# Patient Record
Sex: MALE | Race: WHITE | NOT HISPANIC OR LATINO | Employment: OTHER | URBAN - NONMETROPOLITAN AREA
[De-identification: names, ages, dates, MRNs, and addresses within clinical notes are randomized per-mention and may not be internally consistent; named-entity substitution may affect disease eponyms.]

---

## 2017-01-27 ENCOUNTER — OFFICE VISIT (OUTPATIENT)
Dept: CARDIOLOGY | Facility: CLINIC | Age: 82
End: 2017-01-27

## 2017-01-27 VITALS
BODY MASS INDEX: 21.33 KG/M2 | HEIGHT: 70 IN | SYSTOLIC BLOOD PRESSURE: 108 MMHG | WEIGHT: 149 LBS | HEART RATE: 88 BPM | DIASTOLIC BLOOD PRESSURE: 68 MMHG

## 2017-01-27 DIAGNOSIS — I48.0 PAF (PAROXYSMAL ATRIAL FIBRILLATION) (HCC): ICD-10-CM

## 2017-01-27 DIAGNOSIS — D63.1 ANEMIA ASSOCIATED WITH CHRONIC RENAL FAILURE, STAGE 3 (MODERATE): Primary | ICD-10-CM

## 2017-01-27 DIAGNOSIS — N18.3 ANEMIA ASSOCIATED WITH CHRONIC RENAL FAILURE, STAGE 3 (MODERATE): Primary | ICD-10-CM

## 2017-01-27 DIAGNOSIS — F41.9 ANXIETY: ICD-10-CM

## 2017-01-27 PROCEDURE — 93000 ELECTROCARDIOGRAM COMPLETE: CPT | Performed by: INTERNAL MEDICINE

## 2017-01-27 PROCEDURE — 99214 OFFICE O/P EST MOD 30 MIN: CPT | Performed by: INTERNAL MEDICINE

## 2017-01-27 NOTE — PROGRESS NOTES
Jesús Long  [id number]  7/29/1931  85 y.o.  male    Referring Provider: KENNY Lyon    Reason for Follow-up Visit: PAF  Overall doing well  Denies any chest pain  No excessive shortness of breath  Compliant with medications    History of present illness:  Jesús Long is a 85 y.o. yo male with history of PAF who presents today for   Chief Complaint   Patient presents with   • Coronary Artery Disease     6 MON    .    History  Past Medical History   Diagnosis Date   • Anemia    • Atrial fibrillation by electrocardiogram    • CAD in native artery    • Carotid bruit    • CHF (congestive heart failure)    • Chronic kidney disease    • Diabetes mellitus      Controlled diabetes mellitus type II without complication   • Edema    • History of coronary artery bypass graft    • Hyperlipidemia    • Hypertension    • Monoclonal (M) protein disease, multiple 'M' protein    • Multiple myeloma    • Multiple myeloma    • Murmur    • Pulmonary hypertension    • Sick sinus syndrome    • Sleep apnea    • Status post placement of cardiac pacemaker    ,   Past Surgical History   Procedure Laterality Date   • Coronary artery bypass graft       multiple bypasses   • Back surgery     • Hemorroidectomy     • Pacemaker implantation     • Cardiac catheterization  09/16/1998     Left Heart; candidate for re-do CABG   ,   Family History   Problem Relation Age of Onset   • Cancer Mother    • Heart disease Mother    • Heart disease Father    • Coronary artery disease Father    • Dementia Sister    ,   Social History   Substance Use Topics   • Smoking status: Former Smoker     Types: Cigarettes     Quit date: 11/18/1986   • Smokeless tobacco: Former User   • Alcohol use No   ,     Medications  Current Outpatient Prescriptions   Medication Sig Dispense Refill   • carvedilol (COREG) 25 MG tablet Take 25 mg by mouth Daily.     • coenzyme Q10 100 MG capsule Take 100 mg by mouth Daily.     • dabigatran etexilate (PRADAXA) 75 MG  "capsule Take 75 mg by mouth 2 (Two) Times a Day.     • sertraline (ZOLOFT) 25 MG tablet Take 25 mg by mouth Daily.     • vitamin C (ASCORBIC ACID) 250 MG tablet Take 250 mg by mouth Daily.       No current facility-administered medications for this visit.        Allergies:  Review of patient's allergies indicates no known allergies.    Review of Systems  Review of Systems   Constitution: Positive for weakness.   HENT: Negative.    Eyes: Negative.    Cardiovascular: Positive for chest pain and dyspnea on exertion. Negative for claudication, cyanosis, irregular heartbeat, leg swelling, near-syncope, orthopnea, palpitations, paroxysmal nocturnal dyspnea and syncope.   Respiratory: Negative.    Endocrine: Negative.    Hematologic/Lymphatic: Negative.    Skin: Negative.    Gastrointestinal: Negative for anorexia.   Genitourinary: Negative.    Psychiatric/Behavioral: Negative.        Objective     Physical Exam:  Visit Vitals   • /68   • Pulse 88   • Ht 70\" (177.8 cm)   • Wt 149 lb (67.6 kg)   • BMI 21.38 kg/m2     Physical Exam   Constitutional: He appears well-developed.   HENT:   Head: Normocephalic.   Neck: Normal carotid pulses and no JVD present. No tracheal tenderness present. Carotid bruit is not present. No tracheal deviation and no edema present.   Cardiovascular: Regular rhythm and normal pulses.    Murmur heard.   Systolic murmur is present with a grade of 2/6   Pulmonary/Chest: Effort normal. No stridor.   Abdominal: Soft.   Neurological: He is alert. He has normal strength. No cranial nerve deficit or sensory deficit.   Skin: Skin is warm.   Psychiatric: He has a normal mood and affect. His speech is normal and behavior is normal.       Results Review:       ECG 12 Lead  Date/Time: 1/27/2017 11:36 AM  Performed by: CHERISE ACKERMAN  Authorized by: CHERISE ACKERMAN   Comparison: compared with previous ECG from 5/26/2016  Rhythm: sinus rhythm and paced  Clinical impression: abnormal " ECG            Assessment/Plan   Patient Active Problem List   Diagnosis   • Anemia associated with chronic renal failure   • PAF (paroxysmal atrial fibrillation)   • Anxiety        No palpitations. No significant pedal edema. Compliant with medications and diet. Latest labs and medications reviewed.    Plan:    Close follow up with you as scheduled.  Intensive factor modifications.  See order list.    Counseled regarding disease appropriate diet, fluid, caffeine, stimulants and sodium intake as well as importance of compliance to diet, exercise and regular follow up.  Avoid NSAIDS  Suggest F/U with mid level provider    No Follow-up on file.

## 2017-01-27 NOTE — MR AVS SNAPSHOT
Jesús LOPEZ Ethan   1/27/2017 10:00 AM   Office Visit    Dept Phone:  [phone]   Encounter #:  [id number]    Provider:  Mauri Lopez MD   Department:  [facility]                Your Full Care Plan              Today's Medication Changes          These changes are accurate as of: 1/27/17 11:43 AM.  If you have any questions, ask your nurse or doctor.               Stop taking medication(s)listed here:     STATINS SUPPORT PO   Stopped by:  Mauri Lopez MD                      Your Updated Medication List          This list is accurate as of: 1/27/17 11:43 AM.  Always use your most recent med list.                carvedilol 25 MG tablet   Commonly known as:  COREG       coenzyme Q10 100 MG capsule       dabigatran etexilate 75 MG capsule   Commonly known as:  PRADAXA       sertraline 25 MG tablet   Commonly known as:  ZOLOFT       vitamin C 250 MG tablet   Commonly known as:  ASCORBIC ACID               We Performed the Following     ECG 12 Lead       You Were Diagnosed With        Codes Comments    Anemia associated with chronic renal failure, stage 3 (moderate)    -  Primary ICD-10-CM: N18.3, D63.1  ICD-9-CM: 285.21, 585.3     PAF (paroxysmal atrial fibrillation)     ICD-10-CM: I48.0  ICD-9-CM: 427.31     Anxiety     ICD-10-CM: F41.9  ICD-9-CM: 300.00       Instructions     None    Patient Instructions History      Upcoming Appointments     Visit Type Date Time Department    FOLLOW UP 1/27/2017 10:00 AM [facility]    NURSE/MA VISIT 3/16/2017 11:45 AM [facility]    FOLLOW UP 7/26/2017  9:30 AM [facility]    NURSE/MA VISIT 9/15/2017  8:30 AM [facility] P[facility]      MyChart Signup     [facility] doxo allows you to send messages to your doctor, view your test results, renew your prescriptions, schedule appointments, and more. To sign up, go to [employer] and click on the Sign Up Now link in the New User? box. Enter  "your [employer] Activation Code exactly as it appears below along with the last four digits of your Social Security Number and your Date of Birth () to complete the sign-up process. If you do not sign up before the expiration date, you must request a new code.    [employer] Activation Code: [email or web address]  Expires: 2/10/2017 11:42 AM    If you have questions, you can email [email or web address] or call [phone] to talk to our [employer] staff. Remember, [employer] is NOT to be used for urgent needs. For medical emergencies, dial 911.               Other Info from Your Visit           Your Appointments     Mar 16, 2017 11:45 AM CDT   Nurse Visit with PACEMAKER [facility]ART [facility]P [facility]   [facility] (--)    [address]  [facility] [address]   [phone]            2017  9:30 AM CDT   Follow Up with PAD [facility]ART [facility] NP   [facility] (--)    [address]  [facility] [address]   [phone]           Arrive 15 minutes prior to appointment.            Sep 15, 2017  8:30 AM CDT   Nurse Visit with PACEMAKER HEART GRP JEFF   [facility] (--)    [address]  [facility] [address]   [phone]              Allergies     No Known Allergies      Reason for Visit     Coronary Artery Disease 6 MON       Vital Signs     Blood Pressure Pulse Height Weight Body Mass Index Smoking Status    108/68 88 70\" (177.8 cm) 149 lb (67.6 kg) 21.38 kg/m2 Former Smoker      Problems and Diagnoses Noted     Anemia of chronic kidney failure    Anxiety problem    Atrial fibrillation (irregular heartbeat)      Results     ECG 12 Lead               "

## 2017-01-27 NOTE — LETTER
January 27, 2017     KENNY Wilson  [address]  [place] [address]    Patient: Jesús Long   YOB: 1931   Date of Visit: 1/27/2017       KENNY Hernandez:    Thank you for referring Jesús Long to me for evaluation. Below are the relevant portions of my assessment and plan of care.    If you have questions, please do not hesitate to call me. I look forward to following Jesús along with you.         Sincerely,        Mauri Lopez MD        CC: No Recipients  Mauri Lopez MD  1/27/2017 11:40 AM  Signed  Jesús Long  [id number]  7/29/1931  85 y.o.  male    Referring Provider: KENNY Lyon    Reason for Follow-up Visit: PAF  Overall doing well  Denies any chest pain  No excessive shortness of breath  Compliant with medications    History of present illness:  Jesús Long is a 85 y.o. yo male with history of PAF who presents today for   Chief Complaint   Patient presents with   • Coronary Artery Disease     6 MON    .    History  Past Medical History   Diagnosis Date   • Anemia    • Atrial fibrillation by electrocardiogram    • CAD in native artery    • Carotid bruit    • CHF (congestive heart failure)    • Chronic kidney disease    • Diabetes mellitus      Controlled diabetes mellitus type II without complication   • Edema    • History of coronary artery bypass graft    • Hyperlipidemia    • Hypertension    • Monoclonal (M) protein disease, multiple 'M' protein    • Multiple myeloma    • Multiple myeloma    • Murmur    • Pulmonary hypertension    • Sick sinus syndrome    • Sleep apnea    • Status post placement of cardiac pacemaker    ,   Past Surgical History   Procedure Laterality Date   • Coronary artery bypass graft       multiple bypasses   • Back surgery     • Hemorroidectomy     • Pacemaker implantation     • Cardiac catheterization  09/16/1998     Left Heart; candidate for re-do CABG   ,   Family History   Problem Relation Age of Onset   • Cancer Mother    •  "Heart disease Mother    • Heart disease Father    • Coronary artery disease Father    • Dementia Sister    ,   Social History   Substance Use Topics   • Smoking status: Former Smoker     Types: Cigarettes     Quit date: 11/18/1986   • Smokeless tobacco: Former User   • Alcohol use No   ,     Medications  Current Outpatient Prescriptions   Medication Sig Dispense Refill   • carvedilol (COREG) 25 MG tablet Take 25 mg by mouth Daily.     • coenzyme Q10 100 MG capsule Take 100 mg by mouth Daily.     • dabigatran etexilate (PRADAXA) 75 MG capsule Take 75 mg by mouth 2 (Two) Times a Day.     • sertraline (ZOLOFT) 25 MG tablet Take 25 mg by mouth Daily.     • vitamin C (ASCORBIC ACID) 250 MG tablet Take 250 mg by mouth Daily.       No current facility-administered medications for this visit.        Allergies:  Review of patient's allergies indicates no known allergies.    Review of Systems  Review of Systems   Constitution: Positive for weakness.   HENT: Negative.    Eyes: Negative.    Cardiovascular: Positive for chest pain and dyspnea on exertion. Negative for claudication, cyanosis, irregular heartbeat, leg swelling, near-syncope, orthopnea, palpitations, paroxysmal nocturnal dyspnea and syncope.   Respiratory: Negative.    Endocrine: Negative.    Hematologic/Lymphatic: Negative.    Skin: Negative.    Gastrointestinal: Negative for anorexia.   Genitourinary: Negative.    Psychiatric/Behavioral: Negative.        Objective     Physical Exam:  Visit Vitals   • /68   • Pulse 88   • Ht 70\" (177.8 cm)   • Wt 149 lb (67.6 kg)   • BMI 21.38 kg/m2     Physical Exam   Constitutional: He appears well-developed.   HENT:   Head: Normocephalic.   Neck: Normal carotid pulses and no JVD present. No tracheal tenderness present. Carotid bruit is not present. No tracheal deviation and no edema present.   Cardiovascular: Regular rhythm and normal pulses.    Murmur heard.   Systolic murmur is present with a grade of 2/6 "   Pulmonary/Chest: Effort normal. No stridor.   Abdominal: Soft.   Neurological: He is alert. He has normal strength. No cranial nerve deficit or sensory deficit.   Skin: Skin is warm.   Psychiatric: He has a normal mood and affect. His speech is normal and behavior is normal.       Results Review:       ECG 12 Lead  Date/Time: 1/27/2017 11:36 AM  Performed by: CHERISE ACKERMAN  Authorized by: CHERISE ACKERMAN   Comparison: compared with previous ECG from 5/26/2016  Rhythm: sinus rhythm and paced  Clinical impression: abnormal ECG            Assessment/Plan   Patient Active Problem List   Diagnosis   • Anemia associated with chronic renal failure   • PAF (paroxysmal atrial fibrillation)   • Anxiety        No palpitations. No significant pedal edema. Compliant with medications and diet. Latest labs and medications reviewed.    Plan:    Close follow up with you as scheduled.  Intensive factor modifications.  See order list.    Counseled regarding disease appropriate diet, fluid, caffeine, stimulants and sodium intake as well as importance of compliance to diet, exercise and regular follow up.  Avoid NSAIDS  Suggest F/U with mid level provider    No Follow-up on file.

## 2017-03-03 ENCOUNTER — TRANSCRIBE ORDERS (OUTPATIENT)
Dept: ADMINISTRATIVE | Facility: HOSPITAL | Age: 82
End: 2017-03-03

## 2017-03-03 ENCOUNTER — APPOINTMENT (OUTPATIENT)
Dept: LAB | Facility: HOSPITAL | Age: 82
End: 2017-03-03
Attending: INTERNAL MEDICINE

## 2017-03-03 DIAGNOSIS — C90.00 LIGHT CHAIN MYELOMA (HCC): Primary | ICD-10-CM

## 2017-03-03 PROCEDURE — 84166 PROTEIN E-PHORESIS/URINE/CSF: CPT | Performed by: INTERNAL MEDICINE

## 2017-03-03 PROCEDURE — 81050 URINALYSIS VOLUME MEASURE: CPT | Performed by: INTERNAL MEDICINE

## 2017-03-03 PROCEDURE — 84156 ASSAY OF PROTEIN URINE: CPT | Performed by: INTERNAL MEDICINE

## 2017-03-03 PROCEDURE — 86335 IMMUNFIX E-PHORSIS/URINE/CSF: CPT | Performed by: INTERNAL MEDICINE

## 2017-03-06 LAB
ALBUMIN 24H MFR UR ELPH: 15.8 %
ALPHA1 GLOB 24H MFR UR ELPH: 5.8 %
ALPHA2 GLOB 24H MFR UR ELPH: 24.9 %
B-GLOBULIN MFR UR ELPH: 37.3 %
GAMMA GLOB 24H MFR UR ELPH: 16.2 %
HIV 1 & 2 AB SER-IMP: ABNORMAL
INTERPRETATION UR IFE-IMP: ABNORMAL
M PROTEIN 24H MFR UR ELPH: ABNORMAL %
PROT 24H UR-MRATE: 351 MG/24 HR (ref 30–150)
PROT UR-MCNC: 23.4 MG/DL

## 2017-03-16 ENCOUNTER — CLINICAL SUPPORT (OUTPATIENT)
Dept: CARDIOLOGY | Facility: CLINIC | Age: 82
End: 2017-03-16

## 2017-03-16 DIAGNOSIS — Z95.0 CARDIAC PACEMAKER IN SITU: Primary | ICD-10-CM

## 2017-03-16 DIAGNOSIS — I48.91 ATRIAL FIBRILLATION, UNSPECIFIED TYPE (HCC): ICD-10-CM

## 2017-03-16 PROCEDURE — 93288 INTERROG EVL PM/LDLS PM IP: CPT | Performed by: INTERNAL MEDICINE

## 2017-03-16 NOTE — PROGRESS NOTES
Single Chamber Ventricular Pacemaker Evaluation Report  In office    March 16, 2017    Primary Cardiologist: Jessica  : Medtronic Model: Adapta  Implant date: July 2, 2013    Reason for evaluation:routine  Indication for pacemaker: a-fib    Measurements  Ventricular sensing - R wave: n/r @ VVI 30 ppm  Ventricular threshold: 0.75 V @ 0.4 ms  Ventricular lead impedance: 519 ohms      Diagnostic Data  Paced: 92.4 %  Other: no new events  Battery status: satisfactory    Est 8.5 years       Final Parameters  Mode: VVIR     Lower rate: 60 bpm    Ventricular - Amplitude: 2 V Pulse width: 0.4 ms Sensitivity: 4 mV   Changes made: none  Conclusions: normal pacemaker function and stable pacing and sensing thresholds    Follow up: 6 months

## 2017-03-19 NOTE — PROGRESS NOTES
I have reviewed the notes, assessments, and/or procedures performed by Rosita Benson RN, I concur with her documentation of Jesús Long.

## 2017-05-17 ENCOUNTER — APPOINTMENT (OUTPATIENT)
Dept: CARDIOLOGY | Facility: HOSPITAL | Age: 82
End: 2017-05-17

## 2017-05-17 ENCOUNTER — APPOINTMENT (OUTPATIENT)
Dept: GENERAL RADIOLOGY | Facility: HOSPITAL | Age: 82
End: 2017-05-17

## 2017-05-17 ENCOUNTER — APPOINTMENT (OUTPATIENT)
Dept: CT IMAGING | Facility: HOSPITAL | Age: 82
End: 2017-05-17

## 2017-05-17 ENCOUNTER — HOSPITAL ENCOUNTER (INPATIENT)
Facility: HOSPITAL | Age: 82
LOS: 7 days | Discharge: NURSING, REHAB OR LONG-TERM CARE | End: 2017-05-24
Attending: FAMILY MEDICINE | Admitting: INTERNAL MEDICINE

## 2017-05-17 DIAGNOSIS — Z74.09 IMPAIRED FUNCTIONAL MOBILITY, BALANCE, GAIT, AND ENDURANCE: ICD-10-CM

## 2017-05-17 DIAGNOSIS — Z74.09 IMPAIRED MOBILITY AND ADLS: ICD-10-CM

## 2017-05-17 DIAGNOSIS — C90.00 MULTIPLE MYELOMA, REMISSION STATUS UNSPECIFIED (HCC): ICD-10-CM

## 2017-05-17 DIAGNOSIS — D64.9 ANEMIA, UNSPECIFIED TYPE: ICD-10-CM

## 2017-05-17 DIAGNOSIS — N18.3 CKD (CHRONIC KIDNEY DISEASE), STAGE 3 (MODERATE): ICD-10-CM

## 2017-05-17 DIAGNOSIS — R04.2 HEMOPTYSIS: Primary | ICD-10-CM

## 2017-05-17 DIAGNOSIS — Z78.9 IMPAIRED MOBILITY AND ADLS: ICD-10-CM

## 2017-05-17 PROBLEM — N18.9 CHRONIC KIDNEY DISEASE: Status: ACTIVE | Noted: 2017-05-17

## 2017-05-17 PROBLEM — I10 HYPERTENSION: Status: ACTIVE | Noted: 2017-05-17

## 2017-05-17 PROBLEM — D63.8 ANEMIA OF CHRONIC DISEASE: Status: ACTIVE | Noted: 2017-05-17

## 2017-05-17 PROBLEM — Z95.0 PACEMAKER: Status: ACTIVE | Noted: 2017-05-17

## 2017-05-17 PROBLEM — I25.10 CORONARY ARTERY DISEASE: Status: ACTIVE | Noted: 2017-05-17

## 2017-05-17 LAB
ALBUMIN SERPL-MCNC: 3.7 G/DL (ref 3.5–5)
ALBUMIN/GLOB SERPL: 1.4 G/DL (ref 1.1–2.5)
ALP SERPL-CCNC: 45 U/L (ref 24–120)
ALT SERPL W P-5'-P-CCNC: 28 U/L (ref 0–54)
ANION GAP SERPL CALCULATED.3IONS-SCNC: 13 MMOL/L (ref 4–13)
APTT PPP: 97.2 SECONDS (ref 24.1–34.8)
AST SERPL-CCNC: 37 U/L (ref 7–45)
BACTERIA UR QL AUTO: ABNORMAL /HPF
BASOPHILS # BLD AUTO: 0.01 10*3/MM3 (ref 0–0.2)
BASOPHILS NFR BLD AUTO: 0.2 % (ref 0–2)
BH CV ECHO MEAS - AO MAX PG (FULL): 1.3 MMHG
BH CV ECHO MEAS - AO MAX PG: 3.6 MMHG
BH CV ECHO MEAS - AO MEAN PG (FULL): 1 MMHG
BH CV ECHO MEAS - AO MEAN PG: 2 MMHG
BH CV ECHO MEAS - AO ROOT AREA (BSA CORRECTED): 2.4
BH CV ECHO MEAS - AO ROOT AREA: 12.6 CM^2
BH CV ECHO MEAS - AO ROOT DIAM: 4 CM
BH CV ECHO MEAS - AO V2 MAX: 94.8 CM/SEC
BH CV ECHO MEAS - AO V2 MEAN: 64.8 CM/SEC
BH CV ECHO MEAS - AO V2 VTI: 17.7 CM
BH CV ECHO MEAS - AVA(I,A): 2.6 CM^2
BH CV ECHO MEAS - AVA(I,D): 2.6 CM^2
BH CV ECHO MEAS - AVA(V,A): 2.8 CM^2
BH CV ECHO MEAS - AVA(V,D): 2.8 CM^2
BH CV ECHO MEAS - BSA(HAYCOCK): 1.8 M^2
BH CV ECHO MEAS - BSA: 1.7 M^2
BH CV ECHO MEAS - BZI_BMI: 31.1 KILOGRAMS/M^2
BH CV ECHO MEAS - BZI_METRIC_HEIGHT: 152.4 CM
BH CV ECHO MEAS - BZI_METRIC_WEIGHT: 72.1 KG
BH CV ECHO MEAS - CONTRAST EF 4CH: 45 ML/M^2
BH CV ECHO MEAS - EDV(CUBED): 82.9 ML
BH CV ECHO MEAS - EDV(MOD-SP4): 102 ML
BH CV ECHO MEAS - EDV(TEICH): 85.8 ML
BH CV ECHO MEAS - EF(CUBED): 53 %
BH CV ECHO MEAS - EF(MOD-SP4): 45 %
BH CV ECHO MEAS - EF(TEICH): 45.1 %
BH CV ECHO MEAS - ESV(CUBED): 39 ML
BH CV ECHO MEAS - ESV(MOD-SP4): 56.1 ML
BH CV ECHO MEAS - ESV(TEICH): 47.1 ML
BH CV ECHO MEAS - FS: 22.2 %
BH CV ECHO MEAS - IVS/LVPW: 1
BH CV ECHO MEAS - IVSD: 1.2 CM
BH CV ECHO MEAS - LA DIMENSION: 5.7 CM
BH CV ECHO MEAS - LA/AO: 1.4
BH CV ECHO MEAS - LAT PEAK E' VEL: 9.8 CM/SEC
BH CV ECHO MEAS - LV DIASTOLIC VOL/BSA (35-75): 60.2 ML/M^2
BH CV ECHO MEAS - LV MASS(C)D: 195.6 GRAMS
BH CV ECHO MEAS - LV MASS(C)DI: 115.5 GRAMS/M^2
BH CV ECHO MEAS - LV MAX PG: 2.3 MMHG
BH CV ECHO MEAS - LV MEAN PG: 1 MMHG
BH CV ECHO MEAS - LV SYSTOLIC VOL/BSA (12-30): 33.1 ML/M^2
BH CV ECHO MEAS - LV V1 MAX: 75.4 CM/SEC
BH CV ECHO MEAS - LV V1 MEAN: 42.3 CM/SEC
BH CV ECHO MEAS - LV V1 VTI: 13.4 CM
BH CV ECHO MEAS - LVIDD: 4.4 CM
BH CV ECHO MEAS - LVIDS: 3.4 CM
BH CV ECHO MEAS - LVLD AP4: 7.6 CM
BH CV ECHO MEAS - LVLS AP4: 7.4 CM
BH CV ECHO MEAS - LVOT AREA (M): 3.5 CM^2
BH CV ECHO MEAS - LVOT AREA: 3.5 CM^2
BH CV ECHO MEAS - LVOT DIAM: 2.1 CM
BH CV ECHO MEAS - LVPWD: 1.2 CM
BH CV ECHO MEAS - MED PEAK E' VEL: 6.74 CM/SEC
BH CV ECHO MEAS - MV DEC TIME: 0.13 SEC
BH CV ECHO MEAS - MV E MAX VEL: 128 CM/SEC
BH CV ECHO MEAS - PI END-D VEL: 55.3 CM/SEC
BH CV ECHO MEAS - RAP SYSTOLE: 10 MMHG
BH CV ECHO MEAS - RVSP: 74.6 MMHG
BH CV ECHO MEAS - SI(AO): 131.4 ML/M^2
BH CV ECHO MEAS - SI(CUBED): 25.9 ML/M^2
BH CV ECHO MEAS - SI(LVOT): 27.4 ML/M^2
BH CV ECHO MEAS - SI(MOD-SP4): 27.1 ML/M^2
BH CV ECHO MEAS - SI(TEICH): 22.9 ML/M^2
BH CV ECHO MEAS - SV(AO): 222.4 ML
BH CV ECHO MEAS - SV(CUBED): 43.9 ML
BH CV ECHO MEAS - SV(LVOT): 46.4 ML
BH CV ECHO MEAS - SV(MOD-SP4): 45.9 ML
BH CV ECHO MEAS - SV(TEICH): 38.7 ML
BH CV ECHO MEAS - TR MAX VEL: 402 CM/SEC
BILIRUB SERPL-MCNC: 1.1 MG/DL (ref 0.1–1)
BILIRUB UR QL STRIP: NEGATIVE
BUN BLD-MCNC: 46 MG/DL (ref 5–21)
BUN/CREAT SERPL: 16.1 (ref 7–25)
CALCIUM SPEC-SCNC: 8.6 MG/DL (ref 8.4–10.4)
CHLORIDE SERPL-SCNC: 103 MMOL/L (ref 98–110)
CLARITY UR: CLEAR
CO2 SERPL-SCNC: 20 MMOL/L (ref 24–31)
COLOR UR: YELLOW
CREAT BLD-MCNC: 2.85 MG/DL (ref 0.5–1.4)
CRP SERPL-MCNC: 8.95 MG/DL (ref 0–0.99)
DEPRECATED RDW RBC AUTO: 58.6 FL (ref 40–54)
EOSINOPHIL # BLD AUTO: 0.03 10*3/MM3 (ref 0–0.7)
EOSINOPHIL NFR BLD AUTO: 0.5 % (ref 0–4)
ERYTHROCYTE [DISTWIDTH] IN BLOOD BY AUTOMATED COUNT: 16.1 % (ref 12–15)
GFR SERPL CREATININE-BSD FRML MDRD: 21 ML/MIN/1.73
GLOBULIN UR ELPH-MCNC: 2.7 GM/DL
GLUCOSE BLD-MCNC: 134 MG/DL (ref 70–100)
GLUCOSE UR STRIP-MCNC: ABNORMAL MG/DL
GRAN CASTS URNS QL MICRO: ABNORMAL /LPF
HCT VFR BLD AUTO: 26.9 % (ref 40–52)
HGB BLD-MCNC: 8.7 G/DL (ref 14–18)
HGB UR QL STRIP.AUTO: ABNORMAL
IMM GRANULOCYTES # BLD: 0.02 10*3/MM3 (ref 0–0.03)
IMM GRANULOCYTES NFR BLD: 0.3 % (ref 0–5)
INR PPP: 2.44 (ref 0.91–1.09)
KETONES UR QL STRIP: ABNORMAL
LEFT ATRIUM VOLUME INDEX: 59.8 ML/M2
LEFT ATRIUM VOLUME: 101 CM3
LEUKOCYTE ESTERASE UR QL STRIP.AUTO: NEGATIVE
LV EF 2D ECHO EST: 45 %
LYMPHOCYTES # BLD AUTO: 0.45 10*3/MM3 (ref 0.72–4.86)
LYMPHOCYTES NFR BLD AUTO: 6.8 % (ref 15–45)
MCH RBC QN AUTO: 32.2 PG (ref 28–32)
MCHC RBC AUTO-ENTMCNC: 32.3 G/DL (ref 33–36)
MCV RBC AUTO: 99.6 FL (ref 82–95)
MONOCYTES # BLD AUTO: 0.63 10*3/MM3 (ref 0.19–1.3)
MONOCYTES NFR BLD AUTO: 9.5 % (ref 4–12)
NEUTROPHILS # BLD AUTO: 5.49 10*3/MM3 (ref 1.87–8.4)
NEUTROPHILS NFR BLD AUTO: 82.7 % (ref 39–78)
NITRITE UR QL STRIP: NEGATIVE
NT-PROBNP SERPL-MCNC: ABNORMAL PG/ML (ref 0–1800)
PH UR STRIP.AUTO: <=5 [PH] (ref 5–8)
PLATELET # BLD AUTO: 65 10*3/MM3 (ref 130–400)
PMV BLD AUTO: 10.9 FL (ref 6–12)
POTASSIUM BLD-SCNC: 4.7 MMOL/L (ref 3.5–5.3)
PROT SERPL-MCNC: 6.4 G/DL (ref 6.3–8.7)
PROT UR QL STRIP: ABNORMAL
PROTHROMBIN TIME: 27.4 SECONDS (ref 11.9–14.6)
RBC # BLD AUTO: 2.7 10*6/MM3 (ref 4.8–5.9)
RBC # UR: ABNORMAL /HPF
REF LAB TEST METHOD: ABNORMAL
SODIUM BLD-SCNC: 136 MMOL/L (ref 135–145)
SP GR UR STRIP: 1.02 (ref 1–1.03)
SQUAMOUS #/AREA URNS HPF: ABNORMAL /HPF
UROBILINOGEN UR QL STRIP: ABNORMAL
WBC NRBC COR # BLD: 6.63 10*3/MM3 (ref 4.8–10.8)
WBC UR QL AUTO: ABNORMAL /HPF
YEAST URNS QL MICRO: ABNORMAL /HPF

## 2017-05-17 PROCEDURE — 93010 ELECTROCARDIOGRAM REPORT: CPT | Performed by: INTERNAL MEDICINE

## 2017-05-17 PROCEDURE — 85730 THROMBOPLASTIN TIME PARTIAL: CPT | Performed by: FAMILY MEDICINE

## 2017-05-17 PROCEDURE — 83880 ASSAY OF NATRIURETIC PEPTIDE: CPT | Performed by: NURSE PRACTITIONER

## 2017-05-17 PROCEDURE — 71250 CT THORAX DX C-: CPT

## 2017-05-17 PROCEDURE — 85025 COMPLETE CBC W/AUTO DIFF WBC: CPT | Performed by: FAMILY MEDICINE

## 2017-05-17 PROCEDURE — 87040 BLOOD CULTURE FOR BACTERIA: CPT | Performed by: FAMILY MEDICINE

## 2017-05-17 PROCEDURE — 85610 PROTHROMBIN TIME: CPT | Performed by: FAMILY MEDICINE

## 2017-05-17 PROCEDURE — 87086 URINE CULTURE/COLONY COUNT: CPT | Performed by: FAMILY MEDICINE

## 2017-05-17 PROCEDURE — 80053 COMPREHEN METABOLIC PANEL: CPT | Performed by: FAMILY MEDICINE

## 2017-05-17 PROCEDURE — 71010 HC CHEST PA OR AP: CPT

## 2017-05-17 PROCEDURE — 99285 EMERGENCY DEPT VISIT HI MDM: CPT

## 2017-05-17 PROCEDURE — 86140 C-REACTIVE PROTEIN: CPT | Performed by: FAMILY MEDICINE

## 2017-05-17 PROCEDURE — 81001 URINALYSIS AUTO W/SCOPE: CPT | Performed by: FAMILY MEDICINE

## 2017-05-17 PROCEDURE — 93005 ELECTROCARDIOGRAM TRACING: CPT | Performed by: FAMILY MEDICINE

## 2017-05-17 PROCEDURE — 93306 TTE W/DOPPLER COMPLETE: CPT

## 2017-05-17 PROCEDURE — 25010000002 CEFTRIAXONE: Performed by: EMERGENCY MEDICINE

## 2017-05-17 PROCEDURE — 93306 TTE W/DOPPLER COMPLETE: CPT | Performed by: INTERNAL MEDICINE

## 2017-05-17 PROCEDURE — 87205 SMEAR GRAM STAIN: CPT | Performed by: FAMILY MEDICINE

## 2017-05-17 PROCEDURE — 87070 CULTURE OTHR SPECIMN AEROBIC: CPT | Performed by: FAMILY MEDICINE

## 2017-05-17 RX ORDER — TRAZODONE HYDROCHLORIDE 50 MG/1
50 TABLET ORAL NIGHTLY
Status: DISCONTINUED | OUTPATIENT
Start: 2017-05-17 | End: 2017-05-18

## 2017-05-17 RX ORDER — IPRATROPIUM BROMIDE AND ALBUTEROL SULFATE 2.5; .5 MG/3ML; MG/3ML
3 SOLUTION RESPIRATORY (INHALATION) EVERY 6 HOURS PRN
Status: DISCONTINUED | OUTPATIENT
Start: 2017-05-17 | End: 2017-05-18

## 2017-05-17 RX ORDER — GUAIFENESIN 600 MG/1
1200 TABLET, EXTENDED RELEASE ORAL ONCE
Status: COMPLETED | OUTPATIENT
Start: 2017-05-17 | End: 2017-05-17

## 2017-05-17 RX ORDER — DOCUSATE SODIUM 100 MG/1
100 CAPSULE, LIQUID FILLED ORAL 2 TIMES DAILY
Status: DISCONTINUED | OUTPATIENT
Start: 2017-05-17 | End: 2017-05-24 | Stop reason: HOSPADM

## 2017-05-17 RX ORDER — CARVEDILOL 25 MG/1
25 TABLET ORAL DAILY
Status: DISCONTINUED | OUTPATIENT
Start: 2017-05-17 | End: 2017-05-18

## 2017-05-17 RX ORDER — SODIUM CHLORIDE 9 MG/ML
125 INJECTION, SOLUTION INTRAVENOUS CONTINUOUS
Status: DISCONTINUED | OUTPATIENT
Start: 2017-05-17 | End: 2017-05-18

## 2017-05-17 RX ORDER — GUAIFENESIN 600 MG/1
1200 TABLET, EXTENDED RELEASE ORAL EVERY 12 HOURS SCHEDULED
Status: DISCONTINUED | OUTPATIENT
Start: 2017-05-18 | End: 2017-05-24 | Stop reason: HOSPADM

## 2017-05-17 RX ORDER — ONDANSETRON 4 MG/1
4 TABLET, FILM COATED ORAL EVERY 6 HOURS PRN
Status: DISCONTINUED | OUTPATIENT
Start: 2017-05-17 | End: 2017-05-24 | Stop reason: HOSPADM

## 2017-05-17 RX ORDER — SODIUM CHLORIDE 0.9 % (FLUSH) 0.9 %
1-10 SYRINGE (ML) INJECTION AS NEEDED
Status: DISCONTINUED | OUTPATIENT
Start: 2017-05-17 | End: 2017-05-24 | Stop reason: HOSPADM

## 2017-05-17 RX ADMIN — SODIUM CHLORIDE 125 ML/HR: 9 INJECTION, SOLUTION INTRAVENOUS at 05:42

## 2017-05-17 RX ADMIN — CEFTRIAXONE 1 G: 1 INJECTION, POWDER, FOR SOLUTION INTRAMUSCULAR; INTRAVENOUS at 08:48

## 2017-05-17 RX ADMIN — SODIUM CHLORIDE 125 ML/HR: 9 INJECTION, SOLUTION INTRAVENOUS at 18:07

## 2017-05-17 RX ADMIN — GUAIFENESIN 1200 MG: 600 TABLET, EXTENDED RELEASE ORAL at 18:07

## 2017-05-17 RX ADMIN — TRAZODONE HYDROCHLORIDE 50 MG: 50 TABLET ORAL at 21:39

## 2017-05-18 ENCOUNTER — APPOINTMENT (OUTPATIENT)
Dept: GENERAL RADIOLOGY | Facility: HOSPITAL | Age: 82
End: 2017-05-18

## 2017-05-18 PROBLEM — Z79.01 CHRONIC ANTICOAGULATION: Status: ACTIVE | Noted: 2017-05-18

## 2017-05-18 PROBLEM — I27.20 PULMONARY HYPERTENSION (HCC): Status: ACTIVE | Noted: 2017-05-18

## 2017-05-18 LAB
ANION GAP SERPL CALCULATED.3IONS-SCNC: 15 MMOL/L (ref 4–13)
ARTERIAL PATENCY WRIST A: ABNORMAL
ATMOSPHERIC PRESS: ABNORMAL MMHG
BASE EXCESS BLDA CALC-SCNC: -7.6 MMOL/L (ref -2–2)
BASOPHILS # BLD AUTO: 0.01 10*3/MM3 (ref 0–0.2)
BASOPHILS NFR BLD AUTO: 0.1 % (ref 0–2)
BDY SITE: ABNORMAL
BUN BLD-MCNC: 44 MG/DL (ref 5–21)
BUN/CREAT SERPL: 16.6 (ref 7–25)
CALCIUM SPEC-SCNC: 7.8 MG/DL (ref 8.4–10.4)
CHLORIDE SERPL-SCNC: 106 MMOL/L (ref 98–110)
CK SERPL-CCNC: 274 U/L (ref 0–203)
CO2 SERPL-SCNC: 15 MMOL/L (ref 24–31)
CREAT BLD-MCNC: 2.65 MG/DL (ref 0.5–1.4)
D-LACTATE SERPL-SCNC: 1.7 MMOL/L (ref 0.5–2)
DEPRECATED RDW RBC AUTO: 58.4 FL (ref 40–54)
EOSINOPHIL # BLD AUTO: 0 10*3/MM3 (ref 0–0.7)
EOSINOPHIL NFR BLD AUTO: 0 % (ref 0–4)
ERYTHROCYTE [DISTWIDTH] IN BLOOD BY AUTOMATED COUNT: 16.1 % (ref 12–15)
GAS FLOW AIRWAY: 4 LPM
GFR SERPL CREATININE-BSD FRML MDRD: 23 ML/MIN/1.73
GLUCOSE BLD-MCNC: 143 MG/DL (ref 70–100)
HBA1C MFR BLD: 6.5 %
HCO3 BLDA-SCNC: 13.6 MMOL/L (ref 22–26)
HCT VFR BLD AUTO: 25.7 % (ref 40–52)
HGB BLD-MCNC: 8.6 G/DL (ref 14–18)
IMM GRANULOCYTES # BLD: 0.02 10*3/MM3 (ref 0–0.03)
IMM GRANULOCYTES NFR BLD: 0.3 % (ref 0–5)
INR PPP: 2.24 (ref 0.91–1.09)
LYMPHOCYTES # BLD AUTO: 0.35 10*3/MM3 (ref 0.72–4.86)
LYMPHOCYTES NFR BLD AUTO: 4.7 % (ref 15–45)
Lab: ABNORMAL
MAGNESIUM SERPL-MCNC: 1.9 MG/DL (ref 1.4–2.2)
MCH RBC QN AUTO: 33.1 PG (ref 28–32)
MCHC RBC AUTO-ENTMCNC: 33.5 G/DL (ref 33–36)
MCV RBC AUTO: 98.8 FL (ref 82–95)
MODALITY: ABNORMAL
MONOCYTES # BLD AUTO: 0.68 10*3/MM3 (ref 0.19–1.3)
MONOCYTES NFR BLD AUTO: 9.2 % (ref 4–12)
NEUTROPHILS # BLD AUTO: 6.34 10*3/MM3 (ref 1.87–8.4)
NEUTROPHILS NFR BLD AUTO: 85.7 % (ref 39–78)
NOTIFIED BY: ABNORMAL
NOTIFIED WHO: ABNORMAL
PCO2 BLDA: 19.8 MM HG (ref 35–45)
PH BLDA: 7.45 PH UNITS (ref 7.35–7.45)
PHOSPHATE SERPL-MCNC: 3.3 MG/DL (ref 2.5–4.5)
PLATELET # BLD AUTO: 52 10*3/MM3 (ref 130–400)
PMV BLD AUTO: 10.7 FL (ref 6–12)
PO2 BLDA: 98.7 MM HG (ref 80–100)
POTASSIUM BLD-SCNC: 4.8 MMOL/L (ref 3.5–5.3)
PROCALCITONIN SERPL-MCNC: 2.54 NG/ML
PROTHROMBIN TIME: 25.6 SECONDS (ref 11.9–14.6)
RBC # BLD AUTO: 2.6 10*6/MM3 (ref 4.8–5.9)
SAO2 % BLDCOA: 97.9 % (ref 94–100)
SAO2 % BLDCOA: 97.9 % (ref 94–100)
SODIUM BLD-SCNC: 136 MMOL/L (ref 135–145)
WBC NRBC COR # BLD: 7.4 10*3/MM3 (ref 4.8–10.8)

## 2017-05-18 PROCEDURE — 82550 ASSAY OF CK (CPK): CPT | Performed by: FAMILY MEDICINE

## 2017-05-18 PROCEDURE — 83735 ASSAY OF MAGNESIUM: CPT | Performed by: FAMILY MEDICINE

## 2017-05-18 PROCEDURE — 25010000002 VANCOMYCIN PER 500 MG: Performed by: NURSE PRACTITIONER

## 2017-05-18 PROCEDURE — 94640 AIRWAY INHALATION TREATMENT: CPT

## 2017-05-18 PROCEDURE — 36600 WITHDRAWAL OF ARTERIAL BLOOD: CPT

## 2017-05-18 PROCEDURE — 83036 HEMOGLOBIN GLYCOSYLATED A1C: CPT | Performed by: FAMILY MEDICINE

## 2017-05-18 PROCEDURE — 94799 UNLISTED PULMONARY SVC/PX: CPT

## 2017-05-18 PROCEDURE — 25010000002 AZITHROMYCIN: Performed by: FAMILY MEDICINE

## 2017-05-18 PROCEDURE — 25010000002 LEVOFLOXACIN PER 250 MG: Performed by: NURSE PRACTITIONER

## 2017-05-18 PROCEDURE — 84100 ASSAY OF PHOSPHORUS: CPT | Performed by: FAMILY MEDICINE

## 2017-05-18 PROCEDURE — 85610 PROTHROMBIN TIME: CPT | Performed by: FAMILY MEDICINE

## 2017-05-18 PROCEDURE — 99222 1ST HOSP IP/OBS MODERATE 55: CPT | Performed by: INTERNAL MEDICINE

## 2017-05-18 PROCEDURE — 71010 HC CHEST PA OR AP: CPT

## 2017-05-18 PROCEDURE — 85025 COMPLETE CBC W/AUTO DIFF WBC: CPT | Performed by: FAMILY MEDICINE

## 2017-05-18 PROCEDURE — 82803 BLOOD GASES ANY COMBINATION: CPT

## 2017-05-18 PROCEDURE — 84145 PROCALCITONIN (PCT): CPT | Performed by: FAMILY MEDICINE

## 2017-05-18 PROCEDURE — 83605 ASSAY OF LACTIC ACID: CPT | Performed by: FAMILY MEDICINE

## 2017-05-18 PROCEDURE — 80048 BASIC METABOLIC PNL TOTAL CA: CPT | Performed by: FAMILY MEDICINE

## 2017-05-18 PROCEDURE — 94760 N-INVAS EAR/PLS OXIMETRY 1: CPT

## 2017-05-18 RX ORDER — CARVEDILOL 6.25 MG/1
6.25 TABLET ORAL 2 TIMES DAILY WITH MEALS
Status: ON HOLD | COMMUNITY
End: 2017-05-24

## 2017-05-18 RX ORDER — IPRATROPIUM BROMIDE AND ALBUTEROL SULFATE 2.5; .5 MG/3ML; MG/3ML
3 SOLUTION RESPIRATORY (INHALATION)
Status: DISCONTINUED | OUTPATIENT
Start: 2017-05-18 | End: 2017-05-20

## 2017-05-18 RX ORDER — VANCOMYCIN HYDROCHLORIDE 1 G/200ML
1000 INJECTION, SOLUTION INTRAVENOUS
Status: DISCONTINUED | OUTPATIENT
Start: 2017-05-18 | End: 2017-05-21

## 2017-05-18 RX ORDER — CARVEDILOL 6.25 MG/1
6.25 TABLET ORAL EVERY 12 HOURS SCHEDULED
Status: DISCONTINUED | OUTPATIENT
Start: 2017-05-18 | End: 2017-05-18

## 2017-05-18 RX ORDER — ACETAMINOPHEN 325 MG/1
650 TABLET ORAL EVERY 6 HOURS PRN
Status: DISCONTINUED | OUTPATIENT
Start: 2017-05-18 | End: 2017-05-24 | Stop reason: HOSPADM

## 2017-05-18 RX ORDER — DEXTROSE AND SODIUM CHLORIDE 5; .9 G/100ML; G/100ML
75 INJECTION, SOLUTION INTRAVENOUS CONTINUOUS
Status: DISCONTINUED | OUTPATIENT
Start: 2017-05-18 | End: 2017-05-18

## 2017-05-18 RX ORDER — ASPIRIN 81 MG/1
81 TABLET ORAL DAILY
Status: DISCONTINUED | OUTPATIENT
Start: 2017-05-18 | End: 2017-05-20

## 2017-05-18 RX ORDER — FUROSEMIDE 10 MG/ML
20 INJECTION INTRAMUSCULAR; INTRAVENOUS ONCE
Status: DISCONTINUED | OUTPATIENT
Start: 2017-05-18 | End: 2017-05-18

## 2017-05-18 RX ORDER — LEVOFLOXACIN 5 MG/ML
500 INJECTION, SOLUTION INTRAVENOUS EVERY 24 HOURS
Status: DISCONTINUED | OUTPATIENT
Start: 2017-05-18 | End: 2017-05-19

## 2017-05-18 RX ORDER — SODIUM CHLORIDE 9 MG/ML
75 INJECTION, SOLUTION INTRAVENOUS CONTINUOUS
Status: DISCONTINUED | OUTPATIENT
Start: 2017-05-18 | End: 2017-05-19

## 2017-05-18 RX ORDER — ACETAMINOPHEN 160 MG/5ML
650 SOLUTION ORAL EVERY 4 HOURS PRN
Status: DISCONTINUED | OUTPATIENT
Start: 2017-05-18 | End: 2017-05-23

## 2017-05-18 RX ADMIN — GUAIFENESIN 1200 MG: 600 TABLET, EXTENDED RELEASE ORAL at 20:36

## 2017-05-18 RX ADMIN — GUAIFENESIN 1200 MG: 600 TABLET, EXTENDED RELEASE ORAL at 08:11

## 2017-05-18 RX ADMIN — VANCOMYCIN HYDROCHLORIDE 1000 MG: 1 INJECTION, SOLUTION INTRAVENOUS at 12:40

## 2017-05-18 RX ADMIN — SODIUM BICARBONATE 50 MEQ: 84 INJECTION, SOLUTION INTRAVENOUS at 19:32

## 2017-05-18 RX ADMIN — ASPIRIN 81 MG: 81 TABLET ORAL at 11:34

## 2017-05-18 RX ADMIN — SODIUM CHLORIDE 500 ML: 9 INJECTION, SOLUTION INTRAVENOUS at 19:32

## 2017-05-18 RX ADMIN — AZITHROMYCIN 500 MG: 500 INJECTION, POWDER, LYOPHILIZED, FOR SOLUTION INTRAVENOUS at 08:39

## 2017-05-18 RX ADMIN — SODIUM CHLORIDE 100 ML/HR: 9 INJECTION, SOLUTION INTRAVENOUS at 20:38

## 2017-05-18 RX ADMIN — CEFEPIME HYDROCHLORIDE 1 G: 1 INJECTION, POWDER, FOR SOLUTION INTRAMUSCULAR; INTRAVENOUS at 19:43

## 2017-05-18 RX ADMIN — IPRATROPIUM BROMIDE AND ALBUTEROL SULFATE 3 ML: .5; 3 SOLUTION RESPIRATORY (INHALATION) at 19:18

## 2017-05-18 RX ADMIN — SODIUM CHLORIDE 125 ML/HR: 9 INJECTION, SOLUTION INTRAVENOUS at 02:57

## 2017-05-18 RX ADMIN — CARVEDILOL 25 MG: 25 TABLET, FILM COATED ORAL at 08:11

## 2017-05-18 RX ADMIN — IPRATROPIUM BROMIDE AND ALBUTEROL SULFATE 3 ML: .5; 3 SOLUTION RESPIRATORY (INHALATION) at 14:44

## 2017-05-18 RX ADMIN — IPRATROPIUM BROMIDE AND ALBUTEROL SULFATE 3 ML: .5; 3 SOLUTION RESPIRATORY (INHALATION) at 22:41

## 2017-05-18 RX ADMIN — CEFEPIME HYDROCHLORIDE 1 G: 1 INJECTION, POWDER, FOR SOLUTION INTRAMUSCULAR; INTRAVENOUS at 10:37

## 2017-05-18 RX ADMIN — DOCUSATE SODIUM 100 MG: 100 CAPSULE ORAL at 20:36

## 2017-05-18 RX ADMIN — LEVOFLOXACIN 500 MG: 500 INJECTION, SOLUTION INTRAVENOUS at 11:34

## 2017-05-18 RX ADMIN — SERTRALINE 25 MG: 50 TABLET, FILM COATED ORAL at 08:11

## 2017-05-18 RX ADMIN — IPRATROPIUM BROMIDE AND ALBUTEROL SULFATE 3 ML: .5; 3 SOLUTION RESPIRATORY (INHALATION) at 11:06

## 2017-05-19 ENCOUNTER — APPOINTMENT (OUTPATIENT)
Dept: GENERAL RADIOLOGY | Facility: HOSPITAL | Age: 82
End: 2017-05-19

## 2017-05-19 PROBLEM — J18.9 PNEUMONIA OF LEFT UPPER LOBE DUE TO INFECTIOUS ORGANISM: Status: ACTIVE | Noted: 2017-05-19

## 2017-05-19 LAB
ANION GAP SERPL CALCULATED.3IONS-SCNC: 12 MMOL/L (ref 4–13)
BACTERIA SPEC AEROBE CULT: NORMAL
BACTERIA SPEC RESP CULT: NORMAL
BASOPHILS # BLD AUTO: 0.01 10*3/MM3 (ref 0–0.2)
BASOPHILS NFR BLD AUTO: 0.2 % (ref 0–2)
BUN BLD-MCNC: 45 MG/DL (ref 5–21)
BUN/CREAT SERPL: 17.6 (ref 7–25)
CALCIUM SPEC-SCNC: 7.2 MG/DL (ref 8.4–10.4)
CHLORIDE SERPL-SCNC: 106 MMOL/L (ref 98–110)
CO2 SERPL-SCNC: 16 MMOL/L (ref 24–31)
CREAT BLD-MCNC: 2.55 MG/DL (ref 0.5–1.4)
DEPRECATED RDW RBC AUTO: 58.1 FL (ref 40–54)
EOSINOPHIL # BLD AUTO: 0 10*3/MM3 (ref 0–0.7)
EOSINOPHIL NFR BLD AUTO: 0 % (ref 0–4)
ERYTHROCYTE [DISTWIDTH] IN BLOOD BY AUTOMATED COUNT: 16.1 % (ref 12–15)
GFR SERPL CREATININE-BSD FRML MDRD: 24 ML/MIN/1.73
GLUCOSE BLD-MCNC: 161 MG/DL (ref 70–100)
GRAM STN SPEC: NORMAL
HCT VFR BLD AUTO: 24.4 % (ref 40–52)
HGB BLD-MCNC: 8.3 G/DL (ref 14–18)
IMM GRANULOCYTES # BLD: 0.03 10*3/MM3 (ref 0–0.03)
IMM GRANULOCYTES NFR BLD: 0.5 % (ref 0–5)
LYMPHOCYTES # BLD AUTO: 0.22 10*3/MM3 (ref 0.72–4.86)
LYMPHOCYTES NFR BLD AUTO: 3.8 % (ref 15–45)
MCH RBC QN AUTO: 33.3 PG (ref 28–32)
MCHC RBC AUTO-ENTMCNC: 34 G/DL (ref 33–36)
MCV RBC AUTO: 98 FL (ref 82–95)
MONOCYTES # BLD AUTO: 0.43 10*3/MM3 (ref 0.19–1.3)
MONOCYTES NFR BLD AUTO: 7.4 % (ref 4–12)
NEUTROPHILS # BLD AUTO: 5.09 10*3/MM3 (ref 1.87–8.4)
NEUTROPHILS NFR BLD AUTO: 88.1 % (ref 39–78)
PLATELET # BLD AUTO: 42 10*3/MM3 (ref 130–400)
PMV BLD AUTO: 9.8 FL (ref 6–12)
POTASSIUM BLD-SCNC: 3.9 MMOL/L (ref 3.5–5.3)
RBC # BLD AUTO: 2.49 10*6/MM3 (ref 4.8–5.9)
SODIUM BLD-SCNC: 134 MMOL/L (ref 135–145)
WBC NRBC COR # BLD: 5.78 10*3/MM3 (ref 4.8–10.8)

## 2017-05-19 PROCEDURE — 71010 HC CHEST PA OR AP: CPT

## 2017-05-19 PROCEDURE — 99233 SBSQ HOSP IP/OBS HIGH 50: CPT | Performed by: INTERNAL MEDICINE

## 2017-05-19 PROCEDURE — 25010000002 LEVOFLOXACIN PER 250 MG: Performed by: INTERNAL MEDICINE

## 2017-05-19 PROCEDURE — 94799 UNLISTED PULMONARY SVC/PX: CPT

## 2017-05-19 PROCEDURE — G8979 MOBILITY GOAL STATUS: HCPCS

## 2017-05-19 PROCEDURE — 97110 THERAPEUTIC EXERCISES: CPT

## 2017-05-19 PROCEDURE — 80048 BASIC METABOLIC PNL TOTAL CA: CPT | Performed by: FAMILY MEDICINE

## 2017-05-19 PROCEDURE — 97162 PT EVAL MOD COMPLEX 30 MIN: CPT

## 2017-05-19 PROCEDURE — 85025 COMPLETE CBC W/AUTO DIFF WBC: CPT | Performed by: FAMILY MEDICINE

## 2017-05-19 PROCEDURE — 97116 GAIT TRAINING THERAPY: CPT

## 2017-05-19 PROCEDURE — G8978 MOBILITY CURRENT STATUS: HCPCS

## 2017-05-19 RX ORDER — CYPROHEPTADINE HYDROCHLORIDE 4 MG/1
4 TABLET ORAL 3 TIMES DAILY
Status: DISCONTINUED | OUTPATIENT
Start: 2017-05-19 | End: 2017-05-19

## 2017-05-19 RX ORDER — LEVOFLOXACIN 5 MG/ML
250 INJECTION, SOLUTION INTRAVENOUS EVERY 24 HOURS
Status: DISCONTINUED | OUTPATIENT
Start: 2017-05-19 | End: 2017-05-22

## 2017-05-19 RX ORDER — MEGESTROL ACETATE 40 MG/ML
800 SUSPENSION ORAL DAILY
Status: DISCONTINUED | OUTPATIENT
Start: 2017-05-19 | End: 2017-05-24 | Stop reason: HOSPADM

## 2017-05-19 RX ADMIN — MEGESTROL ACETATE 800 MG: 40 SUSPENSION ORAL at 11:10

## 2017-05-19 RX ADMIN — GUAIFENESIN 1200 MG: 600 TABLET, EXTENDED RELEASE ORAL at 20:46

## 2017-05-19 RX ADMIN — IPRATROPIUM BROMIDE AND ALBUTEROL SULFATE 3 ML: .5; 3 SOLUTION RESPIRATORY (INHALATION) at 11:21

## 2017-05-19 RX ADMIN — CEFEPIME HYDROCHLORIDE 1 G: 1 INJECTION, POWDER, FOR SOLUTION INTRAMUSCULAR; INTRAVENOUS at 09:53

## 2017-05-19 RX ADMIN — CEFEPIME HYDROCHLORIDE 1 G: 1 INJECTION, POWDER, FOR SOLUTION INTRAMUSCULAR; INTRAVENOUS at 02:39

## 2017-05-19 RX ADMIN — CEFEPIME HYDROCHLORIDE 1 G: 1 INJECTION, POWDER, FOR SOLUTION INTRAMUSCULAR; INTRAVENOUS at 21:19

## 2017-05-19 RX ADMIN — IPRATROPIUM BROMIDE AND ALBUTEROL SULFATE 3 ML: .5; 3 SOLUTION RESPIRATORY (INHALATION) at 15:06

## 2017-05-19 RX ADMIN — SODIUM BICARBONATE 50 MEQ: 84 INJECTION, SOLUTION INTRAVENOUS at 00:02

## 2017-05-19 RX ADMIN — IPRATROPIUM BROMIDE AND ALBUTEROL SULFATE 3 ML: .5; 3 SOLUTION RESPIRATORY (INHALATION) at 19:04

## 2017-05-19 RX ADMIN — LEVOFLOXACIN 250 MG: 5 INJECTION, SOLUTION INTRAVENOUS at 11:10

## 2017-05-19 RX ADMIN — SERTRALINE 25 MG: 50 TABLET, FILM COATED ORAL at 09:52

## 2017-05-19 RX ADMIN — IPRATROPIUM BROMIDE AND ALBUTEROL SULFATE 3 ML: .5; 3 SOLUTION RESPIRATORY (INHALATION) at 22:32

## 2017-05-19 RX ADMIN — ASPIRIN 81 MG: 81 TABLET ORAL at 09:52

## 2017-05-19 RX ADMIN — GUAIFENESIN 1200 MG: 600 TABLET, EXTENDED RELEASE ORAL at 09:52

## 2017-05-19 RX ADMIN — IPRATROPIUM BROMIDE AND ALBUTEROL SULFATE 3 ML: .5; 3 SOLUTION RESPIRATORY (INHALATION) at 06:34

## 2017-05-19 RX ADMIN — DOCUSATE SODIUM 100 MG: 100 CAPSULE ORAL at 09:52

## 2017-05-19 RX ADMIN — IPRATROPIUM BROMIDE AND ALBUTEROL SULFATE 3 ML: .5; 3 SOLUTION RESPIRATORY (INHALATION) at 02:45

## 2017-05-20 LAB
ABO GROUP BLD: NORMAL
ANION GAP SERPL CALCULATED.3IONS-SCNC: 14 MMOL/L (ref 4–13)
BASOPHILS # BLD AUTO: 0 10*3/MM3 (ref 0–0.2)
BASOPHILS NFR BLD AUTO: 0 % (ref 0–2)
BLD GP AB SCN SERPL QL: NEGATIVE
BUN BLD-MCNC: 55 MG/DL (ref 5–21)
BUN/CREAT SERPL: 20.9 (ref 7–25)
CALCIUM SPEC-SCNC: 7.6 MG/DL (ref 8.4–10.4)
CHLORIDE SERPL-SCNC: 104 MMOL/L (ref 98–110)
CO2 SERPL-SCNC: 15 MMOL/L (ref 24–31)
CREAT BLD-MCNC: 2.63 MG/DL (ref 0.5–1.4)
DEPRECATED RDW RBC AUTO: 56.8 FL (ref 40–54)
EOSINOPHIL # BLD AUTO: 0.02 10*3/MM3 (ref 0–0.7)
EOSINOPHIL NFR BLD AUTO: 0.4 % (ref 0–4)
ERYTHROCYTE [DISTWIDTH] IN BLOOD BY AUTOMATED COUNT: 16.1 % (ref 12–15)
FERRITIN SERPL-MCNC: ABNORMAL NG/ML (ref 17.9–464)
FIBRINOGEN PPP-MCNC: 594 MG/DL (ref 240–460)
FSP PPP LA-ACNC: NORMAL
GFR SERPL CREATININE-BSD FRML MDRD: 23 ML/MIN/1.73
GLUCOSE BLD-MCNC: 128 MG/DL (ref 70–100)
GLUCOSE BLDC GLUCOMTR-MCNC: 192 MG/DL (ref 70–130)
HCT VFR BLD AUTO: 23.5 % (ref 40–52)
HGB BLD-MCNC: 7.9 G/DL (ref 14–18)
IMM GRANULOCYTES # BLD: 0.06 10*3/MM3 (ref 0–0.03)
IMM GRANULOCYTES NFR BLD: 1.3 % (ref 0–5)
LYMPHOCYTES # BLD AUTO: 0.23 10*3/MM3 (ref 0.72–4.86)
LYMPHOCYTES NFR BLD AUTO: 5 % (ref 15–45)
MCH RBC QN AUTO: 32.4 PG (ref 28–32)
MCHC RBC AUTO-ENTMCNC: 33.6 G/DL (ref 33–36)
MCV RBC AUTO: 96.3 FL (ref 82–95)
MONOCYTES # BLD AUTO: 0.33 10*3/MM3 (ref 0.19–1.3)
MONOCYTES NFR BLD AUTO: 7.2 % (ref 4–12)
NEUTROPHILS # BLD AUTO: 3.95 10*3/MM3 (ref 1.87–8.4)
NEUTROPHILS NFR BLD AUTO: 86.1 % (ref 39–78)
PLATELET # BLD AUTO: 51 10*3/MM3 (ref 130–400)
PMV BLD AUTO: 11.1 FL (ref 6–12)
POTASSIUM BLD-SCNC: 3.6 MMOL/L (ref 3.5–5.3)
RBC # BLD AUTO: 2.44 10*6/MM3 (ref 4.8–5.9)
RH BLD: POSITIVE
SODIUM BLD-SCNC: 133 MMOL/L (ref 135–145)
VIT B12 BLD-MCNC: >1000 PG/ML (ref 239–931)
WBC NRBC COR # BLD: 4.59 10*3/MM3 (ref 4.8–10.8)

## 2017-05-20 PROCEDURE — 99232 SBSQ HOSP IP/OBS MODERATE 35: CPT | Performed by: INTERNAL MEDICINE

## 2017-05-20 PROCEDURE — 25010000002 LEVOFLOXACIN PER 250 MG: Performed by: INTERNAL MEDICINE

## 2017-05-20 PROCEDURE — 86900 BLOOD TYPING SEROLOGIC ABO: CPT | Performed by: INTERNAL MEDICINE

## 2017-05-20 PROCEDURE — P9016 RBC LEUKOCYTES REDUCED: HCPCS

## 2017-05-20 PROCEDURE — 86022 PLATELET ANTIBODIES: CPT | Performed by: INTERNAL MEDICINE

## 2017-05-20 PROCEDURE — 85025 COMPLETE CBC W/AUTO DIFF WBC: CPT | Performed by: FAMILY MEDICINE

## 2017-05-20 PROCEDURE — 86923 COMPATIBILITY TEST ELECTRIC: CPT

## 2017-05-20 PROCEDURE — 94799 UNLISTED PULMONARY SVC/PX: CPT

## 2017-05-20 PROCEDURE — 86901 BLOOD TYPING SEROLOGIC RH(D): CPT | Performed by: INTERNAL MEDICINE

## 2017-05-20 PROCEDURE — 97110 THERAPEUTIC EXERCISES: CPT

## 2017-05-20 PROCEDURE — 85060 BLOOD SMEAR INTERPRETATION: CPT | Performed by: INTERNAL MEDICINE

## 2017-05-20 PROCEDURE — 86850 RBC ANTIBODY SCREEN: CPT | Performed by: INTERNAL MEDICINE

## 2017-05-20 PROCEDURE — 82962 GLUCOSE BLOOD TEST: CPT

## 2017-05-20 PROCEDURE — 25010000002 FUROSEMIDE PER 20 MG: Performed by: INTERNAL MEDICINE

## 2017-05-20 PROCEDURE — 25010000002 VANCOMYCIN PER 500 MG: Performed by: NURSE PRACTITIONER

## 2017-05-20 PROCEDURE — 36430 TRANSFUSION BLD/BLD COMPNT: CPT

## 2017-05-20 PROCEDURE — 94760 N-INVAS EAR/PLS OXIMETRY 1: CPT

## 2017-05-20 PROCEDURE — 97116 GAIT TRAINING THERAPY: CPT

## 2017-05-20 PROCEDURE — 82607 VITAMIN B-12: CPT | Performed by: INTERNAL MEDICINE

## 2017-05-20 PROCEDURE — 82728 ASSAY OF FERRITIN: CPT | Performed by: INTERNAL MEDICINE

## 2017-05-20 PROCEDURE — 86900 BLOOD TYPING SEROLOGIC ABO: CPT

## 2017-05-20 PROCEDURE — 80048 BASIC METABOLIC PNL TOTAL CA: CPT | Performed by: FAMILY MEDICINE

## 2017-05-20 PROCEDURE — 85384 FIBRINOGEN ACTIVITY: CPT | Performed by: INTERNAL MEDICINE

## 2017-05-20 PROCEDURE — 85362 FIBRIN DEGRADATION PRODUCTS: CPT | Performed by: INTERNAL MEDICINE

## 2017-05-20 RX ORDER — ALBUTEROL SULFATE 2.5 MG/3ML
2.5 SOLUTION RESPIRATORY (INHALATION)
Status: DISCONTINUED | OUTPATIENT
Start: 2017-05-20 | End: 2017-05-22

## 2017-05-20 RX ORDER — SACCHAROMYCES BOULARDII 250 MG
250 CAPSULE ORAL 2 TIMES DAILY
Status: DISCONTINUED | OUTPATIENT
Start: 2017-05-20 | End: 2017-05-24 | Stop reason: HOSPADM

## 2017-05-20 RX ORDER — FUROSEMIDE 10 MG/ML
20 INJECTION INTRAMUSCULAR; INTRAVENOUS ONCE
Status: COMPLETED | OUTPATIENT
Start: 2017-05-20 | End: 2017-05-20

## 2017-05-20 RX ORDER — FUROSEMIDE 10 MG/ML
20 INJECTION INTRAMUSCULAR; INTRAVENOUS ONCE
Status: COMPLETED | OUTPATIENT
Start: 2017-05-21 | End: 2017-05-21

## 2017-05-20 RX ADMIN — MEGESTROL ACETATE 800 MG: 40 SUSPENSION ORAL at 08:14

## 2017-05-20 RX ADMIN — VANCOMYCIN HYDROCHLORIDE 1000 MG: 1 INJECTION, SOLUTION INTRAVENOUS at 12:23

## 2017-05-20 RX ADMIN — ALBUTEROL SULFATE 2.5 MG: 2.5 SOLUTION RESPIRATORY (INHALATION) at 10:48

## 2017-05-20 RX ADMIN — Medication 250 MG: at 10:22

## 2017-05-20 RX ADMIN — FUROSEMIDE 20 MG: 10 INJECTION, SOLUTION INTRAMUSCULAR; INTRAVENOUS at 19:01

## 2017-05-20 RX ADMIN — DOCUSATE SODIUM 100 MG: 100 CAPSULE ORAL at 08:12

## 2017-05-20 RX ADMIN — ASPIRIN 81 MG: 81 TABLET ORAL at 08:14

## 2017-05-20 RX ADMIN — CEFEPIME HYDROCHLORIDE 1 G: 1 INJECTION, POWDER, FOR SOLUTION INTRAMUSCULAR; INTRAVENOUS at 21:43

## 2017-05-20 RX ADMIN — GUAIFENESIN 1200 MG: 600 TABLET, EXTENDED RELEASE ORAL at 08:14

## 2017-05-20 RX ADMIN — LEVOFLOXACIN 250 MG: 5 INJECTION, SOLUTION INTRAVENOUS at 11:52

## 2017-05-20 RX ADMIN — Medication 250 MG: at 17:32

## 2017-05-20 RX ADMIN — IPRATROPIUM BROMIDE AND ALBUTEROL SULFATE 3 ML: .5; 3 SOLUTION RESPIRATORY (INHALATION) at 02:30

## 2017-05-20 RX ADMIN — ALBUTEROL SULFATE 2.5 MG: 2.5 SOLUTION RESPIRATORY (INHALATION) at 23:30

## 2017-05-20 RX ADMIN — SERTRALINE 25 MG: 50 TABLET, FILM COATED ORAL at 08:15

## 2017-05-20 RX ADMIN — ALBUTEROL SULFATE 2.5 MG: 2.5 SOLUTION RESPIRATORY (INHALATION) at 15:15

## 2017-05-20 RX ADMIN — IPRATROPIUM BROMIDE AND ALBUTEROL SULFATE 3 ML: .5; 3 SOLUTION RESPIRATORY (INHALATION) at 07:08

## 2017-05-20 RX ADMIN — CEFEPIME HYDROCHLORIDE 1 G: 1 INJECTION, POWDER, FOR SOLUTION INTRAMUSCULAR; INTRAVENOUS at 10:22

## 2017-05-20 RX ADMIN — GUAIFENESIN 1200 MG: 600 TABLET, EXTENDED RELEASE ORAL at 21:43

## 2017-05-20 RX ADMIN — ALBUTEROL SULFATE 2.5 MG: 2.5 SOLUTION RESPIRATORY (INHALATION) at 19:07

## 2017-05-20 RX ADMIN — DOCUSATE SODIUM 100 MG: 100 CAPSULE ORAL at 17:32

## 2017-05-21 LAB
ANION GAP SERPL CALCULATED.3IONS-SCNC: 13 MMOL/L (ref 4–13)
BASOPHILS # BLD AUTO: 0.01 10*3/MM3 (ref 0–0.2)
BASOPHILS NFR BLD AUTO: 0.2 % (ref 0–2)
BUN BLD-MCNC: 65 MG/DL (ref 5–21)
BUN/CREAT SERPL: 24 (ref 7–25)
CALCIUM SPEC-SCNC: 7.7 MG/DL (ref 8.4–10.4)
CHLORIDE SERPL-SCNC: 104 MMOL/L (ref 98–110)
CO2 SERPL-SCNC: 16 MMOL/L (ref 24–31)
CREAT BLD-MCNC: 2.71 MG/DL (ref 0.5–1.4)
DEPRECATED RDW RBC AUTO: 57.5 FL (ref 40–54)
EOSINOPHIL # BLD AUTO: 0.08 10*3/MM3 (ref 0–0.7)
EOSINOPHIL NFR BLD AUTO: 1.4 % (ref 0–4)
ERYTHROCYTE [DISTWIDTH] IN BLOOD BY AUTOMATED COUNT: 16.8 % (ref 12–15)
GFR SERPL CREATININE-BSD FRML MDRD: 22 ML/MIN/1.73
GLUCOSE BLD-MCNC: 142 MG/DL (ref 70–100)
GLUCOSE BLDC GLUCOMTR-MCNC: 134 MG/DL (ref 70–130)
GLUCOSE BLDC GLUCOMTR-MCNC: 202 MG/DL (ref 70–130)
HCT VFR BLD AUTO: 26.3 % (ref 40–52)
HGB BLD-MCNC: 8.9 G/DL (ref 14–18)
IMM GRANULOCYTES # BLD: 0.12 10*3/MM3 (ref 0–0.03)
IMM GRANULOCYTES NFR BLD: 2.2 % (ref 0–5)
IRON 24H UR-MRATE: 65 MCG/DL (ref 42–180)
IRON SATN MFR SERPL: 45 % (ref 20–45)
LYMPHOCYTES # BLD AUTO: 0.27 10*3/MM3 (ref 0.72–4.86)
LYMPHOCYTES NFR BLD AUTO: 4.8 % (ref 15–45)
MCH RBC QN AUTO: 31.2 PG (ref 28–32)
MCHC RBC AUTO-ENTMCNC: 33.8 G/DL (ref 33–36)
MCV RBC AUTO: 92.3 FL (ref 82–95)
MONOCYTES # BLD AUTO: 0.62 10*3/MM3 (ref 0.19–1.3)
MONOCYTES NFR BLD AUTO: 11.1 % (ref 4–12)
NEUTROPHILS # BLD AUTO: 4.48 10*3/MM3 (ref 1.87–8.4)
NEUTROPHILS NFR BLD AUTO: 80.3 % (ref 39–78)
PLATELET # BLD AUTO: 49 10*3/MM3 (ref 130–400)
PMV BLD AUTO: 11.1 FL (ref 6–12)
POTASSIUM BLD-SCNC: 3.5 MMOL/L (ref 3.5–5.3)
RBC # BLD AUTO: 2.85 10*6/MM3 (ref 4.8–5.9)
SODIUM BLD-SCNC: 133 MMOL/L (ref 135–145)
TIBC SERPL-MCNC: 144 MCG/DL (ref 225–420)
WBC NRBC COR # BLD: 5.58 10*3/MM3 (ref 4.8–10.8)

## 2017-05-21 PROCEDURE — 94760 N-INVAS EAR/PLS OXIMETRY 1: CPT

## 2017-05-21 PROCEDURE — 97110 THERAPEUTIC EXERCISES: CPT

## 2017-05-21 PROCEDURE — 83550 IRON BINDING TEST: CPT | Performed by: INTERNAL MEDICINE

## 2017-05-21 PROCEDURE — 80048 BASIC METABOLIC PNL TOTAL CA: CPT | Performed by: FAMILY MEDICINE

## 2017-05-21 PROCEDURE — 97116 GAIT TRAINING THERAPY: CPT

## 2017-05-21 PROCEDURE — 94799 UNLISTED PULMONARY SVC/PX: CPT

## 2017-05-21 PROCEDURE — 83540 ASSAY OF IRON: CPT | Performed by: INTERNAL MEDICINE

## 2017-05-21 PROCEDURE — 82962 GLUCOSE BLOOD TEST: CPT

## 2017-05-21 PROCEDURE — 63510000001 EPOETIN ALFA PER 1000 UNITS: Performed by: INTERNAL MEDICINE

## 2017-05-21 PROCEDURE — 85025 COMPLETE CBC W/AUTO DIFF WBC: CPT | Performed by: FAMILY MEDICINE

## 2017-05-21 PROCEDURE — 25010000002 FUROSEMIDE PER 20 MG: Performed by: INTERNAL MEDICINE

## 2017-05-21 PROCEDURE — 25010000002 LEVOFLOXACIN PER 250 MG: Performed by: INTERNAL MEDICINE

## 2017-05-21 PROCEDURE — 99232 SBSQ HOSP IP/OBS MODERATE 35: CPT | Performed by: INTERNAL MEDICINE

## 2017-05-21 RX ORDER — SODIUM CHLORIDE/ALOE VERA
GEL (GRAM) NASAL
Status: DISCONTINUED | OUTPATIENT
Start: 2017-05-21 | End: 2017-05-24 | Stop reason: HOSPADM

## 2017-05-21 RX ADMIN — GUAIFENESIN 1200 MG: 600 TABLET, EXTENDED RELEASE ORAL at 08:02

## 2017-05-21 RX ADMIN — CEFEPIME HYDROCHLORIDE 1 G: 1 INJECTION, POWDER, FOR SOLUTION INTRAMUSCULAR; INTRAVENOUS at 21:20

## 2017-05-21 RX ADMIN — ERYTHROPOIETIN 40000 UNITS: 40000 INJECTION, SOLUTION INTRAVENOUS; SUBCUTANEOUS at 13:00

## 2017-05-21 RX ADMIN — ALBUTEROL SULFATE 2.5 MG: 2.5 SOLUTION RESPIRATORY (INHALATION) at 19:17

## 2017-05-21 RX ADMIN — Medication 250 MG: at 08:02

## 2017-05-21 RX ADMIN — SERTRALINE 25 MG: 50 TABLET, FILM COATED ORAL at 08:02

## 2017-05-21 RX ADMIN — CEFEPIME HYDROCHLORIDE 1 G: 1 INJECTION, POWDER, FOR SOLUTION INTRAMUSCULAR; INTRAVENOUS at 09:59

## 2017-05-21 RX ADMIN — DOCUSATE SODIUM 100 MG: 100 CAPSULE ORAL at 08:02

## 2017-05-21 RX ADMIN — ALBUTEROL SULFATE 2.5 MG: 2.5 SOLUTION RESPIRATORY (INHALATION) at 07:02

## 2017-05-21 RX ADMIN — Medication 250 MG: at 17:10

## 2017-05-21 RX ADMIN — GUAIFENESIN 1200 MG: 600 TABLET, EXTENDED RELEASE ORAL at 20:20

## 2017-05-21 RX ADMIN — ALBUTEROL SULFATE 2.5 MG: 2.5 SOLUTION RESPIRATORY (INHALATION) at 23:40

## 2017-05-21 RX ADMIN — DOCUSATE SODIUM 100 MG: 100 CAPSULE ORAL at 17:10

## 2017-05-21 RX ADMIN — LEVOFLOXACIN 250 MG: 5 INJECTION, SOLUTION INTRAVENOUS at 11:27

## 2017-05-21 RX ADMIN — ALBUTEROL SULFATE 2.5 MG: 2.5 SOLUTION RESPIRATORY (INHALATION) at 03:40

## 2017-05-21 RX ADMIN — FUROSEMIDE 20 MG: 10 INJECTION, SOLUTION INTRAMUSCULAR; INTRAVENOUS at 04:36

## 2017-05-21 RX ADMIN — ALBUTEROL SULFATE 2.5 MG: 2.5 SOLUTION RESPIRATORY (INHALATION) at 14:32

## 2017-05-21 RX ADMIN — MEGESTROL ACETATE 800 MG: 40 SUSPENSION ORAL at 08:02

## 2017-05-22 ENCOUNTER — APPOINTMENT (OUTPATIENT)
Dept: GENERAL RADIOLOGY | Facility: HOSPITAL | Age: 82
End: 2017-05-22

## 2017-05-22 LAB
ABO + RH BLD: NORMAL
ABO + RH BLD: NORMAL
ALBUMIN SERPL-MCNC: 2.6 G/DL (ref 3.5–5)
ALBUMIN/GLOB SERPL: 1 G/DL (ref 1.1–2.5)
ALP SERPL-CCNC: 54 U/L (ref 24–120)
ALT SERPL W P-5'-P-CCNC: 55 U/L (ref 0–54)
ANION GAP SERPL CALCULATED.3IONS-SCNC: 14 MMOL/L (ref 4–13)
AST SERPL-CCNC: 57 U/L (ref 7–45)
BACTERIA SPEC AEROBE CULT: NORMAL
BASOPHILS # BLD AUTO: 0.02 10*3/MM3 (ref 0–0.2)
BASOPHILS NFR BLD AUTO: 0.3 % (ref 0–2)
BH BB BLOOD EXPIRATION DATE: NORMAL
BH BB BLOOD EXPIRATION DATE: NORMAL
BH BB BLOOD TYPE BARCODE: 8400
BH BB BLOOD TYPE BARCODE: 8400
BH BB DISPENSE STATUS: NORMAL
BH BB DISPENSE STATUS: NORMAL
BH BB PRODUCT CODE: NORMAL
BH BB PRODUCT CODE: NORMAL
BH BB UNIT NUMBER: NORMAL
BH BB UNIT NUMBER: NORMAL
BILIRUB SERPL-MCNC: 0.9 MG/DL (ref 0.1–1)
BUN BLD-MCNC: 67 MG/DL (ref 5–21)
BUN/CREAT SERPL: 25.6 (ref 7–25)
CALCIUM SPEC-SCNC: 8 MG/DL (ref 8.4–10.4)
CHLORIDE SERPL-SCNC: 107 MMOL/L (ref 98–110)
CO2 SERPL-SCNC: 15 MMOL/L (ref 24–31)
CREAT BLD-MCNC: 2.62 MG/DL (ref 0.5–1.4)
CYTOLOGIST CVX/VAG CYTO: NORMAL
DEPRECATED RDW RBC AUTO: 57.2 FL (ref 40–54)
EOSINOPHIL # BLD AUTO: 0.1 10*3/MM3 (ref 0–0.7)
EOSINOPHIL NFR BLD AUTO: 1.7 % (ref 0–4)
ERYTHROCYTE [DISTWIDTH] IN BLOOD BY AUTOMATED COUNT: 17.2 % (ref 12–15)
GFR SERPL CREATININE-BSD FRML MDRD: 23 ML/MIN/1.73
GLOBULIN UR ELPH-MCNC: 2.7 GM/DL
GLUCOSE BLD-MCNC: 137 MG/DL (ref 70–100)
GLUCOSE BLDC GLUCOMTR-MCNC: 137 MG/DL (ref 70–130)
GLUCOSE BLDC GLUCOMTR-MCNC: 159 MG/DL (ref 70–130)
GLUCOSE BLDC GLUCOMTR-MCNC: 197 MG/DL (ref 70–130)
HCT VFR BLD AUTO: 25.6 % (ref 40–52)
HGB BLD-MCNC: 9 G/DL (ref 14–18)
IMM GRANULOCYTES # BLD: 0.06 10*3/MM3 (ref 0–0.03)
IMM GRANULOCYTES NFR BLD: 1 % (ref 0–5)
LYMPHOCYTES # BLD AUTO: 0.31 10*3/MM3 (ref 0.72–4.86)
LYMPHOCYTES NFR BLD AUTO: 5.4 % (ref 15–45)
MCH RBC QN AUTO: 31.8 PG (ref 28–32)
MCHC RBC AUTO-ENTMCNC: 35.2 G/DL (ref 33–36)
MCV RBC AUTO: 90.5 FL (ref 82–95)
MONOCYTES # BLD AUTO: 0.6 10*3/MM3 (ref 0.19–1.3)
MONOCYTES NFR BLD AUTO: 10.4 % (ref 4–12)
NEUTROPHILS # BLD AUTO: 4.67 10*3/MM3 (ref 1.87–8.4)
NEUTROPHILS NFR BLD AUTO: 81.2 % (ref 39–78)
PATH INTERP BLD-IMP: NORMAL
PLATELET # BLD AUTO: 47 10*3/MM3 (ref 130–400)
PMV BLD AUTO: 10 FL (ref 6–12)
POTASSIUM BLD-SCNC: 3 MMOL/L (ref 3.5–5.3)
PROT SERPL-MCNC: 5.3 G/DL (ref 6.3–8.7)
RBC # BLD AUTO: 2.83 10*6/MM3 (ref 4.8–5.9)
SODIUM BLD-SCNC: 136 MMOL/L (ref 135–145)
UNIT  ABO: NORMAL
UNIT  ABO: NORMAL
UNIT  RH: NORMAL
UNIT  RH: NORMAL
WBC NRBC COR # BLD: 5.76 10*3/MM3 (ref 4.8–10.8)

## 2017-05-22 PROCEDURE — 97530 THERAPEUTIC ACTIVITIES: CPT

## 2017-05-22 PROCEDURE — 25010000002 LEVOFLOXACIN PER 250 MG: Performed by: INTERNAL MEDICINE

## 2017-05-22 PROCEDURE — 94799 UNLISTED PULMONARY SVC/PX: CPT

## 2017-05-22 PROCEDURE — 71010 HC CHEST PA OR AP: CPT

## 2017-05-22 PROCEDURE — 85025 COMPLETE CBC W/AUTO DIFF WBC: CPT | Performed by: FAMILY MEDICINE

## 2017-05-22 PROCEDURE — 97116 GAIT TRAINING THERAPY: CPT

## 2017-05-22 PROCEDURE — 25010000002 METHYLPREDNISOLONE PER 40 MG: Performed by: INTERNAL MEDICINE

## 2017-05-22 PROCEDURE — 82962 GLUCOSE BLOOD TEST: CPT

## 2017-05-22 PROCEDURE — 99232 SBSQ HOSP IP/OBS MODERATE 35: CPT | Performed by: INTERNAL MEDICINE

## 2017-05-22 PROCEDURE — 97110 THERAPEUTIC EXERCISES: CPT

## 2017-05-22 PROCEDURE — 80053 COMPREHEN METABOLIC PANEL: CPT | Performed by: INTERNAL MEDICINE

## 2017-05-22 PROCEDURE — 94760 N-INVAS EAR/PLS OXIMETRY 1: CPT

## 2017-05-22 RX ORDER — METHYLPREDNISOLONE SODIUM SUCCINATE 40 MG/ML
40 INJECTION, POWDER, LYOPHILIZED, FOR SOLUTION INTRAMUSCULAR; INTRAVENOUS EVERY 12 HOURS
Status: DISCONTINUED | OUTPATIENT
Start: 2017-05-22 | End: 2017-05-23

## 2017-05-22 RX ORDER — POTASSIUM CHLORIDE 750 MG/1
40 CAPSULE, EXTENDED RELEASE ORAL ONCE
Status: COMPLETED | OUTPATIENT
Start: 2017-05-22 | End: 2017-05-22

## 2017-05-22 RX ORDER — IPRATROPIUM BROMIDE AND ALBUTEROL SULFATE 2.5; .5 MG/3ML; MG/3ML
3 SOLUTION RESPIRATORY (INHALATION)
Status: DISCONTINUED | OUTPATIENT
Start: 2017-05-22 | End: 2017-05-24 | Stop reason: HOSPADM

## 2017-05-22 RX ADMIN — POTASSIUM CHLORIDE 40 MEQ: 10 CAPSULE, EXTENDED RELEASE ORAL at 14:44

## 2017-05-22 RX ADMIN — Medication 250 MG: at 17:48

## 2017-05-22 RX ADMIN — ALBUTEROL SULFATE 2.5 MG: 2.5 SOLUTION RESPIRATORY (INHALATION) at 03:04

## 2017-05-22 RX ADMIN — IPRATROPIUM BROMIDE AND ALBUTEROL SULFATE 3 ML: .5; 3 SOLUTION RESPIRATORY (INHALATION) at 15:12

## 2017-05-22 RX ADMIN — Medication 250 MG: at 09:22

## 2017-05-22 RX ADMIN — GUAIFENESIN 1200 MG: 600 TABLET, EXTENDED RELEASE ORAL at 21:45

## 2017-05-22 RX ADMIN — IPRATROPIUM BROMIDE AND ALBUTEROL SULFATE 3 ML: .5; 3 SOLUTION RESPIRATORY (INHALATION) at 20:36

## 2017-05-22 RX ADMIN — CEFEPIME HYDROCHLORIDE 1 G: 1 INJECTION, POWDER, FOR SOLUTION INTRAMUSCULAR; INTRAVENOUS at 09:21

## 2017-05-22 RX ADMIN — DOCUSATE SODIUM 100 MG: 100 CAPSULE ORAL at 09:22

## 2017-05-22 RX ADMIN — DOCUSATE SODIUM 100 MG: 100 CAPSULE ORAL at 17:48

## 2017-05-22 RX ADMIN — MEGESTROL ACETATE 800 MG: 40 SUSPENSION ORAL at 09:21

## 2017-05-22 RX ADMIN — SERTRALINE 25 MG: 50 TABLET, FILM COATED ORAL at 09:22

## 2017-05-22 RX ADMIN — ALBUTEROL SULFATE 2.5 MG: 2.5 SOLUTION RESPIRATORY (INHALATION) at 06:57

## 2017-05-22 RX ADMIN — GUAIFENESIN 1200 MG: 600 TABLET, EXTENDED RELEASE ORAL at 09:22

## 2017-05-22 RX ADMIN — CEFEPIME HYDROCHLORIDE 1 G: 1 INJECTION, POWDER, FOR SOLUTION INTRAMUSCULAR; INTRAVENOUS at 21:45

## 2017-05-22 RX ADMIN — ALBUTEROL SULFATE 2.5 MG: 2.5 SOLUTION RESPIRATORY (INHALATION) at 11:55

## 2017-05-22 RX ADMIN — METHYLPREDNISOLONE SODIUM SUCCINATE 40 MG: 40 INJECTION, POWDER, FOR SOLUTION INTRAMUSCULAR; INTRAVENOUS at 14:44

## 2017-05-22 RX ADMIN — LEVOFLOXACIN 250 MG: 5 INJECTION, SOLUTION INTRAVENOUS at 12:36

## 2017-05-23 LAB
ANION GAP SERPL CALCULATED.3IONS-SCNC: 15 MMOL/L (ref 4–13)
BASOPHILS # BLD AUTO: 0.01 10*3/MM3 (ref 0–0.2)
BASOPHILS NFR BLD AUTO: 0.2 % (ref 0–2)
BUN BLD-MCNC: 74 MG/DL (ref 5–21)
BUN/CREAT SERPL: 28.5 (ref 7–25)
CALCIUM SPEC-SCNC: 8.8 MG/DL (ref 8.4–10.4)
CHLORIDE SERPL-SCNC: 109 MMOL/L (ref 98–110)
CO2 SERPL-SCNC: 15 MMOL/L (ref 24–31)
CREAT BLD-MCNC: 2.6 MG/DL (ref 0.5–1.4)
DEPRECATED RDW RBC AUTO: 58.4 FL (ref 40–54)
EOSINOPHIL # BLD AUTO: 0 10*3/MM3 (ref 0–0.7)
EOSINOPHIL NFR BLD AUTO: 0 % (ref 0–4)
ERYTHROCYTE [DISTWIDTH] IN BLOOD BY AUTOMATED COUNT: 17.3 % (ref 12–15)
GFR SERPL CREATININE-BSD FRML MDRD: 24 ML/MIN/1.73
GLUCOSE BLD-MCNC: 266 MG/DL (ref 70–100)
GLUCOSE BLD-MCNC: 311 MG/DL (ref 70–100)
GLUCOSE BLDC GLUCOMTR-MCNC: 258 MG/DL (ref 70–130)
GLUCOSE BLDC GLUCOMTR-MCNC: 379 MG/DL (ref 70–130)
GLUCOSE BLDC GLUCOMTR-MCNC: 460 MG/DL (ref 70–130)
GLUCOSE BLDC GLUCOMTR-MCNC: 474 MG/DL (ref 70–130)
GLUCOSE BLDC GLUCOMTR-MCNC: 512 MG/DL (ref 70–130)
HCT VFR BLD AUTO: 27.2 % (ref 40–52)
HGB BLD-MCNC: 9.3 G/DL (ref 14–18)
IMM GRANULOCYTES # BLD: 0.05 10*3/MM3 (ref 0–0.03)
IMM GRANULOCYTES NFR BLD: 1 % (ref 0–5)
LYMPHOCYTES # BLD AUTO: 0.26 10*3/MM3 (ref 0.72–4.86)
LYMPHOCYTES NFR BLD AUTO: 5.2 % (ref 15–45)
MAGNESIUM SERPL-MCNC: 1.9 MG/DL (ref 1.4–2.2)
MCH RBC QN AUTO: 31.5 PG (ref 28–32)
MCHC RBC AUTO-ENTMCNC: 34.2 G/DL (ref 33–36)
MCV RBC AUTO: 92.2 FL (ref 82–95)
MONOCYTES # BLD AUTO: 0.24 10*3/MM3 (ref 0.19–1.3)
MONOCYTES NFR BLD AUTO: 4.8 % (ref 4–12)
NEUTROPHILS # BLD AUTO: 4.41 10*3/MM3 (ref 1.87–8.4)
NEUTROPHILS NFR BLD AUTO: 88.8 % (ref 39–78)
PHOSPHATE SERPL-MCNC: 3.2 MG/DL (ref 2.5–4.5)
PLATELET # BLD AUTO: 45 10*3/MM3 (ref 130–400)
PMV BLD AUTO: 10.7 FL (ref 6–12)
POTASSIUM BLD-SCNC: 4.4 MMOL/L (ref 3.5–5.3)
RBC # BLD AUTO: 2.95 10*6/MM3 (ref 4.8–5.9)
SODIUM BLD-SCNC: 139 MMOL/L (ref 135–145)
WBC NRBC COR # BLD: 4.97 10*3/MM3 (ref 4.8–10.8)

## 2017-05-23 PROCEDURE — 83735 ASSAY OF MAGNESIUM: CPT | Performed by: INTERNAL MEDICINE

## 2017-05-23 PROCEDURE — 80048 BASIC METABOLIC PNL TOTAL CA: CPT | Performed by: INTERNAL MEDICINE

## 2017-05-23 PROCEDURE — G8987 SELF CARE CURRENT STATUS: HCPCS

## 2017-05-23 PROCEDURE — 84100 ASSAY OF PHOSPHORUS: CPT | Performed by: INTERNAL MEDICINE

## 2017-05-23 PROCEDURE — 94760 N-INVAS EAR/PLS OXIMETRY 1: CPT

## 2017-05-23 PROCEDURE — 82947 ASSAY GLUCOSE BLOOD QUANT: CPT | Performed by: INTERNAL MEDICINE

## 2017-05-23 PROCEDURE — 94799 UNLISTED PULMONARY SVC/PX: CPT

## 2017-05-23 PROCEDURE — 97165 OT EVAL LOW COMPLEX 30 MIN: CPT

## 2017-05-23 PROCEDURE — 63710000001 INSULIN LISPRO (HUMAN) PER 5 UNITS: Performed by: INTERNAL MEDICINE

## 2017-05-23 PROCEDURE — 97116 GAIT TRAINING THERAPY: CPT

## 2017-05-23 PROCEDURE — 97110 THERAPEUTIC EXERCISES: CPT

## 2017-05-23 PROCEDURE — 85025 COMPLETE CBC W/AUTO DIFF WBC: CPT | Performed by: FAMILY MEDICINE

## 2017-05-23 PROCEDURE — 82962 GLUCOSE BLOOD TEST: CPT

## 2017-05-23 PROCEDURE — 25010000002 METHYLPREDNISOLONE PER 40 MG: Performed by: INTERNAL MEDICINE

## 2017-05-23 PROCEDURE — 86022 PLATELET ANTIBODIES: CPT | Performed by: INTERNAL MEDICINE

## 2017-05-23 PROCEDURE — 97530 THERAPEUTIC ACTIVITIES: CPT

## 2017-05-23 PROCEDURE — G8988 SELF CARE GOAL STATUS: HCPCS

## 2017-05-23 PROCEDURE — 99232 SBSQ HOSP IP/OBS MODERATE 35: CPT | Performed by: INTERNAL MEDICINE

## 2017-05-23 RX ORDER — PREDNISONE 20 MG/1
20 TABLET ORAL DAILY
Status: DISCONTINUED | OUTPATIENT
Start: 2017-05-24 | End: 2017-05-24

## 2017-05-23 RX ORDER — NICOTINE POLACRILEX 4 MG
15 LOZENGE BUCCAL
Status: DISCONTINUED | OUTPATIENT
Start: 2017-05-23 | End: 2017-05-24 | Stop reason: HOSPADM

## 2017-05-23 RX ORDER — DEXTROSE MONOHYDRATE 25 G/50ML
25 INJECTION, SOLUTION INTRAVENOUS
Status: DISCONTINUED | OUTPATIENT
Start: 2017-05-23 | End: 2017-05-24 | Stop reason: HOSPADM

## 2017-05-23 RX ORDER — LACTULOSE 20 G/30ML
30 SOLUTION ORAL DAILY PRN
Status: DISCONTINUED | OUTPATIENT
Start: 2017-05-23 | End: 2017-05-24 | Stop reason: HOSPADM

## 2017-05-23 RX ORDER — SODIUM BICARBONATE 650 MG/1
650 TABLET ORAL 2 TIMES DAILY
Status: DISCONTINUED | OUTPATIENT
Start: 2017-05-23 | End: 2017-05-24 | Stop reason: HOSPADM

## 2017-05-23 RX ADMIN — IPRATROPIUM BROMIDE AND ALBUTEROL SULFATE 3 ML: .5; 3 SOLUTION RESPIRATORY (INHALATION) at 11:34

## 2017-05-23 RX ADMIN — GUAIFENESIN 1200 MG: 600 TABLET, EXTENDED RELEASE ORAL at 08:29

## 2017-05-23 RX ADMIN — GUAIFENESIN 1200 MG: 600 TABLET, EXTENDED RELEASE ORAL at 19:56

## 2017-05-23 RX ADMIN — SODIUM BICARBONATE 650 MG: 650 TABLET, ORALLY DISINTEGRATING ORAL at 17:34

## 2017-05-23 RX ADMIN — CEFEPIME HYDROCHLORIDE 1 G: 1 INJECTION, POWDER, FOR SOLUTION INTRAMUSCULAR; INTRAVENOUS at 10:10

## 2017-05-23 RX ADMIN — IPRATROPIUM BROMIDE AND ALBUTEROL SULFATE 3 ML: .5; 3 SOLUTION RESPIRATORY (INHALATION) at 21:23

## 2017-05-23 RX ADMIN — DOCUSATE SODIUM 100 MG: 100 CAPSULE ORAL at 08:29

## 2017-05-23 RX ADMIN — MEGESTROL ACETATE 800 MG: 40 SUSPENSION ORAL at 08:29

## 2017-05-23 RX ADMIN — Medication 250 MG: at 17:34

## 2017-05-23 RX ADMIN — CEFEPIME HYDROCHLORIDE 1 G: 1 INJECTION, POWDER, FOR SOLUTION INTRAMUSCULAR; INTRAVENOUS at 23:28

## 2017-05-23 RX ADMIN — SODIUM BICARBONATE 650 MG: 650 TABLET, ORALLY DISINTEGRATING ORAL at 12:41

## 2017-05-23 RX ADMIN — IPRATROPIUM BROMIDE AND ALBUTEROL SULFATE 3 ML: .5; 3 SOLUTION RESPIRATORY (INHALATION) at 07:39

## 2017-05-23 RX ADMIN — Medication 250 MG: at 08:29

## 2017-05-23 RX ADMIN — METHYLPREDNISOLONE SODIUM SUCCINATE 40 MG: 40 INJECTION, POWDER, FOR SOLUTION INTRAMUSCULAR; INTRAVENOUS at 03:45

## 2017-05-23 RX ADMIN — SERTRALINE 25 MG: 50 TABLET, FILM COATED ORAL at 08:29

## 2017-05-23 RX ADMIN — DOCUSATE SODIUM 100 MG: 100 CAPSULE ORAL at 17:34

## 2017-05-23 RX ADMIN — LACTULOSE 30 G: 20 SOLUTION ORAL at 20:52

## 2017-05-23 RX ADMIN — INSULIN LISPRO 7 UNITS: 100 INJECTION, SOLUTION INTRAVENOUS; SUBCUTANEOUS at 16:47

## 2017-05-23 RX ADMIN — INSULIN LISPRO 5 UNITS: 100 INJECTION, SOLUTION INTRAVENOUS; SUBCUTANEOUS at 20:51

## 2017-05-23 RX ADMIN — IPRATROPIUM BROMIDE AND ALBUTEROL SULFATE 3 ML: .5; 3 SOLUTION RESPIRATORY (INHALATION) at 15:30

## 2017-05-24 ENCOUNTER — APPOINTMENT (OUTPATIENT)
Dept: GENERAL RADIOLOGY | Facility: HOSPITAL | Age: 82
End: 2017-05-24

## 2017-05-24 VITALS
OXYGEN SATURATION: 93 % | TEMPERATURE: 97.9 F | WEIGHT: 167.4 LBS | RESPIRATION RATE: 18 BRPM | SYSTOLIC BLOOD PRESSURE: 138 MMHG | BODY MASS INDEX: 27.89 KG/M2 | HEIGHT: 65 IN | DIASTOLIC BLOOD PRESSURE: 55 MMHG | HEART RATE: 72 BPM

## 2017-05-24 LAB
ANION GAP SERPL CALCULATED.3IONS-SCNC: 15 MMOL/L (ref 4–13)
BASOPHILS # BLD AUTO: 0.01 10*3/MM3 (ref 0–0.2)
BASOPHILS NFR BLD AUTO: 0.1 % (ref 0–2)
BUN BLD-MCNC: 76 MG/DL (ref 5–21)
BUN/CREAT SERPL: 29.8 (ref 7–25)
CALCIUM SPEC-SCNC: 9.1 MG/DL (ref 8.4–10.4)
CHLORIDE SERPL-SCNC: 112 MMOL/L (ref 98–110)
CO2 SERPL-SCNC: 16 MMOL/L (ref 24–31)
CREAT BLD-MCNC: 2.55 MG/DL (ref 0.5–1.4)
DEPRECATED RDW RBC AUTO: 59 FL (ref 40–54)
EOSINOPHIL # BLD AUTO: 0.03 10*3/MM3 (ref 0–0.7)
EOSINOPHIL NFR BLD AUTO: 0.4 % (ref 0–4)
ERYTHROCYTE [DISTWIDTH] IN BLOOD BY AUTOMATED COUNT: 17.4 % (ref 12–15)
GFR SERPL CREATININE-BSD FRML MDRD: 24 ML/MIN/1.73
GLUCOSE BLD-MCNC: 136 MG/DL (ref 70–100)
GLUCOSE BLDC GLUCOMTR-MCNC: 130 MG/DL (ref 70–130)
GLUCOSE BLDC GLUCOMTR-MCNC: 228 MG/DL (ref 70–130)
HCT VFR BLD AUTO: 28.5 % (ref 40–52)
HGB BLD-MCNC: 9.7 G/DL (ref 14–18)
IMM GRANULOCYTES # BLD: 0.09 10*3/MM3 (ref 0–0.03)
IMM GRANULOCYTES NFR BLD: 1.2 % (ref 0–5)
LYMPHOCYTES # BLD AUTO: 0.64 10*3/MM3 (ref 0.72–4.86)
LYMPHOCYTES NFR BLD AUTO: 8.6 % (ref 15–45)
MCH RBC QN AUTO: 31.6 PG (ref 28–32)
MCHC RBC AUTO-ENTMCNC: 34 G/DL (ref 33–36)
MCV RBC AUTO: 92.8 FL (ref 82–95)
MONOCYTES # BLD AUTO: 0.81 10*3/MM3 (ref 0.19–1.3)
MONOCYTES NFR BLD AUTO: 10.8 % (ref 4–12)
NEUTROPHILS # BLD AUTO: 5.9 10*3/MM3 (ref 1.87–8.4)
NEUTROPHILS NFR BLD AUTO: 78.9 % (ref 39–78)
PLATELET # BLD AUTO: 53 10*3/MM3 (ref 130–400)
PMV BLD AUTO: 9.8 FL (ref 6–12)
POTASSIUM BLD-SCNC: 4.1 MMOL/L (ref 3.5–5.3)
RBC # BLD AUTO: 3.07 10*6/MM3 (ref 4.8–5.9)
SODIUM BLD-SCNC: 143 MMOL/L (ref 135–145)
WBC NRBC COR # BLD: 7.48 10*3/MM3 (ref 4.8–10.8)

## 2017-05-24 PROCEDURE — 94799 UNLISTED PULMONARY SVC/PX: CPT

## 2017-05-24 PROCEDURE — 82962 GLUCOSE BLOOD TEST: CPT

## 2017-05-24 PROCEDURE — 80048 BASIC METABOLIC PNL TOTAL CA: CPT | Performed by: INTERNAL MEDICINE

## 2017-05-24 PROCEDURE — 71020 HC CHEST PA AND LATERAL: CPT

## 2017-05-24 PROCEDURE — 63710000001 PREDNISONE PER 1 MG: Performed by: INTERNAL MEDICINE

## 2017-05-24 PROCEDURE — 97110 THERAPEUTIC EXERCISES: CPT

## 2017-05-24 PROCEDURE — 97116 GAIT TRAINING THERAPY: CPT

## 2017-05-24 PROCEDURE — 85025 COMPLETE CBC W/AUTO DIFF WBC: CPT | Performed by: FAMILY MEDICINE

## 2017-05-24 RX ORDER — SACCHAROMYCES BOULARDII 250 MG
250 CAPSULE ORAL 2 TIMES DAILY
Start: 2017-05-24 | End: 2017-07-26

## 2017-05-24 RX ORDER — SODIUM BICARBONATE 650 MG/1
650 TABLET ORAL 2 TIMES DAILY
Status: ON HOLD
Start: 2017-05-24 | End: 2019-01-01

## 2017-05-24 RX ORDER — MEGESTROL ACETATE 40 MG/ML
800 SUSPENSION ORAL DAILY
Start: 2017-05-24 | End: 2017-07-26

## 2017-05-24 RX ORDER — PREDNISONE 10 MG/1
10 TABLET ORAL DAILY
Status: DISCONTINUED | OUTPATIENT
Start: 2017-05-25 | End: 2017-05-24 | Stop reason: HOSPADM

## 2017-05-24 RX ORDER — IPRATROPIUM BROMIDE AND ALBUTEROL SULFATE 2.5; .5 MG/3ML; MG/3ML
3 SOLUTION RESPIRATORY (INHALATION)
Qty: 360 ML
Start: 2017-05-24 | End: 2017-07-26

## 2017-05-24 RX ORDER — CARVEDILOL 6.25 MG/1
3.12 TABLET ORAL 2 TIMES DAILY WITH MEALS
Start: 2017-05-24 | End: 2017-07-26

## 2017-05-24 RX ORDER — GUAIFENESIN 600 MG/1
1200 TABLET, EXTENDED RELEASE ORAL EVERY 12 HOURS SCHEDULED
Start: 2017-05-24 | End: 2017-07-26

## 2017-05-24 RX ORDER — PSEUDOEPHEDRINE HCL 30 MG
100 TABLET ORAL 2 TIMES DAILY
Refills: 0
Start: 2017-05-24 | End: 2017-07-26

## 2017-05-24 RX ORDER — PREDNISONE 10 MG/1
10 TABLET ORAL DAILY
Start: 2017-05-25 | End: 2017-07-26

## 2017-05-24 RX ORDER — LACTULOSE 20 G/30ML
30 SOLUTION ORAL DAILY PRN
Qty: 30 ML
Start: 2017-05-24 | End: 2017-07-26

## 2017-05-24 RX ADMIN — IPRATROPIUM BROMIDE AND ALBUTEROL SULFATE 3 ML: .5; 3 SOLUTION RESPIRATORY (INHALATION) at 06:47

## 2017-05-24 RX ADMIN — CEFEPIME HYDROCHLORIDE 1 G: 1 INJECTION, POWDER, FOR SOLUTION INTRAMUSCULAR; INTRAVENOUS at 10:18

## 2017-05-24 RX ADMIN — PREDNISONE 20 MG: 20 TABLET ORAL at 08:15

## 2017-05-24 RX ADMIN — GUAIFENESIN 1200 MG: 600 TABLET, EXTENDED RELEASE ORAL at 08:16

## 2017-05-24 RX ADMIN — Medication 250 MG: at 08:15

## 2017-05-24 RX ADMIN — SODIUM BICARBONATE 650 MG: 650 TABLET, ORALLY DISINTEGRATING ORAL at 08:15

## 2017-05-24 RX ADMIN — SERTRALINE 25 MG: 50 TABLET, FILM COATED ORAL at 08:16

## 2017-05-24 RX ADMIN — DOCUSATE SODIUM 100 MG: 100 CAPSULE ORAL at 08:16

## 2017-05-24 RX ADMIN — IPRATROPIUM BROMIDE AND ALBUTEROL SULFATE 3 ML: .5; 3 SOLUTION RESPIRATORY (INHALATION) at 10:46

## 2017-05-24 RX ADMIN — MEGESTROL ACETATE 800 MG: 40 SUSPENSION ORAL at 08:16

## 2017-05-30 LAB
PLAT GP IA/IIA AB BLD QL IA: POSITIVE
PLAT GP IB/IX AB BLD QL IA: POSITIVE
PLAT GP IIB/IIIA AB BLD QL IA: POSITIVE

## 2017-06-14 ENCOUNTER — TELEPHONE (OUTPATIENT)
Dept: CARDIOLOGY | Facility: CLINIC | Age: 82
End: 2017-06-14

## 2017-06-14 NOTE — TELEPHONE ENCOUNTER
PLEASE REVIEW RECENT DISCHARGE. PT WAS TOLD TO HOLD ANTICOAGULATION AS WELL AS COREG. SENDY WITH RESPIRATORY DISEASE WOULD LIKE TO KNOW WHEN HE IS TO RE-START THIS. SAYS PT IS CONFUSED ABOUT THIS    PLEASE ADVISE.

## 2017-06-26 ENCOUNTER — APPOINTMENT (OUTPATIENT)
Dept: LAB | Facility: HOSPITAL | Age: 82
End: 2017-06-26
Attending: INTERNAL MEDICINE

## 2017-06-26 ENCOUNTER — TRANSCRIBE ORDERS (OUTPATIENT)
Dept: ADMINISTRATIVE | Facility: HOSPITAL | Age: 82
End: 2017-06-26

## 2017-06-26 DIAGNOSIS — C90.00 MULTIPLE MYELOMA, WITHOUT MENTION OF HAVING ACHIEVED REMISSION: Primary | ICD-10-CM

## 2017-06-28 ENCOUNTER — APPOINTMENT (OUTPATIENT)
Dept: LAB | Facility: HOSPITAL | Age: 82
End: 2017-06-28
Attending: INTERNAL MEDICINE

## 2017-06-28 PROCEDURE — 86335 IMMUNFIX E-PHORSIS/URINE/CSF: CPT | Performed by: INTERNAL MEDICINE

## 2017-06-28 PROCEDURE — 84156 ASSAY OF PROTEIN URINE: CPT | Performed by: INTERNAL MEDICINE

## 2017-06-28 PROCEDURE — 81050 URINALYSIS VOLUME MEASURE: CPT | Performed by: INTERNAL MEDICINE

## 2017-06-28 PROCEDURE — 84166 PROTEIN E-PHORESIS/URINE/CSF: CPT | Performed by: INTERNAL MEDICINE

## 2017-06-29 LAB
ALBUMIN 24H MFR UR ELPH: 17.3 %
ALPHA1 GLOB 24H MFR UR ELPH: 4.8 %
ALPHA2 GLOB 24H MFR UR ELPH: 32.1 %
B-GLOBULIN MFR UR ELPH: 32.6 %
GAMMA GLOB 24H MFR UR ELPH: 13.2 %
HIV 1 & 2 AB SER-IMP: ABNORMAL
INTERPRETATION UR IFE-IMP: ABNORMAL
M PROTEIN 24H MFR UR ELPH: ABNORMAL %
PROT 24H UR-MRATE: 557 MG/24 HR (ref 30–150)
PROT UR-MCNC: 29.3 MG/DL

## 2017-07-26 ENCOUNTER — OFFICE VISIT (OUTPATIENT)
Dept: CARDIOLOGY | Facility: CLINIC | Age: 82
End: 2017-07-26

## 2017-07-26 VITALS
HEIGHT: 69 IN | BODY MASS INDEX: 21.62 KG/M2 | HEART RATE: 82 BPM | WEIGHT: 146 LBS | RESPIRATION RATE: 18 BRPM | DIASTOLIC BLOOD PRESSURE: 60 MMHG | SYSTOLIC BLOOD PRESSURE: 105 MMHG

## 2017-07-26 DIAGNOSIS — D63.1 ANEMIA ASSOCIATED WITH CHRONIC RENAL FAILURE, STAGE 3 (MODERATE): ICD-10-CM

## 2017-07-26 DIAGNOSIS — I48.0 PAF (PAROXYSMAL ATRIAL FIBRILLATION) (HCC): Primary | ICD-10-CM

## 2017-07-26 DIAGNOSIS — I10 ESSENTIAL HYPERTENSION: ICD-10-CM

## 2017-07-26 DIAGNOSIS — I25.10 CORONARY ARTERY DISEASE INVOLVING NATIVE CORONARY ARTERY OF NATIVE HEART WITHOUT ANGINA PECTORIS: ICD-10-CM

## 2017-07-26 DIAGNOSIS — Z95.0 PACEMAKER: ICD-10-CM

## 2017-07-26 DIAGNOSIS — N18.30 CHRONIC KIDNEY DISEASE, STAGE 3 (MODERATE): ICD-10-CM

## 2017-07-26 DIAGNOSIS — N18.3 ANEMIA ASSOCIATED WITH CHRONIC RENAL FAILURE, STAGE 3 (MODERATE): ICD-10-CM

## 2017-07-26 PROCEDURE — 93000 ELECTROCARDIOGRAM COMPLETE: CPT | Performed by: NURSE PRACTITIONER

## 2017-07-26 PROCEDURE — 99214 OFFICE O/P EST MOD 30 MIN: CPT | Performed by: NURSE PRACTITIONER

## 2017-07-26 RX ORDER — CARVEDILOL 6.25 MG/1
6.25 TABLET ORAL 2 TIMES DAILY WITH MEALS
COMMUNITY
End: 2019-01-01 | Stop reason: HOSPADM

## 2017-07-26 RX ORDER — DABIGATRAN ETEXILATE 75 MG/1
75 CAPSULE ORAL 2 TIMES DAILY
COMMUNITY
End: 2017-08-17 | Stop reason: SDUPTHER

## 2017-07-26 NOTE — PROGRESS NOTES
Subjective:     Encounter Date:07/26/2017      Patient ID: Jesús Long is a 85 y.o. male.    Chief Complaint:  History of Present Illness  Patient is here for follow up from hospital discharge. Patient had acute blood loss anemia, pneumonia and hemoptysis. All have resolved and patient is feeling much better. Patient has resumed his coreg and pradaxa and is feeling much better. Patient has a history of CHF, paroxysmal atrial fibrillation, pacemaker, coronary artery disease with CABG and redo. Patient denies chest pain or swelling on legs. Patient is not compliant with low salt diet, I encouraged this. No bleeding issues. Occasional shortness of breath upon quick movement or exertion. But no change. Patient is followed by Nicole.   The following portions of the patient's history were reviewed and updated as appropriate: allergies, current medications, past family history, past medical history, past social history, past surgical history and problem list.   Prior to Admission medications    Medication Sig Start Date End Date Taking? Authorizing Provider   carvedilol (COREG) 6.25 MG tablet Take 6.25 mg by mouth 2 (Two) Times a Day With Meals.   Yes Historical Provider, MD   Cholecalciferol (VITAMIN D3) 5000 UNITS capsule capsule Take 5,000 Units by mouth Daily.   Yes Historical Provider, MD   coenzyme Q10 100 MG capsule Take 100 mg by mouth Daily.   Yes Historical Provider, MD   dabigatran etexilate (PRADAXA) 75 MG capsule Take 75 mg by mouth 2 (Two) Times a Day.   Yes Historical Provider, MD   sertraline (ZOLOFT) 25 MG tablet Take 25 mg by mouth Daily.   Yes Historical Provider, MD   sodium bicarbonate 650 MG tablet Take 1 tablet by mouth 2 (Two) Times a Day. 5/24/17  Yes Josue Brown MD   docusate sodium 100 MG capsule Take 100 mg by mouth 2 (Two) Times a Day. 5/24/17 7/26/17 Yes Josue Brown MD   carvedilol (COREG) 6.25 MG tablet Take 0.5 tablets by mouth 2 (Two) Times a Day With  Meals.  Patient taking differently: Take 6.25 mg by mouth 2 (Two) Times a Day With Meals. 5/24/17 7/26/17  Josue Brown MD   cefepime (MAXIPIME) 1 g/100 mL 0.9% NS IVPB (mbp) Infuse 100 mL into a venous catheter Every 12 (Twelve) Hours. Indications: Pneumonia 5/24/17 7/26/17  Josue Brown MD   epoetin tavo (EPOGEN,PROCRIT) 95983 UNIT/ML injection Inject 1 mL under the skin Every 7 (Seven) Days. Indications: Anemia associated with Chronic Kidney Failure 5/24/17 7/26/17  Josue Brown MD   guaiFENesin (MUCINEX) 600 MG 12 hr tablet Take 2 tablets by mouth Every 12 (Twelve) Hours. 5/24/17 7/26/17  Joseu Brown MD   ipratropium-albuterol (DUO-NEB) 0.5-2.5 mg/mL nebulizer Take 3 mL by nebulization 4 (Four) Times a Day. 5/24/17 7/26/17  Josue Brown MD   lactulose 20 GM/30ML solution solution Take 45 mL by mouth Daily As Needed (constipation). 5/24/17 7/26/17  Josue Brown MD   megestrol (MEGACE) 40 MG/ML suspension Take 20 mL by mouth Daily. 5/24/17 7/26/17  Josue Brown MD   predniSONE (DELTASONE) 10 MG tablet Take 1 tablet by mouth Daily. For 1 additional days then stop 5/25/17 7/26/17  Josue Brown MD   saccharomyces boulardii (FLORASTOR) 250 MG capsule Take 1 capsule by mouth 2 (Two) Times a Day. 5/24/17 7/26/17  Josue Brown MD   vitamin C (ASCORBIC ACID) 250 MG tablet Take 250 mg by mouth Daily.  7/26/17  Historical Provider, MD     Past Medical History:   Diagnosis Date   • Anemia    • Atrial fibrillation by electrocardiogram    • CAD in native artery    • Carotid bruit    • CHF (congestive heart failure)    • Chronic kidney disease    • Diabetes mellitus     Controlled diabetes mellitus type II without complication   • Edema    • History of coronary artery bypass graft    • Hyperlipidemia    • Hypertension    • Monoclonal (M) protein disease, multiple 'M' protein    • Multiple myeloma    • Multiple myeloma    • Murmur    • Pulmonary  hypertension    • Sick sinus syndrome    • Sleep apnea    • Status post placement of cardiac pacemaker        Review of Systems   Constitution: Positive for weakness. Negative for chills, decreased appetite, fever, malaise/fatigue, weight gain and weight loss.   HENT: Negative for headaches and nosebleeds.    Eyes: Negative for visual disturbance.   Cardiovascular: Negative for chest pain, dyspnea on exertion, leg swelling, near-syncope, orthopnea, palpitations, paroxysmal nocturnal dyspnea and syncope.   Respiratory: Positive for shortness of breath. Negative for cough, hemoptysis and snoring.    Endocrine: Negative for cold intolerance and heat intolerance.   Hematologic/Lymphatic: Negative for bleeding problem. Does not bruise/bleed easily.   Skin: Negative for rash.   Musculoskeletal: Negative for back pain and falls.   Gastrointestinal: Negative for abdominal pain, constipation, diarrhea, heartburn, melena, nausea and vomiting.   Genitourinary: Negative for hematuria.   Neurological: Negative for dizziness and light-headedness.   Psychiatric/Behavioral: Negative for altered mental status.   Allergic/Immunologic: Negative for persistent infections.         ECG 12 Lead  Date/Time: 7/26/2017 10:07 AM  Performed by: WILMER ROMERO  Authorized by: WILMER ROMERO   Comparison: compared with previous ECG from 5/17/2017  Similar to previous ECG  Rhythm: paced               Objective:     Physical Exam   Constitutional: He is oriented to person, place, and time. He appears well-developed and well-nourished.   HENT:   Head: Normocephalic and atraumatic.   Eyes: Pupils are equal, round, and reactive to light.   Neck: Normal range of motion. Neck supple. No JVD present. Carotid bruit is not present.   Cardiovascular: Normal rate, regular rhythm, normal heart sounds and intact distal pulses.    Pulmonary/Chest: Effort normal and breath sounds normal.   Abdominal: Soft. Bowel sounds are normal.   Musculoskeletal: Normal  "range of motion.   Neurological: He is alert and oriented to person, place, and time. He has normal reflexes.   Skin: Skin is warm and dry.   Psychiatric: He has a normal mood and affect. His behavior is normal. Judgment and thought content normal.     Blood pressure 105/60, pulse 82, resp. rate 18, height 69\" (175.3 cm), weight 146 lb (66.2 kg).      Lab Review:       Assessment:          Diagnosis Plan   1. PAF (paroxysmal atrial fibrillation)     2. Pacemaker     3. Essential hypertension     4. Coronary artery disease involving native coronary artery of native heart without angina pectoris     5. Anemia associated with chronic renal failure, stage 3 (moderate)     6. Chronic kidney disease, stage 3 (moderate)            Plan:       1. Paroxysmal Atrial Fibrillation- patient is back on Pradaxa and Coreg and tolerating well.   2. Last Pacemaker check good  3. Blood pressure controlled, borderline low but patient asymptomatic  4. No clinical evidence of ischemia, history of CABG 30+ years ago with redo 15+ years ago  5. Anemia followed with Dr. Julio  6. CKD followed by Dr. Damon  Will see in one year or sooner for any issues.       "

## 2017-08-17 RX ORDER — DABIGATRAN ETEXILATE 75 MG/1
75 CAPSULE ORAL 2 TIMES DAILY
Qty: 180 CAPSULE | Refills: 3 | Status: SHIPPED | OUTPATIENT
Start: 2017-08-17 | End: 2018-06-24 | Stop reason: HOSPADM

## 2017-08-17 NOTE — TELEPHONE ENCOUNTER
Pt came into the office today stating that he needs his Pradaxa refilled.     pls send to [facility] mail[facility]

## 2017-09-15 ENCOUNTER — CLINICAL SUPPORT (OUTPATIENT)
Dept: CARDIOLOGY | Facility: CLINIC | Age: 82
End: 2017-09-15

## 2017-09-15 DIAGNOSIS — Z95.0 PACEMAKER: ICD-10-CM

## 2017-09-15 PROCEDURE — 93294 REM INTERROG EVL PM/LDLS PM: CPT | Performed by: PHYSICIAN ASSISTANT

## 2017-09-15 PROCEDURE — 93296 REM INTERROG EVL PM/IDS: CPT | Performed by: PHYSICIAN ASSISTANT

## 2017-09-18 NOTE — PROGRESS NOTES
Single Chamber Ventricular Pacemaker Evaluation Report  [facility]    September 18, 2017    Primary Cardiologist: Jessica  : Medtronic Model: Adapta  Implant date: 7/2/13    Reason for evaluation: routine  Indication for pacemaker: sick sinus syndrome    Measurements  Ventricular sensing - R wave: n/r  Ventricular threshold: 0.375 V @ 0.4 ms  Ventricular lead impedance: 491 ohms      Diagnostic Data  Paced: 91.1 %  Other: no events  Battery status: satisfactory           Final Parameters  Mode: VVIR     Lower rate: 60 bpm    Ventricular - Amplitude: 2.0 V Pulse width: 0.4 ms Sensitivity: 5.6 mV   Changes made: n/a  Conclusions: normal pacemaker function    Follow up: 6 months

## 2017-09-19 NOTE — PROGRESS NOTES
I have reviewed the notes, assessments, and/or procedures performed by  Mel Oneill PA-C  , I concur with her  documentation of   Jesús Long.

## 2018-02-19 ENCOUNTER — LAB (OUTPATIENT)
Dept: LAB | Facility: HOSPITAL | Age: 83
End: 2018-02-19
Attending: INTERNAL MEDICINE

## 2018-02-19 ENCOUNTER — TRANSCRIBE ORDERS (OUTPATIENT)
Dept: ADMINISTRATIVE | Facility: HOSPITAL | Age: 83
End: 2018-02-19

## 2018-02-19 DIAGNOSIS — C90.00 MYELOMA KIDNEY (HCC): Primary | ICD-10-CM

## 2018-02-19 DIAGNOSIS — N08 MYELOMA KIDNEY (HCC): Primary | ICD-10-CM

## 2018-02-19 PROCEDURE — 86335 IMMUNFIX E-PHORSIS/URINE/CSF: CPT | Performed by: INTERNAL MEDICINE

## 2018-02-19 PROCEDURE — 84156 ASSAY OF PROTEIN URINE: CPT | Performed by: INTERNAL MEDICINE

## 2018-02-19 PROCEDURE — 81050 URINALYSIS VOLUME MEASURE: CPT | Performed by: INTERNAL MEDICINE

## 2018-02-19 PROCEDURE — 84166 PROTEIN E-PHORESIS/URINE/CSF: CPT | Performed by: INTERNAL MEDICINE

## 2018-02-20 LAB
ALBUMIN 24H MFR UR ELPH: 11.2 %
ALPHA1 GLOB 24H MFR UR ELPH: 5 %
ALPHA2 GLOB 24H MFR UR ELPH: 24.4 %
B-GLOBULIN MFR UR ELPH: 40.5 %
GAMMA GLOB 24H MFR UR ELPH: 18.9 %
HIV 1 & 2 AB SER-IMP: ABNORMAL
INTERPRETATION UR IFE-IMP: ABNORMAL
M PROTEIN 24H MFR UR ELPH: ABNORMAL %
PROT 24H UR-MRATE: 164 G/(24.H) (ref 30–150)
PROT UR-MCNC: 9.1 MG/DL

## 2018-03-15 ENCOUNTER — CLINICAL SUPPORT NO REQUIREMENTS (OUTPATIENT)
Dept: CARDIOLOGY | Facility: CLINIC | Age: 83
End: 2018-03-15

## 2018-03-15 DIAGNOSIS — Z95.0 PACEMAKER: ICD-10-CM

## 2018-03-15 PROCEDURE — 93296 REM INTERROG EVL PM/IDS: CPT | Performed by: PHYSICIAN ASSISTANT

## 2018-03-15 PROCEDURE — 93294 REM INTERROG EVL PM/LDLS PM: CPT | Performed by: PHYSICIAN ASSISTANT

## 2018-04-02 NOTE — PROGRESS NOTES
Agree with assessment and plan of mid level provider as below with any changes if made as noted by  Mel Long.

## 2018-06-19 ENCOUNTER — APPOINTMENT (OUTPATIENT)
Dept: GENERAL RADIOLOGY | Facility: HOSPITAL | Age: 83
End: 2018-06-19

## 2018-06-19 ENCOUNTER — HOSPITAL ENCOUNTER (INPATIENT)
Facility: HOSPITAL | Age: 83
LOS: 5 days | Discharge: HOME | End: 2018-06-24
Attending: EMERGENCY MEDICINE | Admitting: FAMILY MEDICINE

## 2018-06-19 DIAGNOSIS — K62.5 BRIGHT RED BLOOD PER RECTUM: Primary | ICD-10-CM

## 2018-06-19 DIAGNOSIS — K92.2 GASTROINTESTINAL HEMORRHAGE, UNSPECIFIED GASTROINTESTINAL HEMORRHAGE TYPE: ICD-10-CM

## 2018-06-19 DIAGNOSIS — Z74.09 DECREASED FUNCTIONAL MOBILITY AND ENDURANCE: ICD-10-CM

## 2018-06-19 DIAGNOSIS — K59.00 CONSTIPATION, UNSPECIFIED CONSTIPATION TYPE: ICD-10-CM

## 2018-06-19 LAB
ALBUMIN SERPL-MCNC: 4 G/DL (ref 3.5–5)
ALBUMIN/GLOB SERPL: 1.5 G/DL (ref 1.1–2.5)
ALP SERPL-CCNC: 53 U/L (ref 24–120)
ALT SERPL W P-5'-P-CCNC: 26 U/L (ref 0–54)
ANION GAP SERPL CALCULATED.3IONS-SCNC: 15 MMOL/L (ref 4–13)
AST SERPL-CCNC: 44 U/L (ref 7–45)
BASOPHILS # BLD AUTO: 0.02 10*3/MM3 (ref 0–0.2)
BASOPHILS NFR BLD AUTO: 0.4 % (ref 0–2)
BILIRUB SERPL-MCNC: 0.5 MG/DL (ref 0.1–1)
BUN BLD-MCNC: 60 MG/DL (ref 5–21)
BUN/CREAT SERPL: 27.4 (ref 7–25)
CALCIUM SPEC-SCNC: 9 MG/DL (ref 8.4–10.4)
CHLORIDE SERPL-SCNC: 101 MMOL/L (ref 98–110)
CO2 SERPL-SCNC: 24 MMOL/L (ref 24–31)
CREAT BLD-MCNC: 2.19 MG/DL (ref 0.5–1.4)
D-LACTATE SERPL-SCNC: 0.9 MMOL/L (ref 0.5–2)
DEPRECATED RDW RBC AUTO: 56.6 FL (ref 40–54)
EOSINOPHIL # BLD AUTO: 0.08 10*3/MM3 (ref 0–0.7)
EOSINOPHIL NFR BLD AUTO: 1.6 % (ref 0–4)
ERYTHROCYTE [DISTWIDTH] IN BLOOD BY AUTOMATED COUNT: 16.7 % (ref 12–15)
GFR SERPL CREATININE-BSD FRML MDRD: 29 ML/MIN/1.73
GLOBULIN UR ELPH-MCNC: 2.6 GM/DL
GLUCOSE BLD-MCNC: 132 MG/DL (ref 70–100)
HCT VFR BLD AUTO: 29.1 % (ref 40–52)
HGB BLD-MCNC: 9.3 G/DL (ref 14–18)
IMM GRANULOCYTES # BLD: 0.02 10*3/MM3 (ref 0–0.03)
IMM GRANULOCYTES NFR BLD: 0.4 % (ref 0–5)
INR PPP: 1.28 (ref 0.91–1.09)
LYMPHOCYTES # BLD AUTO: 0.89 10*3/MM3 (ref 0.72–4.86)
LYMPHOCYTES NFR BLD AUTO: 18.1 % (ref 15–45)
MCH RBC QN AUTO: 31.7 PG (ref 28–32)
MCHC RBC AUTO-ENTMCNC: 32 G/DL (ref 33–36)
MCV RBC AUTO: 99.3 FL (ref 82–95)
MONOCYTES # BLD AUTO: 0.51 10*3/MM3 (ref 0.19–1.3)
MONOCYTES NFR BLD AUTO: 10.4 % (ref 4–12)
NEUTROPHILS # BLD AUTO: 3.4 10*3/MM3 (ref 1.87–8.4)
NEUTROPHILS NFR BLD AUTO: 69.1 % (ref 39–78)
NRBC BLD MANUAL-RTO: 0 /100 WBC (ref 0–0)
PLATELET # BLD AUTO: 114 10*3/MM3 (ref 130–400)
PMV BLD AUTO: 10.4 FL (ref 6–12)
POTASSIUM BLD-SCNC: 4.2 MMOL/L (ref 3.5–5.3)
PROT SERPL-MCNC: 6.6 G/DL (ref 6.3–8.7)
PROTHROMBIN TIME: 16.4 SECONDS (ref 11.9–14.6)
RBC # BLD AUTO: 2.93 10*6/MM3 (ref 4.8–5.9)
SODIUM BLD-SCNC: 140 MMOL/L (ref 135–145)
WBC NRBC COR # BLD: 4.92 10*3/MM3 (ref 4.8–10.8)

## 2018-06-19 PROCEDURE — 86850 RBC ANTIBODY SCREEN: CPT | Performed by: NURSE PRACTITIONER

## 2018-06-19 PROCEDURE — 36415 COLL VENOUS BLD VENIPUNCTURE: CPT | Performed by: NURSE PRACTITIONER

## 2018-06-19 PROCEDURE — 85018 HEMOGLOBIN: CPT | Performed by: NURSE PRACTITIONER

## 2018-06-19 PROCEDURE — 74018 RADEX ABDOMEN 1 VIEW: CPT

## 2018-06-19 PROCEDURE — 85014 HEMATOCRIT: CPT | Performed by: NURSE PRACTITIONER

## 2018-06-19 PROCEDURE — 99284 EMERGENCY DEPT VISIT MOD MDM: CPT

## 2018-06-19 PROCEDURE — 85610 PROTHROMBIN TIME: CPT | Performed by: EMERGENCY MEDICINE

## 2018-06-19 PROCEDURE — 83605 ASSAY OF LACTIC ACID: CPT | Performed by: EMERGENCY MEDICINE

## 2018-06-19 PROCEDURE — 80053 COMPREHEN METABOLIC PANEL: CPT | Performed by: EMERGENCY MEDICINE

## 2018-06-19 PROCEDURE — 86900 BLOOD TYPING SEROLOGIC ABO: CPT | Performed by: NURSE PRACTITIONER

## 2018-06-19 PROCEDURE — 86923 COMPATIBILITY TEST ELECTRIC: CPT

## 2018-06-19 PROCEDURE — 86901 BLOOD TYPING SEROLOGIC RH(D): CPT | Performed by: NURSE PRACTITIONER

## 2018-06-19 PROCEDURE — 85025 COMPLETE CBC W/AUTO DIFF WBC: CPT | Performed by: EMERGENCY MEDICINE

## 2018-06-19 RX ORDER — ONDANSETRON 4 MG/1
4 TABLET, ORALLY DISINTEGRATING ORAL EVERY 6 HOURS PRN
Status: DISCONTINUED | OUTPATIENT
Start: 2018-06-19 | End: 2018-06-24 | Stop reason: HOSPADM

## 2018-06-19 RX ORDER — CARVEDILOL 6.25 MG/1
6.25 TABLET ORAL 2 TIMES DAILY WITH MEALS
Status: DISCONTINUED | OUTPATIENT
Start: 2018-06-19 | End: 2018-06-24 | Stop reason: HOSPADM

## 2018-06-19 RX ORDER — SODIUM CHLORIDE 9 MG/ML
50 INJECTION, SOLUTION INTRAVENOUS CONTINUOUS
Status: DISCONTINUED | OUTPATIENT
Start: 2018-06-19 | End: 2018-06-22

## 2018-06-19 RX ORDER — SODIUM CHLORIDE 0.9 % (FLUSH) 0.9 %
1-10 SYRINGE (ML) INJECTION AS NEEDED
Status: DISCONTINUED | OUTPATIENT
Start: 2018-06-19 | End: 2018-06-24 | Stop reason: HOSPADM

## 2018-06-19 RX ORDER — SODIUM BICARBONATE 650 MG/1
650 TABLET ORAL 2 TIMES DAILY
Status: DISCONTINUED | OUTPATIENT
Start: 2018-06-19 | End: 2018-06-24 | Stop reason: HOSPADM

## 2018-06-19 RX ORDER — SODIUM CHLORIDE 0.9 % (FLUSH) 0.9 %
10 SYRINGE (ML) INJECTION AS NEEDED
Status: DISCONTINUED | OUTPATIENT
Start: 2018-06-19 | End: 2018-06-24 | Stop reason: HOSPADM

## 2018-06-19 RX ORDER — ONDANSETRON 4 MG/1
4 TABLET, FILM COATED ORAL EVERY 6 HOURS PRN
Status: DISCONTINUED | OUTPATIENT
Start: 2018-06-19 | End: 2018-06-24 | Stop reason: HOSPADM

## 2018-06-19 RX ORDER — ACETAMINOPHEN 325 MG/1
650 TABLET ORAL EVERY 4 HOURS PRN
Status: DISCONTINUED | OUTPATIENT
Start: 2018-06-19 | End: 2018-06-24 | Stop reason: HOSPADM

## 2018-06-19 RX ORDER — ONDANSETRON 2 MG/ML
4 INJECTION INTRAMUSCULAR; INTRAVENOUS EVERY 6 HOURS PRN
Status: DISCONTINUED | OUTPATIENT
Start: 2018-06-19 | End: 2018-06-24 | Stop reason: HOSPADM

## 2018-06-19 RX ADMIN — CARVEDILOL 6.25 MG: 6.25 TABLET, FILM COATED ORAL at 23:05

## 2018-06-19 RX ADMIN — SODIUM BICARBONATE 650 MG: 650 TABLET ORAL at 23:05

## 2018-06-19 RX ADMIN — SODIUM CHLORIDE 50 ML/HR: 9 INJECTION, SOLUTION INTRAVENOUS at 23:05

## 2018-06-19 RX ADMIN — SODIUM CHLORIDE 1000 ML: 9 INJECTION, SOLUTION INTRAVENOUS at 19:58

## 2018-06-20 ENCOUNTER — ANESTHESIA (OUTPATIENT)
Dept: GASTROENTEROLOGY | Facility: HOSPITAL | Age: 83
End: 2018-06-20

## 2018-06-20 ENCOUNTER — ANESTHESIA EVENT (OUTPATIENT)
Dept: GASTROENTEROLOGY | Facility: HOSPITAL | Age: 83
End: 2018-06-20

## 2018-06-20 PROBLEM — K92.2 GASTROINTESTINAL HEMORRHAGE: Status: ACTIVE | Noted: 2018-06-19

## 2018-06-20 LAB
ABO GROUP BLD: NORMAL
ALBUMIN SERPL-MCNC: 3 G/DL (ref 3.5–5)
ALBUMIN/GLOB SERPL: 1.4 G/DL (ref 1.1–2.5)
ALP SERPL-CCNC: 40 U/L (ref 24–120)
ALT SERPL W P-5'-P-CCNC: 27 U/L (ref 0–54)
ANION GAP SERPL CALCULATED.3IONS-SCNC: 9 MMOL/L (ref 4–13)
ARTICHOKE IGE QN: 85 MG/DL (ref 0–99)
AST SERPL-CCNC: 29 U/L (ref 7–45)
BASOPHILS # BLD AUTO: 0.01 10*3/MM3 (ref 0–0.2)
BASOPHILS NFR BLD AUTO: 0.3 % (ref 0–2)
BILIRUB SERPL-MCNC: 0.3 MG/DL (ref 0.1–1)
BLD GP AB SCN SERPL QL: NEGATIVE
BUN BLD-MCNC: 53 MG/DL (ref 5–21)
BUN/CREAT SERPL: 27 (ref 7–25)
CALCIUM SPEC-SCNC: 7.9 MG/DL (ref 8.4–10.4)
CHLORIDE SERPL-SCNC: 108 MMOL/L (ref 98–110)
CHOLEST SERPL-MCNC: 133 MG/DL (ref 130–200)
CO2 SERPL-SCNC: 23 MMOL/L (ref 24–31)
CREAT BLD-MCNC: 1.96 MG/DL (ref 0.5–1.4)
DEPRECATED RDW RBC AUTO: 55.7 FL (ref 40–54)
EOSINOPHIL # BLD AUTO: 0.11 10*3/MM3 (ref 0–0.7)
EOSINOPHIL NFR BLD AUTO: 3 % (ref 0–4)
ERYTHROCYTE [DISTWIDTH] IN BLOOD BY AUTOMATED COUNT: 16.4 % (ref 12–15)
GFR SERPL CREATININE-BSD FRML MDRD: 33 ML/MIN/1.73
GLOBULIN UR ELPH-MCNC: 2.1 GM/DL
GLUCOSE BLD-MCNC: 120 MG/DL (ref 70–100)
HBA1C MFR BLD: 6.8 %
HCT VFR BLD AUTO: 23.3 % (ref 40–52)
HCT VFR BLD AUTO: 24.4 % (ref 40–52)
HCT VFR BLD AUTO: 25 % (ref 40–52)
HCT VFR BLD AUTO: 25.4 % (ref 40–52)
HCT VFR BLD AUTO: 25.6 % (ref 40–52)
HCT VFR BLD AUTO: 26.1 % (ref 40–52)
HCT VFR BLD AUTO: 28.8 % (ref 40–52)
HDLC SERPL-MCNC: 23 MG/DL
HGB BLD-MCNC: 7.4 G/DL (ref 14–18)
HGB BLD-MCNC: 7.7 G/DL (ref 14–18)
HGB BLD-MCNC: 7.9 G/DL (ref 14–18)
HGB BLD-MCNC: 8.2 G/DL (ref 14–18)
HGB BLD-MCNC: 8.2 G/DL (ref 14–18)
HGB BLD-MCNC: 8.4 G/DL (ref 14–18)
HGB BLD-MCNC: 9.2 G/DL (ref 14–18)
IMM GRANULOCYTES # BLD: 0.01 10*3/MM3 (ref 0–0.03)
IMM GRANULOCYTES NFR BLD: 0.3 % (ref 0–5)
LDLC/HDLC SERPL: 3.67 {RATIO}
LYMPHOCYTES # BLD AUTO: 0.66 10*3/MM3 (ref 0.72–4.86)
LYMPHOCYTES NFR BLD AUTO: 17.9 % (ref 15–45)
MCH RBC QN AUTO: 31.5 PG (ref 28–32)
MCHC RBC AUTO-ENTMCNC: 31.8 G/DL (ref 33–36)
MCV RBC AUTO: 99.1 FL (ref 82–95)
MONOCYTES # BLD AUTO: 0.34 10*3/MM3 (ref 0.19–1.3)
MONOCYTES NFR BLD AUTO: 9.2 % (ref 4–12)
NEUTROPHILS # BLD AUTO: 2.55 10*3/MM3 (ref 1.87–8.4)
NEUTROPHILS NFR BLD AUTO: 69.3 % (ref 39–78)
NRBC BLD MANUAL-RTO: 0 /100 WBC (ref 0–0)
PLATELET # BLD AUTO: 78 10*3/MM3 (ref 130–400)
PMV BLD AUTO: 10.6 FL (ref 6–12)
POTASSIUM BLD-SCNC: 4 MMOL/L (ref 3.5–5.3)
PROT SERPL-MCNC: 5.1 G/DL (ref 6.3–8.7)
RBC # BLD AUTO: 2.35 10*6/MM3 (ref 4.8–5.9)
RH BLD: POSITIVE
SODIUM BLD-SCNC: 140 MMOL/L (ref 135–145)
T&S EXPIRATION DATE: NORMAL
TRIGL SERPL-MCNC: 128 MG/DL (ref 0–149)
WBC NRBC COR # BLD: 3.68 10*3/MM3 (ref 4.8–10.8)

## 2018-06-20 PROCEDURE — 94799 UNLISTED PULMONARY SVC/PX: CPT

## 2018-06-20 PROCEDURE — 99223 1ST HOSP IP/OBS HIGH 75: CPT | Performed by: INTERNAL MEDICINE

## 2018-06-20 PROCEDURE — 83036 HEMOGLOBIN GLYCOSYLATED A1C: CPT | Performed by: NURSE PRACTITIONER

## 2018-06-20 PROCEDURE — 85025 COMPLETE CBC W/AUTO DIFF WBC: CPT | Performed by: NURSE PRACTITIONER

## 2018-06-20 PROCEDURE — 85018 HEMOGLOBIN: CPT | Performed by: NURSE PRACTITIONER

## 2018-06-20 PROCEDURE — P9016 RBC LEUKOCYTES REDUCED: HCPCS

## 2018-06-20 PROCEDURE — 86900 BLOOD TYPING SEROLOGIC ABO: CPT

## 2018-06-20 PROCEDURE — 85014 HEMATOCRIT: CPT | Performed by: NURSE PRACTITIONER

## 2018-06-20 PROCEDURE — 25010000002 PROPOFOL 10 MG/ML EMULSION: Performed by: NURSE ANESTHETIST, CERTIFIED REGISTERED

## 2018-06-20 PROCEDURE — 43235 EGD DIAGNOSTIC BRUSH WASH: CPT | Performed by: INTERNAL MEDICINE

## 2018-06-20 PROCEDURE — 80053 COMPREHEN METABOLIC PANEL: CPT | Performed by: NURSE PRACTITIONER

## 2018-06-20 PROCEDURE — 36415 COLL VENOUS BLD VENIPUNCTURE: CPT | Performed by: NURSE PRACTITIONER

## 2018-06-20 PROCEDURE — 0DJ08ZZ INSPECTION OF UPPER INTESTINAL TRACT, VIA NATURAL OR ARTIFICIAL OPENING ENDOSCOPIC: ICD-10-PCS | Performed by: INTERNAL MEDICINE

## 2018-06-20 PROCEDURE — 36430 TRANSFUSION BLD/BLD COMPNT: CPT

## 2018-06-20 PROCEDURE — 80061 LIPID PANEL: CPT | Performed by: NURSE PRACTITIONER

## 2018-06-20 RX ORDER — SODIUM CHLORIDE 0.9 % (FLUSH) 0.9 %
1-10 SYRINGE (ML) INJECTION AS NEEDED
Status: DISCONTINUED | OUTPATIENT
Start: 2018-06-20 | End: 2018-06-22

## 2018-06-20 RX ORDER — SODIUM CHLORIDE 9 MG/ML
100 INJECTION, SOLUTION INTRAVENOUS CONTINUOUS
Status: DISCONTINUED | OUTPATIENT
Start: 2018-06-20 | End: 2018-06-22

## 2018-06-20 RX ORDER — LIDOCAINE HYDROCHLORIDE 20 MG/ML
INJECTION, SOLUTION INFILTRATION; PERINEURAL AS NEEDED
Status: DISCONTINUED | OUTPATIENT
Start: 2018-06-20 | End: 2018-06-20 | Stop reason: SURG

## 2018-06-20 RX ORDER — PROPOFOL 10 MG/ML
VIAL (ML) INTRAVENOUS AS NEEDED
Status: DISCONTINUED | OUTPATIENT
Start: 2018-06-20 | End: 2018-06-20 | Stop reason: SURG

## 2018-06-20 RX ADMIN — SODIUM CHLORIDE 8 MG/HR: 900 INJECTION INTRAVENOUS at 20:00

## 2018-06-20 RX ADMIN — SODIUM BICARBONATE 650 MG: 650 TABLET ORAL at 09:23

## 2018-06-20 RX ADMIN — PROPOFOL 30 MG: 10 INJECTION, EMULSION INTRAVENOUS at 13:06

## 2018-06-20 RX ADMIN — CARVEDILOL 6.25 MG: 6.25 TABLET, FILM COATED ORAL at 09:23

## 2018-06-20 RX ADMIN — SODIUM CHLORIDE 8 MG/HR: 900 INJECTION INTRAVENOUS at 15:51

## 2018-06-20 RX ADMIN — SERTRALINE 50 MG: 50 TABLET, FILM COATED ORAL at 09:23

## 2018-06-20 RX ADMIN — LIDOCAINE HYDROCHLORIDE 100 MG: 20 INJECTION, SOLUTION INFILTRATION; PERINEURAL at 13:06

## 2018-06-20 RX ADMIN — SODIUM BICARBONATE 650 MG: 650 TABLET ORAL at 21:37

## 2018-06-20 RX ADMIN — PROPOFOL 30 MG: 10 INJECTION, EMULSION INTRAVENOUS at 13:07

## 2018-06-20 RX ADMIN — SODIUM CHLORIDE 100 ML/HR: 9 INJECTION, SOLUTION INTRAVENOUS at 20:01

## 2018-06-20 RX ADMIN — CHOLECALCIFEROL CAP 125 MCG (5000 UNIT) 5000 UNITS: 125 CAP at 09:24

## 2018-06-20 NOTE — ANESTHESIA POSTPROCEDURE EVALUATION
"Patient: Jesús Long    Procedure Summary     Date:  06/20/18 Room / Location:  [facility] ENDOSCOPY 5 / [facility] ENDOS[facility]    Anesthesia Start:  1259 Anesthesia Stop:  1315    Procedure:  ESOPHAGOGASTRODUODENOSCOPY WITH ANESTHESIA (N/A ) Diagnosis:       Gastrointestinal hemorrhage, unspecified gastrointestinal hemorrhage type      (Gastrointestinal hemorrhage, unspecified gastrointestinal hemorrhage type [K92.2])    Surgeon:  Torin Bain MD Provider:  Gio Finney CRNA    Anesthesia Type:  general ASA Status:  3 - Emergent          Anesthesia Type: general  Last vitals  BP   128/71 (06/20/18 1215)   Temp   98.5 °F (36.9 °C) (06/20/18 1215)   Pulse   76 (06/20/18 1215)   Resp   18 (06/20/18 1215)     SpO2   97 % (06/20/18 1215)     Post Anesthesia Care and Evaluation    Patient location during evaluation: PHASE II  Patient participation: complete - patient participated  Level of consciousness: awake and awake and alert  Pain management: adequate  Airway patency: patent  Anesthetic complications: No anesthetic complications    Cardiovascular status: acceptable  Respiratory status: acceptable  Hydration status: acceptable    Comments: Blood pressure 128/71, pulse 76, temperature 98.5 °F (36.9 °C), temperature source Oral, resp. rate 18, height 177.8 cm (70\"), weight 66.2 kg (146 lb), SpO2 97 %.        "

## 2018-06-20 NOTE — ANESTHESIA PREPROCEDURE EVALUATION
Anesthesia Evaluation     Patient summary reviewed   no history of anesthetic complications:  NPO Solid Status: > 8 hours  NPO Liquid Status: > 4 hours           Airway   Mallampati: I  Neck ROM: full  No difficulty expected  Dental    (+) edentulous    Pulmonary    (+) a smoker Former, sleep apnea,   (-) asthma  Cardiovascular     PT is on anticoagulation therapy  Patient on routine beta blocker and Beta blocker given within 24 hours of surgery    (+) pacemaker (medtronic) pacemaker, hypertension, CAD, CABG (4V, 1986 4V 1998), dysrhythmias Atrial Fib, CHF,     ROS comment: Echo:  ·Left ventricular wall thickness is consistent with mild concentric hypertrophy.  ·Left ventricular function is low normal. Estimated EF = 45%.  ·Left ventricular diastolic dysfunction (grade I) consistent with impaired relaxation.  ·Moderate tricuspid valve regurgitation is present.  ·Severe pulmonary hypertension is present    Neuro/Psych  (+) TIA (possible), psychiatric history (delirium),     (-) seizures, CVA  GI/Hepatic/Renal/Endo    (+)  GI bleeding, renal disease CRI, diabetes mellitus,   (-) liver disease    Musculoskeletal     Abdominal    Substance History      OB/GYN          Other      history of cancer (myeloma)         Getting PRBCs during interview         Anesthesia Plan    ASA 3 - emergent     general   total IV anesthesia  intravenous induction   Anesthetic plan and risks discussed with patient.

## 2018-06-21 LAB
ABO + RH BLD: NORMAL
BH BB BLOOD EXPIRATION DATE: NORMAL
BH BB BLOOD TYPE BARCODE: 8400
BH BB DISPENSE STATUS: NORMAL
BH BB PRODUCT CODE: NORMAL
BH BB UNIT NUMBER: NORMAL
HCT VFR BLD AUTO: 25 % (ref 40–52)
HCT VFR BLD AUTO: 25.9 % (ref 40–52)
HCT VFR BLD AUTO: 26.6 % (ref 40–52)
HGB BLD-MCNC: 8 G/DL (ref 14–18)
HGB BLD-MCNC: 8.1 G/DL (ref 14–18)
HGB BLD-MCNC: 8.6 G/DL (ref 14–18)
UNIT  ABO: NORMAL
UNIT  RH: NORMAL

## 2018-06-21 PROCEDURE — 94760 N-INVAS EAR/PLS OXIMETRY 1: CPT

## 2018-06-21 PROCEDURE — 85018 HEMOGLOBIN: CPT | Performed by: NURSE PRACTITIONER

## 2018-06-21 PROCEDURE — 85018 HEMOGLOBIN: CPT | Performed by: FAMILY MEDICINE

## 2018-06-21 PROCEDURE — 85014 HEMATOCRIT: CPT | Performed by: FAMILY MEDICINE

## 2018-06-21 PROCEDURE — 63710000001 DIPHENHYDRAMINE PER 50 MG: Performed by: INTERNAL MEDICINE

## 2018-06-21 PROCEDURE — 94799 UNLISTED PULMONARY SVC/PX: CPT

## 2018-06-21 PROCEDURE — 99232 SBSQ HOSP IP/OBS MODERATE 35: CPT | Performed by: INTERNAL MEDICINE

## 2018-06-21 PROCEDURE — 99223 1ST HOSP IP/OBS HIGH 75: CPT | Performed by: INTERNAL MEDICINE

## 2018-06-21 PROCEDURE — 85014 HEMATOCRIT: CPT | Performed by: NURSE PRACTITIONER

## 2018-06-21 RX ORDER — DIPHENHYDRAMINE HCL 25 MG
25 CAPSULE ORAL NIGHTLY PRN
Status: DISCONTINUED | OUTPATIENT
Start: 2018-06-21 | End: 2018-06-24 | Stop reason: HOSPADM

## 2018-06-21 RX ADMIN — SODIUM CHLORIDE 8 MG/HR: 900 INJECTION INTRAVENOUS at 11:31

## 2018-06-21 RX ADMIN — SODIUM CHLORIDE 100 ML/HR: 9 INJECTION, SOLUTION INTRAVENOUS at 16:21

## 2018-06-21 RX ADMIN — CHOLECALCIFEROL CAP 125 MCG (5000 UNIT) 5000 UNITS: 125 CAP at 08:42

## 2018-06-21 RX ADMIN — SODIUM CHLORIDE 8 MG/HR: 900 INJECTION INTRAVENOUS at 00:43

## 2018-06-21 RX ADMIN — SODIUM CHLORIDE 8 MG/HR: 900 INJECTION INTRAVENOUS at 06:31

## 2018-06-21 RX ADMIN — SERTRALINE 50 MG: 50 TABLET, FILM COATED ORAL at 08:42

## 2018-06-21 RX ADMIN — CARVEDILOL 6.25 MG: 6.25 TABLET, FILM COATED ORAL at 18:05

## 2018-06-21 RX ADMIN — SODIUM BICARBONATE 650 MG: 650 TABLET ORAL at 08:42

## 2018-06-21 RX ADMIN — SODIUM CHLORIDE 100 ML/HR: 9 INJECTION, SOLUTION INTRAVENOUS at 06:31

## 2018-06-21 RX ADMIN — DIPHENHYDRAMINE HYDROCHLORIDE 25 MG: 25 CAPSULE ORAL at 21:21

## 2018-06-21 RX ADMIN — SODIUM BICARBONATE 650 MG: 650 TABLET ORAL at 21:23

## 2018-06-21 RX ADMIN — CARVEDILOL 6.25 MG: 6.25 TABLET, FILM COATED ORAL at 08:42

## 2018-06-22 LAB
ANION GAP SERPL CALCULATED.3IONS-SCNC: 9 MMOL/L (ref 4–13)
BUN BLD-MCNC: 29 MG/DL (ref 5–21)
BUN/CREAT SERPL: 18.4 (ref 7–25)
CALCIUM SPEC-SCNC: 6.9 MG/DL (ref 8.4–10.4)
CHLORIDE SERPL-SCNC: 113 MMOL/L (ref 98–110)
CO2 SERPL-SCNC: 21 MMOL/L (ref 24–31)
CREAT BLD-MCNC: 1.58 MG/DL (ref 0.5–1.4)
DEPRECATED RDW RBC AUTO: 64.2 FL (ref 40–54)
ERYTHROCYTE [DISTWIDTH] IN BLOOD BY AUTOMATED COUNT: 18.9 % (ref 12–15)
GFR SERPL CREATININE-BSD FRML MDRD: 42 ML/MIN/1.73
GLUCOSE BLD-MCNC: 116 MG/DL (ref 70–100)
HCT VFR BLD AUTO: 23 % (ref 40–52)
HCT VFR BLD AUTO: 30.4 % (ref 40–52)
HGB BLD-MCNC: 10 G/DL (ref 14–18)
HGB BLD-MCNC: 7.4 G/DL (ref 14–18)
MCH RBC QN AUTO: 31.8 PG (ref 28–32)
MCHC RBC AUTO-ENTMCNC: 32.2 G/DL (ref 33–36)
MCV RBC AUTO: 98.7 FL (ref 82–95)
PLATELET # BLD AUTO: 60 10*3/MM3 (ref 130–400)
PMV BLD AUTO: 10.5 FL (ref 6–12)
POTASSIUM BLD-SCNC: 3.9 MMOL/L (ref 3.5–5.3)
RBC # BLD AUTO: 2.33 10*6/MM3 (ref 4.8–5.9)
SODIUM BLD-SCNC: 143 MMOL/L (ref 135–145)
WBC NRBC COR # BLD: 4.42 10*3/MM3 (ref 4.8–10.8)

## 2018-06-22 PROCEDURE — P9016 RBC LEUKOCYTES REDUCED: HCPCS

## 2018-06-22 PROCEDURE — 85018 HEMOGLOBIN: CPT | Performed by: FAMILY MEDICINE

## 2018-06-22 PROCEDURE — 36430 TRANSFUSION BLD/BLD COMPNT: CPT

## 2018-06-22 PROCEDURE — 86900 BLOOD TYPING SEROLOGIC ABO: CPT

## 2018-06-22 PROCEDURE — 85027 COMPLETE CBC AUTOMATED: CPT | Performed by: FAMILY MEDICINE

## 2018-06-22 PROCEDURE — 85014 HEMATOCRIT: CPT | Performed by: FAMILY MEDICINE

## 2018-06-22 PROCEDURE — 99233 SBSQ HOSP IP/OBS HIGH 50: CPT | Performed by: INTERNAL MEDICINE

## 2018-06-22 PROCEDURE — 99232 SBSQ HOSP IP/OBS MODERATE 35: CPT | Performed by: INTERNAL MEDICINE

## 2018-06-22 PROCEDURE — 63710000001 DIPHENHYDRAMINE PER 50 MG: Performed by: INTERNAL MEDICINE

## 2018-06-22 PROCEDURE — 80048 BASIC METABOLIC PNL TOTAL CA: CPT | Performed by: FAMILY MEDICINE

## 2018-06-22 PROCEDURE — 86923 COMPATIBILITY TEST ELECTRIC: CPT

## 2018-06-22 RX ADMIN — SODIUM CHLORIDE 100 ML/HR: 9 INJECTION, SOLUTION INTRAVENOUS at 02:54

## 2018-06-22 RX ADMIN — SERTRALINE 50 MG: 50 TABLET, FILM COATED ORAL at 09:17

## 2018-06-22 RX ADMIN — CHOLECALCIFEROL CAP 125 MCG (5000 UNIT) 5000 UNITS: 125 CAP at 09:17

## 2018-06-22 RX ADMIN — CARVEDILOL 6.25 MG: 6.25 TABLET, FILM COATED ORAL at 09:17

## 2018-06-22 RX ADMIN — DIPHENHYDRAMINE HYDROCHLORIDE 25 MG: 25 CAPSULE ORAL at 21:41

## 2018-06-22 RX ADMIN — CARVEDILOL 6.25 MG: 6.25 TABLET, FILM COATED ORAL at 18:45

## 2018-06-22 RX ADMIN — SODIUM BICARBONATE 650 MG: 650 TABLET ORAL at 09:17

## 2018-06-22 RX ADMIN — SODIUM BICARBONATE 650 MG: 650 TABLET ORAL at 20:34

## 2018-06-23 LAB
ABO + RH BLD: NORMAL
ABO + RH BLD: NORMAL
BH BB BLOOD EXPIRATION DATE: NORMAL
BH BB BLOOD EXPIRATION DATE: NORMAL
BH BB BLOOD TYPE BARCODE: 8400
BH BB BLOOD TYPE BARCODE: 8400
BH BB DISPENSE STATUS: NORMAL
BH BB DISPENSE STATUS: NORMAL
BH BB PRODUCT CODE: NORMAL
BH BB PRODUCT CODE: NORMAL
BH BB UNIT NUMBER: NORMAL
BH BB UNIT NUMBER: NORMAL
HCT VFR BLD AUTO: 28.5 % (ref 40–52)
HCT VFR BLD AUTO: 30.2 % (ref 40–52)
HGB BLD-MCNC: 9.3 G/DL (ref 14–18)
HGB BLD-MCNC: 9.5 G/DL (ref 14–18)
UNIT  ABO: NORMAL
UNIT  ABO: NORMAL
UNIT  RH: NORMAL
UNIT  RH: NORMAL

## 2018-06-23 PROCEDURE — 99232 SBSQ HOSP IP/OBS MODERATE 35: CPT | Performed by: INTERNAL MEDICINE

## 2018-06-23 PROCEDURE — G8979 MOBILITY GOAL STATUS: HCPCS | Performed by: PHYSICAL THERAPIST

## 2018-06-23 PROCEDURE — 97161 PT EVAL LOW COMPLEX 20 MIN: CPT

## 2018-06-23 PROCEDURE — G8978 MOBILITY CURRENT STATUS: HCPCS | Performed by: PHYSICAL THERAPIST

## 2018-06-23 PROCEDURE — 85018 HEMOGLOBIN: CPT | Performed by: FAMILY MEDICINE

## 2018-06-23 PROCEDURE — 63710000001 DIPHENHYDRAMINE PER 50 MG: Performed by: INTERNAL MEDICINE

## 2018-06-23 PROCEDURE — 85014 HEMATOCRIT: CPT | Performed by: FAMILY MEDICINE

## 2018-06-23 RX ADMIN — CARVEDILOL 6.25 MG: 6.25 TABLET, FILM COATED ORAL at 17:55

## 2018-06-23 RX ADMIN — CHOLECALCIFEROL CAP 125 MCG (5000 UNIT) 5000 UNITS: 125 CAP at 08:25

## 2018-06-23 RX ADMIN — CARVEDILOL 6.25 MG: 6.25 TABLET, FILM COATED ORAL at 08:25

## 2018-06-23 RX ADMIN — SODIUM BICARBONATE 650 MG: 650 TABLET ORAL at 08:25

## 2018-06-23 RX ADMIN — SERTRALINE 50 MG: 50 TABLET, FILM COATED ORAL at 08:25

## 2018-06-23 RX ADMIN — DIPHENHYDRAMINE HYDROCHLORIDE 25 MG: 25 CAPSULE ORAL at 21:32

## 2018-06-23 RX ADMIN — SODIUM BICARBONATE 650 MG: 650 TABLET ORAL at 20:31

## 2018-06-24 VITALS
TEMPERATURE: 98.2 F | SYSTOLIC BLOOD PRESSURE: 131 MMHG | HEART RATE: 73 BPM | OXYGEN SATURATION: 97 % | BODY MASS INDEX: 20.9 KG/M2 | RESPIRATION RATE: 18 BRPM | DIASTOLIC BLOOD PRESSURE: 58 MMHG | HEIGHT: 70 IN | WEIGHT: 146 LBS

## 2018-06-24 LAB
HCT VFR BLD AUTO: 29.6 % (ref 40–52)
HGB BLD-MCNC: 9.5 G/DL (ref 14–18)

## 2018-06-24 PROCEDURE — 99232 SBSQ HOSP IP/OBS MODERATE 35: CPT | Performed by: INTERNAL MEDICINE

## 2018-06-24 PROCEDURE — 99231 SBSQ HOSP IP/OBS SF/LOW 25: CPT | Performed by: INTERNAL MEDICINE

## 2018-06-24 PROCEDURE — 85014 HEMATOCRIT: CPT | Performed by: FAMILY MEDICINE

## 2018-06-24 PROCEDURE — 85018 HEMOGLOBIN: CPT | Performed by: FAMILY MEDICINE

## 2018-06-24 RX ADMIN — SERTRALINE 50 MG: 50 TABLET, FILM COATED ORAL at 08:31

## 2018-06-24 RX ADMIN — SODIUM BICARBONATE 650 MG: 650 TABLET ORAL at 08:31

## 2018-06-24 RX ADMIN — CHOLECALCIFEROL CAP 125 MCG (5000 UNIT) 5000 UNITS: 125 CAP at 08:31

## 2018-06-24 RX ADMIN — CARVEDILOL 6.25 MG: 6.25 TABLET, FILM COATED ORAL at 08:31

## 2018-08-14 ENCOUNTER — OFFICE VISIT (OUTPATIENT)
Dept: GASTROENTEROLOGY | Facility: CLINIC | Age: 83
End: 2018-08-14

## 2018-08-14 VITALS
OXYGEN SATURATION: 97 % | WEIGHT: 143 LBS | HEART RATE: 101 BPM | DIASTOLIC BLOOD PRESSURE: 72 MMHG | SYSTOLIC BLOOD PRESSURE: 122 MMHG | HEIGHT: 70 IN | BODY MASS INDEX: 20.47 KG/M2

## 2018-08-14 DIAGNOSIS — D63.8 ANEMIA OF CHRONIC DISEASE: Primary | ICD-10-CM

## 2018-08-14 DIAGNOSIS — K92.2 GASTROINTESTINAL HEMORRHAGE, UNSPECIFIED GASTROINTESTINAL HEMORRHAGE TYPE: ICD-10-CM

## 2018-08-14 PROCEDURE — 99213 OFFICE O/P EST LOW 20 MIN: CPT | Performed by: CLINICAL NURSE SPECIALIST

## 2018-08-14 RX ORDER — ASPIRIN 325 MG
81 TABLET, DELAYED RELEASE (ENTERIC COATED) ORAL DAILY
Status: ON HOLD | COMMUNITY
End: 2019-01-01

## 2018-08-14 NOTE — PROGRESS NOTES
Jesús Long  7/29/1931 8/14/2018  Chief Complaint   Patient presents with   • GI Problem     Here to discuss recent hospitalization for anemia and rectal bleeding         HPI    Jesús Long is a  87 y.o. male here for a follow up visit for complaint of recent GI bleed requiring hospitalization 6/19/2018. He was on Pradaxa at that time and he has since been removed and taking ASA. He had a hgb as low as 7.4. He had urgent Endoscopy on 6/20/2018 that showed normal esophagus, stomach and duodenum.     Past Medical History:   Diagnosis Date   • Anemia    • Atrial fibrillation by electrocardiogram (CMS/[facility])    • CAD in native artery    • Carotid bruit    • CHF (congestive heart failure) (CMS/[facility])    • Chronic kidney disease     stage III   • Diabetes mellitus (CMS/[facility])     diet controlled   • History of coronary artery bypass graft    • Hyperlipidemia    • Hypertension    • Monoclonal (M) protein disease, multiple 'M' protein    • Multiple myeloma (CMS/[facility])    • Murmur    • Pulmonary hypertension    • Sick sinus syndrome (CMS/[facility])     s/p pace maker   • Sleep apnea      Past Surgical History:   Procedure Laterality Date   • BACK SURGERY     • CARDIAC CATHETERIZATION  09/16/1998    Left Heart; candidate for re-do CABG   • COLONOSCOPY  12/10/2013    Diverticulosis repeat prn   • CORONARY ARTERY BYPASS GRAFT      multiple bypasses   • ENDOSCOPY N/A 6/20/2018    Normal exam   • HEMORROIDECTOMY     • PACEMAKER IMPLANTATION         Outpatient Prescriptions Marked as Taking for the 8/14/18 encounter (Office Visit) with Nicky Grubbs APRN   Medication Sig Dispense Refill   • aspirin  MG tablet Take 81 mg by mouth Daily.     • carvedilol (COREG) 6.25 MG tablet Take 6.25 mg by mouth 2 (Two) Times a Day With Meals.     • Cholecalciferol (VITAMIN D3) 5000 UNITS capsule capsule Take 5,000 Units by mouth Daily.     • coenzyme Q10 100 MG capsule Take 100 mg by mouth Daily.     • sertraline (ZOLOFT) 25 MG tablet  Take 25 mg by mouth Daily.     • sodium bicarbonate 650 MG tablet Take 1 tablet by mouth 2 (Two) Times a Day.         No Known Allergies    Social History     Social History   • Marital status:      Spouse name: N/A   • Number of children: N/A   • Years of education: N/A     Occupational History   • Not on file.     Social History Main Topics   • Smoking status: Former Smoker     Types: Cigarettes     Quit date: 11/18/1986   • Smokeless tobacco: Former User   • Alcohol use No   • Drug use: No   • Sexual activity: Defer     Other Topics Concern   • Not on file     Social History Narrative   • No narrative on file       Family History   Problem Relation Age of Onset   • Cancer Mother    • Heart disease Mother    • Colon cancer Mother    • Heart disease Father    • Coronary artery disease Father    • Dementia Sister    • Colon polyps Neg Hx        Review of Systems   Constitutional: Negative for activity change, appetite change, chills, diaphoresis, fatigue, fever and unexpected weight change.   HENT: Negative for ear pain, hearing loss, mouth sores, sore throat, trouble swallowing and voice change.    Eyes: Negative.    Respiratory: Negative for cough, choking, shortness of breath and wheezing.    Cardiovascular: Negative for chest pain and palpitations.   Gastrointestinal: Negative for abdominal pain, blood in stool, constipation, diarrhea, nausea and vomiting.   Endocrine: Negative for cold intolerance and heat intolerance.   Genitourinary: Negative for decreased urine volume, dysuria, frequency, hematuria and urgency.   Musculoskeletal: Negative for back pain, gait problem and myalgias.   Skin: Negative for color change, pallor and rash.   Allergic/Immunologic: Negative for food allergies and immunocompromised state.   Neurological: Negative for dizziness, tremors, seizures, syncope, weakness, light-headedness, numbness and headaches.   Hematological: Negative for adenopathy. Does not bruise/bleed easily.  "  Psychiatric/Behavioral: Negative for agitation and confusion. The patient is not nervous/anxious.    All other systems reviewed and are negative.      /72   Pulse 101   Ht 177.8 cm (70\")   Wt 64.9 kg (143 lb)   SpO2 97%   BMI 20.52 kg/m²   Body mass index is 20.52 kg/m².    Physical Exam   Constitutional: He is oriented to person, place, and time. He appears well-developed and well-nourished.   HENT:   Head: Normocephalic and atraumatic.   Eyes: Pupils are equal, round, and reactive to light.   Neck: Normal range of motion. Neck supple. No tracheal deviation present.   Cardiovascular: Normal rate, regular rhythm and normal heart sounds.  Exam reveals no gallop and no friction rub.    No murmur heard.  Pulmonary/Chest: Effort normal and breath sounds normal. No respiratory distress. He has no wheezes. He has no rales. He exhibits no tenderness.   Abdominal: Soft. Bowel sounds are normal. He exhibits no distension. There is no hepatosplenomegaly. There is no tenderness. There is no rigidity, no rebound and no guarding.   Musculoskeletal: Normal range of motion. He exhibits no edema, tenderness or deformity.   Neurological: He is alert and oriented to person, place, and time. He has normal reflexes.   Skin: Skin is warm and dry. No rash noted. No pallor.   Psychiatric: He has a normal mood and affect. His behavior is normal. Judgment and thought content normal.       ASSESSMENT AND PLAN    Jesús was seen today for gi problem.    Diagnoses and all orders for this visit:    Anemia of chronic disease    Gastrointestinal hemorrhage, unspecified gastrointestinal hemorrhage type      Resolved GI bleeding off of Pradaxa he is on ASA and he has no further signs of bleeding he is aware to notify me or go to there ER with any recurrent problems.     There are no Patient Instructions on file for this visit.  Nicky Grubbs, APRN  2:36 PM  8/14/2018    Obesity, Adult  Obesity is the condition of having too much " total body fat. Being overweight or obese means that your weight is greater than what is considered healthy for your body size. Obesity is determined by a measurement called BMI. BMI is an estimate of body fat and is calculated from height and weight. For adults, a BMI of 30 or higher is considered obese.  Obesity can eventually lead to other health concerns and major illnesses, including:  · Stroke.  · Coronary artery disease (CAD).  · Type 2 diabetes.  · Some types of cancer, including cancers of the colon, breast, uterus, and gallbladder.  · Osteoarthritis.  · High blood pressure (hypertension).  · High cholesterol.  · Sleep apnea.  · Gallbladder stones.  · Infertility problems.  What are the causes?  The main cause of obesity is taking in (consuming) more calories than your body uses for energy. Other factors that contribute to this condition may include:  · Being born with genes that make you more likely to become obese.  · Having a medical condition that causes obesity. These conditions include:  ¨ Hypothyroidism.  ¨ Polycystic ovarian syndrome (PCOS).  ¨ Binge-eating disorder.  ¨ Cushing syndrome.  · Taking certain medicines, such as steroids, antidepressants, and seizure medicines.  · Not being physically active (sedentary lifestyle).  · Living where there are limited places to exercise safely or buy healthy foods.  · Not getting enough sleep.  What increases the risk?  The following factors may increase your risk of this condition:  · Having a family history of obesity.  · Being a woman of -American descent.  · Being a man of  descent.  What are the signs or symptoms?  Having excessive body fat is the main symptom of this condition.  How is this diagnosed?  This condition may be diagnosed based on:  · Your symptoms.  · Your medical history.  · A physical exam. Your health care provider may measure:  ¨ Your BMI. If you are an adult with a BMI between 25 and less than 30, you are considered  overweight. If you are an adult with a BMI of 30 or higher, you are considered obese.  ¨ The distances around your hips and your waist (circumferences). These may be compared to each other to help diagnose your condition.  ¨ Your skinfold thickness. Your health care provider may gently pinch a fold of your skin and measure it.  How is this treated?  Treatment for this condition often includes changing your lifestyle. Treatment may include some or all of the following:  · Dietary changes. Work with your health care provider and a dietitian to set a weight-loss goal that is healthy and reasonable for you. Dietary changes may include eating:  ¨ Smaller portions. A portion size is the amount of a particular food that is healthy for you to eat at one time. This varies from person to person.  ¨ Low-calorie or low-fat options.  ¨ More whole grains, fruits, and vegetables.  · Regular physical activity. This may include aerobic activity (cardio) and strength training.  · Medicine to help you lose weight. Your health care provider may prescribe medicine if you are unable to lose 1 pound a week after 6 weeks of eating more healthily and doing more physical activity.  · Surgery. Surgical options may include gastric banding and gastric bypass. Surgery may be done if:  ¨ Other treatments have not helped to improve your condition.  ¨ You have a BMI of 40 or higher.  ¨ You have life-threatening health problems related to obesity.  Follow these instructions at home:     Eating and drinking     · Follow recommendations from your health care provider about what you eat and drink. Your health care provider may advise you to:  ¨ Limit fast foods, sweets, and processed snack foods.  ¨ Choose low-fat options, such as low-fat milk instead of whole milk.  ¨ Eat 5 or more servings of fruits or vegetables every day.  ¨ Eat at home more often. This gives you more control over what you eat.  ¨ Choose healthy foods when you eat out.  ¨ Learn  what a healthy portion size is.  ¨ Keep low-fat snacks on hand.  ¨ Avoid sugary drinks, such as soda, fruit juice, iced tea sweetened with sugar, and flavored milk.  ¨ Eat a healthy breakfast.  · Drink enough water to keep your urine clear or pale yellow.  · Do not go without eating for long periods of time (do not fast) or follow a fad diet. Fasting and fad diets can be unhealthy and even dangerous.  Physical Activity   · Exercise regularly, as told by your health care provider. Ask your health care provider what types of exercise are safe for you and how often you should exercise.  · Warm up and stretch before being active.  · Cool down and stretch after being active.  · Rest between periods of activity.  Lifestyle   · Limit the time that you spend in front of your TV, computer, or video game system.  · Find ways to reward yourself that do not involve food.  · Limit alcohol intake to no more than 1 drink a day for nonpregnant women and 2 drinks a day for men. One drink equals 12 oz of beer, 5 oz of wine, or 1½ oz of hard liquor.  General instructions   · Keep a weight loss journal to keep track of the food you eat and how much you exercise you get.  · Take over-the-counter and prescription medicines only as told by your health care provider.  · Take vitamins and supplements only as told by your health care provider.  · Consider joining a support group. Your health care provider may be able to recommend a support group.  · Keep all follow-up visits as told by your health care provider. This is important.  Contact a health care provider if:  · You are unable to meet your weight loss goal after 6 weeks of dietary and lifestyle changes.  This information is not intended to replace advice given to you by your health care provider. Make sure you discuss any questions you have with your health care provider.  Document Released: 01/25/2006 Document Revised: 05/22/2017 Document Reviewed: 10/05/2016  [email or web address]  Patient Education © 2017 [employer]      IF YOU SMOKE OR USE TOBACCO PLEASE READ THE FOLLOWING:    Why is smoking bad for me?  Smoking increases the risk of heart disease, lung disease, vascular disease, stroke, and cancer.     If you smoke, STOP!    If you would like more information on quitting smoking, please visit the [email or web address] website: [email or web address]   This link will provide additional resources including the QUIT line and the [employer] the [employer] support groups.     For more information:    Quit Now [place]  1-800-QUIT-NOW  https://[email or web address].quitlogix.org/en-US/

## 2018-08-22 ENCOUNTER — OFFICE VISIT (OUTPATIENT)
Dept: CARDIOLOGY | Facility: CLINIC | Age: 83
End: 2018-08-22

## 2018-08-22 VITALS
DIASTOLIC BLOOD PRESSURE: 48 MMHG | HEART RATE: 80 BPM | WEIGHT: 146 LBS | HEIGHT: 70 IN | BODY MASS INDEX: 20.9 KG/M2 | SYSTOLIC BLOOD PRESSURE: 104 MMHG

## 2018-08-22 DIAGNOSIS — D63.8 ANEMIA OF CHRONIC DISEASE: ICD-10-CM

## 2018-08-22 DIAGNOSIS — D63.1 ANEMIA ASSOCIATED WITH CHRONIC RENAL FAILURE: ICD-10-CM

## 2018-08-22 DIAGNOSIS — C90.00 MULTIPLE MYELOMA NOT HAVING ACHIEVED REMISSION (HCC): ICD-10-CM

## 2018-08-22 DIAGNOSIS — N18.9 ANEMIA ASSOCIATED WITH CHRONIC RENAL FAILURE: ICD-10-CM

## 2018-08-22 DIAGNOSIS — I27.20 PULMONARY HYPERTENSION (HCC): ICD-10-CM

## 2018-08-22 DIAGNOSIS — I25.10 CORONARY ARTERY DISEASE INVOLVING NATIVE CORONARY ARTERY OF NATIVE HEART WITHOUT ANGINA PECTORIS: ICD-10-CM

## 2018-08-22 DIAGNOSIS — Z95.0 PACEMAKER: ICD-10-CM

## 2018-08-22 DIAGNOSIS — Z79.01 CHRONIC ANTICOAGULATION: ICD-10-CM

## 2018-08-22 DIAGNOSIS — N18.9 CHRONIC KIDNEY DISEASE, UNSPECIFIED CKD STAGE: ICD-10-CM

## 2018-08-22 DIAGNOSIS — I48.0 PAF (PAROXYSMAL ATRIAL FIBRILLATION) (HCC): Primary | ICD-10-CM

## 2018-08-22 DIAGNOSIS — R04.2 HEMOPTYSIS: ICD-10-CM

## 2018-08-22 DIAGNOSIS — I10 ESSENTIAL HYPERTENSION: ICD-10-CM

## 2018-08-22 DIAGNOSIS — E11.9 CONTROLLED TYPE 2 DIABETES MELLITUS WITHOUT COMPLICATION, WITHOUT LONG-TERM CURRENT USE OF INSULIN (HCC): Chronic | ICD-10-CM

## 2018-08-22 PROBLEM — K92.2 GASTROINTESTINAL HEMORRHAGE: Status: RESOLVED | Noted: 2018-06-19 | Resolved: 2018-08-22

## 2018-08-22 PROBLEM — J18.9 PNEUMONIA OF LEFT UPPER LOBE DUE TO INFECTIOUS ORGANISM: Status: RESOLVED | Noted: 2017-05-19 | Resolved: 2018-08-22

## 2018-08-22 PROCEDURE — 93000 ELECTROCARDIOGRAM COMPLETE: CPT | Performed by: INTERNAL MEDICINE

## 2018-08-22 PROCEDURE — 99214 OFFICE O/P EST MOD 30 MIN: CPT | Performed by: INTERNAL MEDICINE

## 2018-08-22 NOTE — PROGRESS NOTES
Jesús Long  [id number]  7/29/1931  87 y.o.  male    Referring Provider: Yocasta Baumann APRN    Reason for Follow-up Visit:  Routine follow up.   Paroxysmal atrial fibrillation   sick sinus syndrome s/p pacemaker   Gastrointestinal bleed   Coronary artery bypass grafting twice    Subjective    Mild chronic exertional shortness of breath on exertion relieved with rest  No significant cough or wheezing  Going on for several months  No palpitations  No associated chest pain  No significant pedal edema  No fever or chills  No significant expectoration  No hemoptysis  No presyncope or syncope       History of present illness:  Jesús Long is a 87 y.o. yo male with history of Paroxysmal atrial fibrillation Gastrointestinal bleed who presents today for   Chief Complaint   Patient presents with   • Atrial Fibrillation     1 YR FU    • Shortness of Breath   .    History  Past Medical History:   Diagnosis Date   • Anemia    • Atrial fibrillation by electrocardiogram (CMS/HCC)    • CAD in native artery    • Carotid bruit    • CHF (congestive heart failure) (CMS/HCC)    • Chronic kidney disease     stage III   • Diabetes mellitus (CMS/HCC)     diet controlled   • History of coronary artery bypass graft    • Hyperlipidemia    • Hypertension    • Monoclonal (M) protein disease, multiple 'M' protein    • Multiple myeloma (CMS/HCC)    • Murmur    • Pulmonary hypertension    • Sick sinus syndrome (CMS/HCC)     s/p pace maker   • Sleep apnea    ,   Past Surgical History:   Procedure Laterality Date   • BACK SURGERY     • CARDIAC CATHETERIZATION  09/16/1998    Left Heart; candidate for re-do CABG   • COLONOSCOPY  12/10/2013    Diverticulosis repeat prn   • CORONARY ARTERY BYPASS GRAFT      X 8   • ENDOSCOPY N/A 6/20/2018    Normal exam   • HEMORROIDECTOMY     • PACEMAKER IMPLANTATION     ,   Family History   Problem Relation Age of Onset   • Cancer Mother    • Heart disease Mother    • Colon cancer Mother    • Heart  "disease Father    • Coronary artery disease Father    • Dementia Sister    • Colon polyps Neg Hx    ,   Social History   Substance Use Topics   • Smoking status: Former Smoker     Types: Cigarettes     Quit date: 11/18/1986   • Smokeless tobacco: Former User   • Alcohol use No   ,     Medications  Current Outpatient Prescriptions   Medication Sig Dispense Refill   • aspirin  MG tablet Take 81 mg by mouth Daily.     • carvedilol (COREG) 6.25 MG tablet Take 6.25 mg by mouth 2 (Two) Times a Day With Meals.     • Cholecalciferol (VITAMIN D3) 5000 UNITS capsule capsule Take 5,000 Units by mouth Daily.     • coenzyme Q10 100 MG capsule Take 100 mg by mouth Daily.     • sertraline (ZOLOFT) 25 MG tablet Take 25 mg by mouth Daily.     • sodium bicarbonate 650 MG tablet Take 1 tablet by mouth 2 (Two) Times a Day.       No current facility-administered medications for this visit.        Allergies:  Patient has no known allergies.    Review of Systems  Review of Systems   Constitution: Positive for weakness.   HENT: Negative.    Eyes: Negative.    Cardiovascular: Positive for dyspnea on exertion. Negative for chest pain, claudication, cyanosis, irregular heartbeat, leg swelling, near-syncope, orthopnea, palpitations, paroxysmal nocturnal dyspnea and syncope.   Respiratory: Negative.    Endocrine: Negative.    Hematologic/Lymphatic: Negative.    Skin: Negative.    Musculoskeletal: Negative for arthritis.   Gastrointestinal: Negative for anorexia.   Genitourinary: Negative.    Psychiatric/Behavioral: Negative.        Objective     Physical Exam:  /48   Pulse 80   Ht 177.8 cm (70\")   Wt 66.2 kg (146 lb)   BMI 20.95 kg/m²   Physical Exam   Constitutional: He appears well-developed.   HENT:   Head: Normocephalic.   Neck: Normal carotid pulses and no JVD present. No tracheal tenderness present. Carotid bruit is not present. No tracheal deviation and no edema present.   Cardiovascular: Regular rhythm and normal pulses. " "   Murmur heard.   Systolic murmur is present with a grade of 2/6   Pulmonary/Chest: Effort normal. No stridor.   Abdominal: Soft.   Neurological: He is alert. He has normal strength. No cranial nerve deficit or sensory deficit.   Skin: Skin is warm.   Psychiatric: He has a normal mood and affect. His speech is normal and behavior is normal.       Results Review:       ECG 12 Lead  Date/Time: 8/22/2018 1:03 PM  Performed by: CHERISE ACKERMAN  Authorized by: CHERISE ACKERMAN   Comparison: compared with previous ECG from 7/26/2017  Similar to previous ECG  Rhythm: paced  Rate: normal  Clinical impression: abnormal ECG            Assessment/Plan   Patient Active Problem List   Diagnosis   • Anemia associated with chronic renal failure   • PAF (paroxysmal atrial fibrillation) (CMS/HCC) No AC DUE TO GI  BLEED   • Anxiety   • Pacemaker   • Anemia of chronic disease   • Multiple myeloma (CMS/HCC)   • Chronic kidney disease   • Hypertension   • Coronary artery disease   • Pulmonary hypertension   • Chronic anticoagulation   • Bright red blood per rectum   • Controlled type 2 diabetes mellitus diet controlled without complication, without long-term current use of insulin (CMS/HCC)        No palpitations. No significant pedal edema. Compliant with medications and diet. Latest labs and medications reviewed.    Plan:    Low salt/ HTN/ Heart healthy carbohydrate restricted cardiac diet as applicable to this patient's current diagnoses.   This handout has relevant information regarding shopping for food, preparing meals, what to eat at restaurants, tracking of food intake, information regarding sodium intake and salt content, how to read food labels, knowing what to eat, tips reagarding physical activity, calorie count and calorie expenditure. What foods to avoid. Information regarding alcoholic drinks along with \"good\" and \"bad\" fats.  Reiterated prior recommendations     The patient is advised to reduce or avoid caffeine or other " cardiac stimulants.     The patient was advised that NSAID-type medications have three  very important potential side effects: cardiovascular complications, gastrointestinal irritation including hemorrhage and renal injuries.  Do not use anti-inflammatories such as Motrin/ibuprofen, Alleve/naprosyn, Mobic and like medications Use Tylenol instead.The patient expresses understanding of these issues and questions were answered.   Monitor for any signs of bleeding including red or dark stools. Fall precautions.       Monitor for any signs of bleeding including red or dark stools. Fall precautions.   Patient is asked to monitor BP at home or work, several times per month and return with written values at next office visit.     Advised staying uptodate with immunizations per established standard guidelines.    Reviewed available prior notes, consults, prior visits, laboratory findings, radiology And cardiology relevant reports. Updated chart as applicable. I have reviewed the patient's medical history in detail and updated the computerized patient record as relevant.    Updated patient regarding any new or relevant abnormalities on review of records or any new findings on physical exam. Mentioned to patient about purpose of visit and desirable health short and long term goals and objectives.    Offered to give patient  a copy of my notes and relevant tests/ prior ECG etc for the patient to review and follow specific advise and relevant findings if any, prognosis, along with my current and future plans.      Questions were encouraged, asked and answered to the patient's  understanding and satisfaction. Questions if any regarding current medications and side effects, need for refills and importance of compliance to medications stressed.    Reviewed available prior notes, consults, prior visits, laboratory findings, radiology and cardiology relevant reports. Updated chart as applicable. I have reviewed the patient's medical  history in detail and updated the computerized patient record as relevant.    Updated patient regarding any new or relevant abnormalities on review of records or any new findings on physical exam. Mentioned to patient about purpose of visit and desirable health short and long term goals and objectives.    Keep A1c less than 7 Primary to monitor  Keep LDL below 70 mg/dl. Monitor liver and renal functions.   Monitor CBC, CMP, TSH (as indicated) and Lipid Panel by primary     Monitor cardiac rhythm device function regularly per established schedule or PRN     Watchman device referral as significant stroke risk and bleeding risk  Substantial information provided including brochure   He will think about this and let me know      Return in about 3 months (around 11/22/2018).

## 2018-09-17 ENCOUNTER — CLINICAL SUPPORT (OUTPATIENT)
Dept: CARDIOLOGY | Facility: CLINIC | Age: 83
End: 2018-09-17

## 2018-09-17 DIAGNOSIS — Z95.0 PACEMAKER: ICD-10-CM

## 2018-09-17 PROCEDURE — 93294 REM INTERROG EVL PM/LDLS PM: CPT | Performed by: PHYSICIAN ASSISTANT

## 2018-09-17 PROCEDURE — 93296 REM INTERROG EVL PM/IDS: CPT | Performed by: PHYSICIAN ASSISTANT

## 2018-10-01 NOTE — PROGRESS NOTES
Single Chamber Ventricular Pacemaker Evaluation Report  [facility]    September 30, 2018    Primary Cardiologist: Jessica  : [employer]onic Model: Adapta  Implant date: 7/2/13    Reason for evaluation: routine  Indication for pacemaker: sick sinus syndrome    Measurements  Ventricular sensing - R wave: n/r  Ventricular threshold: 0.375 V @ 0.4 ms  Ventricular lead impedance: 526 ohms      Diagnostic Data  Paced: 99.6 %  Other: no new events noted  Battery status: satisfactory          Final Parameters  Mode: VVI     Lower rate: 60 bpm    Ventricular - Amplitude: 2.0 V Pulse width: 0.4 ms Sensitivity: 4.0 mV   Changes made: n/a  Conclusions: normal pacemaker function    Follow up: 3 months

## 2018-11-27 ENCOUNTER — OFFICE VISIT (OUTPATIENT)
Dept: CARDIOLOGY | Facility: CLINIC | Age: 83
End: 2018-11-27

## 2018-11-27 VITALS
WEIGHT: 144 LBS | DIASTOLIC BLOOD PRESSURE: 60 MMHG | BODY MASS INDEX: 20.62 KG/M2 | SYSTOLIC BLOOD PRESSURE: 128 MMHG | HEIGHT: 70 IN

## 2018-11-27 DIAGNOSIS — N18.9 CHRONIC KIDNEY DISEASE, UNSPECIFIED CKD STAGE: ICD-10-CM

## 2018-11-27 DIAGNOSIS — Z95.0 PACEMAKER: ICD-10-CM

## 2018-11-27 DIAGNOSIS — F41.9 ANXIETY: ICD-10-CM

## 2018-11-27 DIAGNOSIS — I48.20 CHRONIC ATRIAL FIBRILLATION (HCC): Primary | ICD-10-CM

## 2018-11-27 DIAGNOSIS — Z79.01 CHRONIC ANTICOAGULATION: ICD-10-CM

## 2018-11-27 DIAGNOSIS — I10 ESSENTIAL HYPERTENSION: ICD-10-CM

## 2018-11-27 DIAGNOSIS — C90.00 MULTIPLE MYELOMA NOT HAVING ACHIEVED REMISSION (HCC): ICD-10-CM

## 2018-11-27 DIAGNOSIS — I27.20 PULMONARY HYPERTENSION (HCC): ICD-10-CM

## 2018-11-27 DIAGNOSIS — K62.5 BRIGHT RED BLOOD PER RECTUM: ICD-10-CM

## 2018-11-27 DIAGNOSIS — E11.9 CONTROLLED TYPE 2 DIABETES MELLITUS WITHOUT COMPLICATION, WITHOUT LONG-TERM CURRENT USE OF INSULIN (HCC): Chronic | ICD-10-CM

## 2018-11-27 PROCEDURE — 93000 ELECTROCARDIOGRAM COMPLETE: CPT | Performed by: INTERNAL MEDICINE

## 2018-11-27 PROCEDURE — 99214 OFFICE O/P EST MOD 30 MIN: CPT | Performed by: INTERNAL MEDICINE

## 2018-11-27 NOTE — PROGRESS NOTES
Jesús Long  [id number]  7/29/1931  87 y.o.  male    Referring Provider: Yocasta Baumann APRN    Reason for Follow-up Visit:  Routine follow up.   atrial fibrillation   sick sinus syndrome s/p pacemaker   Gastrointestinal bleed in past  Coronary artery bypass grafting twice    Subjective    Mild chronic exertional shortness of breath on exertion relieved with rest  No significant cough or wheezing    Going on for several months  No palpitations    No associated chest pain  No significant pedal edema  No fever or chills    No significant expectoration  No hemoptysis  No presyncope or syncope   Easy fatiguability   Feels tired       History of present illness:  Jesús Long is a 87 y.o. yo male with history of Paroxysmal atrial fibrillation Gastrointestinal bleed who presents today for   Chief Complaint   Patient presents with   • Atrial Fibrillation     3 month follow up    .    History  Past Medical History:   Diagnosis Date   • Anemia    • Atrial fibrillation by electrocardiogram (CMS/HCC)    • CAD in native artery    • Carotid bruit    • CHF (congestive heart failure) (CMS/HCC)    • Chronic kidney disease     stage III   • Diabetes mellitus (CMS/HCC)     diet controlled   • History of coronary artery bypass graft    • Hyperlipidemia    • Hypertension    • Monoclonal (M) protein disease, multiple 'M' protein    • Multiple myeloma (CMS/HCC)    • Murmur    • Pulmonary hypertension (CMS/HCC)    • Sick sinus syndrome (CMS/HCC)     s/p pace maker   • Sleep apnea    ,   Past Surgical History:   Procedure Laterality Date   • BACK SURGERY     • CARDIAC CATHETERIZATION  09/16/1998    Left Heart; candidate for re-do CABG   • COLONOSCOPY  12/10/2013    Diverticulosis repeat prn   • CORONARY ARTERY BYPASS GRAFT      X 8   • HEMORROIDECTOMY     • PACEMAKER IMPLANTATION     ,   Family History   Problem Relation Age of Onset   • Cancer Mother    • Heart disease Mother    • Colon cancer Mother    • Heart disease Father   "  • Coronary artery disease Father    • Dementia Sister    • Colon polyps Neg Hx    ,   Social History     Tobacco Use   • Smoking status: Former Smoker     Types: Cigarettes     Last attempt to quit: 1986     Years since quittin.0   • Smokeless tobacco: Former User   Substance Use Topics   • Alcohol use: No   • Drug use: No   ,     Medications  Current Outpatient Medications   Medication Sig Dispense Refill   • aspirin  MG tablet Take 81 mg by mouth Daily.     • carvedilol (COREG) 6.25 MG tablet Take 6.25 mg by mouth 2 (Two) Times a Day With Meals.     • Cholecalciferol (VITAMIN D3) 5000 UNITS capsule capsule Take 5,000 Units by mouth Daily.     • coenzyme Q10 100 MG capsule Take 100 mg by mouth Daily.     • sertraline (ZOLOFT) 25 MG tablet Take 25 mg by mouth Daily.     • sodium bicarbonate 650 MG tablet Take 1 tablet by mouth 2 (Two) Times a Day.       No current facility-administered medications for this visit.        Allergies:  Patient has no known allergies.    Review of Systems  Review of Systems   Constitution: Positive for weakness and malaise/fatigue.   HENT: Negative.    Eyes: Negative.    Cardiovascular: Positive for dyspnea on exertion. Negative for chest pain, claudication, cyanosis, irregular heartbeat, leg swelling, near-syncope, orthopnea, palpitations, paroxysmal nocturnal dyspnea and syncope.   Respiratory: Negative.    Endocrine: Negative.    Hematologic/Lymphatic: Negative.    Skin: Negative.    Musculoskeletal: Positive for arthritis, joint pain and stiffness.   Gastrointestinal: Negative for anorexia.   Genitourinary: Negative.    Psychiatric/Behavioral: Negative.        Objective     Physical Exam:  /60 (BP Location: Right arm, Patient Position: Sitting, Cuff Size: Adult)   Ht 177.8 cm (70\")   Wt 65.3 kg (144 lb)   BMI 20.66 kg/m²   Physical Exam   Constitutional: He appears well-developed.   HENT:   Head: Normocephalic.   Neck: Normal carotid pulses and no JVD " present. No tracheal tenderness present. Carotid bruit is not present. No tracheal deviation and no edema present.   Cardiovascular: Regular rhythm and normal pulses.   Murmur heard.   Systolic murmur is present with a grade of 2/6.  Pulmonary/Chest: Effort normal. No stridor.   Abdominal: Soft.   Neurological: He is alert. He has normal strength. No cranial nerve deficit or sensory deficit.   Skin: Skin is warm.   Psychiatric: He has a normal mood and affect. His speech is normal and behavior is normal.       Results Review:    Results for orders placed during the hospital encounter of 05/17/17   STAT Adult Transthoracic Echo Complete    Narrative · Left ventricular wall thickness is consistent with mild concentric   hypertrophy.  · Left ventricular function is low normal. Estimated EF = 45%.  · Left ventricular diastolic dysfunction (grade I) consistent with   impaired relaxation.  · Moderate tricuspid valve regurgitation is present.  · Severe pulmonary hypertension is present  ·              ECG 12 Lead  Date/Time: 11/27/2018 9:20 AM  Performed by: Mauri Lopez MD  Authorized by: Mauri Lopez MD   Comparison: compared with previous ECG from 8/22/2018  Comparison to previous ECG: premature ventricular contractions are new  Rhythm: atrial fibrillation  Ectopy: PVCs  Rate: normal  Clinical impression: abnormal ECG            Assessment/Plan   Patient Active Problem List   Diagnosis   • Anemia associated with chronic renal failure   • PAF (paroxysmal atrial fibrillation) (CMS/HCC) No AC DUE TO GI  BLEED   • Anxiety   • Pacemaker   • Anemia of chronic disease   • Multiple myeloma (CMS/HCC)   • Chronic kidney disease   • Hypertension   • Coronary artery disease   • Pulmonary hypertension (CMS/HCC)   • Bright red blood per rectum   • Controlled type 2 diabetes mellitus diet controlled without complication, without long-term current use of insulin (CMS/HCC)        No palpitations. No significant pedal edema. Compliant  "with medications and diet. Latest labs and medications reviewed.      Plan      Watchman device referral as significant stroke risk and bleeding risk  Substantial information provided including brochure   Not sure if he has LUCIA, BRANDO may help  He wants to wait once again    Monitor cardiac rhythm device function regularly per established schedule or PRN       xxxxxxxxxxxxxxxxxxxxxxxxxxxxxxxxxxxxxxxxxxxxxxxxxxxxxxxxxxxxxxxxxxxxxxxxxxxxxxxxxxxxxxxxxxxxxxxxxxxxxxxxxxxxxxxxxxxxxxxxxxxxxxxxxxxxxxxxxxxxxxxxxxxxxxxxxxxxxxxxxxxxxxxxxxxxxxxxxxxxxxxxxxxxxxxxxxxxxxxxxxxxxxxxxxxxxxxxxxxxxxxxxxxxxxxxxxxxxxxxxxxxxxxx  Health maintenance    Low salt/ HTN/ Heart healthy carbohydrate restricted cardiac diet as applicable to this patient's current diagnoses.   This handout has relevant information regarding shopping for food, preparing meals, what to eat at restaurants, tracking of food intake, information regarding sodium intake and salt content, how to read food labels, knowing what to eat, tips reagarding physical activity, calorie count and calorie expenditure. What foods to avoid. Information regarding alcoholic drinks along with \"good\" and \"bad\" fats.  Reiterated prior recommendations     The patient is advised to reduce or avoid caffeine or other cardiac stimulants.     The patient was advised that NSAID-type medications have three  very important potential side effects: cardiovascular complications, gastrointestinal irritation including hemorrhage and renal injuries.  Do not use anti-inflammatories such as Motrin/ibuprofen, Alleve/naprosyn, Mobic and like medications Use Tylenol instead.The patient expresses understanding of these issues and questions were answered.   Monitor for any signs of bleeding including red or dark stools. Fall precautions.       Monitor for any signs of bleeding including red or dark stools. Fall precautions.   Patient is asked to monitor BP at home or work, several times per month and return with " written values at next office visit.     Advised staying uptodate with immunizations per established standard guidelines.      Offered to give patient  a copy of my notes and relevant tests/ prior ECG etc for the patient to review and follow specific advise and relevant findings if any, prognosis, along with my current and future plans.      Questions were encouraged, asked and answered to the patient's  understanding and satisfaction. Questions if any regarding current medications and side effects, need for refills and importance of compliance to medications stressed.    Reviewed available prior notes, consults, prior visits, laboratory findings, radiology and cardiology relevant reports. Updated chart as applicable. I have reviewed the patient's medical history in detail and updated the computerized patient record as relevant.      Updated patient regarding any new or relevant abnormalities on review of records or any new findings on physical exam. Mentioned to patient about purpose of visit and desirable health short and long term goals and objectives.        xxxxxxxxxxxxxxxxxxxxxxxxxxxxxxxxxxxxxxxxxxxxxxxxxxxxxxxxxxxxxxxxxxxxxxxxxxxxxxxxxxxxxxxxxxxxxxxxxxxxxxxxxxxxxxxxxxxxxxxxxxxxxxxxxxxxxxxxxxxxxxxxxxxxxxxxxxxxxxxxxxxxxxxxxxxxxxxxxxxxxxxxxxxxxxxxxxxxxxxxxxxxxxxxxxxxxxxxxxxxxxxxxxxxxxxxxxxxxxxxxxxxxxxx      Return in about 6 months (around 5/27/2019).

## 2018-12-07 ENCOUNTER — DOCUMENTATION (OUTPATIENT)
Dept: CARDIOLOGY | Facility: CLINIC | Age: 83
End: 2018-12-07

## 2018-12-07 NOTE — PROGRESS NOTES
Patient's daughter called and stated that patient was seen by Gil Bennett PA-C, at [facility] today.  The facility is needing pacemaker device/lead information as patient needs to have MRI.  RN was able to verify that generator, RA lead, and RV lead are MRI compatible; HOWEVER, RA lead is capped.  Having a capped lead is a contraindication to having a MRI.  [facility] contacted and notified that patient's device is NOT MRI compatible.  RN attempted to call family with no answer/unable to leave message at both available numbers.

## 2018-12-17 ENCOUNTER — APPOINTMENT (OUTPATIENT)
Dept: GENERAL RADIOLOGY | Facility: HOSPITAL | Age: 83
End: 2018-12-17

## 2018-12-17 ENCOUNTER — APPOINTMENT (OUTPATIENT)
Dept: CT IMAGING | Facility: HOSPITAL | Age: 83
End: 2018-12-17

## 2018-12-17 ENCOUNTER — HOSPITAL ENCOUNTER (EMERGENCY)
Facility: HOSPITAL | Age: 83
Discharge: HOME | End: 2018-12-17
Attending: EMERGENCY MEDICINE | Admitting: EMERGENCY MEDICINE

## 2018-12-17 VITALS
HEART RATE: 71 BPM | WEIGHT: 144 LBS | RESPIRATION RATE: 18 BRPM | SYSTOLIC BLOOD PRESSURE: 141 MMHG | TEMPERATURE: 98.9 F | OXYGEN SATURATION: 99 % | BODY MASS INDEX: 20.62 KG/M2 | HEIGHT: 70 IN | DIASTOLIC BLOOD PRESSURE: 69 MMHG

## 2018-12-17 DIAGNOSIS — R40.4 TRANSIENT ALTERATION OF AWARENESS: Primary | ICD-10-CM

## 2018-12-17 LAB
ALBUMIN SERPL-MCNC: 3.7 G/DL (ref 3.5–5)
ALBUMIN/GLOB SERPL: 1.2 G/DL (ref 1.1–2.5)
ALP SERPL-CCNC: 68 U/L (ref 24–120)
ALT SERPL W P-5'-P-CCNC: 35 U/L (ref 0–54)
AMMONIA BLD-SCNC: <9 UMOL/L (ref 9–33)
ANION GAP SERPL CALCULATED.3IONS-SCNC: 12 MMOL/L (ref 4–13)
APTT PPP: 38.2 SECONDS (ref 24.1–34.8)
AST SERPL-CCNC: 56 U/L (ref 7–45)
BACTERIA UR QL AUTO: ABNORMAL /HPF
BASOPHILS # BLD AUTO: 0.01 10*3/MM3 (ref 0–0.2)
BASOPHILS NFR BLD AUTO: 0.2 % (ref 0–2)
BILIRUB SERPL-MCNC: 0.6 MG/DL (ref 0.1–1)
BILIRUB UR QL STRIP: NEGATIVE
BUN BLD-MCNC: 46 MG/DL (ref 5–21)
BUN/CREAT SERPL: 26.6 (ref 7–25)
CALCIUM SPEC-SCNC: 9 MG/DL (ref 8.4–10.4)
CHLORIDE SERPL-SCNC: 97 MMOL/L (ref 98–110)
CLARITY UR: CLEAR
CO2 SERPL-SCNC: 27 MMOL/L (ref 24–31)
COLOR UR: YELLOW
CREAT BLD-MCNC: 1.73 MG/DL (ref 0.5–1.4)
D-LACTATE SERPL-SCNC: 0.7 MMOL/L (ref 0.5–2)
DEPRECATED RDW RBC AUTO: 54.3 FL (ref 40–54)
EOSINOPHIL # BLD AUTO: 0.04 10*3/MM3 (ref 0–0.7)
EOSINOPHIL NFR BLD AUTO: 0.7 % (ref 0–4)
ERYTHROCYTE [DISTWIDTH] IN BLOOD BY AUTOMATED COUNT: 15.7 % (ref 12–15)
GFR SERPL CREATININE-BSD FRML MDRD: 38 ML/MIN/1.73
GLOBULIN UR ELPH-MCNC: 3.2 GM/DL
GLUCOSE BLD-MCNC: 176 MG/DL (ref 70–100)
GLUCOSE UR STRIP-MCNC: ABNORMAL MG/DL
HCT VFR BLD AUTO: 30.2 % (ref 40–52)
HGB BLD-MCNC: 9.6 G/DL (ref 14–18)
HGB UR QL STRIP.AUTO: NEGATIVE
HOLD SPECIMEN: NORMAL
HOLD SPECIMEN: NORMAL
HYALINE CASTS UR QL AUTO: ABNORMAL /LPF
IMM GRANULOCYTES # BLD: 0.04 10*3/MM3 (ref 0–0.03)
IMM GRANULOCYTES NFR BLD: 0.7 % (ref 0–5)
INR PPP: 1.11 (ref 0.91–1.09)
KETONES UR QL STRIP: NEGATIVE
LEUKOCYTE ESTERASE UR QL STRIP.AUTO: NEGATIVE
LYMPHOCYTES # BLD AUTO: 0.59 10*3/MM3 (ref 0.72–4.86)
LYMPHOCYTES NFR BLD AUTO: 9.9 % (ref 15–45)
MAGNESIUM SERPL-MCNC: 2.2 MG/DL (ref 1.4–2.2)
MCH RBC QN AUTO: 30.7 PG (ref 28–32)
MCHC RBC AUTO-ENTMCNC: 31.8 G/DL (ref 33–36)
MCV RBC AUTO: 96.5 FL (ref 82–95)
MONOCYTES # BLD AUTO: 0.73 10*3/MM3 (ref 0.19–1.3)
MONOCYTES NFR BLD AUTO: 12.2 % (ref 4–12)
NEUTROPHILS # BLD AUTO: 4.55 10*3/MM3 (ref 1.87–8.4)
NEUTROPHILS NFR BLD AUTO: 76.3 % (ref 39–78)
NITRITE UR QL STRIP: NEGATIVE
NRBC BLD MANUAL-RTO: 0 /100 WBC (ref 0–0)
PH UR STRIP.AUTO: <=5 [PH] (ref 5–8)
PLATELET # BLD AUTO: 109 10*3/MM3 (ref 130–400)
PMV BLD AUTO: 9.2 FL (ref 6–12)
POTASSIUM BLD-SCNC: 4.2 MMOL/L (ref 3.5–5.3)
PROT SERPL-MCNC: 6.9 G/DL (ref 6.3–8.7)
PROT UR QL STRIP: ABNORMAL
PROTHROMBIN TIME: 14.7 SECONDS (ref 11.9–14.6)
RBC # BLD AUTO: 3.13 10*6/MM3 (ref 4.8–5.9)
RBC # UR: ABNORMAL /HPF
REF LAB TEST METHOD: ABNORMAL
SODIUM BLD-SCNC: 136 MMOL/L (ref 135–145)
SP GR UR STRIP: 1.02 (ref 1–1.03)
SQUAMOUS #/AREA URNS HPF: ABNORMAL /HPF
UROBILINOGEN UR QL STRIP: ABNORMAL
WBC NRBC COR # BLD: 5.96 10*3/MM3 (ref 4.8–10.8)
WBC UR QL AUTO: ABNORMAL /HPF
WHOLE BLOOD HOLD SPECIMEN: NORMAL
WHOLE BLOOD HOLD SPECIMEN: NORMAL

## 2018-12-17 PROCEDURE — 99284 EMERGENCY DEPT VISIT MOD MDM: CPT

## 2018-12-17 PROCEDURE — 81001 URINALYSIS AUTO W/SCOPE: CPT | Performed by: EMERGENCY MEDICINE

## 2018-12-17 PROCEDURE — 85025 COMPLETE CBC W/AUTO DIFF WBC: CPT | Performed by: EMERGENCY MEDICINE

## 2018-12-17 PROCEDURE — 85730 THROMBOPLASTIN TIME PARTIAL: CPT | Performed by: EMERGENCY MEDICINE

## 2018-12-17 PROCEDURE — 82140 ASSAY OF AMMONIA: CPT | Performed by: EMERGENCY MEDICINE

## 2018-12-17 PROCEDURE — 71045 X-RAY EXAM CHEST 1 VIEW: CPT

## 2018-12-17 PROCEDURE — 83735 ASSAY OF MAGNESIUM: CPT | Performed by: EMERGENCY MEDICINE

## 2018-12-17 PROCEDURE — 70450 CT HEAD/BRAIN W/O DYE: CPT

## 2018-12-17 PROCEDURE — 93005 ELECTROCARDIOGRAM TRACING: CPT | Performed by: EMERGENCY MEDICINE

## 2018-12-17 PROCEDURE — 85610 PROTHROMBIN TIME: CPT | Performed by: EMERGENCY MEDICINE

## 2018-12-17 PROCEDURE — 80053 COMPREHEN METABOLIC PANEL: CPT | Performed by: EMERGENCY MEDICINE

## 2018-12-17 PROCEDURE — 72125 CT NECK SPINE W/O DYE: CPT

## 2018-12-17 PROCEDURE — 72131 CT LUMBAR SPINE W/O DYE: CPT

## 2018-12-17 PROCEDURE — 83605 ASSAY OF LACTIC ACID: CPT | Performed by: EMERGENCY MEDICINE

## 2018-12-17 PROCEDURE — 87040 BLOOD CULTURE FOR BACTERIA: CPT | Performed by: EMERGENCY MEDICINE

## 2018-12-17 PROCEDURE — 93010 ELECTROCARDIOGRAM REPORT: CPT | Performed by: INTERNAL MEDICINE

## 2018-12-17 RX ORDER — METOPROLOL SUCCINATE 25 MG/1
25 TABLET, EXTENDED RELEASE ORAL DAILY
COMMUNITY
End: 2019-01-01 | Stop reason: HOSPADM

## 2018-12-17 RX ORDER — SODIUM CHLORIDE 0.9 % (FLUSH) 0.9 %
10 SYRINGE (ML) INJECTION AS NEEDED
Status: DISCONTINUED | OUTPATIENT
Start: 2018-12-17 | End: 2018-12-18 | Stop reason: HOSPADM

## 2018-12-18 NOTE — ED PROVIDER NOTES
"Subjective   Patient brought by family with complaint that patient has been confused and \"talking out of his head\".  Has waxed and waned for past 2 days.  No fever, cough, or vomiting but have noticed his urine to be very dark.  Has been getting weaker over past month and now using walker.  Has had several falls recently and due to have CTs of his back and neck tomorrow in evaluation of that by PCP.        History provided by:  Patient, relative and spouse  History limited by:  Mental status change   used: No    Altered Mental Status   Presenting symptoms: confusion    Severity:  Moderate  Most recent episode:  2 days ago  Episode history:  Single  Duration:  2 days  Timing:  Constant  Progression:  Waxing and waning  Chronicity:  New  Context: not alcohol use, not dementia, not drug use, not head injury, not homeless, taking medications as prescribed, not nursing home resident, not recent change in medication, not recent illness and not recent infection    Associated symptoms: weakness    Associated symptoms: no abdominal pain, normal movement, no agitation, no bladder incontinence, no decreased appetite, no depression, no difficulty breathing, no eye deviation, no fever, no hallucinations, no headaches, no light-headedness, no nausea, no palpitations, no rash, no seizures, no slurred speech, no suicidal behavior, no visual change and no vomiting        Review of Systems   Constitutional: Negative for decreased appetite and fever.   HENT: Negative.    Respiratory: Negative.    Cardiovascular: Negative.  Negative for palpitations.   Gastrointestinal: Negative.  Negative for abdominal pain, nausea and vomiting.   Genitourinary: Positive for difficulty urinating. Negative for bladder incontinence.   Musculoskeletal: Negative.    Skin: Negative.  Negative for rash.   Neurological: Positive for weakness. Negative for seizures, light-headedness and headaches.   Psychiatric/Behavioral: Positive for " confusion. Negative for agitation and hallucinations.   All other systems reviewed and are negative.      Past Medical History:   Diagnosis Date   • Anemia    • Atrial fibrillation by electrocardiogram (CMS/[facility])    • CAD in native artery    • Carotid bruit    • CHF (congestive heart failure) (CMS/[facility])    • Chronic kidney disease     stage III   • Diabetes mellitus (CMS/[facility])     diet controlled   • History of coronary artery bypass graft    • Hyperlipidemia    • Hypertension    • Monoclonal (M) protein disease, multiple 'M' protein    • Multiple myeloma (CMS/[facility])    • Murmur    • Pulmonary hypertension (CMS/[facility])    • Sick sinus syndrome (CMS/[facility])     s/p pace maker   • Sleep apnea        No Known Allergies    Past Surgical History:   Procedure Laterality Date   • BACK SURGERY     • CARDIAC CATHETERIZATION  1998    Left Heart; candidate for re-do CABG   • COLONOSCOPY  12/10/2013    Diverticulosis repeat prn   • CORONARY ARTERY BYPASS GRAFT      X 8   • HEMORROIDECTOMY     • PACEMAKER IMPLANTATION         Family History   Problem Relation Age of Onset   • Cancer Mother    • Heart disease Mother    • Colon cancer Mother    • Heart disease Father    • Coronary artery disease Father    • Dementia Sister    • Colon polyps Neg Hx        Social History     Socioeconomic History   • Marital status:      Spouse name: Not on file   • Number of children: Not on file   • Years of education: Not on file   • Highest education level: Not on file   Tobacco Use   • Smoking status: Former Smoker     Types: Cigarettes     Last attempt to quit: 1986     Years since quittin.1   • Smokeless tobacco: Former User   Substance and Sexual Activity   • Alcohol use: No   • Drug use: No   • Sexual activity: Defer       Prior to Admission medications    Medication Sig Start Date End Date Taking? Authorizing Provider   aspirin  MG tablet Take 81 mg by mouth Daily.   Yes Provider, MD [facility]   Cholecalciferol  (VITAMIN D3) 5000 UNITS capsule capsule Take 5,000 Units by mouth Daily.   Yes [facility] Alvarado MD   coenzyme Q10 100 MG capsule Take 100 mg by mouth Daily.   Yes [facility] Alvarado MD   metoprolol succinate XL (TOPROL-XL) 25 MG 24 hr tablet Take 25 mg by mouth Daily.   Yes [facility] Alvarado MD   sertraline (ZOLOFT) 25 MG tablet Take 25 mg by mouth Daily.   Yes [facility] Alvarado MD   sodium bicarbonate 650 MG tablet Take 1 tablet by mouth 2 (Two) Times a Day. 5/24/17  Yes Josue Brown MD   carvedilol (COREG) 6.25 MG tablet Take 6.25 mg by mouth 2 (Two) Times a Day With Meals.    [facility]agle MD       Medications   sodium chloride 0.9 % flush 10 mL (not administered)       Vitals:    12/17/18 2201   BP: 141/69   Pulse: 71   Resp: 18   Temp: 98.9 °F (37.2 °C)   SpO2: 99%         Objective   Physical Exam   Constitutional: He appears well-developed and well-nourished.   HENT:   Head: Normocephalic and atraumatic.   Neck: Normal range of motion. Neck supple.   Cardiovascular: Normal rate and regular rhythm.   Murmur heard.  4/6 STEPHANIE   Pulmonary/Chest: Effort normal and breath sounds normal.   Abdominal: Soft. Bowel sounds are normal.   Musculoskeletal: Normal range of motion.   Neurological: He is alert.   Responds to questions but seems slow and rambling in responses.  No focal signs.   Skin: Skin is warm and dry.   Psychiatric: He has a normal mood and affect. His behavior is normal.   Nursing note and vitals reviewed.      Procedures         Lab Results (last 24 hours)     Procedure Component Value Units Date/Time    Blood Culture - Blood, Arm, Right [[id number]] Collected:  12/17/18 1852    Specimen:  Blood from Arm, Right Updated:  12/17/18 1914    Blood Culture - Blood, Hand, Right [[id number]] Collected:  12/17/18 1856    Specimen:  Blood from Hand, Right Updated:  12/17/18 1914    CBC & Differential [[id number]] Collected:  12/17/18 1902    Specimen:  Blood Updated:  12/17/18 1912     Narrative:       The following orders were created for panel order CBC & Differential.  Procedure                               Abnormality         Status                     ---------                               -----------         ------                     CBC Auto Differential[[id number]]        Abnormal            Final result                 Please view results for these tests on the individual orders.    Comprehensive Metabolic Panel [[id number]]  (Abnormal) Collected:  12/17/18 1902    Specimen:  Blood Updated:  12/17/18 1925     Glucose 176 mg/dL      BUN 46 mg/dL      Creatinine 1.73 mg/dL      Sodium 136 mmol/L      Potassium 4.2 mmol/L      Chloride 97 mmol/L      CO2 27.0 mmol/L      Calcium 9.0 mg/dL      Total Protein 6.9 g/dL      Albumin 3.70 g/dL      ALT (SGPT) 35 U/L      AST (SGOT) 56 U/L      Alkaline Phosphatase 68 U/L      Total Bilirubin 0.6 mg/dL      eGFR Non African Amer 38 mL/min/1.73      Globulin 3.2 gm/dL      A/G Ratio 1.2 g/dL      BUN/Creatinine Ratio 26.6     Anion Gap 12.0 mmol/L     Narrative:       The MDRD GFR formula is only valid for adults with stable renal function between ages 18 and 70.    Protime-INR [[id number]]  (Abnormal) Collected:  12/17/18 1902    Specimen:  Blood Updated:  12/17/18 1921     Protime 14.7 Seconds      INR 1.11    aPTT [[id number]]  (Abnormal) Collected:  12/17/18 1902    Specimen:  Blood Updated:  12/17/18 1921     PTT 38.2 seconds     Magnesium [[id number]]  (Normal) Collected:  12/17/18 1902    Specimen:  Blood Updated:  12/17/18 1925     Magnesium 2.2 mg/dL     Ammonia [[id number]]  (Abnormal) Collected:  12/17/18 1902    Specimen:  Blood Updated:  12/17/18 1924     Ammonia <9 umol/L     Lactic Acid, Plasma [[id number]]  (Normal) Collected:  12/17/18 1902    Specimen:  Blood Updated:  12/17/18 1925     Lactate 0.7 mmol/L     CBC Auto Differential [[id number]]  (Abnormal) Collected:  12/17/18 1902    Specimen:  Blood Updated:  12/17/18 1912     WBC  5.96 10*3/mm3      RBC 3.13 10*6/mm3      Hemoglobin 9.6 g/dL      Hematocrit 30.2 %      MCV 96.5 fL      MCH 30.7 pg      MCHC 31.8 g/dL      RDW 15.7 %      RDW-SD 54.3 fl      MPV 9.2 fL      Platelets 109 10*3/mm3      Neutrophil % 76.3 %      Lymphocyte % 9.9 %      Monocyte % 12.2 %      Eosinophil % 0.7 %      Basophil % 0.2 %      Immature Grans % 0.7 %      Neutrophils, Absolute 4.55 10*3/mm3      Lymphocytes, Absolute 0.59 10*3/mm3      Monocytes, Absolute 0.73 10*3/mm3      Eosinophils, Absolute 0.04 10*3/mm3      Basophils, Absolute 0.01 10*3/mm3      Immature Grans, Absolute 0.04 10*3/mm3      nRBC 0.0 /100 WBC     Urinalysis With Culture If Indicated - Urine, Clean Catch [[id number]]  (Abnormal) Collected:  12/17/18 1926    Specimen:  Urine, Clean Catch Updated:  12/17/18 1938     Color, UA Yellow     Appearance, UA Clear     pH, UA <=5.0     Specific Gravity, UA 1.022     Glucose,  mg/dL (2+)     Ketones, UA Negative     Bilirubin, UA Negative     Blood, UA Negative     Protein, UA 30 mg/dL (1+)     Leuk Esterase, UA Negative     Nitrite, UA Negative     Urobilinogen, UA 1.0 E.U./dL    Urinalysis, Microscopic Only - Urine, Clean Catch [[id number]]  (Abnormal) Collected:  12/17/18 1926    Specimen:  Urine, Clean Catch Updated:  12/17/18 1938     RBC, UA 3-5 /HPF      WBC, UA 0-2 /HPF      Bacteria, UA None Seen /HPF      Squamous Epithelial Cells, UA None Seen /HPF      Hyaline Casts, UA 0-2 /LPF      Methodology Automated Microscopy          CT Lumbar Spine Without Contrast   Final Result   1. No evidence of acute osseous injury in the cervical spine. Mild   anterior wedging of multiple vertebral bodies is felt to most likely be   chronic and degenerative in nature.   2. Degenerative disc disease as above. This results in probable spinal   canal stenosis at L4-L5 with severe bilateral neuroforaminal narrowing   also noted at this level.   3. Bilateral renal lesions, incompletely characterized on  this exam.   Consider further evaluation with nonemergent renal ultrasound,   previously performed.       This report was finalized on 12/17/2018 20:36 by Dr. Joey Lobo MD.      CT Cervical Spine Without Contrast   Final Result   1. Degenerative changes of the cervical spine which are most pronounced   at C6-C7. This results in bilateral neuroforaminal narrowing as well as   mild spinal canal stenosis.    2. No acute osseous abnormality is identified.       This report was finalized on 12/17/2018 20:30 by Dr. Joey Lobo MD.      CT Head Without Contrast   Final Result   1. No acute intracranial findings.       2. Sequela of chronic microvascular ischemia. Previous right frontal   lobe infarct.       3. Diffuse atrophy.       This report was finalized on 12/17/2018 20:07 by Dr. Joey Lobo MD.      XR Chest 1 View   Final Result   No evidence of acute cardiopulmonary process. Left sixth rib   fracture, age indeterminate and possibly acute.   This report was finalized on 12/17/2018 18:54 by Dr. Joey Lobo MD.          ED Course  ED Course as of Dec 17 2337   Mon Dec 17, 2018   2330 Told patient and family that all testing here was basically okay for him or no significant changes from old and I did not have clear explanation for changes they have been seeing.  I did tell them of old CVA noted on CT and they did not know of that.  Told them this could be first signs of dementia but onset would be unusual.  In short, I do not have clear answer but patient seems clear and family does say he is back to normal at present.  He is stable for DC and if he continues with these symptoms he can follow up with PCP.  [TR]      ED Course User Index  [TR] Romie Velasco Jr., MD          MDM  Number of Diagnoses or Management Options  Transient alteration of awareness: new and requires workup     Amount and/or Complexity of Data Reviewed  Clinical lab tests: ordered and reviewed  Tests in the radiology section of  CPT®: ordered and reviewed  Tests in the medicine section of CPT®: reviewed and ordered  Decide to obtain previous medical records or to obtain history from someone other than the patient: yes    Risk of Complications, Morbidity, and/or Mortality  Presenting problems: moderate  Diagnostic procedures: moderate  Management options: moderate    Patient Progress  Patient progress: stable      Final diagnoses:   Transient alteration of awareness          Romie Velasco Jr., MD  12/17/18 [id number]

## 2018-12-22 LAB
BACTERIA SPEC AEROBE CULT: NORMAL
BACTERIA SPEC AEROBE CULT: NORMAL

## 2019-01-01 ENCOUNTER — INFUSION (OUTPATIENT)
Dept: ONCOLOGY | Facility: HOSPITAL | Age: 84
End: 2019-01-01

## 2019-01-01 ENCOUNTER — APPOINTMENT (OUTPATIENT)
Dept: CT IMAGING | Facility: HOSPITAL | Age: 84
End: 2019-01-01

## 2019-01-01 ENCOUNTER — LAB (OUTPATIENT)
Dept: LAB | Facility: HOSPITAL | Age: 84
End: 2019-01-01

## 2019-01-01 ENCOUNTER — APPOINTMENT (OUTPATIENT)
Dept: GENERAL RADIOLOGY | Facility: HOSPITAL | Age: 84
End: 2019-01-01

## 2019-01-01 ENCOUNTER — CLINICAL SUPPORT NO REQUIREMENTS (OUTPATIENT)
Dept: CARDIOLOGY | Facility: CLINIC | Age: 84
End: 2019-01-01

## 2019-01-01 ENCOUNTER — HOSPITAL ENCOUNTER (INPATIENT)
Facility: HOSPITAL | Age: 84
LOS: 9 days | Discharge: NURSING, REHAB OR LONG-TERM CARE | End: 2019-07-03
Attending: EMERGENCY MEDICINE | Admitting: INTERNAL MEDICINE

## 2019-01-01 ENCOUNTER — TELEPHONE (OUTPATIENT)
Dept: ONCOLOGY | Facility: CLINIC | Age: 84
End: 2019-01-01

## 2019-01-01 ENCOUNTER — OFFICE VISIT (OUTPATIENT)
Dept: CARDIOLOGY | Facility: CLINIC | Age: 84
End: 2019-01-01

## 2019-01-01 ENCOUNTER — APPOINTMENT (OUTPATIENT)
Dept: ULTRASOUND IMAGING | Facility: HOSPITAL | Age: 84
End: 2019-01-01

## 2019-01-01 ENCOUNTER — HOSPITAL ENCOUNTER (EMERGENCY)
Facility: HOSPITAL | Age: 84
Discharge: NURSING, REHAB OR LONG-TERM CARE | End: 2019-07-05
Attending: EMERGENCY MEDICINE | Admitting: EMERGENCY MEDICINE

## 2019-01-01 ENCOUNTER — APPOINTMENT (OUTPATIENT)
Dept: LAB | Facility: HOSPITAL | Age: 84
End: 2019-01-01

## 2019-01-01 ENCOUNTER — APPOINTMENT (OUTPATIENT)
Dept: ONCOLOGY | Facility: HOSPITAL | Age: 84
End: 2019-01-01

## 2019-01-01 ENCOUNTER — TRANSCRIBE ORDERS (OUTPATIENT)
Dept: ADMINISTRATIVE | Facility: HOSPITAL | Age: 84
End: 2019-01-01

## 2019-01-01 ENCOUNTER — HOSPITAL ENCOUNTER (OUTPATIENT)
Dept: ULTRASOUND IMAGING | Facility: HOSPITAL | Age: 84
Discharge: HOME | End: 2019-06-07
Admitting: INTERNAL MEDICINE

## 2019-01-01 ENCOUNTER — HOSPITAL ENCOUNTER (EMERGENCY)
Facility: HOSPITAL | Age: 84
Discharge: HOME | End: 2019-06-21
Admitting: EMERGENCY MEDICINE

## 2019-01-01 ENCOUNTER — HOSPITAL ENCOUNTER (OUTPATIENT)
Dept: CARDIOLOGY | Facility: HOSPITAL | Age: 84
Discharge: HOME | End: 2019-06-06
Admitting: INTERNAL MEDICINE

## 2019-01-01 ENCOUNTER — HOSPITAL ENCOUNTER (EMERGENCY)
Facility: HOSPITAL | Age: 84
Discharge: HOME | End: 2019-11-04
Attending: EMERGENCY MEDICINE | Admitting: EMERGENCY MEDICINE

## 2019-01-01 VITALS
HEART RATE: 80 BPM | TEMPERATURE: 97.1 F | SYSTOLIC BLOOD PRESSURE: 141 MMHG | OXYGEN SATURATION: 100 % | HEIGHT: 70 IN | RESPIRATION RATE: 20 BRPM | BODY MASS INDEX: 21.19 KG/M2 | DIASTOLIC BLOOD PRESSURE: 81 MMHG | WEIGHT: 148 LBS

## 2019-01-01 VITALS
WEIGHT: 138 LBS | DIASTOLIC BLOOD PRESSURE: 62 MMHG | HEIGHT: 70 IN | BODY MASS INDEX: 19.76 KG/M2 | SYSTOLIC BLOOD PRESSURE: 122 MMHG

## 2019-01-01 VITALS
BODY MASS INDEX: 20.95 KG/M2 | HEART RATE: 76 BPM | WEIGHT: 146 LBS | SYSTOLIC BLOOD PRESSURE: 115 MMHG | DIASTOLIC BLOOD PRESSURE: 55 MMHG

## 2019-01-01 VITALS
WEIGHT: 133.5 LBS | TEMPERATURE: 97.8 F | OXYGEN SATURATION: 100 % | HEIGHT: 72 IN | HEART RATE: 68 BPM | SYSTOLIC BLOOD PRESSURE: 128 MMHG | BODY MASS INDEX: 18.08 KG/M2 | DIASTOLIC BLOOD PRESSURE: 55 MMHG | RESPIRATION RATE: 16 BRPM

## 2019-01-01 VITALS
TEMPERATURE: 97.6 F | HEART RATE: 76 BPM | DIASTOLIC BLOOD PRESSURE: 61 MMHG | BODY MASS INDEX: 21.09 KG/M2 | WEIGHT: 147 LBS | SYSTOLIC BLOOD PRESSURE: 103 MMHG

## 2019-01-01 VITALS
DIASTOLIC BLOOD PRESSURE: 72 MMHG | SYSTOLIC BLOOD PRESSURE: 116 MMHG | HEIGHT: 70 IN | WEIGHT: 148 LBS | RESPIRATION RATE: 20 BRPM | BODY MASS INDEX: 21.19 KG/M2 | HEART RATE: 80 BPM | TEMPERATURE: 97.8 F | OXYGEN SATURATION: 96 %

## 2019-01-01 VITALS
OXYGEN SATURATION: 93 % | HEIGHT: 70 IN | SYSTOLIC BLOOD PRESSURE: 98 MMHG | TEMPERATURE: 97.5 F | BODY MASS INDEX: 19.33 KG/M2 | WEIGHT: 135 LBS | DIASTOLIC BLOOD PRESSURE: 56 MMHG | HEART RATE: 72 BPM | RESPIRATION RATE: 20 BRPM

## 2019-01-01 VITALS
SYSTOLIC BLOOD PRESSURE: 135 MMHG | WEIGHT: 147 LBS | HEIGHT: 71 IN | OXYGEN SATURATION: 100 % | DIASTOLIC BLOOD PRESSURE: 63 MMHG | HEART RATE: 62 BPM | BODY MASS INDEX: 20.58 KG/M2 | RESPIRATION RATE: 17 BRPM | TEMPERATURE: 98 F

## 2019-01-01 VITALS
BODY MASS INDEX: 21.02 KG/M2 | WEIGHT: 146.8 LBS | SYSTOLIC BLOOD PRESSURE: 132 MMHG | OXYGEN SATURATION: 97 % | HEART RATE: 89 BPM | TEMPERATURE: 97.4 F | DIASTOLIC BLOOD PRESSURE: 73 MMHG | HEIGHT: 70 IN | RESPIRATION RATE: 18 BRPM

## 2019-01-01 VITALS
HEART RATE: 57 BPM | HEIGHT: 70 IN | OXYGEN SATURATION: 100 % | BODY MASS INDEX: 20.76 KG/M2 | TEMPERATURE: 97.5 F | WEIGHT: 145 LBS | RESPIRATION RATE: 18 BRPM | DIASTOLIC BLOOD PRESSURE: 64 MMHG | SYSTOLIC BLOOD PRESSURE: 136 MMHG

## 2019-01-01 VITALS
TEMPERATURE: 97.7 F | SYSTOLIC BLOOD PRESSURE: 110 MMHG | DIASTOLIC BLOOD PRESSURE: 78 MMHG | OXYGEN SATURATION: 88 % | HEART RATE: 60 BPM | WEIGHT: 161.5 LBS | RESPIRATION RATE: 15 BRPM | BODY MASS INDEX: 23.92 KG/M2 | HEIGHT: 69 IN

## 2019-01-01 VITALS
BODY MASS INDEX: 21.19 KG/M2 | SYSTOLIC BLOOD PRESSURE: 113 MMHG | OXYGEN SATURATION: 98 % | HEART RATE: 86 BPM | TEMPERATURE: 97.5 F | WEIGHT: 148 LBS | RESPIRATION RATE: 20 BRPM | DIASTOLIC BLOOD PRESSURE: 68 MMHG | HEIGHT: 70 IN

## 2019-01-01 VITALS
TEMPERATURE: 98 F | DIASTOLIC BLOOD PRESSURE: 93 MMHG | RESPIRATION RATE: 20 BRPM | HEIGHT: 70 IN | WEIGHT: 139 LBS | OXYGEN SATURATION: 99 % | BODY MASS INDEX: 19.9 KG/M2 | HEART RATE: 60 BPM | SYSTOLIC BLOOD PRESSURE: 131 MMHG

## 2019-01-01 VITALS
HEART RATE: 47 BPM | OXYGEN SATURATION: 90 % | WEIGHT: 149 LBS | SYSTOLIC BLOOD PRESSURE: 114 MMHG | BODY MASS INDEX: 21.38 KG/M2 | DIASTOLIC BLOOD PRESSURE: 64 MMHG

## 2019-01-01 VITALS
SYSTOLIC BLOOD PRESSURE: 131 MMHG | OXYGEN SATURATION: 93 % | DIASTOLIC BLOOD PRESSURE: 76 MMHG | BODY MASS INDEX: 21.47 KG/M2 | RESPIRATION RATE: 18 BRPM | TEMPERATURE: 97.5 F | HEIGHT: 70 IN | HEART RATE: 81 BPM | WEIGHT: 150 LBS

## 2019-01-01 VITALS
SYSTOLIC BLOOD PRESSURE: 122 MMHG | WEIGHT: 145 LBS | BODY MASS INDEX: 20.76 KG/M2 | HEIGHT: 70 IN | DIASTOLIC BLOOD PRESSURE: 62 MMHG

## 2019-01-01 DIAGNOSIS — N17.9 AKI (ACUTE KIDNEY INJURY) (HCC): Primary | ICD-10-CM

## 2019-01-01 DIAGNOSIS — I48.0 PAF (PAROXYSMAL ATRIAL FIBRILLATION) (HCC): ICD-10-CM

## 2019-01-01 DIAGNOSIS — C90.00 LIGHT CHAIN MYELOMA (HCC): ICD-10-CM

## 2019-01-01 DIAGNOSIS — R41.82 ALTERED MENTAL STATUS, UNSPECIFIED ALTERED MENTAL STATUS TYPE: ICD-10-CM

## 2019-01-01 DIAGNOSIS — D63.8 ANEMIA OF CHRONIC DISEASE: Primary | ICD-10-CM

## 2019-01-01 DIAGNOSIS — D63.8 ANEMIA OF CHRONIC DISORDER: ICD-10-CM

## 2019-01-01 DIAGNOSIS — N18.4 ANEMIA DUE TO STAGE 4 CHRONIC KIDNEY DISEASE (HCC): Primary | ICD-10-CM

## 2019-01-01 DIAGNOSIS — D63.1 ANEMIA DUE TO STAGE 4 CHRONIC KIDNEY DISEASE (HCC): Primary | ICD-10-CM

## 2019-01-01 DIAGNOSIS — R06.09 DOE (DYSPNEA ON EXERTION): ICD-10-CM

## 2019-01-01 DIAGNOSIS — D61.818 PANCYTOPENIA (HCC): ICD-10-CM

## 2019-01-01 DIAGNOSIS — C90.00 LIGHT CHAIN MYELOMA (HCC): Primary | ICD-10-CM

## 2019-01-01 DIAGNOSIS — D63.1 ANEMIA DUE TO STAGE 4 CHRONIC KIDNEY DISEASE (HCC): ICD-10-CM

## 2019-01-01 DIAGNOSIS — I25.10 CORONARY ARTERY DISEASE INVOLVING NATIVE CORONARY ARTERY OF NATIVE HEART WITHOUT ANGINA PECTORIS: ICD-10-CM

## 2019-01-01 DIAGNOSIS — Z00.00 ENCOUNTER FOR GENERAL ADULT MEDICAL EXAMINATION WITHOUT ABNORMAL FINDINGS: ICD-10-CM

## 2019-01-01 DIAGNOSIS — N18.9 CHRONIC KIDNEY DISEASE, UNSPECIFIED CKD STAGE: ICD-10-CM

## 2019-01-01 DIAGNOSIS — C90.00 MYELOMA KIDNEY (HCC): ICD-10-CM

## 2019-01-01 DIAGNOSIS — R13.12 OROPHARYNGEAL DYSPHAGIA: ICD-10-CM

## 2019-01-01 DIAGNOSIS — D63.1 ANEMIA ASSOCIATED WITH CHRONIC RENAL FAILURE: ICD-10-CM

## 2019-01-01 DIAGNOSIS — D63.8 ANEMIA OF CHRONIC DISEASE: ICD-10-CM

## 2019-01-01 DIAGNOSIS — N18.9 ANEMIA ASSOCIATED WITH CHRONIC RENAL FAILURE: ICD-10-CM

## 2019-01-01 DIAGNOSIS — S01.81XA LACERATION OF FOREHEAD, INITIAL ENCOUNTER: Primary | ICD-10-CM

## 2019-01-01 DIAGNOSIS — Z95.0 PACEMAKER: Primary | ICD-10-CM

## 2019-01-01 DIAGNOSIS — C90.00 MULTIPLE MYELOMA, REMISSION STATUS UNSPECIFIED (HCC): Primary | ICD-10-CM

## 2019-01-01 DIAGNOSIS — C90.01 MULTIPLE MYELOMA IN REMISSION (HCC): ICD-10-CM

## 2019-01-01 DIAGNOSIS — I10 ESSENTIAL HYPERTENSION: ICD-10-CM

## 2019-01-01 DIAGNOSIS — R17 HIGH SERUM BILIRUBIN LEVEL: ICD-10-CM

## 2019-01-01 DIAGNOSIS — F41.9 ANXIETY: ICD-10-CM
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
## 2019-01-01 DIAGNOSIS — Z78.9 DECREASED ACTIVITIES OF DAILY LIVING (ADL): ICD-10-CM

## 2019-01-01 DIAGNOSIS — F41.9 ANXIETY: Primary | ICD-10-CM

## 2019-01-01 DIAGNOSIS — Z95.0 PACEMAKER: ICD-10-CM

## 2019-01-01 DIAGNOSIS — G93.41 METABOLIC ENCEPHALOPATHY: ICD-10-CM

## 2019-01-01 DIAGNOSIS — R09.02 EXERCISE HYPOXEMIA: ICD-10-CM

## 2019-01-01 DIAGNOSIS — I27.20 PULMONARY HYPERTENSION (HCC): ICD-10-CM

## 2019-01-01 DIAGNOSIS — E11.9 CONTROLLED TYPE 2 DIABETES MELLITUS WITHOUT COMPLICATION, WITHOUT LONG-TERM CURRENT USE OF INSULIN (HCC): Chronic | ICD-10-CM

## 2019-01-01 DIAGNOSIS — N18.4 ANEMIA DUE TO STAGE 4 CHRONIC KIDNEY DISEASE (HCC): ICD-10-CM

## 2019-01-01 DIAGNOSIS — N08 MYELOMA KIDNEY (HCC): ICD-10-CM

## 2019-01-01 DIAGNOSIS — F03.91 DEMENTIA WITH BEHAVIORAL DISTURBANCE, UNSPECIFIED DEMENTIA TYPE: ICD-10-CM

## 2019-01-01 DIAGNOSIS — T07.XXXA MULTIPLE CONTUSIONS: ICD-10-CM

## 2019-01-01 DIAGNOSIS — S12.9XXA CLOSED FRACTURE OF SPINOUS PROCESS OF CERVICAL VERTEBRA, INITIAL ENCOUNTER (HCC): ICD-10-CM

## 2019-01-01 DIAGNOSIS — D72.819 LEUKOPENIA, UNSPECIFIED TYPE: Primary | ICD-10-CM

## 2019-01-01 DIAGNOSIS — I49.5 SSS (SICK SINUS SYNDROME) (HCC): ICD-10-CM

## 2019-01-01 DIAGNOSIS — I49.5 SICK SINUS SYNDROME (HCC): ICD-10-CM

## 2019-01-01 DIAGNOSIS — R17 HIGH SERUM BILIRUBIN LEVEL: Primary | ICD-10-CM

## 2019-01-01 DIAGNOSIS — Z74.09 DECREASED MOBILITY AND ENDURANCE: ICD-10-CM

## 2019-01-01 LAB
25(OH)D3 SERPL-MCNC: 77.6 NG/ML (ref 30–100)
A PHAGOCYTOPH IGM TITR SER IF: NEGATIVE {TITER}
ABO + RH BLD: NORMAL
ABO GROUP BLD: NORMAL
ABO GROUP BLD: NORMAL
ALBUMIN SERPL-MCNC: 2.5 G/DL (ref 3.5–5)
ALBUMIN SERPL-MCNC: 2.7 G/DL (ref 3.5–5)
ALBUMIN SERPL-MCNC: 2.8 G/DL (ref 2.9–4.4)
ALBUMIN SERPL-MCNC: 2.8 G/DL (ref 3.5–5)
ALBUMIN SERPL-MCNC: 2.9 G/DL (ref 3.5–5)
ALBUMIN SERPL-MCNC: 3 G/DL (ref 3.5–5)
ALBUMIN SERPL-MCNC: 3.2 G/DL (ref 3.5–5)
ALBUMIN SERPL-MCNC: 3.4 G/DL (ref 3.5–5)
ALBUMIN SERPL-MCNC: 3.5 G/DL (ref 3.5–5)
ALBUMIN SERPL-MCNC: 3.7 G/DL (ref 3.5–5)
ALBUMIN SERPL-MCNC: 3.8 G/DL (ref 3.5–5)
ALBUMIN SERPL-MCNC: 3.9 G/DL (ref 3.5–5)
ALBUMIN SERPL-MCNC: 4.2 G/DL (ref 3.5–5.2)
ALBUMIN SERPL-MCNC: 4.4 G/DL (ref 3.5–5)
ALBUMIN/GLOB SERPL: 1 G/DL (ref 1.1–2.5)
ALBUMIN/GLOB SERPL: 1 G/DL (ref 1.1–2.5)
ALBUMIN/GLOB SERPL: 1.1 G/DL (ref 1.1–2.5)
ALBUMIN/GLOB SERPL: 1.1 G/DL (ref 1.1–2.5)
ALBUMIN/GLOB SERPL: 1.2 G/DL (ref 1.1–2.5)
ALBUMIN/GLOB SERPL: 1.2 G/DL (ref 1.1–2.5)
ALBUMIN/GLOB SERPL: 1.2 {RATIO} (ref 0.7–1.7)
ALBUMIN/GLOB SERPL: 1.3 G/DL (ref 1.1–2.5)
ALBUMIN/GLOB SERPL: 1.3 G/DL (ref 1.1–2.5)
ALBUMIN/GLOB SERPL: 1.4 G/DL (ref 1.1–2.5)
ALBUMIN/GLOB SERPL: 1.4 G/DL (ref 1.1–2.5)
ALBUMIN/GLOB SERPL: 1.5 G/DL
ALBUMIN/GLOB SERPL: 1.5 G/DL (ref 1.1–2.5)
ALBUMIN/GLOB SERPL: 1.5 G/DL (ref 1.1–2.5)
ALP SERPL-CCNC: 45 U/L (ref 24–120)
ALP SERPL-CCNC: 46 U/L (ref 24–120)
ALP SERPL-CCNC: 46 U/L (ref 39–117)
ALP SERPL-CCNC: 51 U/L (ref 24–120)
ALP SERPL-CCNC: 53 U/L (ref 24–120)
ALP SERPL-CCNC: 54 U/L (ref 24–120)
ALP SERPL-CCNC: 54 U/L (ref 24–120)
ALP SERPL-CCNC: 56 U/L (ref 24–120)
ALP SERPL-CCNC: 57 U/L (ref 24–120)
ALP SERPL-CCNC: 58 U/L (ref 24–120)
ALP SERPL-CCNC: 61 U/L (ref 24–120)
ALPHA1 GLOB FLD ELPH-MCNC: 0.3 G/DL (ref 0–0.4)
ALPHA2 GLOB SERPL ELPH-MCNC: 0.6 G/DL (ref 0.4–1)
ALT SERPL W P-5'-P-CCNC: 15 U/L (ref 0–54)
ALT SERPL W P-5'-P-CCNC: 17 U/L (ref 0–54)
ALT SERPL W P-5'-P-CCNC: 17 U/L (ref 0–54)
ALT SERPL W P-5'-P-CCNC: 24 U/L (ref 0–54)
ALT SERPL W P-5'-P-CCNC: 29 U/L (ref 0–54)
ALT SERPL W P-5'-P-CCNC: 34 U/L (ref 0–54)
ALT SERPL W P-5'-P-CCNC: 36 U/L (ref 0–54)
ALT SERPL W P-5'-P-CCNC: 39 U/L (ref 0–54)
ALT SERPL W P-5'-P-CCNC: 39 U/L (ref 0–54)
ALT SERPL W P-5'-P-CCNC: 45 U/L (ref 0–54)
ALT SERPL W P-5'-P-CCNC: 47 U/L (ref 0–54)
ALT SERPL W P-5'-P-CCNC: 8 U/L (ref 1–41)
ALT SERPL W P-5'-P-CCNC: <15 U/L (ref 0–54)
AMMONIA BLD-SCNC: <9 UMOL/L (ref 9–33)
ANA HOMOGEN TITR SER: ABNORMAL {TITER}
ANA SER QL IA: POSITIVE
ANION GAP SERPL CALCULATED.3IONS-SCNC: 10 MMOL/L (ref 4–13)
ANION GAP SERPL CALCULATED.3IONS-SCNC: 11 MMOL/L (ref 4–13)
ANION GAP SERPL CALCULATED.3IONS-SCNC: 12 MMOL/L (ref 5–15)
ANION GAP SERPL CALCULATED.3IONS-SCNC: 5 MMOL/L (ref 4–13)
ANION GAP SERPL CALCULATED.3IONS-SCNC: 6 MMOL/L (ref 4–13)
ANION GAP SERPL CALCULATED.3IONS-SCNC: 6 MMOL/L (ref 4–13)
ANION GAP SERPL CALCULATED.3IONS-SCNC: 7 MMOL/L (ref 4–13)
ANION GAP SERPL CALCULATED.3IONS-SCNC: 8 MMOL/L (ref 4–13)
ANION GAP SERPL CALCULATED.3IONS-SCNC: 9 MMOL/L (ref 4–13)
ANISOCYTOSIS BLD QL: ABNORMAL
ANISOCYTOSIS BLD QL: NORMAL
ANISOCYTOSIS BLD QL: NORMAL
APTT PPP: 36.8 SECONDS (ref 24.1–35)
APTT PPP: 36.9 SECONDS (ref 24.1–35)
AST SERPL-CCNC: 15 U/L (ref 1–40)
AST SERPL-CCNC: 32 U/L (ref 7–45)
AST SERPL-CCNC: 32 U/L (ref 7–45)
AST SERPL-CCNC: 33 U/L (ref 7–45)
AST SERPL-CCNC: 35 U/L (ref 7–45)
AST SERPL-CCNC: 43 U/L (ref 7–45)
AST SERPL-CCNC: 50 U/L (ref 7–45)
AST SERPL-CCNC: 51 U/L (ref 7–45)
AST SERPL-CCNC: 51 U/L (ref 7–45)
AST SERPL-CCNC: 65 U/L (ref 7–45)
AST SERPL-CCNC: 68 U/L (ref 7–45)
AST SERPL-CCNC: 76 U/L (ref 7–45)
AST SERPL-CCNC: 84 U/L (ref 7–45)
B-GLOBULIN SERPL ELPH-MCNC: 0.8 G/DL (ref 0.7–1.3)
BACTERIA SPEC AEROBE CULT: NORMAL
BACTERIA UR QL AUTO: ABNORMAL /HPF
BASOPHILS # BLD AUTO: 0.01 10*3/MM3 (ref 0–0.2)
BASOPHILS # BLD AUTO: 0.02 10*3/MM3 (ref 0–0.2)
BASOPHILS # BLD AUTO: 0.03 10*3/MM3 (ref 0–0.2)
BASOPHILS # BLD AUTO: 0.03 10*3/MM3 (ref 0–0.2)
BASOPHILS # BLD MANUAL: 0.05 10*3/MM3 (ref 0–0.2)
BASOPHILS NFR BLD AUTO: 0.2 % (ref 0–2)
BASOPHILS NFR BLD AUTO: 0.3 % (ref 0–2)
BASOPHILS NFR BLD AUTO: 0.4 % (ref 0–2)
BASOPHILS NFR BLD AUTO: 0.4 % (ref 0–2)
BASOPHILS NFR BLD AUTO: 0.5 % (ref 0–2)
BASOPHILS NFR BLD AUTO: 0.6 % (ref 0–1.5)
BASOPHILS NFR BLD AUTO: 0.7 % (ref 0–2)
BASOPHILS NFR BLD AUTO: 2 % (ref 0–2)
BH BB BLOOD EXPIRATION DATE: NORMAL
BH BB BLOOD TYPE BARCODE: 6200
BH BB BLOOD TYPE BARCODE: 8400
BH BB DISPENSE STATUS: NORMAL
BH BB PRODUCT CODE: NORMAL
BH BB UNIT NUMBER: NORMAL
BH CV ECHO MEAS - AO MAX PG (FULL): 1.6 MMHG
BH CV ECHO MEAS - AO MAX PG: 3.2 MMHG
BH CV ECHO MEAS - AO MEAN PG (FULL): 1 MMHG
BH CV ECHO MEAS - AO MEAN PG: 2 MMHG
BH CV ECHO MEAS - AO ROOT AREA (BSA CORRECTED): 1.8
BH CV ECHO MEAS - AO ROOT AREA: 8 CM^2
BH CV ECHO MEAS - AO ROOT DIAM: 3.2 CM
BH CV ECHO MEAS - AO V2 MAX: 88.9 CM/SEC
BH CV ECHO MEAS - AO V2 MEAN: 64.4 CM/SEC
BH CV ECHO MEAS - AO V2 VTI: 20.6 CM
BH CV ECHO MEAS - AVA(I,A): 2.2 CM^2
BH CV ECHO MEAS - AVA(I,D): 2.2 CM^2
BH CV ECHO MEAS - AVA(V,A): 2.4 CM^2
BH CV ECHO MEAS - AVA(V,D): 2.4 CM^2
BH CV ECHO MEAS - BSA(HAYCOCK): 1.8 M^2
BH CV ECHO MEAS - BSA: 1.8 M^2
BH CV ECHO MEAS - BZI_BMI: 20.8 KILOGRAMS/M^2
BH CV ECHO MEAS - BZI_METRIC_HEIGHT: 177.8 CM
BH CV ECHO MEAS - BZI_METRIC_WEIGHT: 65.8 KG
BH CV ECHO MEAS - EDV(CUBED): 106.5 ML
BH CV ECHO MEAS - EDV(MOD-SP4): 72.4 ML
BH CV ECHO MEAS - EDV(TEICH): 104.4 ML
BH CV ECHO MEAS - EF(CUBED): 72.3 %
BH CV ECHO MEAS - EF(MOD-SP4): 50 %
BH CV ECHO MEAS - EF(TEICH): 64 %
BH CV ECHO MEAS - ESV(CUBED): 29.5 ML
BH CV ECHO MEAS - ESV(MOD-SP4): 36.2 ML
BH CV ECHO MEAS - ESV(TEICH): 37.6 ML
BH CV ECHO MEAS - FS: 34.8 %
BH CV ECHO MEAS - IVS/LVPW: 1.3
BH CV ECHO MEAS - IVSD: 1 CM
BH CV ECHO MEAS - LA DIMENSION: 4.6 CM
BH CV ECHO MEAS - LA/AO: 1.4
BH CV ECHO MEAS - LAT PEAK E' VEL: 9.7 CM/SEC
BH CV ECHO MEAS - LV DIASTOLIC VOL/BSA (35-75): 39.8 ML/M^2
BH CV ECHO MEAS - LV MASS(C)D: 148.4 GRAMS
BH CV ECHO MEAS - LV MASS(C)DI: 81.5 GRAMS/M^2
BH CV ECHO MEAS - LV MAX PG: 1.6 MMHG
BH CV ECHO MEAS - LV MEAN PG: 1 MMHG
BH CV ECHO MEAS - LV SYSTOLIC VOL/BSA (12-30): 19.9 ML/M^2
BH CV ECHO MEAS - LV V1 MAX: 62.8 CM/SEC
BH CV ECHO MEAS - LV V1 MEAN: 43.9 CM/SEC
BH CV ECHO MEAS - LV V1 VTI: 13.3 CM
BH CV ECHO MEAS - LVIDD: 4.7 CM
BH CV ECHO MEAS - LVIDS: 3.1 CM
BH CV ECHO MEAS - LVLD AP4: 7.5 CM
BH CV ECHO MEAS - LVLS AP4: 7.4 CM
BH CV ECHO MEAS - LVOT AREA (M): 3.5 CM^2
BH CV ECHO MEAS - LVOT AREA: 3.5 CM^2
BH CV ECHO MEAS - LVOT DIAM: 2.1 CM
BH CV ECHO MEAS - LVPWD: 0.8 CM
BH CV ECHO MEAS - MED PEAK E' VEL: 6.53 CM/SEC
BH CV ECHO MEAS - MV A MAX VEL: 21.3 CM/SEC
BH CV ECHO MEAS - MV DEC SLOPE: 99.5 CM/SEC^2
BH CV ECHO MEAS - MV DEC TIME: 0.37 SEC
BH CV ECHO MEAS - MV E MAX VEL: 64 CM/SEC
BH CV ECHO MEAS - MV E/A: 3
BH CV ECHO MEAS - PA MAX PG: 1.1 MMHG
BH CV ECHO MEAS - PA V2 MAX: 53.4 CM/SEC
BH CV ECHO MEAS - PI END-D VEL: 158 CM/SEC
BH CV ECHO MEAS - RAP SYSTOLE: 5 MMHG
BH CV ECHO MEAS - RVSP: 55.1 MMHG
BH CV ECHO MEAS - SI(AO): 91 ML/M^2
BH CV ECHO MEAS - SI(CUBED): 42.3 ML/M^2
BH CV ECHO MEAS - SI(LVOT): 25.3 ML/M^2
BH CV ECHO MEAS - SI(MOD-SP4): 19.9 ML/M^2
BH CV ECHO MEAS - SI(TEICH): 36.7 ML/M^2
BH CV ECHO MEAS - SV(AO): 165.7 ML
BH CV ECHO MEAS - SV(CUBED): 77 ML
BH CV ECHO MEAS - SV(LVOT): 46.1 ML
BH CV ECHO MEAS - SV(MOD-SP4): 36.2 ML
BH CV ECHO MEAS - SV(TEICH): 66.8 ML
BH CV ECHO MEAS - TR MAX VEL: 354 CM/SEC
BH CV ECHO MEASUREMENTS AVERAGE E/E' RATIO: 7.89
BILIRUB SERPL-MCNC: 0.5 MG/DL (ref 0.2–1.2)
BILIRUB SERPL-MCNC: 0.6 MG/DL (ref 0.1–1)
BILIRUB SERPL-MCNC: 0.7 MG/DL (ref 0.1–1)
BILIRUB SERPL-MCNC: 0.8 MG/DL (ref 0.1–1)
BILIRUB SERPL-MCNC: 0.9 MG/DL (ref 0.1–1)
BILIRUB SERPL-MCNC: 0.9 MG/DL (ref 0.1–1)
BILIRUB SERPL-MCNC: 1.2 MG/DL (ref 0.1–1)
BILIRUB SERPL-MCNC: 1.4 MG/DL (ref 0.1–1)
BILIRUB SERPL-MCNC: 1.4 MG/DL (ref 0.1–1)
BILIRUB UR QL STRIP: NEGATIVE
BLD GP AB SCN SERPL QL: NEGATIVE
BLD GP AB SCN SERPL QL: NEGATIVE
BUN BLD-MCNC: 34 MG/DL (ref 5–21)
BUN BLD-MCNC: 40 MG/DL (ref 5–21)
BUN BLD-MCNC: 43 MG/DL (ref 5–21)
BUN BLD-MCNC: 43 MG/DL (ref 8–23)
BUN BLD-MCNC: 46 MG/DL (ref 5–21)
BUN BLD-MCNC: 46 MG/DL (ref 5–21)
BUN BLD-MCNC: 48 MG/DL (ref 5–21)
BUN BLD-MCNC: 50 MG/DL (ref 5–21)
BUN BLD-MCNC: 50 MG/DL (ref 5–21)
BUN BLD-MCNC: 54 MG/DL (ref 5–21)
BUN BLD-MCNC: 55 MG/DL (ref 5–21)
BUN BLD-MCNC: 55 MG/DL (ref 5–21)
BUN BLD-MCNC: 60 MG/DL (ref 5–21)
BUN BLD-MCNC: 63 MG/DL (ref 5–21)
BUN BLD-MCNC: 63 MG/DL (ref 5–21)
BUN BLD-MCNC: 65 MG/DL (ref 5–21)
BUN BLD-MCNC: 66 MG/DL (ref 5–21)
BUN/CREAT SERPL: 16.9 (ref 7–25)
BUN/CREAT SERPL: 18.8 (ref 7–25)
BUN/CREAT SERPL: 19 (ref 7–25)
BUN/CREAT SERPL: 19.5 (ref 7–25)
BUN/CREAT SERPL: 20 (ref 7–25)
BUN/CREAT SERPL: 20.2 (ref 7–25)
BUN/CREAT SERPL: 21.2 (ref 7–25)
BUN/CREAT SERPL: 21.7 (ref 7–25)
BUN/CREAT SERPL: 22.8 (ref 7–25)
BUN/CREAT SERPL: 25.6 (ref 7–25)
BUN/CREAT SERPL: 26.7 (ref 7–25)
BUN/CREAT SERPL: 26.9 (ref 7–25)
BUN/CREAT SERPL: 27 (ref 7–25)
BUN/CREAT SERPL: 27.9 (ref 7–25)
BUN/CREAT SERPL: 28.9 (ref 7–25)
BUN/CREAT SERPL: 30.9 (ref 7–25)
BUN/CREAT SERPL: 37.1 (ref 7–25)
CALCIUM SPEC-SCNC: 6.7 MG/DL (ref 8.4–10.4)
CALCIUM SPEC-SCNC: 6.8 MG/DL (ref 8.4–10.4)
CALCIUM SPEC-SCNC: 6.8 MG/DL (ref 8.4–10.4)
CALCIUM SPEC-SCNC: 6.9 MG/DL (ref 8.4–10.4)
CALCIUM SPEC-SCNC: 7 MG/DL (ref 8.4–10.4)
CALCIUM SPEC-SCNC: 7.1 MG/DL (ref 8.4–10.4)
CALCIUM SPEC-SCNC: 7.2 MG/DL (ref 8.4–10.4)
CALCIUM SPEC-SCNC: 7.3 MG/DL (ref 8.4–10.4)
CALCIUM SPEC-SCNC: 7.5 MG/DL (ref 8.4–10.4)
CALCIUM SPEC-SCNC: 8 MG/DL (ref 8.4–10.4)
CALCIUM SPEC-SCNC: 8.8 MG/DL (ref 8.4–10.4)
CALCIUM SPEC-SCNC: 8.8 MG/DL (ref 8.4–10.4)
CALCIUM SPEC-SCNC: 9.2 MG/DL (ref 8.4–10.4)
CALCIUM SPEC-SCNC: 9.2 MG/DL (ref 8.6–10.5)
CALCIUM SPEC-SCNC: 9.4 MG/DL (ref 8.4–10.4)
CENTROMERE B AB SER-ACNC: <0.2 AI (ref 0–0.9)
CHLORIDE SERPL-SCNC: 100 MMOL/L (ref 98–110)
CHLORIDE SERPL-SCNC: 101 MMOL/L (ref 98–110)
CHLORIDE SERPL-SCNC: 101 MMOL/L (ref 98–110)
CHLORIDE SERPL-SCNC: 102 MMOL/L (ref 98–110)
CHLORIDE SERPL-SCNC: 102 MMOL/L (ref 98–110)
CHLORIDE SERPL-SCNC: 103 MMOL/L (ref 98–110)
CHLORIDE SERPL-SCNC: 104 MMOL/L (ref 98–110)
CHLORIDE SERPL-SCNC: 107 MMOL/L (ref 98–110)
CHLORIDE SERPL-SCNC: 108 MMOL/L (ref 98–110)
CHLORIDE SERPL-SCNC: 96 MMOL/L (ref 98–107)
CHLORIDE SERPL-SCNC: 97 MMOL/L (ref 98–110)
CHLORIDE SERPL-SCNC: 98 MMOL/L (ref 98–110)
CHLORIDE SERPL-SCNC: 99 MMOL/L (ref 98–110)
CHROMATIN AB SERPL-ACNC: <0.2 AI (ref 0–0.9)
CLARITY UR: ABNORMAL
CLARITY UR: CLEAR
CLUMPED PLATELETS: PRESENT
CO2 SERPL-SCNC: 16 MMOL/L (ref 24–31)
CO2 SERPL-SCNC: 19 MMOL/L (ref 24–31)
CO2 SERPL-SCNC: 20 MMOL/L (ref 24–31)
CO2 SERPL-SCNC: 21 MMOL/L (ref 24–31)
CO2 SERPL-SCNC: 23 MMOL/L (ref 24–31)
CO2 SERPL-SCNC: 24 MMOL/L (ref 24–31)
CO2 SERPL-SCNC: 24 MMOL/L (ref 24–31)
CO2 SERPL-SCNC: 26 MMOL/L (ref 24–31)
CO2 SERPL-SCNC: 27 MMOL/L (ref 24–31)
CO2 SERPL-SCNC: 27 MMOL/L (ref 24–31)
CO2 SERPL-SCNC: 28 MMOL/L (ref 22–29)
CO2 SERPL-SCNC: 29 MMOL/L (ref 24–31)
CO2 SERPL-SCNC: 30 MMOL/L (ref 24–31)
CO2 SERPL-SCNC: 31 MMOL/L (ref 24–31)
CO2 SERPL-SCNC: 32 MMOL/L (ref 24–31)
CO2 SERPL-SCNC: 33 MMOL/L (ref 24–31)
CO2 SERPL-SCNC: 35 MMOL/L (ref 24–31)
COLOR UR: YELLOW
CONV HGE IGG TITER: NEGATIVE
CREAT BLD-MCNC: 1.6 MG/DL (ref 0.5–1.4)
CREAT BLD-MCNC: 1.7 MG/DL (ref 0.5–1.4)
CREAT BLD-MCNC: 1.73 MG/DL (ref 0.5–1.4)
CREAT BLD-MCNC: 1.75 MG/DL (ref 0.5–1.4)
CREAT BLD-MCNC: 1.78 MG/DL (ref 0.5–1.4)
CREAT BLD-MCNC: 2.04 MG/DL (ref 0.5–1.4)
CREAT BLD-MCNC: 2.15 MG/DL (ref 0.5–1.4)
CREAT BLD-MCNC: 2.26 MG/DL (ref 0.76–1.27)
CREAT BLD-MCNC: 2.36 MG/DL (ref 0.5–1.4)
CREAT BLD-MCNC: 2.38 MG/DL (ref 0.5–1.4)
CREAT BLD-MCNC: 2.45 MG/DL (ref 0.5–1.4)
CREAT BLD-MCNC: 2.53 MG/DL (ref 0.5–1.4)
CREAT BLD-MCNC: 2.54 MG/DL (ref 0.5–1.4)
CREAT BLD-MCNC: 2.76 MG/DL (ref 0.5–1.4)
CREAT UR-MCNC: 68 MG/DL
CYTO UR: NORMAL
CYTOLOGIST CVX/VAG CYTO: NORMAL
D-LACTATE SERPL-SCNC: 0.7 MMOL/L (ref 0.5–2)
D-LACTATE SERPL-SCNC: 1.1 MMOL/L (ref 0.5–2)
DACRYOCYTES BLD QL SMEAR: ABNORMAL
DEPRECATED RDW RBC AUTO: 51.1 FL (ref 40–54)
DEPRECATED RDW RBC AUTO: 52.9 FL (ref 40–54)
DEPRECATED RDW RBC AUTO: 54.3 FL (ref 40–54)
DEPRECATED RDW RBC AUTO: 54.8 FL (ref 40–54)
DEPRECATED RDW RBC AUTO: 55.1 FL (ref 40–54)
DEPRECATED RDW RBC AUTO: 55.3 FL (ref 40–54)
DEPRECATED RDW RBC AUTO: 55.7 FL (ref 40–54)
DEPRECATED RDW RBC AUTO: 56.7 FL (ref 40–54)
DEPRECATED RDW RBC AUTO: 56.9 FL (ref 40–54)
DEPRECATED RDW RBC AUTO: 57 FL (ref 40–54)
DEPRECATED RDW RBC AUTO: 57 FL (ref 40–54)
DEPRECATED RDW RBC AUTO: 57.2 FL (ref 40–54)
DEPRECATED RDW RBC AUTO: 58.2 FL (ref 40–54)
DEPRECATED RDW RBC AUTO: 58.4 FL (ref 40–54)
DEPRECATED RDW RBC AUTO: 58.8 FL (ref 40–54)
DEPRECATED RDW RBC AUTO: 59.5 FL (ref 40–54)
DEPRECATED RDW RBC AUTO: 59.8 FL (ref 40–54)
DEPRECATED RDW RBC AUTO: 60.3 FL (ref 40–54)
DEPRECATED RDW RBC AUTO: 62.9 FL (ref 40–54)
DEPRECATED RDW RBC AUTO: 66.1 FL (ref 40–54)
DEPRECATED RDW RBC AUTO: 68.9 FL (ref 37–54)
DEVELOPER EXPIRATION DATE: NORMAL
DEVELOPER LOT NUMBER: 151
DSDNA AB SER-ACNC: <1 IU/ML (ref 0–9)
E CHAFFEENSIS IGG TITR SER IF: NEGATIVE {TITER}
E. CHAFFEENSIS (HME) IGM TITER: NEGATIVE
ELLIPTOCYTES BLD QL SMEAR: ABNORMAL
ENA JO1 AB SER-ACNC: <0.2 AI (ref 0–0.9)
ENA RNP AB SER-ACNC: <0.2 AI (ref 0–0.9)
ENA SCL70 AB SER-ACNC: <0.2 AI (ref 0–0.9)
ENA SM AB SER-ACNC: <0.2 AI (ref 0–0.9)
ENA SS-A AB SER-ACNC: <0.2 AI (ref 0–0.9)
ENA SS-B AB SER-ACNC: <0.2 AI (ref 0–0.9)
EOSINOPHIL # BLD AUTO: 0.02 10*3/MM3 (ref 0–0.7)
EOSINOPHIL # BLD AUTO: 0.03 10*3/MM3 (ref 0–0.7)
EOSINOPHIL # BLD AUTO: 0.03 10*3/MM3 (ref 0–0.7)
EOSINOPHIL # BLD AUTO: 0.04 10*3/MM3 (ref 0–0.7)
EOSINOPHIL # BLD AUTO: 0.04 10*3/MM3 (ref 0–0.7)
EOSINOPHIL # BLD AUTO: 0.09 10*3/MM3 (ref 0–0.7)
EOSINOPHIL # BLD AUTO: 0.09 10*3/MM3 (ref 0–0.7)
EOSINOPHIL # BLD AUTO: 0.1 10*3/MM3 (ref 0–0.7)
EOSINOPHIL # BLD AUTO: 0.11 10*3/MM3 (ref 0–0.4)
EOSINOPHIL # BLD AUTO: 0.11 10*3/MM3 (ref 0–0.7)
EOSINOPHIL # BLD AUTO: 0.11 10*3/MM3 (ref 0–0.7)
EOSINOPHIL # BLD MANUAL: 0.03 10*3/MM3 (ref 0–0.7)
EOSINOPHIL # BLD MANUAL: 0.04 10*3/MM3 (ref 0–0.7)
EOSINOPHIL # BLD MANUAL: 0.09 10*3/MM3 (ref 0–0.7)
EOSINOPHIL NFR BLD AUTO: 0.3 % (ref 0–4)
EOSINOPHIL NFR BLD AUTO: 0.5 % (ref 0–4)
EOSINOPHIL NFR BLD AUTO: 0.5 % (ref 0–4)
EOSINOPHIL NFR BLD AUTO: 0.6 % (ref 0–4)
EOSINOPHIL NFR BLD AUTO: 0.6 % (ref 0–4)
EOSINOPHIL NFR BLD AUTO: 0.7 % (ref 0–4)
EOSINOPHIL NFR BLD AUTO: 0.8 % (ref 0–4)
EOSINOPHIL NFR BLD AUTO: 1.9 % (ref 0–4)
EOSINOPHIL NFR BLD AUTO: 2.1 % (ref 0.3–6.2)
EOSINOPHIL NFR BLD AUTO: 2.1 % (ref 0–4)
EOSINOPHIL NFR BLD AUTO: 2.1 % (ref 0–4)
EOSINOPHIL NFR BLD AUTO: 2.4 % (ref 0–4)
EOSINOPHIL NFR BLD AUTO: 3 % (ref 0–4)
EOSINOPHIL NFR BLD MANUAL: 1 % (ref 0–4)
EOSINOPHIL NFR BLD MANUAL: 1 % (ref 0–4)
EOSINOPHIL NFR BLD MANUAL: 2 % (ref 0–4)
ERYTHROCYTE [DISTWIDTH] IN BLOOD BY AUTOMATED COUNT: 15.6 % (ref 12–15)
ERYTHROCYTE [DISTWIDTH] IN BLOOD BY AUTOMATED COUNT: 15.7 % (ref 12–15)
ERYTHROCYTE [DISTWIDTH] IN BLOOD BY AUTOMATED COUNT: 15.8 % (ref 12–15)
ERYTHROCYTE [DISTWIDTH] IN BLOOD BY AUTOMATED COUNT: 16 % (ref 12–15)
ERYTHROCYTE [DISTWIDTH] IN BLOOD BY AUTOMATED COUNT: 16.4 % (ref 12–15)
ERYTHROCYTE [DISTWIDTH] IN BLOOD BY AUTOMATED COUNT: 16.5 % (ref 12–15)
ERYTHROCYTE [DISTWIDTH] IN BLOOD BY AUTOMATED COUNT: 16.7 % (ref 12–15)
ERYTHROCYTE [DISTWIDTH] IN BLOOD BY AUTOMATED COUNT: 16.8 % (ref 12–15)
ERYTHROCYTE [DISTWIDTH] IN BLOOD BY AUTOMATED COUNT: 17 % (ref 12–15)
ERYTHROCYTE [DISTWIDTH] IN BLOOD BY AUTOMATED COUNT: 17.1 % (ref 12–15)
ERYTHROCYTE [DISTWIDTH] IN BLOOD BY AUTOMATED COUNT: 17.2 % (ref 12–15)
ERYTHROCYTE [DISTWIDTH] IN BLOOD BY AUTOMATED COUNT: 17.3 % (ref 12–15)
ERYTHROCYTE [DISTWIDTH] IN BLOOD BY AUTOMATED COUNT: 17.6 % (ref 12–15)
ERYTHROCYTE [DISTWIDTH] IN BLOOD BY AUTOMATED COUNT: 17.8 % (ref 12–15)
ERYTHROCYTE [DISTWIDTH] IN BLOOD BY AUTOMATED COUNT: 18.6 % (ref 12–15)
ERYTHROCYTE [DISTWIDTH] IN BLOOD BY AUTOMATED COUNT: 18.7 % (ref 12.3–15.4)
EXPIRATION DATE: NORMAL
FECAL OCCULT BLOOD SCREEN, POC: NEGATIVE
FERRITIN SERPL-MCNC: 1010 NG/ML (ref 17.9–464)
FERRITIN SERPL-MCNC: 1480 NG/ML (ref 17.9–464)
FERRITIN SERPL-MCNC: 2528 NG/ML (ref 30–400)
FERRITIN SERPL-MCNC: 941 NG/ML (ref 17.9–464)
FERRITIN SERPL-MCNC: 974 NG/ML (ref 17.9–464)
FERRITIN SERPL-MCNC: ABNORMAL NG/ML (ref 17.9–464)
FIBRINOGEN PPP-MCNC: 273 MG/DL (ref 240–460)
FOLATE SERPL-MCNC: >20 NG/ML (ref 4.78–24.2)
FSP PPP LA-ACNC: NORMAL
GAMMA GLOB SERPL ELPH-MCNC: 0.8 G/DL (ref 0.4–1.8)
GFR SERPL CREATININE-BSD FRML MDRD: 22 ML/MIN/1.73
GFR SERPL CREATININE-BSD FRML MDRD: 24 ML/MIN/1.73
GFR SERPL CREATININE-BSD FRML MDRD: 24 ML/MIN/1.73
GFR SERPL CREATININE-BSD FRML MDRD: 25 ML/MIN/1.73
GFR SERPL CREATININE-BSD FRML MDRD: 26 ML/MIN/1.73
GFR SERPL CREATININE-BSD FRML MDRD: 28 ML/MIN/1.73
GFR SERPL CREATININE-BSD FRML MDRD: 29 ML/MIN/1.73
GFR SERPL CREATININE-BSD FRML MDRD: 31 ML/MIN/1.73
GFR SERPL CREATININE-BSD FRML MDRD: 36 ML/MIN/1.73
GFR SERPL CREATININE-BSD FRML MDRD: 37 ML/MIN/1.73
GFR SERPL CREATININE-BSD FRML MDRD: 38 ML/MIN/1.73
GFR SERPL CREATININE-BSD FRML MDRD: 38 ML/MIN/1.73
GFR SERPL CREATININE-BSD FRML MDRD: 41 ML/MIN/1.73
GIANT PLATELETS: ABNORMAL
GIANT PLATELETS: ABNORMAL
GIANT PLATELETS: NORMAL
GLOBULIN SER CALC-MCNC: 2.4 G/DL (ref 2.2–3.9)
GLOBULIN UR ELPH-MCNC: 2.4 GM/DL
GLOBULIN UR ELPH-MCNC: 2.5 GM/DL
GLOBULIN UR ELPH-MCNC: 2.6 GM/DL
GLOBULIN UR ELPH-MCNC: 2.7 GM/DL
GLOBULIN UR ELPH-MCNC: 2.8 GM/DL
GLOBULIN UR ELPH-MCNC: 3 GM/DL
GLOBULIN UR ELPH-MCNC: 3.2 GM/DL
GLUCOSE BLD-MCNC: 106 MG/DL (ref 70–100)
GLUCOSE BLD-MCNC: 107 MG/DL (ref 70–100)
GLUCOSE BLD-MCNC: 109 MG/DL (ref 70–100)
GLUCOSE BLD-MCNC: 111 MG/DL (ref 70–100)
GLUCOSE BLD-MCNC: 115 MG/DL (ref 70–100)
GLUCOSE BLD-MCNC: 121 MG/DL (ref 70–100)
GLUCOSE BLD-MCNC: 127 MG/DL (ref 70–100)
GLUCOSE BLD-MCNC: 133 MG/DL (ref 70–100)
GLUCOSE BLD-MCNC: 135 MG/DL (ref 70–100)
GLUCOSE BLD-MCNC: 136 MG/DL (ref 65–99)
GLUCOSE BLD-MCNC: 137 MG/DL (ref 70–100)
GLUCOSE BLD-MCNC: 145 MG/DL (ref 70–100)
GLUCOSE BLD-MCNC: 150 MG/DL (ref 70–100)
GLUCOSE BLD-MCNC: 154 MG/DL (ref 70–100)
GLUCOSE BLD-MCNC: 167 MG/DL (ref 70–100)
GLUCOSE BLD-MCNC: 95 MG/DL (ref 70–100)
GLUCOSE BLD-MCNC: 97 MG/DL (ref 70–100)
GLUCOSE BLDC GLUCOMTR-MCNC: 138 MG/DL (ref 70–130)
GLUCOSE UR STRIP-MCNC: ABNORMAL MG/DL
GLYCOPROTEIN IV ANTIBODY: NEGATIVE
GRAN CASTS URNS QL MICRO: ABNORMAL /LPF
HCT VFR BLD AUTO: 22.8 % (ref 40–52)
HCT VFR BLD AUTO: 23.2 % (ref 40–52)
HCT VFR BLD AUTO: 23.2 % (ref 40–52)
HCT VFR BLD AUTO: 23.4 % (ref 40–52)
HCT VFR BLD AUTO: 26.4 % (ref 40–52)
HCT VFR BLD AUTO: 27 % (ref 40–52)
HCT VFR BLD AUTO: 27.3 % (ref 40–52)
HCT VFR BLD AUTO: 27.5 % (ref 40–52)
HCT VFR BLD AUTO: 28.1 % (ref 40–52)
HCT VFR BLD AUTO: 28.6 % (ref 40–52)
HCT VFR BLD AUTO: 29.5 % (ref 40–52)
HCT VFR BLD AUTO: 29.8 % (ref 40–52)
HCT VFR BLD AUTO: 31 % (ref 40–52)
HCT VFR BLD AUTO: 31.8 % (ref 40–52)
HCT VFR BLD AUTO: 32 % (ref 40–52)
HCT VFR BLD AUTO: 33.1 % (ref 40–52)
HCT VFR BLD AUTO: 33.3 % (ref 40–52)
HCT VFR BLD AUTO: 33.8 % (ref 40–52)
HCT VFR BLD AUTO: 33.9 % (ref 37.5–51)
HCT VFR BLD AUTO: 34.1 % (ref 40–52)
HCT VFR BLD AUTO: 34.6 % (ref 40–52)
HGB BLD-MCNC: 10 G/DL (ref 14–18)
HGB BLD-MCNC: 10 G/DL (ref 14–18)
HGB BLD-MCNC: 10.1 G/DL (ref 14–18)
HGB BLD-MCNC: 10.4 G/DL (ref 14–18)
HGB BLD-MCNC: 10.5 G/DL (ref 14–18)
HGB BLD-MCNC: 10.6 G/DL (ref 14–18)
HGB BLD-MCNC: 10.6 G/DL (ref 14–18)
HGB BLD-MCNC: 11 G/DL (ref 14–18)
HGB BLD-MCNC: 11.2 G/DL (ref 13–17.7)
HGB BLD-MCNC: 7.7 G/DL (ref 14–18)
HGB BLD-MCNC: 7.8 G/DL (ref 14–18)
HGB BLD-MCNC: 7.9 G/DL (ref 14–18)
HGB BLD-MCNC: 8 G/DL (ref 14–18)
HGB BLD-MCNC: 8.8 G/DL (ref 14–18)
HGB BLD-MCNC: 8.9 G/DL (ref 14–18)
HGB BLD-MCNC: 9.1 G/DL (ref 14–18)
HGB BLD-MCNC: 9.3 G/DL (ref 14–18)
HGB BLD-MCNC: 9.3 G/DL (ref 14–18)
HGB BLD-MCNC: 9.4 G/DL (ref 14–18)
HGB BLD-MCNC: 9.5 G/DL (ref 14–18)
HGB BLD-MCNC: 9.7 G/DL (ref 14–18)
HGB UR QL STRIP.AUTO: ABNORMAL
HGB UR QL STRIP.AUTO: ABNORMAL
HGB UR QL STRIP.AUTO: NEGATIVE
HGB UR QL STRIP.AUTO: NEGATIVE
HIV1+2 AB SER QL: NEGATIVE
HLA AB SER QL IA: NEGATIVE
HOLD SPECIMEN: NORMAL
HYALINE CASTS UR QL AUTO: ABNORMAL /LPF
HYPOCHROMIA BLD QL: ABNORMAL
HYPOCHROMIA BLD QL: ABNORMAL
IMM GRANULOCYTES # BLD AUTO: 0.01 10*3/MM3 (ref 0–0.05)
IMM GRANULOCYTES # BLD AUTO: 0.02 10*3/MM3 (ref 0–0.05)
IMM GRANULOCYTES # BLD AUTO: 0.03 10*3/MM3 (ref 0–0.05)
IMM GRANULOCYTES # BLD AUTO: 0.05 10*3/MM3 (ref 0–0.05)
IMM GRANULOCYTES NFR BLD AUTO: 0.2 % (ref 0–0.5)
IMM GRANULOCYTES NFR BLD AUTO: 0.2 % (ref 0–5)
IMM GRANULOCYTES NFR BLD AUTO: 0.2 % (ref 0–5)
IMM GRANULOCYTES NFR BLD AUTO: 0.3 % (ref 0–5)
IMM GRANULOCYTES NFR BLD AUTO: 0.3 % (ref 0–5)
IMM GRANULOCYTES NFR BLD AUTO: 0.4 % (ref 0–5)
IMM GRANULOCYTES NFR BLD AUTO: 0.5 % (ref 0–5)
IMM GRANULOCYTES NFR BLD AUTO: 0.5 % (ref 0–5)
IMM GRANULOCYTES NFR BLD AUTO: 0.7 % (ref 0–5)
IMM GRANULOCYTES NFR BLD AUTO: 0.7 % (ref 0–5)
IMM GRANULOCYTES NFR BLD AUTO: 0.8 % (ref 0–5)
INR PPP: 1.11 (ref 0.91–1.09)
INR PPP: 1.21 (ref 0.91–1.09)
IRON 24H UR-MRATE: 33 MCG/DL (ref 42–180)
IRON 24H UR-MRATE: 54 MCG/DL (ref 42–180)
IRON 24H UR-MRATE: 65 MCG/DL (ref 42–180)
IRON 24H UR-MRATE: 67 MCG/DL (ref 42–180)
IRON 24H UR-MRATE: 79 MCG/DL (ref 42–180)
IRON 24H UR-MRATE: 90 MCG/DL (ref 59–158)
IRON SATN MFR SERPL: 16 % (ref 20–45)
IRON SATN MFR SERPL: 27 % (ref 20–45)
IRON SATN MFR SERPL: 29 % (ref 20–45)
IRON SATN MFR SERPL: 30 % (ref 20–45)
IRON SATN MFR SERPL: 37 % (ref 20–50)
IRON SATN MFR SERPL: 52 % (ref 20–45)
KETONES UR QL STRIP: ABNORMAL
KETONES UR QL STRIP: ABNORMAL
KETONES UR QL STRIP: NEGATIVE
KETONES UR QL STRIP: NEGATIVE
LAB AP CASE REPORT: NORMAL
LABORATORY COMMENT REPORT: NORMAL
LDH SERPL-CCNC: 1196 U/L (ref 265–665)
LEFT ATRIUM VOLUME INDEX: 49.6 ML/M2
LEFT ATRIUM VOLUME: 90.3 CM3
LEUKOCYTE ESTERASE UR QL STRIP.AUTO: NEGATIVE
LV EF 2D ECHO EST: 50 %
LYMPHOCYTES # BLD AUTO: 0.49 10*3/MM3 (ref 0.72–4.86)
LYMPHOCYTES # BLD AUTO: 0.54 10*3/MM3 (ref 0.72–4.86)
LYMPHOCYTES # BLD AUTO: 0.55 10*3/MM3 (ref 0.72–4.86)
LYMPHOCYTES # BLD AUTO: 0.57 10*3/MM3 (ref 0.72–4.86)
LYMPHOCYTES # BLD AUTO: 0.61 10*3/MM3 (ref 0.72–4.86)
LYMPHOCYTES # BLD AUTO: 0.64 10*3/MM3 (ref 0.72–4.86)
LYMPHOCYTES # BLD AUTO: 0.65 10*3/MM3 (ref 0.72–4.86)
LYMPHOCYTES # BLD AUTO: 0.67 10*3/MM3 (ref 0.72–4.86)
LYMPHOCYTES # BLD AUTO: 0.69 10*3/MM3 (ref 0.72–4.86)
LYMPHOCYTES # BLD AUTO: 0.74 10*3/MM3 (ref 0.72–4.86)
LYMPHOCYTES # BLD AUTO: 0.76 10*3/MM3 (ref 0.72–4.86)
LYMPHOCYTES # BLD AUTO: 0.81 10*3/MM3 (ref 0.72–4.86)
LYMPHOCYTES # BLD AUTO: 0.86 10*3/MM3 (ref 0.7–3.1)
LYMPHOCYTES # BLD MANUAL: 0.07 10*3/MM3 (ref 0.72–4.86)
LYMPHOCYTES # BLD MANUAL: 0.13 10*3/MM3 (ref 0.72–4.86)
LYMPHOCYTES # BLD MANUAL: 0.29 10*3/MM3 (ref 0.72–4.86)
LYMPHOCYTES # BLD MANUAL: 0.53 10*3/MM3 (ref 0.72–4.86)
LYMPHOCYTES # BLD MANUAL: 0.97 10*3/MM3 (ref 0.72–4.86)
LYMPHOCYTES # BLD MANUAL: 1.16 10*3/MM3 (ref 0.72–4.86)
LYMPHOCYTES # BLD MANUAL: 1.58 10*3/MM3 (ref 0.72–4.86)
LYMPHOCYTES NFR BLD AUTO: 10.8 % (ref 15–45)
LYMPHOCYTES NFR BLD AUTO: 11.2 % (ref 15–45)
LYMPHOCYTES NFR BLD AUTO: 12.4 % (ref 15–45)
LYMPHOCYTES NFR BLD AUTO: 13 % (ref 15–45)
LYMPHOCYTES NFR BLD AUTO: 13.7 % (ref 15–45)
LYMPHOCYTES NFR BLD AUTO: 14.3 % (ref 15–45)
LYMPHOCYTES NFR BLD AUTO: 16.2 % (ref 15–45)
LYMPHOCYTES NFR BLD AUTO: 16.8 % (ref 19.6–45.3)
LYMPHOCYTES NFR BLD AUTO: 17.3 % (ref 15–45)
LYMPHOCYTES NFR BLD AUTO: 17.6 % (ref 15–45)
LYMPHOCYTES NFR BLD AUTO: 17.6 % (ref 15–45)
LYMPHOCYTES NFR BLD AUTO: 8.7 % (ref 15–45)
LYMPHOCYTES NFR BLD AUTO: 9.6 % (ref 15–45)
LYMPHOCYTES NFR BLD MANUAL: 1 % (ref 4–12)
LYMPHOCYTES NFR BLD MANUAL: 10 % (ref 15–45)
LYMPHOCYTES NFR BLD MANUAL: 12.2 % (ref 15–45)
LYMPHOCYTES NFR BLD MANUAL: 2 % (ref 4–12)
LYMPHOCYTES NFR BLD MANUAL: 24.2 % (ref 15–45)
LYMPHOCYTES NFR BLD MANUAL: 27 % (ref 15–45)
LYMPHOCYTES NFR BLD MANUAL: 3 % (ref 4–12)
LYMPHOCYTES NFR BLD MANUAL: 3.1 % (ref 4–12)
LYMPHOCYTES NFR BLD MANUAL: 4 % (ref 15–45)
LYMPHOCYTES NFR BLD MANUAL: 51 % (ref 15–45)
LYMPHOCYTES NFR BLD MANUAL: 6 % (ref 4–12)
LYMPHOCYTES NFR BLD MANUAL: 6 % (ref 4–12)
LYMPHOCYTES NFR BLD MANUAL: 7.1 % (ref 15–45)
LYMPHOCYTES NFR BLD MANUAL: 8.1 % (ref 4–12)
Lab: 151
Lab: ABNORMAL
M-SPIKE: 0.3 G/DL
MAGNESIUM SERPL-MCNC: 1.7 MG/DL (ref 1.4–2.2)
MAGNESIUM SERPL-MCNC: 2 MG/DL (ref 1.4–2.2)
MAXIMAL PREDICTED HEART RATE: 133 BPM
MCH RBC QN AUTO: 29.8 PG (ref 28–32)
MCH RBC QN AUTO: 29.9 PG (ref 28–32)
MCH RBC QN AUTO: 30 PG (ref 28–32)
MCH RBC QN AUTO: 30.1 PG (ref 28–32)
MCH RBC QN AUTO: 30.3 PG (ref 28–32)
MCH RBC QN AUTO: 30.4 PG (ref 28–32)
MCH RBC QN AUTO: 30.5 PG (ref 28–32)
MCH RBC QN AUTO: 30.6 PG (ref 28–32)
MCH RBC QN AUTO: 30.7 PG (ref 28–32)
MCH RBC QN AUTO: 30.8 PG (ref 28–32)
MCH RBC QN AUTO: 31.1 PG (ref 28–32)
MCH RBC QN AUTO: 31.1 PG (ref 28–32)
MCH RBC QN AUTO: 31.4 PG (ref 28–32)
MCH RBC QN AUTO: 33.4 PG (ref 26.6–33)
MCHC RBC AUTO-ENTMCNC: 31.1 G/DL (ref 33–36)
MCHC RBC AUTO-ENTMCNC: 31.4 G/DL (ref 33–36)
MCHC RBC AUTO-ENTMCNC: 31.5 G/DL (ref 33–36)
MCHC RBC AUTO-ENTMCNC: 31.5 G/DL (ref 33–36)
MCHC RBC AUTO-ENTMCNC: 31.6 G/DL (ref 33–36)
MCHC RBC AUTO-ENTMCNC: 31.8 G/DL (ref 33–36)
MCHC RBC AUTO-ENTMCNC: 32.2 G/DL (ref 33–36)
MCHC RBC AUTO-ENTMCNC: 32.2 G/DL (ref 33–36)
MCHC RBC AUTO-ENTMCNC: 32.3 G/DL (ref 33–36)
MCHC RBC AUTO-ENTMCNC: 32.5 G/DL (ref 33–36)
MCHC RBC AUTO-ENTMCNC: 33 G/DL (ref 31.5–35.7)
MCHC RBC AUTO-ENTMCNC: 33.6 G/DL (ref 33–36)
MCHC RBC AUTO-ENTMCNC: 33.7 G/DL (ref 33–36)
MCHC RBC AUTO-ENTMCNC: 33.7 G/DL (ref 33–36)
MCHC RBC AUTO-ENTMCNC: 33.8 G/DL (ref 33–36)
MCHC RBC AUTO-ENTMCNC: 34.5 G/DL (ref 33–36)
MCHC RBC AUTO-ENTMCNC: 34.5 G/DL (ref 33–36)
MCV RBC AUTO: 100 FL (ref 82–95)
MCV RBC AUTO: 101.2 FL (ref 79–97)
MCV RBC AUTO: 86.7 FL (ref 82–95)
MCV RBC AUTO: 88.1 FL (ref 82–95)
MCV RBC AUTO: 88.5 FL (ref 82–95)
MCV RBC AUTO: 90.1 FL (ref 82–95)
MCV RBC AUTO: 90.4 FL (ref 82–95)
MCV RBC AUTO: 91.2 FL (ref 82–95)
MCV RBC AUTO: 91.3 FL (ref 82–95)
MCV RBC AUTO: 92.1 FL (ref 82–95)
MCV RBC AUTO: 93.5 FL (ref 82–95)
MCV RBC AUTO: 94.1 FL (ref 82–95)
MCV RBC AUTO: 94.8 FL (ref 82–95)
MCV RBC AUTO: 94.9 FL (ref 82–95)
MCV RBC AUTO: 95.1 FL (ref 82–95)
MCV RBC AUTO: 95.2 FL (ref 82–95)
MCV RBC AUTO: 96.8 FL (ref 82–95)
MCV RBC AUTO: 97.4 FL (ref 82–95)
MCV RBC AUTO: 97.4 FL (ref 82–95)
MCV RBC AUTO: 97.8 FL (ref 82–95)
MCV RBC AUTO: 99.4 FL (ref 82–95)
MONOCYTES # BLD AUTO: 0.02 10*3/MM3 (ref 0.19–1.3)
MONOCYTES # BLD AUTO: 0.07 10*3/MM3 (ref 0.19–1.3)
MONOCYTES # BLD AUTO: 0.09 10*3/MM3 (ref 0.19–1.3)
MONOCYTES # BLD AUTO: 0.1 10*3/MM3 (ref 0.19–1.3)
MONOCYTES # BLD AUTO: 0.11 10*3/MM3 (ref 0.19–1.3)
MONOCYTES # BLD AUTO: 0.26 10*3/MM3 (ref 0.19–1.3)
MONOCYTES # BLD AUTO: 0.3 10*3/MM3 (ref 0.19–1.3)
MONOCYTES # BLD AUTO: 0.32 10*3/MM3 (ref 0.19–1.3)
MONOCYTES # BLD AUTO: 0.35 10*3/MM3 (ref 0.19–1.3)
MONOCYTES # BLD AUTO: 0.37 10*3/MM3 (ref 0.19–1.3)
MONOCYTES # BLD AUTO: 0.37 10*3/MM3 (ref 0.19–1.3)
MONOCYTES # BLD AUTO: 0.4 10*3/MM3 (ref 0.19–1.3)
MONOCYTES # BLD AUTO: 0.4 10*3/MM3 (ref 0.19–1.3)
MONOCYTES # BLD AUTO: 0.42 10*3/MM3 (ref 0.19–1.3)
MONOCYTES # BLD AUTO: 0.43 10*3/MM3 (ref 0.19–1.3)
MONOCYTES # BLD AUTO: 0.49 10*3/MM3 (ref 0.1–0.9)
MONOCYTES # BLD AUTO: 0.58 10*3/MM3 (ref 0.19–1.3)
MONOCYTES # BLD AUTO: 0.61 10*3/MM3 (ref 0.19–1.3)
MONOCYTES # BLD AUTO: 0.61 10*3/MM3 (ref 0.19–1.3)
MONOCYTES # BLD AUTO: 0.62 10*3/MM3 (ref 0.19–1.3)
MONOCYTES NFR BLD AUTO: 10.1 % (ref 4–12)
MONOCYTES NFR BLD AUTO: 10.5 % (ref 4–12)
MONOCYTES NFR BLD AUTO: 11.5 % (ref 4–12)
MONOCYTES NFR BLD AUTO: 5.8 % (ref 4–12)
MONOCYTES NFR BLD AUTO: 7.4 % (ref 4–12)
MONOCYTES NFR BLD AUTO: 8.5 % (ref 4–12)
MONOCYTES NFR BLD AUTO: 9.4 % (ref 4–12)
MONOCYTES NFR BLD AUTO: 9.5 % (ref 4–12)
MONOCYTES NFR BLD AUTO: 9.6 % (ref 5–12)
MONOCYTES NFR BLD AUTO: 9.7 % (ref 4–12)
NEGATIVE CONTROL: NEGATIVE
NEUTROPHILS # BLD AUTO: 1.33 10*3/MM3 (ref 1.87–8.4)
NEUTROPHILS # BLD AUTO: 1.49 10*3/MM3 (ref 1.87–8.4)
NEUTROPHILS # BLD AUTO: 1.72 10*3/MM3 (ref 1.87–8.4)
NEUTROPHILS # BLD AUTO: 1.78 10*3/MM3 (ref 1.7–7)
NEUTROPHILS # BLD AUTO: 2.57 10*3/MM3 (ref 1.87–8.4)
NEUTROPHILS # BLD AUTO: 2.67 10*3/MM3 (ref 1.87–8.4)
NEUTROPHILS # BLD AUTO: 2.74 10*3/MM3 (ref 1.87–8.4)
NEUTROPHILS # BLD AUTO: 2.93 10*3/MM3 (ref 1.87–8.4)
NEUTROPHILS # BLD AUTO: 2.99 10*3/MM3 (ref 1.87–8.4)
NEUTROPHILS # BLD AUTO: 3.02 10*3/MM3 (ref 1.87–8.4)
NEUTROPHILS # BLD AUTO: 3.16 10*3/MM3 (ref 1.87–8.4)
NEUTROPHILS # BLD AUTO: 3.45 10*3/MM3 (ref 1.87–8.4)
NEUTROPHILS # BLD AUTO: 3.63 10*3/MM3 (ref 1.7–7)
NEUTROPHILS # BLD AUTO: 3.92 10*3/MM3 (ref 1.87–8.4)
NEUTROPHILS # BLD AUTO: 4.01 10*3/MM3 (ref 1.87–8.4)
NEUTROPHILS # BLD AUTO: 4.14 10*3/MM3 (ref 1.87–8.4)
NEUTROPHILS # BLD AUTO: 4.36 10*3/MM3 (ref 1.87–8.4)
NEUTROPHILS # BLD AUTO: 4.44 10*3/MM3 (ref 1.87–8.4)
NEUTROPHILS # BLD AUTO: 4.71 10*3/MM3 (ref 1.87–8.4)
NEUTROPHILS # BLD AUTO: 5.05 10*3/MM3 (ref 1.87–8.4)
NEUTROPHILS NFR BLD AUTO: 69.3 % (ref 39–78)
NEUTROPHILS NFR BLD AUTO: 70 % (ref 39–78)
NEUTROPHILS NFR BLD AUTO: 70.2 % (ref 39–78)
NEUTROPHILS NFR BLD AUTO: 70.7 % (ref 42.7–76)
NEUTROPHILS NFR BLD AUTO: 71 % (ref 39–78)
NEUTROPHILS NFR BLD AUTO: 73.4 % (ref 39–78)
NEUTROPHILS NFR BLD AUTO: 73.9 % (ref 39–78)
NEUTROPHILS NFR BLD AUTO: 74.2 % (ref 39–78)
NEUTROPHILS NFR BLD AUTO: 78.9 % (ref 39–78)
NEUTROPHILS NFR BLD AUTO: 79.2 % (ref 39–78)
NEUTROPHILS NFR BLD AUTO: 79.8 % (ref 39–78)
NEUTROPHILS NFR BLD AUTO: 80.2 % (ref 39–78)
NEUTROPHILS NFR BLD AUTO: 80.2 % (ref 39–78)
NEUTROPHILS NFR BLD MANUAL: 43 % (ref 39–78)
NEUTROPHILS NFR BLD MANUAL: 64 % (ref 39–78)
NEUTROPHILS NFR BLD MANUAL: 65.7 % (ref 39–78)
NEUTROPHILS NFR BLD MANUAL: 72.4 % (ref 39–78)
NEUTROPHILS NFR BLD MANUAL: 83 % (ref 39–78)
NEUTROPHILS NFR BLD MANUAL: 84 % (ref 39–78)
NEUTROPHILS NFR BLD MANUAL: 88.9 % (ref 39–78)
NEUTS BAND NFR BLD MANUAL: 1 % (ref 0–10)
NEUTS BAND NFR BLD MANUAL: 3 % (ref 0–10)
NEUTS BAND NFR BLD MANUAL: 3.1 % (ref 0–10)
NEUTS BAND NFR BLD MANUAL: 7 % (ref 0–10)
NEUTS VAC BLD QL SMEAR: ABNORMAL
NEUTS VAC BLD QL SMEAR: ABNORMAL
NITRITE UR QL STRIP: NEGATIVE
NRBC BLD AUTO-RTO: 0 /100 WBC (ref 0–0)
NRBC BLD AUTO-RTO: 0 /100 WBC (ref 0–0.2)
OVALOCYTES BLD QL SMEAR: ABNORMAL
OVALOCYTES BLD QL SMEAR: NORMAL
PATH INTERP BLD-IMP: NORMAL
PATH REPORT.FINAL DX SPEC: NORMAL
PATH REPORT.GROSS SPEC: NORMAL
PH UR STRIP.AUTO: 5.5 [PH] (ref 5–8)
PH UR STRIP.AUTO: <=5 [PH] (ref 5–8)
PH UR STRIP.AUTO: <=5 [PH] (ref 5–8)
PH UR STRIP.AUTO: >=9 [PH] (ref 5–8)
PHOSPHATE SERPL-MCNC: 1.5 MG/DL (ref 2.5–4.5)
PLAT GP IA/IIA AB SER QL IA: NEGATIVE
PLAT GP IB/IX AB SER QL IA: NEGATIVE
PLAT GP IIB/IIIA AB SER QL IA: NEGATIVE
PLATELET # BLD AUTO: 100 10*3/MM3 (ref 130–400)
PLATELET # BLD AUTO: 116 10*3/MM3 (ref 140–450)
PLATELET # BLD AUTO: 19 10*3/MM3 (ref 130–400)
PLATELET # BLD AUTO: 20 10*3/MM3 (ref 130–400)
PLATELET # BLD AUTO: 24 10*3/MM3 (ref 130–400)
PLATELET # BLD AUTO: 25 10*3/MM3 (ref 130–400)
PLATELET # BLD AUTO: 47 10*3/MM3 (ref 130–400)
PLATELET # BLD AUTO: 48 10*3/MM3 (ref 130–400)
PLATELET # BLD AUTO: 50 10*3/MM3 (ref 130–400)
PLATELET # BLD AUTO: 53 10*3/MM3 (ref 130–400)
PLATELET # BLD AUTO: 54 10*3/MM3 (ref 130–400)
PLATELET # BLD AUTO: 56 10*3/MM3 (ref 130–400)
PLATELET # BLD AUTO: 65 10*3/MM3 (ref 130–400)
PLATELET # BLD AUTO: 71 10*3/MM3 (ref 130–400)
PLATELET # BLD AUTO: 72 10*3/MM3 (ref 130–400)
PLATELET # BLD AUTO: 77 10*3/MM3 (ref 130–400)
PLATELET # BLD AUTO: 81 10*3/MM3 (ref 130–400)
PLATELET # BLD AUTO: 83 10*3/MM3 (ref 130–400)
PLATELET # BLD AUTO: 83 10*3/MM3 (ref 130–400)
PLATELET # BLD AUTO: 86 10*3/MM3 (ref 130–400)
PLATELET # BLD AUTO: 99 10*3/MM3 (ref 130–400)
PMV BLD AUTO: 10 FL (ref 6–12)
PMV BLD AUTO: 10 FL (ref 6–12)
PMV BLD AUTO: 10.2 FL (ref 6–12)
PMV BLD AUTO: 10.2 FL (ref 6–12)
PMV BLD AUTO: 10.4 FL (ref 6–12)
PMV BLD AUTO: 10.6 FL (ref 6–12)
PMV BLD AUTO: 10.7 FL (ref 6–12)
PMV BLD AUTO: 11 FL (ref 6–12)
PMV BLD AUTO: 11.4 FL (ref 6–12)
PMV BLD AUTO: 11.9 FL (ref 6–12)
PMV BLD AUTO: 12.2 FL (ref 6–12)
PMV BLD AUTO: 12.3 FL (ref 6–12)
PMV BLD AUTO: 8.7 FL (ref 6–12)
PMV BLD AUTO: 8.9 FL (ref 6–12)
PMV BLD AUTO: 9.5 FL (ref 6–12)
PMV BLD AUTO: 9.8 FL (ref 6–12)
PMV BLD AUTO: 9.9 FL (ref 6–12)
PMV BLD AUTO: ABNORMAL FL (ref 6–12)
POIKILOCYTOSIS BLD QL SMEAR: ABNORMAL
POIKILOCYTOSIS BLD QL SMEAR: NORMAL
POIKILOCYTOSIS BLD QL SMEAR: NORMAL
POSITIVE CONTROL: POSITIVE
POTASSIUM BLD-SCNC: 3.2 MMOL/L (ref 3.5–5.3)
POTASSIUM BLD-SCNC: 3.3 MMOL/L (ref 3.5–5.3)
POTASSIUM BLD-SCNC: 3.3 MMOL/L (ref 3.5–5.3)
POTASSIUM BLD-SCNC: 3.5 MMOL/L (ref 3.5–5.3)
POTASSIUM BLD-SCNC: 3.7 MMOL/L (ref 3.5–5.3)
POTASSIUM BLD-SCNC: 3.8 MMOL/L (ref 3.5–5.3)
POTASSIUM BLD-SCNC: 4 MMOL/L (ref 3.5–5.3)
POTASSIUM BLD-SCNC: 4 MMOL/L (ref 3.5–5.3)
POTASSIUM BLD-SCNC: 4.2 MMOL/L (ref 3.5–5.3)
POTASSIUM BLD-SCNC: 4.3 MMOL/L (ref 3.5–5.3)
POTASSIUM BLD-SCNC: 4.5 MMOL/L (ref 3.5–5.3)
POTASSIUM BLD-SCNC: 4.6 MMOL/L (ref 3.5–5.3)
POTASSIUM BLD-SCNC: 4.6 MMOL/L (ref 3.5–5.3)
POTASSIUM BLD-SCNC: 4.7 MMOL/L (ref 3.5–5.3)
POTASSIUM BLD-SCNC: 5 MMOL/L (ref 3.5–5.2)
PROCALCITONIN SERPL-MCNC: 3.87 NG/ML
PROT PATTERN SERPL ELPH-IMP: ABNORMAL
PROT SERPL-MCNC: 4.9 G/DL (ref 6.3–8.7)
PROT SERPL-MCNC: 5.1 G/DL (ref 6.3–8.7)
PROT SERPL-MCNC: 5.2 G/DL (ref 6–8.5)
PROT SERPL-MCNC: 5.3 G/DL (ref 6.3–8.7)
PROT SERPL-MCNC: 5.3 G/DL (ref 6.3–8.7)
PROT SERPL-MCNC: 5.5 G/DL (ref 6.3–8.7)
PROT SERPL-MCNC: 6 G/DL (ref 6.3–8.7)
PROT SERPL-MCNC: 6.2 G/DL (ref 6.3–8.7)
PROT SERPL-MCNC: 6.2 G/DL (ref 6.3–8.7)
PROT SERPL-MCNC: 6.4 G/DL (ref 6.3–8.7)
PROT SERPL-MCNC: 6.6 G/DL (ref 6.3–8.7)
PROT SERPL-MCNC: 6.7 G/DL (ref 6.3–8.7)
PROT SERPL-MCNC: 7 G/DL (ref 6–8.5)
PROT SERPL-MCNC: 7.4 G/DL (ref 6.3–8.7)
PROT UR QL STRIP: ABNORMAL
PROT UR-MCNC: 68 MG/DL (ref 0–13.5)
PROTHROMBIN TIME: 14.7 SECONDS (ref 11.9–14.6)
PROTHROMBIN TIME: 15.7 SECONDS (ref 11.9–14.6)
PTH-INTACT SERPL-MCNC: 331 PG/ML (ref 7.5–53.5)
R RICKETTSI IGM TITR SER: 0.21 INDEX (ref 0–0.89)
RBC # BLD AUTO: 2.53 10*6/MM3 (ref 4.8–5.9)
RBC # BLD AUTO: 2.54 10*6/MM3 (ref 4.8–5.9)
RBC # BLD AUTO: 2.54 10*6/MM3 (ref 4.8–5.9)
RBC # BLD AUTO: 2.62 10*6/MM3 (ref 4.8–5.9)
RBC # BLD AUTO: 2.9 10*6/MM3 (ref 4.8–5.9)
RBC # BLD AUTO: 2.92 10*6/MM3 (ref 4.8–5.9)
RBC # BLD AUTO: 2.96 10*6/MM3 (ref 4.8–5.9)
RBC # BLD AUTO: 3.06 10*6/MM3 (ref 4.8–5.9)
RBC # BLD AUTO: 3.11 10*6/MM3 (ref 4.8–5.9)
RBC # BLD AUTO: 3.12 10*6/MM3 (ref 4.8–5.9)
RBC # BLD AUTO: 3.13 10*6/MM3 (ref 4.8–5.9)
RBC # BLD AUTO: 3.22 10*6/MM3 (ref 4.8–5.9)
RBC # BLD AUTO: 3.24 10*6/MM3 (ref 4.8–5.9)
RBC # BLD AUTO: 3.25 10*6/MM3 (ref 4.8–5.9)
RBC # BLD AUTO: 3.26 10*6/MM3 (ref 4.8–5.9)
RBC # BLD AUTO: 3.35 10*6/MM3 (ref 4.14–5.8)
RBC # BLD AUTO: 3.4 10*6/MM3 (ref 4.8–5.9)
RBC # BLD AUTO: 3.41 10*6/MM3 (ref 4.8–5.9)
RBC # BLD AUTO: 3.42 10*6/MM3 (ref 4.8–5.9)
RBC # BLD AUTO: 3.49 10*6/MM3 (ref 4.8–5.9)
RBC # BLD AUTO: 3.65 10*6/MM3 (ref 4.8–5.9)
RBC # UR: ABNORMAL /HPF
REF LAB TEST METHOD: ABNORMAL
RH BLD: POSITIVE
RH BLD: POSITIVE
SCHISTOCYTES BLD QL SMEAR: ABNORMAL
SMALL PLATELETS BLD QL SMEAR: ABNORMAL
SMALL PLATELETS BLD QL SMEAR: NORMAL
SMALL PLATELETS BLD QL SMEAR: NORMAL
SMUDGE CELLS BLD QL SMEAR: ABNORMAL
SODIUM BLD-SCNC: 131 MMOL/L (ref 135–145)
SODIUM BLD-SCNC: 132 MMOL/L (ref 135–145)
SODIUM BLD-SCNC: 132 MMOL/L (ref 135–145)
SODIUM BLD-SCNC: 133 MMOL/L (ref 135–145)
SODIUM BLD-SCNC: 135 MMOL/L (ref 135–145)
SODIUM BLD-SCNC: 136 MMOL/L (ref 135–145)
SODIUM BLD-SCNC: 136 MMOL/L (ref 136–145)
SODIUM BLD-SCNC: 137 MMOL/L (ref 135–145)
SODIUM BLD-SCNC: 137 MMOL/L (ref 135–145)
SODIUM BLD-SCNC: 138 MMOL/L (ref 135–145)
SODIUM BLD-SCNC: 139 MMOL/L (ref 135–145)
SODIUM BLD-SCNC: 140 MMOL/L (ref 135–145)
SODIUM UR-SCNC: 27 MMOL/L (ref 30–90)
SP GR UR STRIP: 1.01 (ref 1–1.03)
SP GR UR STRIP: 1.02 (ref 1–1.03)
SQUAMOUS #/AREA URNS HPF: ABNORMAL /HPF
STRESS TARGET HR: 113 BPM
T&S EXPIRATION DATE: NORMAL
T&S EXPIRATION DATE: NORMAL
T4 FREE SERPL-MCNC: 1.34 NG/DL (ref 0.78–2.19)
TIBC SERPL-MCNC: 152 MCG/DL (ref 225–420)
TIBC SERPL-MCNC: 202 MCG/DL (ref 225–420)
TIBC SERPL-MCNC: 204 MCG/DL (ref 225–420)
TIBC SERPL-MCNC: 217 MCG/DL (ref 225–420)
TIBC SERPL-MCNC: 230 MCG/DL (ref 225–420)
TIBC SERPL-MCNC: 241 MCG/DL (ref 298–536)
TOXIC GRANULATION: ABNORMAL
TRANSFERRIN SERPL-MCNC: 162 MG/DL (ref 200–360)
TROPONIN I SERPL-MCNC: 0.02 NG/ML (ref 0–0.03)
TSH SERPL DL<=0.05 MIU/L-ACNC: 3.94 MIU/ML (ref 0.47–4.68)
UNIT  ABO: NORMAL
UNIT  RH: NORMAL
URATE SERPL-MCNC: 5.5 MG/DL (ref 3.5–8.5)
UROBILINOGEN UR QL STRIP: ABNORMAL
UUN 24H UR-MCNC: 627 MG/DL
VARIANT LYMPHS NFR BLD MANUAL: 1 % (ref 0–5)
VARIANT LYMPHS NFR BLD MANUAL: 2 % (ref 0–5)
VARIANT LYMPHS NFR BLD MANUAL: 4 % (ref 0–5)
VARIANT LYMPHS NFR BLD MANUAL: 7.1 % (ref 0–5)
VIT B12 BLD-MCNC: >1000 PG/ML (ref 239–931)
VWF CP ACT/NOR PPP CHRO: 77 %
WBC MORPH BLD: NORMAL
WBC NRBC COR # BLD: 1.66 10*3/MM3 (ref 4.8–10.8)
WBC NRBC COR # BLD: 1.87 10*3/MM3 (ref 4.8–10.8)
WBC NRBC COR # BLD: 2.1 10*3/MM3 (ref 4.8–10.8)
WBC NRBC COR # BLD: 2.36 10*3/MM3 (ref 4.8–10.8)
WBC NRBC COR # BLD: 3.1 10*3/MM3 (ref 4.8–10.8)
WBC NRBC COR # BLD: 3.7 10*3/MM3 (ref 4.8–10.8)
WBC NRBC COR # BLD: 4.01 10*3/MM3 (ref 4.8–10.8)
WBC NRBC COR # BLD: 4.13 10*3/MM3 (ref 4.8–10.8)
WBC NRBC COR # BLD: 4.26 10*3/MM3 (ref 4.8–10.8)
WBC NRBC COR # BLD: 4.27 10*3/MM3 (ref 4.8–10.8)
WBC NRBC COR # BLD: 4.28 10*3/MM3 (ref 4.8–10.8)
WBC NRBC COR # BLD: 4.31 10*3/MM3 (ref 4.8–10.8)
WBC NRBC COR # BLD: 4.37 10*3/MM3 (ref 4.8–10.8)
WBC NRBC COR # BLD: 5 10*3/MM3 (ref 4.8–10.8)
WBC NRBC COR # BLD: 5.13 10*3/MM3 (ref 3.4–10.8)
WBC NRBC COR # BLD: 5.18 10*3/MM3 (ref 4.8–10.8)
WBC NRBC COR # BLD: 5.29 10*3/MM3 (ref 4.8–10.8)
WBC NRBC COR # BLD: 5.3 10*3/MM3 (ref 4.8–10.8)
WBC NRBC COR # BLD: 5.93 10*3/MM3 (ref 4.8–10.8)
WBC NRBC COR # BLD: 5.95 10*3/MM3 (ref 4.8–10.8)
WBC NRBC COR # BLD: 6.3 10*3/MM3 (ref 4.8–10.8)
WBC UR QL AUTO: ABNORMAL /HPF
WHOLE BLOOD HOLD SPECIMEN: NORMAL

## 2019-01-01 PROCEDURE — 99214 OFFICE O/P EST MOD 30 MIN: CPT | Performed by: INTERNAL MEDICINE

## 2019-01-01 PROCEDURE — 96374 THER/PROPH/DIAG INJ IV PUSH: CPT

## 2019-01-01 PROCEDURE — 94799 UNLISTED PULMONARY SVC/PX: CPT

## 2019-01-01 PROCEDURE — 85025 COMPLETE CBC W/AUTO DIFF WBC: CPT | Performed by: NURSE PRACTITIONER

## 2019-01-01 PROCEDURE — 25010000002 ZIPRASIDONE MESYLATE PER 10 MG: Performed by: NURSE PRACTITIONER

## 2019-01-01 PROCEDURE — 86225 DNA ANTIBODY NATIVE: CPT | Performed by: INTERNAL MEDICINE

## 2019-01-01 PROCEDURE — 36415 COLL VENOUS BLD VENIPUNCTURE: CPT

## 2019-01-01 PROCEDURE — 86235 NUCLEAR ANTIGEN ANTIBODY: CPT | Performed by: INTERNAL MEDICINE

## 2019-01-01 PROCEDURE — 82570 ASSAY OF URINE CREATININE: CPT | Performed by: NURSE PRACTITIONER

## 2019-01-01 PROCEDURE — 85610 PROTHROMBIN TIME: CPT | Performed by: PHYSICIAN ASSISTANT

## 2019-01-01 PROCEDURE — 83605 ASSAY OF LACTIC ACID: CPT | Performed by: PHYSICIAN ASSISTANT

## 2019-01-01 PROCEDURE — 96372 THER/PROPH/DIAG INJ SC/IM: CPT

## 2019-01-01 PROCEDURE — 97110 THERAPEUTIC EXERCISES: CPT

## 2019-01-01 PROCEDURE — P9016 RBC LEUKOCYTES REDUCED: HCPCS

## 2019-01-01 PROCEDURE — 85025 COMPLETE CBC W/AUTO DIFF WBC: CPT | Performed by: INTERNAL MEDICINE

## 2019-01-01 PROCEDURE — 71250 CT THORAX DX C-: CPT

## 2019-01-01 PROCEDURE — 25010000002 FUROSEMIDE PER 20 MG: Performed by: INTERNAL MEDICINE

## 2019-01-01 PROCEDURE — 86923 COMPATIBILITY TEST ELECTRIC: CPT

## 2019-01-01 PROCEDURE — 88185 FLOWCYTOMETRY/TC ADD-ON: CPT | Performed by: INTERNAL MEDICINE

## 2019-01-01 PROCEDURE — 85060 BLOOD SMEAR INTERPRETATION: CPT | Performed by: INTERNAL MEDICINE

## 2019-01-01 PROCEDURE — 86757 RICKETTSIA ANTIBODY: CPT | Performed by: INTERNAL MEDICINE

## 2019-01-01 PROCEDURE — 93971 EXTREMITY STUDY: CPT

## 2019-01-01 PROCEDURE — 90471 IMMUNIZATION ADMIN: CPT | Performed by: EMERGENCY MEDICINE

## 2019-01-01 PROCEDURE — 85362 FIBRIN DEGRADATION PRODUCTS: CPT | Performed by: INTERNAL MEDICINE

## 2019-01-01 PROCEDURE — 86900 BLOOD TYPING SEROLOGIC ABO: CPT

## 2019-01-01 PROCEDURE — 88264 CHROMOSOME ANALYSIS 20-25: CPT | Performed by: INTERNAL MEDICINE

## 2019-01-01 PROCEDURE — 85007 BL SMEAR W/DIFF WBC COUNT: CPT | Performed by: NURSE PRACTITIONER

## 2019-01-01 PROCEDURE — 80053 COMPREHEN METABOLIC PANEL: CPT | Performed by: EMERGENCY MEDICINE

## 2019-01-01 PROCEDURE — 25010000002 EPOETIN ALFA PER 1000 UNITS: Performed by: INTERNAL MEDICINE

## 2019-01-01 PROCEDURE — 82728 ASSAY OF FERRITIN: CPT | Performed by: INTERNAL MEDICINE

## 2019-01-01 PROCEDURE — 80053 COMPREHEN METABOLIC PANEL: CPT | Performed by: PHYSICIAN ASSISTANT

## 2019-01-01 PROCEDURE — 84156 ASSAY OF PROTEIN URINE: CPT | Performed by: NURSE PRACTITIONER

## 2019-01-01 PROCEDURE — P9035 PLATELET PHERES LEUKOREDUCED: HCPCS

## 2019-01-01 PROCEDURE — 93288 INTERROG EVL PM/LDLS PM IP: CPT | Performed by: INTERNAL MEDICINE

## 2019-01-01 PROCEDURE — 84300 ASSAY OF URINE SODIUM: CPT | Performed by: NURSE PRACTITIONER

## 2019-01-01 PROCEDURE — 76775 US EXAM ABDO BACK WALL LIM: CPT

## 2019-01-01 PROCEDURE — 92610 EVALUATE SWALLOWING FUNCTION: CPT

## 2019-01-01 PROCEDURE — 85384 FIBRINOGEN ACTIVITY: CPT | Performed by: INTERNAL MEDICINE

## 2019-01-01 PROCEDURE — 86850 RBC ANTIBODY SCREEN: CPT

## 2019-01-01 PROCEDURE — 25010000002 TDAP 5-2.5-18.5 LF-MCG/0.5 SUSPENSION: Performed by: EMERGENCY MEDICINE

## 2019-01-01 PROCEDURE — 86901 BLOOD TYPING SEROLOGIC RH(D): CPT | Performed by: INTERNAL MEDICINE

## 2019-01-01 PROCEDURE — G0432 EIA HIV-1/HIV-2 SCREEN: HCPCS | Performed by: INTERNAL MEDICINE

## 2019-01-01 PROCEDURE — 97535 SELF CARE MNGMENT TRAINING: CPT

## 2019-01-01 PROCEDURE — 85007 BL SMEAR W/DIFF WBC COUNT: CPT | Performed by: INTERNAL MEDICINE

## 2019-01-01 PROCEDURE — 83735 ASSAY OF MAGNESIUM: CPT | Performed by: NURSE PRACTITIONER

## 2019-01-01 PROCEDURE — 80053 COMPREHEN METABOLIC PANEL: CPT

## 2019-01-01 PROCEDURE — 85025 COMPLETE CBC W/AUTO DIFF WBC: CPT | Performed by: EMERGENCY MEDICINE

## 2019-01-01 PROCEDURE — 86850 RBC ANTIBODY SCREEN: CPT | Performed by: INTERNAL MEDICINE

## 2019-01-01 PROCEDURE — 25010000002 DIPHENHYDRAMINE: Performed by: INTERNAL MEDICINE

## 2019-01-01 PROCEDURE — 86038 ANTINUCLEAR ANTIBODIES: CPT | Performed by: INTERNAL MEDICINE

## 2019-01-01 PROCEDURE — 97110 THERAPEUTIC EXERCISES: CPT | Performed by: OCCUPATIONAL THERAPIST

## 2019-01-01 PROCEDURE — 94760 N-INVAS EAR/PLS OXIMETRY 1: CPT

## 2019-01-01 PROCEDURE — 81001 URINALYSIS AUTO W/SCOPE: CPT | Performed by: EMERGENCY MEDICINE

## 2019-01-01 PROCEDURE — 83540 ASSAY OF IRON: CPT

## 2019-01-01 PROCEDURE — 86022 PLATELET ANTIBODIES: CPT | Performed by: INTERNAL MEDICINE

## 2019-01-01 PROCEDURE — 84145 PROCALCITONIN (PCT): CPT | Performed by: NURSE PRACTITIONER

## 2019-01-01 PROCEDURE — 97116 GAIT TRAINING THERAPY: CPT

## 2019-01-01 PROCEDURE — 97165 OT EVAL LOW COMPLEX 30 MIN: CPT | Performed by: OCCUPATIONAL THERAPIST

## 2019-01-01 PROCEDURE — 86901 BLOOD TYPING SEROLOGIC RH(D): CPT

## 2019-01-01 PROCEDURE — 85007 BL SMEAR W/DIFF WBC COUNT: CPT | Performed by: EMERGENCY MEDICINE

## 2019-01-01 PROCEDURE — 72170 X-RAY EXAM OF PELVIS: CPT

## 2019-01-01 PROCEDURE — 07DR3ZX EXTRACTION OF ILIAC BONE MARROW, PERCUTANEOUS APPROACH, DIAGNOSTIC: ICD-10-PCS | Performed by: RADIOLOGY

## 2019-01-01 PROCEDURE — 73030 X-RAY EXAM OF SHOULDER: CPT

## 2019-01-01 PROCEDURE — 80053 COMPREHEN METABOLIC PANEL: CPT | Performed by: INTERNAL MEDICINE

## 2019-01-01 PROCEDURE — 83615 LACTATE (LD) (LDH) ENZYME: CPT | Performed by: INTERNAL MEDICINE

## 2019-01-01 PROCEDURE — 82728 ASSAY OF FERRITIN: CPT

## 2019-01-01 PROCEDURE — 84165 PROTEIN E-PHORESIS SERUM: CPT | Performed by: INTERNAL MEDICINE

## 2019-01-01 PROCEDURE — 88237 TISSUE CULTURE BONE MARROW: CPT | Performed by: INTERNAL MEDICINE

## 2019-01-01 PROCEDURE — 72125 CT NECK SPINE W/O DYE: CPT

## 2019-01-01 PROCEDURE — 96365 THER/PROPH/DIAG IV INF INIT: CPT

## 2019-01-01 PROCEDURE — 80048 BASIC METABOLIC PNL TOTAL CA: CPT | Performed by: NURSE PRACTITIONER

## 2019-01-01 PROCEDURE — 82962 GLUCOSE BLOOD TEST: CPT

## 2019-01-01 PROCEDURE — 97530 THERAPEUTIC ACTIVITIES: CPT

## 2019-01-01 PROCEDURE — 93010 ELECTROCARDIOGRAM REPORT: CPT | Performed by: INTERNAL MEDICINE

## 2019-01-01 PROCEDURE — 93000 ELECTROCARDIOGRAM COMPLETE: CPT | Performed by: INTERNAL MEDICINE

## 2019-01-01 PROCEDURE — 83550 IRON BINDING TEST: CPT | Performed by: INTERNAL MEDICINE

## 2019-01-01 PROCEDURE — 86666 EHRLICHIA ANTIBODY: CPT | Performed by: INTERNAL MEDICINE

## 2019-01-01 PROCEDURE — 82607 VITAMIN B-12: CPT | Performed by: INTERNAL MEDICINE

## 2019-01-01 PROCEDURE — 81001 URINALYSIS AUTO W/SCOPE: CPT | Performed by: PHYSICIAN ASSISTANT

## 2019-01-01 PROCEDURE — 85730 THROMBOPLASTIN TIME PARTIAL: CPT | Performed by: PHYSICIAN ASSISTANT

## 2019-01-01 PROCEDURE — 82306 VITAMIN D 25 HYDROXY: CPT | Performed by: INTERNAL MEDICINE

## 2019-01-01 PROCEDURE — 84100 ASSAY OF PHOSPHORUS: CPT | Performed by: INTERNAL MEDICINE

## 2019-01-01 PROCEDURE — 84466 ASSAY OF TRANSFERRIN: CPT | Performed by: INTERNAL MEDICINE

## 2019-01-01 PROCEDURE — 25010000002 DENOSUMAB 120 MG/1.7ML SOLUTION: Performed by: INTERNAL MEDICINE

## 2019-01-01 PROCEDURE — 97535 SELF CARE MNGMENT TRAINING: CPT | Performed by: OCCUPATIONAL THERAPIST

## 2019-01-01 PROCEDURE — 70450 CT HEAD/BRAIN W/O DYE: CPT

## 2019-01-01 PROCEDURE — 71045 X-RAY EXAM CHEST 1 VIEW: CPT

## 2019-01-01 PROCEDURE — 74230 X-RAY XM SWLNG FUNCJ C+: CPT

## 2019-01-01 PROCEDURE — 93005 ELECTROCARDIOGRAM TRACING: CPT | Performed by: EMERGENCY MEDICINE

## 2019-01-01 PROCEDURE — 80053 COMPREHEN METABOLIC PANEL: CPT | Performed by: NURSE PRACTITIONER

## 2019-01-01 PROCEDURE — 99284 EMERGENCY DEPT VISIT MOD MDM: CPT

## 2019-01-01 PROCEDURE — 25010000002 FERRIC CARBOXYMALTOSE 750 MG/15ML SOLUTION 15 ML VIAL: Performed by: INTERNAL MEDICINE

## 2019-01-01 PROCEDURE — 93306 TTE W/DOPPLER COMPLETE: CPT

## 2019-01-01 PROCEDURE — A9270 NON-COVERED ITEM OR SERVICE: HCPCS | Performed by: INTERNAL MEDICINE

## 2019-01-01 PROCEDURE — 88184 FLOWCYTOMETRY/ TC 1 MARKER: CPT | Performed by: INTERNAL MEDICINE

## 2019-01-01 PROCEDURE — 96367 TX/PROPH/DG ADDL SEQ IV INF: CPT

## 2019-01-01 PROCEDURE — 83550 IRON BINDING TEST: CPT

## 2019-01-01 PROCEDURE — 88305 TISSUE EXAM BY PATHOLOGIST: CPT

## 2019-01-01 PROCEDURE — 83540 ASSAY OF IRON: CPT | Performed by: INTERNAL MEDICINE

## 2019-01-01 PROCEDURE — 93306 TTE W/DOPPLER COMPLETE: CPT | Performed by: INTERNAL MEDICINE

## 2019-01-01 PROCEDURE — 71046 X-RAY EXAM CHEST 2 VIEWS: CPT

## 2019-01-01 PROCEDURE — 84439 ASSAY OF FREE THYROXINE: CPT | Performed by: INTERNAL MEDICINE

## 2019-01-01 PROCEDURE — 97162 PT EVAL MOD COMPLEX 30 MIN: CPT

## 2019-01-01 PROCEDURE — 84484 ASSAY OF TROPONIN QUANT: CPT | Performed by: EMERGENCY MEDICINE

## 2019-01-01 PROCEDURE — 90715 TDAP VACCINE 7 YRS/> IM: CPT | Performed by: EMERGENCY MEDICINE

## 2019-01-01 PROCEDURE — 88311 DECALCIFY TISSUE: CPT | Performed by: INTERNAL MEDICINE

## 2019-01-01 PROCEDURE — 85025 COMPLETE CBC W/AUTO DIFF WBC: CPT

## 2019-01-01 PROCEDURE — 88280 CHROMOSOME KARYOTYPE STUDY: CPT | Performed by: INTERNAL MEDICINE

## 2019-01-01 PROCEDURE — 85397 CLOTTING FUNCT ACTIVITY: CPT | Performed by: INTERNAL MEDICINE

## 2019-01-01 PROCEDURE — 83970 ASSAY OF PARATHORMONE: CPT | Performed by: INTERNAL MEDICINE

## 2019-01-01 PROCEDURE — 86900 BLOOD TYPING SEROLOGIC ABO: CPT | Performed by: INTERNAL MEDICINE

## 2019-01-01 PROCEDURE — 63710000001 ACETAMINOPHEN 325 MG TABLET: Performed by: INTERNAL MEDICINE

## 2019-01-01 PROCEDURE — 85610 PROTHROMBIN TIME: CPT | Performed by: INTERNAL MEDICINE

## 2019-01-01 PROCEDURE — 99285 EMERGENCY DEPT VISIT HI MDM: CPT

## 2019-01-01 PROCEDURE — 84443 ASSAY THYROID STIM HORMONE: CPT | Performed by: INTERNAL MEDICINE

## 2019-01-01 PROCEDURE — 82746 ASSAY OF FOLIC ACID SERUM: CPT | Performed by: INTERNAL MEDICINE

## 2019-01-01 PROCEDURE — 25010000002 DIPHENHYDRAMINE PER 50 MG: Performed by: INTERNAL MEDICINE

## 2019-01-01 PROCEDURE — 76700 US EXAM ABDOM COMPLETE: CPT

## 2019-01-01 PROCEDURE — 87040 BLOOD CULTURE FOR BACTERIA: CPT | Performed by: PHYSICIAN ASSISTANT

## 2019-01-01 PROCEDURE — 87040 BLOOD CULTURE FOR BACTERIA: CPT | Performed by: EMERGENCY MEDICINE

## 2019-01-01 PROCEDURE — 83605 ASSAY OF LACTIC ACID: CPT | Performed by: EMERGENCY MEDICINE

## 2019-01-01 PROCEDURE — 99283 EMERGENCY DEPT VISIT LOW MDM: CPT

## 2019-01-01 PROCEDURE — 73660 X-RAY EXAM OF TOE(S): CPT

## 2019-01-01 PROCEDURE — 84550 ASSAY OF BLOOD/URIC ACID: CPT | Performed by: INTERNAL MEDICINE

## 2019-01-01 PROCEDURE — 93971 EXTREMITY STUDY: CPT | Performed by: SURGERY

## 2019-01-01 PROCEDURE — 85025 COMPLETE CBC W/AUTO DIFF WBC: CPT | Performed by: PHYSICIAN ASSISTANT

## 2019-01-01 PROCEDURE — 85730 THROMBOPLASTIN TIME PARTIAL: CPT | Performed by: INTERNAL MEDICINE

## 2019-01-01 PROCEDURE — 36430 TRANSFUSION BLD/BLD COMPNT: CPT

## 2019-01-01 PROCEDURE — 88305 TISSUE EXAM BY PATHOLOGIST: CPT | Performed by: INTERNAL MEDICINE

## 2019-01-01 PROCEDURE — 88185 FLOWCYTOMETRY/TC ADD-ON: CPT

## 2019-01-01 PROCEDURE — 88323 CONSLTJ&REPRT MATRL PREP SLD: CPT

## 2019-01-01 PROCEDURE — 82270 OCCULT BLOOD FECES: CPT | Performed by: PHYSICIAN ASSISTANT

## 2019-01-01 PROCEDURE — 92611 MOTION FLUOROSCOPY/SWALLOW: CPT

## 2019-01-01 PROCEDURE — 77012 CT SCAN FOR NEEDLE BIOPSY: CPT

## 2019-01-01 PROCEDURE — 88313 SPECIAL STAINS GROUP 2: CPT

## 2019-01-01 PROCEDURE — 88313 SPECIAL STAINS GROUP 2: CPT | Performed by: INTERNAL MEDICINE

## 2019-01-01 PROCEDURE — 88341 IMHCHEM/IMCYTCHM EA ADD ANTB: CPT

## 2019-01-01 PROCEDURE — 83735 ASSAY OF MAGNESIUM: CPT | Performed by: INTERNAL MEDICINE

## 2019-01-01 PROCEDURE — 82140 ASSAY OF AMMONIA: CPT | Performed by: NURSE PRACTITIONER

## 2019-01-01 PROCEDURE — 84540 ASSAY OF URINE/UREA-N: CPT | Performed by: NURSE PRACTITIONER

## 2019-01-01 RX ORDER — LEVOFLOXACIN 750 MG/1
750 TABLET ORAL DAILY
Qty: 7 TABLET | Refills: 0 | Status: ON HOLD | OUTPATIENT
Start: 2019-01-01 | End: 2019-01-01

## 2019-01-01 RX ORDER — SODIUM CHLORIDE, SODIUM LACTATE, POTASSIUM CHLORIDE, CALCIUM CHLORIDE 600; 310; 30; 20 MG/100ML; MG/100ML; MG/100ML; MG/100ML
50 INJECTION, SOLUTION INTRAVENOUS CONTINUOUS
Status: DISCONTINUED | OUTPATIENT
Start: 2019-01-01 | End: 2019-01-01

## 2019-01-01 RX ORDER — ACETAMINOPHEN 500 MG
500 TABLET ORAL ONCE
Status: COMPLETED | OUTPATIENT
Start: 2019-01-01 | End: 2019-01-01

## 2019-01-01 RX ORDER — DIPHENHYDRAMINE HYDROCHLORIDE 50 MG/ML
50 INJECTION INTRAMUSCULAR; INTRAVENOUS AS NEEDED
Status: CANCELLED | OUTPATIENT
Start: 2019-01-01

## 2019-01-01 RX ORDER — FUROSEMIDE 10 MG/ML
20 INJECTION INTRAMUSCULAR; INTRAVENOUS AS NEEDED
Status: DISPENSED | OUTPATIENT
Start: 2019-01-01 | End: 2019-01-01

## 2019-01-01 RX ORDER — FAMOTIDINE 20 MG/1
20 TABLET, FILM COATED ORAL DAILY
Status: DISCONTINUED | OUTPATIENT
Start: 2019-01-01 | End: 2019-01-01 | Stop reason: HOSPADM

## 2019-01-01 RX ORDER — SACCHAROMYCES BOULARDII 250 MG
250 CAPSULE ORAL 2 TIMES DAILY
Status: DISCONTINUED | OUTPATIENT
Start: 2019-01-01 | End: 2019-01-01 | Stop reason: HOSPADM

## 2019-01-01 RX ORDER — ACETAMINOPHEN 500 MG
500 TABLET ORAL ONCE AS NEEDED
Status: CANCELLED
Start: 2019-01-01

## 2019-01-01 RX ORDER — SODIUM CHLORIDE 9 MG/ML
250 INJECTION, SOLUTION INTRAVENOUS AS NEEDED
Status: DISCONTINUED | OUTPATIENT
Start: 2019-01-01 | End: 2019-01-01 | Stop reason: HOSPADM

## 2019-01-01 RX ORDER — ACETAMINOPHEN 325 MG/1
650 TABLET ORAL EVERY 4 HOURS PRN
Status: DISCONTINUED | OUTPATIENT
Start: 2019-01-01 | End: 2019-01-01 | Stop reason: HOSPADM

## 2019-01-01 RX ORDER — CALCITRIOL 0.25 UG/1
0.25 CAPSULE, LIQUID FILLED ORAL DAILY
Status: DISCONTINUED | OUTPATIENT
Start: 2019-01-01 | End: 2019-01-01 | Stop reason: HOSPADM

## 2019-01-01 RX ORDER — SODIUM CHLORIDE 0.9 % (FLUSH) 0.9 %
10 SYRINGE (ML) INJECTION AS NEEDED
Status: DISCONTINUED | OUTPATIENT
Start: 2019-01-01 | End: 2019-01-01 | Stop reason: HOSPADM

## 2019-01-01 RX ORDER — MEGESTROL ACETATE 40 MG/ML
800 SUSPENSION ORAL DAILY
Start: 2019-01-01

## 2019-01-01 RX ORDER — METOPROLOL SUCCINATE 25 MG/1
25 TABLET, EXTENDED RELEASE ORAL DAILY
Status: DISCONTINUED | OUTPATIENT
Start: 2019-01-01 | End: 2019-01-01

## 2019-01-01 RX ORDER — SACCHAROMYCES BOULARDII 250 MG
250 CAPSULE ORAL 2 TIMES DAILY
Start: 2019-01-01

## 2019-01-01 RX ORDER — FAMOTIDINE 20 MG/1
20 TABLET, FILM COATED ORAL
Status: DISCONTINUED | OUTPATIENT
Start: 2019-01-01 | End: 2019-01-01

## 2019-01-01 RX ORDER — ONDANSETRON 2 MG/ML
4 INJECTION INTRAMUSCULAR; INTRAVENOUS EVERY 6 HOURS PRN
Status: DISCONTINUED | OUTPATIENT
Start: 2019-01-01 | End: 2019-01-01 | Stop reason: HOSPADM

## 2019-01-01 RX ORDER — LANOLIN ALCOHOL/MO/W.PET/CERES
6 CREAM (GRAM) TOPICAL NIGHTLY
Start: 2019-01-01

## 2019-01-01 RX ORDER — POTASSIUM CHLORIDE 750 MG/1
20 CAPSULE, EXTENDED RELEASE ORAL DAILY
Status: DISCONTINUED | OUTPATIENT
Start: 2019-01-01 | End: 2019-01-01

## 2019-01-01 RX ORDER — MEGESTROL ACETATE 40 MG/ML
800 SUSPENSION ORAL DAILY
Status: DISCONTINUED | OUTPATIENT
Start: 2019-01-01 | End: 2019-01-01 | Stop reason: HOSPADM

## 2019-01-01 RX ORDER — FUROSEMIDE 20 MG/1
20 TABLET ORAL DAILY
Status: DISCONTINUED | OUTPATIENT
Start: 2019-01-01 | End: 2019-01-01 | Stop reason: HOSPADM

## 2019-01-01 RX ORDER — SODIUM CHLORIDE 9 MG/ML
250 INJECTION, SOLUTION INTRAVENOUS ONCE
Status: COMPLETED | OUTPATIENT
Start: 2019-01-01 | End: 2019-01-01

## 2019-01-01 RX ORDER — FOLIC ACID/VIT B COMPLEX AND C 0.8 MG
1 TABLET ORAL DAILY
Status: DISCONTINUED | OUTPATIENT
Start: 2019-01-01 | End: 2019-01-01 | Stop reason: HOSPADM

## 2019-01-01 RX ORDER — ACETAMINOPHEN 325 MG/1
650 TABLET ORAL ONCE
Status: CANCELLED | OUTPATIENT
Start: 2019-01-01

## 2019-01-01 RX ORDER — FOLIC ACID/VIT B COMPLEX AND C 0.8 MG
1 TABLET ORAL DAILY
Qty: 30 TABLET | Status: ON HOLD
Start: 2019-01-01 | End: 2020-01-01

## 2019-01-01 RX ORDER — FUROSEMIDE 40 MG/1
40 TABLET ORAL DAILY
Status: DISCONTINUED | OUTPATIENT
Start: 2019-01-01 | End: 2019-01-01

## 2019-01-01 RX ORDER — FUROSEMIDE 20 MG/1
20 TABLET ORAL DAILY
Start: 2019-01-01

## 2019-01-01 RX ORDER — LIDOCAINE 50 MG/G
1 PATCH TOPICAL
Status: DISCONTINUED | OUTPATIENT
Start: 2019-01-01 | End: 2019-01-01 | Stop reason: HOSPADM

## 2019-01-01 RX ORDER — FUROSEMIDE 10 MG/ML
40 INJECTION INTRAMUSCULAR; INTRAVENOUS EVERY 12 HOURS
Status: DISCONTINUED | OUTPATIENT
Start: 2019-01-01 | End: 2019-01-01

## 2019-01-01 RX ORDER — SODIUM CHLORIDE 9 MG/ML
100 INJECTION, SOLUTION INTRAVENOUS CONTINUOUS
Status: DISCONTINUED | OUTPATIENT
Start: 2019-01-01 | End: 2019-01-01

## 2019-01-01 RX ORDER — SODIUM BICARBONATE 650 MG/1
650 TABLET ORAL 4 TIMES DAILY
Status: DISCONTINUED | OUTPATIENT
Start: 2019-01-01 | End: 2019-01-01

## 2019-01-01 RX ORDER — POTASSIUM CHLORIDE 750 MG/1
20 CAPSULE, EXTENDED RELEASE ORAL DAILY
Status: DISCONTINUED | OUTPATIENT
Start: 2019-01-01 | End: 2019-01-01 | Stop reason: HOSPADM

## 2019-01-01 RX ORDER — METAXALONE 400 MG/1
400 TABLET ORAL 3 TIMES DAILY
Status: ON HOLD
Start: 2019-01-01 | End: 2020-01-01

## 2019-01-01 RX ORDER — POTASSIUM CHLORIDE 750 MG/1
20 CAPSULE, EXTENDED RELEASE ORAL DAILY
Status: ON HOLD
Start: 2019-01-01 | End: 2020-01-01

## 2019-01-01 RX ORDER — FUROSEMIDE 10 MG/ML
20 INJECTION INTRAMUSCULAR; INTRAVENOUS AS NEEDED
Status: ACTIVE | OUTPATIENT
Start: 2019-01-01 | End: 2019-01-01

## 2019-01-01 RX ORDER — LANOLIN ALCOHOL/MO/W.PET/CERES
3 CREAM (GRAM) TOPICAL NIGHTLY
Status: DISCONTINUED | OUTPATIENT
Start: 2019-01-01 | End: 2019-01-01

## 2019-01-01 RX ORDER — METHYLPREDNISOLONE SODIUM SUCCINATE 125 MG/2ML
125 INJECTION, POWDER, LYOPHILIZED, FOR SOLUTION INTRAMUSCULAR; INTRAVENOUS AS NEEDED
Status: DISCONTINUED | OUTPATIENT
Start: 2019-01-01 | End: 2019-01-01 | Stop reason: HOSPADM

## 2019-01-01 RX ORDER — DIPHENHYDRAMINE HYDROCHLORIDE 50 MG/ML
50 INJECTION INTRAMUSCULAR; INTRAVENOUS AS NEEDED
Status: DISCONTINUED | OUTPATIENT
Start: 2019-01-01 | End: 2019-01-01 | Stop reason: HOSPADM

## 2019-01-01 RX ORDER — LANOLIN ALCOHOL/MO/W.PET/CERES
6 CREAM (GRAM) TOPICAL NIGHTLY
Status: DISCONTINUED | OUTPATIENT
Start: 2019-01-01 | End: 2019-01-01 | Stop reason: HOSPADM

## 2019-01-01 RX ORDER — FUROSEMIDE 10 MG/ML
20 INJECTION INTRAMUSCULAR; INTRAVENOUS ONCE
Status: DISCONTINUED | OUTPATIENT
Start: 2019-01-01 | End: 2019-01-01 | Stop reason: HOSPADM

## 2019-01-01 RX ORDER — METHYLPREDNISOLONE SODIUM SUCCINATE 125 MG/2ML
125 INJECTION, POWDER, LYOPHILIZED, FOR SOLUTION INTRAMUSCULAR; INTRAVENOUS AS NEEDED
Status: CANCELLED | OUTPATIENT
Start: 2019-01-01

## 2019-01-01 RX ORDER — SODIUM CHLORIDE 9 MG/ML
250 INJECTION, SOLUTION INTRAVENOUS ONCE
Status: CANCELLED | OUTPATIENT
Start: 2019-01-01

## 2019-01-01 RX ORDER — ZIPRASIDONE MESYLATE 20 MG/ML
10 INJECTION, POWDER, LYOPHILIZED, FOR SOLUTION INTRAMUSCULAR EVERY 4 HOURS PRN
Status: DISCONTINUED | OUTPATIENT
Start: 2019-01-01 | End: 2019-01-01

## 2019-01-01 RX ORDER — POTASSIUM CHLORIDE 750 MG/1
20 CAPSULE, EXTENDED RELEASE ORAL ONCE
Status: COMPLETED | OUTPATIENT
Start: 2019-01-01 | End: 2019-01-01

## 2019-01-01 RX ORDER — FUROSEMIDE 10 MG/ML
20 INJECTION INTRAMUSCULAR; INTRAVENOUS 2 TIMES DAILY PRN
Status: CANCELLED
Start: 2019-01-01

## 2019-01-01 RX ORDER — SODIUM BICARBONATE 650 MG/1
650 TABLET ORAL 4 TIMES DAILY
COMMUNITY
End: 2019-01-01 | Stop reason: HOSPADM

## 2019-01-01 RX ORDER — SENNA AND DOCUSATE SODIUM 50; 8.6 MG/1; MG/1
2 TABLET, FILM COATED ORAL NIGHTLY
Status: DISCONTINUED | OUTPATIENT
Start: 2019-01-01 | End: 2019-01-01

## 2019-01-01 RX ORDER — LIDOCAINE HYDROCHLORIDE 10 MG/ML
INJECTION, SOLUTION INFILTRATION; PERINEURAL
Status: COMPLETED | OUTPATIENT
Start: 2019-01-01 | End: 2019-01-01

## 2019-01-01 RX ORDER — ACETAMINOPHEN 325 MG/1
650 TABLET ORAL ONCE
Status: COMPLETED | OUTPATIENT
Start: 2019-01-01 | End: 2019-01-01

## 2019-01-01 RX ORDER — POTASSIUM CHLORIDE 750 MG/1
20 CAPSULE, EXTENDED RELEASE ORAL 2 TIMES DAILY WITH MEALS
Status: DISCONTINUED | OUTPATIENT
Start: 2019-01-01 | End: 2019-01-01

## 2019-01-01 RX ORDER — DIPHENHYDRAMINE HYDROCHLORIDE 50 MG/ML
25 INJECTION INTRAMUSCULAR; INTRAVENOUS ONCE
Status: COMPLETED | OUTPATIENT
Start: 2019-01-01 | End: 2019-01-01

## 2019-01-01 RX ORDER — SODIUM CHLORIDE 0.9 % (FLUSH) 0.9 %
3 SYRINGE (ML) INJECTION EVERY 12 HOURS SCHEDULED
Status: DISCONTINUED | OUTPATIENT
Start: 2019-01-01 | End: 2019-01-01 | Stop reason: HOSPADM

## 2019-01-01 RX ORDER — SODIUM CHLORIDE 0.9 % (FLUSH) 0.9 %
3-10 SYRINGE (ML) INJECTION AS NEEDED
Status: DISCONTINUED | OUTPATIENT
Start: 2019-01-01 | End: 2019-01-01 | Stop reason: HOSPADM

## 2019-01-01 RX ORDER — METAXALONE 800 MG/1
400 TABLET ORAL 3 TIMES DAILY
Status: DISCONTINUED | OUTPATIENT
Start: 2019-01-01 | End: 2019-01-01 | Stop reason: HOSPADM

## 2019-01-01 RX ORDER — CALCITRIOL 0.25 UG/1
0.25 CAPSULE, LIQUID FILLED ORAL DAILY
Start: 2019-01-01

## 2019-01-01 RX ORDER — DOXYCYCLINE 100 MG/1
100 TABLET ORAL EVERY 12 HOURS SCHEDULED
Status: COMPLETED | OUTPATIENT
Start: 2019-01-01 | End: 2019-01-01

## 2019-01-01 RX ADMIN — Medication 250 MG: at 21:10

## 2019-01-01 RX ADMIN — METAXALONE 400 MG: 800 TABLET ORAL at 17:28

## 2019-01-01 RX ADMIN — DOXYCYCLINE 100 MG: 100 TABLET ORAL at 17:55

## 2019-01-01 RX ADMIN — FAMOTIDINE 20 MG: 20 TABLET, FILM COATED ORAL at 08:15

## 2019-01-01 RX ADMIN — SODIUM CHLORIDE, PRESERVATIVE FREE 3 ML: 5 INJECTION INTRAVENOUS at 21:12

## 2019-01-01 RX ADMIN — Medication 250 MG: at 09:18

## 2019-01-01 RX ADMIN — DOXYCYCLINE 100 MG: 100 TABLET ORAL at 20:34

## 2019-01-01 RX ADMIN — Medication 250 MG: at 08:15

## 2019-01-01 RX ADMIN — SODIUM BICARBONATE 650 MG: 650 TABLET ORAL at 21:10

## 2019-01-01 RX ADMIN — FAMOTIDINE 20 MG: 20 TABLET, FILM COATED ORAL at 08:13

## 2019-01-01 RX ADMIN — METAXALONE 400 MG: 800 TABLET ORAL at 09:18

## 2019-01-01 RX ADMIN — Medication 250 MG: at 08:13

## 2019-01-01 RX ADMIN — POTASSIUM CHLORIDE 20 MEQ: 750 CAPSULE, EXTENDED RELEASE ORAL at 17:23

## 2019-01-01 RX ADMIN — Medication 3 MG: at 22:11

## 2019-01-01 RX ADMIN — SODIUM BICARBONATE 650 MG: 650 TABLET ORAL at 17:55

## 2019-01-01 RX ADMIN — Medication: at 04:51

## 2019-01-01 RX ADMIN — ERYTHROPOIETIN 40000 UNITS: 40000 INJECTION, SOLUTION INTRAVENOUS; SUBCUTANEOUS at 10:22

## 2019-01-01 RX ADMIN — DOXYCYCLINE 100 MG: 100 INJECTION, POWDER, LYOPHILIZED, FOR SOLUTION INTRAVENOUS at 17:21

## 2019-01-01 RX ADMIN — DENOSUMAB 120 MG: 120 INJECTION SUBCUTANEOUS at 10:27

## 2019-01-01 RX ADMIN — ACETAMINOPHEN 500 MG: 500 TABLET, FILM COATED ORAL at 09:50

## 2019-01-01 RX ADMIN — SODIUM BICARBONATE 650 MG: 650 TABLET ORAL at 20:17

## 2019-01-01 RX ADMIN — SODIUM CHLORIDE, PRESERVATIVE FREE 3 ML: 5 INJECTION INTRAVENOUS at 20:34

## 2019-01-01 RX ADMIN — METAXALONE 400 MG: 800 TABLET ORAL at 12:15

## 2019-01-01 RX ADMIN — SODIUM CHLORIDE, PRESERVATIVE FREE 3 ML: 5 INJECTION INTRAVENOUS at 12:15

## 2019-01-01 RX ADMIN — ERYTHROPOIETIN 40000 UNITS: 40000 INJECTION, SOLUTION INTRAVENOUS; SUBCUTANEOUS at 10:27

## 2019-01-01 RX ADMIN — SODIUM BICARBONATE 650 MG: 650 TABLET ORAL at 20:00

## 2019-01-01 RX ADMIN — SERTRALINE HYDROCHLORIDE 75 MG: 25 TABLET ORAL at 10:22

## 2019-01-01 RX ADMIN — DENOSUMAB 120 MG: 120 INJECTION SUBCUTANEOUS at 14:41

## 2019-01-01 RX ADMIN — Medication 250 MG: at 21:15

## 2019-01-01 RX ADMIN — ERYTHROPOIETIN 40000 UNITS: 40000 INJECTION, SOLUTION INTRAVENOUS; SUBCUTANEOUS at 09:22

## 2019-01-01 RX ADMIN — LIDOCAINE 1 PATCH: 50 PATCH CUTANEOUS at 09:42

## 2019-01-01 RX ADMIN — SODIUM BICARBONATE 650 MG: 650 TABLET ORAL at 17:50

## 2019-01-01 RX ADMIN — POTASSIUM CHLORIDE 20 MEQ: 750 CAPSULE, EXTENDED RELEASE ORAL at 08:34

## 2019-01-01 RX ADMIN — SERTRALINE HYDROCHLORIDE 75 MG: 25 TABLET ORAL at 12:14

## 2019-01-01 RX ADMIN — SODIUM CHLORIDE, PRESERVATIVE FREE 3 ML: 5 INJECTION INTRAVENOUS at 20:17

## 2019-01-01 RX ADMIN — DOXYCYCLINE 100 MG: 100 TABLET ORAL at 08:34

## 2019-01-01 RX ADMIN — MEGESTROL ACETATE 800 MG: 40 SUSPENSION ORAL at 12:15

## 2019-01-01 RX ADMIN — FAMOTIDINE 20 MG: 20 TABLET, FILM COATED ORAL at 08:34

## 2019-01-01 RX ADMIN — Medication 3 MG: at 21:15

## 2019-01-01 RX ADMIN — FAMOTIDINE 20 MG: 20 TABLET, FILM COATED ORAL at 10:23

## 2019-01-01 RX ADMIN — DENOSUMAB 120 MG: 120 INJECTION SUBCUTANEOUS at 10:29

## 2019-01-01 RX ADMIN — POTASSIUM CHLORIDE 20 MEQ: 750 CAPSULE, EXTENDED RELEASE ORAL at 16:15

## 2019-01-01 RX ADMIN — MEGESTROL ACETATE 800 MG: 40 SUSPENSION ORAL at 16:15

## 2019-01-01 RX ADMIN — ACETAMINOPHEN 650 MG: 325 TABLET, FILM COATED ORAL at 19:51

## 2019-01-01 RX ADMIN — Medication 250 MG: at 21:13

## 2019-01-01 RX ADMIN — SERTRALINE HYDROCHLORIDE 75 MG: 25 TABLET ORAL at 08:15

## 2019-01-01 RX ADMIN — LIDOCAINE HYDROCHLORIDE 10 ML: 10 INJECTION, SOLUTION INFILTRATION; PERINEURAL at 11:57

## 2019-01-01 RX ADMIN — SODIUM CHLORIDE 100 ML/HR: 9 INJECTION, SOLUTION INTRAVENOUS at 10:47

## 2019-01-01 RX ADMIN — SENNOSIDES AND DOCUSATE SODIUM 2 TABLET: 8.6; 5 TABLET ORAL at 20:34

## 2019-01-01 RX ADMIN — Medication: at 22:27

## 2019-01-01 RX ADMIN — Medication 250 MG: at 20:33

## 2019-01-01 RX ADMIN — SODIUM BICARBONATE 650 MG: 650 TABLET ORAL at 11:57

## 2019-01-01 RX ADMIN — SODIUM BICARBONATE 650 MG: 650 TABLET ORAL at 21:15

## 2019-01-01 RX ADMIN — SODIUM BICARBONATE 650 MG: 650 TABLET ORAL at 14:42

## 2019-01-01 RX ADMIN — SODIUM BICARBONATE 650 MG: 650 TABLET ORAL at 20:34

## 2019-01-01 RX ADMIN — SERTRALINE HYDROCHLORIDE 75 MG: 25 TABLET ORAL at 09:17

## 2019-01-01 RX ADMIN — FUROSEMIDE 20 MG: 10 INJECTION, SOLUTION INTRAVENOUS at 01:08

## 2019-01-01 RX ADMIN — TETANUS TOXOID, REDUCED DIPHTHERIA TOXOID AND ACELLULAR PERTUSSIS VACCINE, ADSORBED 0.5 ML: 5; 2.5; 8; 8; 2.5 SUSPENSION INTRAMUSCULAR at 21:16

## 2019-01-01 RX ADMIN — FAMOTIDINE 20 MG: 20 TABLET, FILM COATED ORAL at 09:42

## 2019-01-01 RX ADMIN — ACETAMINOPHEN 650 MG: 325 TABLET, FILM COATED ORAL at 13:53

## 2019-01-01 RX ADMIN — SODIUM CHLORIDE, POTASSIUM CHLORIDE, SODIUM LACTATE AND CALCIUM CHLORIDE 50 ML/HR: 600; 310; 30; 20 INJECTION, SOLUTION INTRAVENOUS at 12:58

## 2019-01-01 RX ADMIN — Medication: at 06:58

## 2019-01-01 RX ADMIN — DOXYCYCLINE 100 MG: 100 TABLET ORAL at 09:58

## 2019-01-01 RX ADMIN — METOPROLOL SUCCINATE 25 MG: 25 TABLET, FILM COATED, EXTENDED RELEASE ORAL at 17:49

## 2019-01-01 RX ADMIN — SODIUM CHLORIDE, PRESERVATIVE FREE 3 ML: 5 INJECTION INTRAVENOUS at 21:14

## 2019-01-01 RX ADMIN — POTASSIUM CHLORIDE 20 MEQ: 750 CAPSULE, EXTENDED RELEASE ORAL at 09:57

## 2019-01-01 RX ADMIN — SODIUM BICARBONATE 650 MG: 650 TABLET ORAL at 09:43

## 2019-01-01 RX ADMIN — SODIUM CHLORIDE, POTASSIUM CHLORIDE, SODIUM LACTATE AND CALCIUM CHLORIDE 50 ML/HR: 600; 310; 30; 20 INJECTION, SOLUTION INTRAVENOUS at 08:14

## 2019-01-01 RX ADMIN — Medication 3 MG: at 21:13

## 2019-01-01 RX ADMIN — SODIUM CHLORIDE 100 ML/HR: 9 INJECTION, SOLUTION INTRAVENOUS at 18:30

## 2019-01-01 RX ADMIN — ERYTHROPOIETIN 40000 UNITS: 40000 INJECTION, SOLUTION INTRAVENOUS; SUBCUTANEOUS at 10:18

## 2019-01-01 RX ADMIN — LIDOCAINE 1 PATCH: 50 PATCH CUTANEOUS at 09:18

## 2019-01-01 RX ADMIN — LIDOCAINE 1 PATCH: 50 PATCH CUTANEOUS at 13:06

## 2019-01-01 RX ADMIN — METAXALONE 400 MG: 800 TABLET ORAL at 20:34

## 2019-01-01 RX ADMIN — SERTRALINE HYDROCHLORIDE 75 MG: 25 TABLET ORAL at 09:58

## 2019-01-01 RX ADMIN — SODIUM BICARBONATE 650 MG: 650 TABLET ORAL at 18:06

## 2019-01-01 RX ADMIN — SODIUM BICARBONATE 650 MG: 650 TABLET ORAL at 08:13

## 2019-01-01 RX ADMIN — Medication 250 MG: at 08:34

## 2019-01-01 RX ADMIN — DOXYCYCLINE 100 MG: 100 TABLET ORAL at 20:17

## 2019-01-01 RX ADMIN — FUROSEMIDE 20 MG: 10 INJECTION, SOLUTION INTRAVENOUS at 21:38

## 2019-01-01 RX ADMIN — LIDOCAINE 1 PATCH: 50 PATCH CUTANEOUS at 10:23

## 2019-01-01 RX ADMIN — LIDOCAINE 1 PATCH: 50 PATCH CUTANEOUS at 09:58

## 2019-01-01 RX ADMIN — SERTRALINE HYDROCHLORIDE 75 MG: 25 TABLET ORAL at 08:34

## 2019-01-01 RX ADMIN — Medication: at 08:34

## 2019-01-01 RX ADMIN — DIPHENHYDRAMINE HYDROCHLORIDE 25 MG: 50 INJECTION INTRAMUSCULAR; INTRAVENOUS at 09:50

## 2019-01-01 RX ADMIN — DOXYCYCLINE 100 MG: 100 TABLET ORAL at 08:15

## 2019-01-01 RX ADMIN — METAXALONE 400 MG: 800 TABLET ORAL at 16:16

## 2019-01-01 RX ADMIN — FERRIC CARBOXYMALTOSE INJECTION 750 MG: 50 INJECTION, SOLUTION INTRAVENOUS at 11:34

## 2019-01-01 RX ADMIN — SODIUM CHLORIDE, PRESERVATIVE FREE 3 ML: 5 INJECTION INTRAVENOUS at 10:24

## 2019-01-01 RX ADMIN — Medication 250 MG: at 09:42

## 2019-01-01 RX ADMIN — ACETAMINOPHEN 650 MG: 325 TABLET, FILM COATED ORAL at 17:23

## 2019-01-01 RX ADMIN — MEGESTROL ACETATE 800 MG: 40 SUSPENSION ORAL at 10:23

## 2019-01-01 RX ADMIN — POTASSIUM CHLORIDE 20 MEQ: 750 CAPSULE, EXTENDED RELEASE ORAL at 12:15

## 2019-01-01 RX ADMIN — SODIUM CHLORIDE 250 ML: 9 INJECTION, SOLUTION INTRAVENOUS at 09:51

## 2019-01-01 RX ADMIN — ACETAMINOPHEN 650 MG: 325 TABLET, FILM COATED ORAL at 07:48

## 2019-01-01 RX ADMIN — SODIUM BICARBONATE 650 MG: 650 TABLET ORAL at 08:34

## 2019-01-01 RX ADMIN — SODIUM BICARBONATE 650 MG: 650 TABLET ORAL at 11:53

## 2019-01-01 RX ADMIN — DOXYCYCLINE 100 MG: 100 TABLET ORAL at 20:00

## 2019-01-01 RX ADMIN — SERTRALINE HYDROCHLORIDE 75 MG: 25 TABLET ORAL at 08:13

## 2019-01-01 RX ADMIN — MEGESTROL ACETATE 800 MG: 40 SUSPENSION ORAL at 09:18

## 2019-01-01 RX ADMIN — SODIUM CHLORIDE 100 ML/HR: 9 INJECTION, SOLUTION INTRAVENOUS at 08:53

## 2019-01-01 RX ADMIN — DIPHENHYDRAMINE HYDROCHLORIDE 25 MG: 50 INJECTION INTRAMUSCULAR; INTRAVENOUS at 11:02

## 2019-01-01 RX ADMIN — CALCITRIOL 0.25 MCG: 0.25 CAPSULE ORAL at 10:23

## 2019-01-01 RX ADMIN — METAXALONE 400 MG: 800 TABLET ORAL at 21:13

## 2019-01-01 RX ADMIN — Medication 250 MG: at 10:23

## 2019-01-01 RX ADMIN — SODIUM BICARBONATE 650 MG: 650 TABLET ORAL at 09:57

## 2019-01-01 RX ADMIN — SODIUM BICARBONATE 650 MG: 650 TABLET ORAL at 12:20

## 2019-01-01 RX ADMIN — LIDOCAINE 1 PATCH: 50 PATCH CUTANEOUS at 08:14

## 2019-01-01 RX ADMIN — FUROSEMIDE 40 MG: 10 INJECTION, SOLUTION INTRAVENOUS at 18:40

## 2019-01-01 RX ADMIN — Medication 3 MG: at 21:16

## 2019-01-01 RX ADMIN — ZIPRASIDONE MESYLATE 10 MG: 20 INJECTION, POWDER, LYOPHILIZED, FOR SOLUTION INTRAMUSCULAR at 23:37

## 2019-01-01 RX ADMIN — SODIUM BICARBONATE 650 MG: 650 TABLET ORAL at 16:16

## 2019-01-01 RX ADMIN — LIDOCAINE 1 PATCH: 50 PATCH CUTANEOUS at 12:16

## 2019-01-01 RX ADMIN — SODIUM BICARBONATE 650 MG: 650 TABLET ORAL at 10:23

## 2019-01-01 RX ADMIN — Medication 1 TABLET: at 12:15

## 2019-01-01 RX ADMIN — SODIUM BICARBONATE 650 MG: 650 TABLET ORAL at 17:23

## 2019-01-01 RX ADMIN — SODIUM BICARBONATE 650 MG: 650 TABLET ORAL at 13:01

## 2019-01-01 RX ADMIN — METAXALONE 400 MG: 800 TABLET ORAL at 10:22

## 2019-01-01 RX ADMIN — SODIUM BICARBONATE 650 MG: 650 TABLET ORAL at 08:15

## 2019-01-01 RX ADMIN — SODIUM CHLORIDE 100 ML/HR: 9 INJECTION, SOLUTION INTRAVENOUS at 21:10

## 2019-01-01 RX ADMIN — SODIUM CHLORIDE, PRESERVATIVE FREE 3 ML: 5 INJECTION INTRAVENOUS at 21:25

## 2019-01-01 RX ADMIN — Medication 1 TABLET: at 09:17

## 2019-01-01 RX ADMIN — CALCITRIOL 0.25 MCG: 0.25 CAPSULE ORAL at 12:15

## 2019-01-01 RX ADMIN — FUROSEMIDE 40 MG: 10 INJECTION, SOLUTION INTRAVENOUS at 06:58

## 2019-01-01 RX ADMIN — ACETAMINOPHEN 650 MG: 325 TABLET, FILM COATED ORAL at 10:53

## 2019-01-01 RX ADMIN — Medication 250 MG: at 09:58

## 2019-01-01 RX ADMIN — CALCITRIOL 0.25 MCG: 0.25 CAPSULE ORAL at 09:17

## 2019-01-01 RX ADMIN — LIDOCAINE 1 PATCH: 50 PATCH CUTANEOUS at 08:34

## 2019-01-01 RX ADMIN — POTASSIUM CHLORIDE 20 MEQ: 750 CAPSULE, EXTENDED RELEASE ORAL at 08:12

## 2019-01-01 RX ADMIN — CALCITRIOL 0.25 MCG: 0.25 CAPSULE ORAL at 08:13

## 2019-01-01 RX ADMIN — DENOSUMAB 120 MG: 120 INJECTION SUBCUTANEOUS at 10:13

## 2019-01-01 RX ADMIN — FAMOTIDINE 20 MG: 20 TABLET, FILM COATED ORAL at 09:58

## 2019-01-01 RX ADMIN — Medication 250 MG: at 20:34

## 2019-01-01 RX ADMIN — LIDOCAINE 1 PATCH: 50 PATCH CUTANEOUS at 08:13

## 2019-01-01 RX ADMIN — POTASSIUM CHLORIDE 20 MEQ: 750 CAPSULE, EXTENDED RELEASE ORAL at 08:14

## 2019-01-01 RX ADMIN — Medication 250 MG: at 20:00

## 2019-01-01 RX ADMIN — DOXYCYCLINE 100 MG: 100 INJECTION, POWDER, LYOPHILIZED, FOR SOLUTION INTRAVENOUS at 04:36

## 2019-01-01 RX ADMIN — Medication: at 13:00

## 2019-01-01 RX ADMIN — SODIUM CHLORIDE, PRESERVATIVE FREE 3 ML: 5 INJECTION INTRAVENOUS at 09:42

## 2019-01-01 RX ADMIN — SODIUM CHLORIDE, PRESERVATIVE FREE 3 ML: 5 INJECTION INTRAVENOUS at 09:18

## 2019-01-01 RX ADMIN — SERTRALINE HYDROCHLORIDE 75 MG: 25 TABLET ORAL at 09:41

## 2019-01-01 RX ADMIN — Medication 250 MG: at 20:17

## 2019-01-01 RX ADMIN — Medication 6 MG: at 20:33

## 2019-01-01 RX ADMIN — METAXALONE 400 MG: 800 TABLET ORAL at 20:33

## 2019-01-01 RX ADMIN — SENNOSIDES AND DOCUSATE SODIUM 2 TABLET: 8.6; 5 TABLET ORAL at 21:15

## 2019-01-01 RX ADMIN — DOXYCYCLINE 100 MG: 100 INJECTION, POWDER, LYOPHILIZED, FOR SOLUTION INTRAVENOUS at 05:50

## 2019-01-01 RX ADMIN — Medication 250 MG: at 12:14

## 2019-01-01 RX ADMIN — SODIUM CHLORIDE 250 ML: 9 INJECTION, SOLUTION INTRAVENOUS at 10:36

## 2019-01-01 RX ADMIN — DOXYCYCLINE 100 MG: 100 INJECTION, POWDER, LYOPHILIZED, FOR SOLUTION INTRAVENOUS at 21:24

## 2019-01-01 RX ADMIN — FAMOTIDINE 20 MG: 20 TABLET, FILM COATED ORAL at 12:15

## 2019-01-01 RX ADMIN — DOXYCYCLINE 100 MG: 100 TABLET ORAL at 21:15

## 2019-01-01 RX ADMIN — FUROSEMIDE 20 MG: 20 TABLET ORAL at 10:36

## 2019-01-01 RX ADMIN — POTASSIUM CHLORIDE 20 MEQ: 750 CAPSULE, EXTENDED RELEASE ORAL at 09:17

## 2019-01-01 RX ADMIN — Medication 3 MG: at 20:17

## 2019-01-01 RX ADMIN — DOXYCYCLINE 100 MG: 100 TABLET ORAL at 08:13

## 2019-01-01 RX ADMIN — FAMOTIDINE 20 MG: 20 TABLET, FILM COATED ORAL at 09:18

## 2019-01-01 RX ADMIN — Medication 250 MG: at 13:06

## 2019-01-08 ENCOUNTER — TRANSCRIBE ORDERS (OUTPATIENT)
Dept: ADMINISTRATIVE | Facility: HOSPITAL | Age: 84
End: 2019-01-08

## 2019-01-08 DIAGNOSIS — C90.00 LIGHT CHAIN MYELOMA (HCC): Primary | ICD-10-CM

## 2019-01-08 DIAGNOSIS — D63.8 ANEMIA OF CHRONIC DISORDER: ICD-10-CM

## 2019-01-09 ENCOUNTER — LAB (OUTPATIENT)
Dept: LAB | Facility: HOSPITAL | Age: 84
End: 2019-01-09

## 2019-01-09 ENCOUNTER — INFUSION (OUTPATIENT)
Dept: ONCOLOGY | Facility: HOSPITAL | Age: 84
End: 2019-01-09

## 2019-01-09 VITALS
WEIGHT: 141 LBS | HEART RATE: 62 BPM | BODY MASS INDEX: 20.19 KG/M2 | RESPIRATION RATE: 20 BRPM | DIASTOLIC BLOOD PRESSURE: 54 MMHG | HEIGHT: 70 IN | SYSTOLIC BLOOD PRESSURE: 126 MMHG | TEMPERATURE: 98.1 F

## 2019-01-09 DIAGNOSIS — D63.8 ANEMIA OF CHRONIC DISEASE: ICD-10-CM

## 2019-01-09 DIAGNOSIS — N18.9 CHRONIC KIDNEY DISEASE, UNSPECIFIED CKD STAGE: ICD-10-CM

## 2019-01-09 DIAGNOSIS — D63.1 ANEMIA ASSOCIATED WITH CHRONIC RENAL FAILURE: ICD-10-CM

## 2019-01-09 DIAGNOSIS — D63.8 ANEMIA OF CHRONIC DISEASE: Primary | ICD-10-CM

## 2019-01-09 DIAGNOSIS — D63.8 ANEMIA OF CHRONIC DISORDER: ICD-10-CM

## 2019-01-09 DIAGNOSIS — C90.00 LIGHT CHAIN MYELOMA (HCC): ICD-10-CM

## 2019-01-09 DIAGNOSIS — N18.9 ANEMIA ASSOCIATED WITH CHRONIC RENAL FAILURE: ICD-10-CM

## 2019-01-09 LAB
ABO GROUP BLD: NORMAL
ALBUMIN SERPL-MCNC: 3.5 G/DL (ref 3.5–5)
ALBUMIN/GLOB SERPL: 1.1 G/DL (ref 1.1–2.5)
ALP SERPL-CCNC: 69 U/L (ref 24–120)
ALT SERPL W P-5'-P-CCNC: 43 U/L (ref 0–54)
ANION GAP SERPL CALCULATED.3IONS-SCNC: 10 MMOL/L (ref 4–13)
AST SERPL-CCNC: 58 U/L (ref 7–45)
BASOPHILS # BLD AUTO: 0.01 10*3/MM3 (ref 0–0.2)
BASOPHILS NFR BLD AUTO: 0.1 % (ref 0–2)
BILIRUB SERPL-MCNC: 0.8 MG/DL (ref 0.1–1)
BLD GP AB SCN SERPL QL: NEGATIVE
BUN BLD-MCNC: 32 MG/DL (ref 5–21)
BUN/CREAT SERPL: 21.1 (ref 7–25)
CALCIUM SPEC-SCNC: 8.8 MG/DL (ref 8.4–10.4)
CHLORIDE SERPL-SCNC: 94 MMOL/L (ref 98–110)
CO2 SERPL-SCNC: 32 MMOL/L (ref 24–31)
CREAT BLD-MCNC: 1.52 MG/DL (ref 0.5–1.4)
DEPRECATED RDW RBC AUTO: 56.2 FL (ref 40–54)
EOSINOPHIL # BLD AUTO: 0 10*3/MM3 (ref 0–0.7)
EOSINOPHIL NFR BLD AUTO: 0 % (ref 0–4)
ERYTHROCYTE [DISTWIDTH] IN BLOOD BY AUTOMATED COUNT: 16.6 % (ref 12–15)
FERRITIN SERPL-MCNC: 1880 NG/ML (ref 17.9–464)
GFR SERPL CREATININE-BSD FRML MDRD: 44 ML/MIN/1.73
GLOBULIN UR ELPH-MCNC: 3.1 GM/DL
GLUCOSE BLD-MCNC: 162 MG/DL (ref 70–100)
HCT VFR BLD AUTO: 26 % (ref 40–52)
HGB BLD-MCNC: 7.8 G/DL (ref 14–18)
IMM GRANULOCYTES # BLD AUTO: 0.06 10*3/MM3 (ref 0–0.03)
IMM GRANULOCYTES NFR BLD AUTO: 0.8 % (ref 0–5)
IRON 24H UR-MRATE: 38 MCG/DL (ref 42–180)
IRON SATN MFR SERPL: 21 % (ref 20–45)
LYMPHOCYTES # BLD AUTO: 0.57 10*3/MM3 (ref 0.72–4.86)
LYMPHOCYTES NFR BLD AUTO: 7.4 % (ref 15–45)
MCH RBC QN AUTO: 28.4 PG (ref 28–32)
MCHC RBC AUTO-ENTMCNC: 30 G/DL (ref 33–36)
MCV RBC AUTO: 94.5 FL (ref 82–95)
MONOCYTES # BLD AUTO: 0.65 10*3/MM3 (ref 0.19–1.3)
MONOCYTES NFR BLD AUTO: 8.5 % (ref 4–12)
NEUTROPHILS # BLD AUTO: 6.4 10*3/MM3 (ref 1.87–8.4)
NEUTROPHILS NFR BLD AUTO: 83.2 % (ref 39–78)
NRBC BLD AUTO-RTO: 0 /100 WBC (ref 0–0)
PLATELET # BLD AUTO: 149 10*3/MM3 (ref 130–400)
PMV BLD AUTO: 9.1 FL (ref 6–12)
POTASSIUM BLD-SCNC: 4.2 MMOL/L (ref 3.5–5.3)
PROT SERPL-MCNC: 6.6 G/DL (ref 6.3–8.7)
RBC # BLD AUTO: 2.75 10*6/MM3 (ref 4.8–5.9)
RH BLD: POSITIVE
SODIUM BLD-SCNC: 136 MMOL/L (ref 135–145)
T&S EXPIRATION DATE: NORMAL
TIBC SERPL-MCNC: 178 MCG/DL (ref 225–420)
WBC NRBC COR # BLD: 7.69 10*3/MM3 (ref 4.8–10.8)

## 2019-01-09 PROCEDURE — 80053 COMPREHEN METABOLIC PANEL: CPT | Performed by: INTERNAL MEDICINE

## 2019-01-09 PROCEDURE — 83540 ASSAY OF IRON: CPT | Performed by: INTERNAL MEDICINE

## 2019-01-09 PROCEDURE — 83550 IRON BINDING TEST: CPT | Performed by: INTERNAL MEDICINE

## 2019-01-09 PROCEDURE — 25010000002 EPOETIN ALFA PER 1000 UNITS: Performed by: INTERNAL MEDICINE

## 2019-01-09 PROCEDURE — 86901 BLOOD TYPING SEROLOGIC RH(D): CPT | Performed by: INTERNAL MEDICINE

## 2019-01-09 PROCEDURE — 85025 COMPLETE CBC W/AUTO DIFF WBC: CPT | Performed by: INTERNAL MEDICINE

## 2019-01-09 PROCEDURE — 96372 THER/PROPH/DIAG INJ SC/IM: CPT

## 2019-01-09 PROCEDURE — 86900 BLOOD TYPING SEROLOGIC ABO: CPT | Performed by: INTERNAL MEDICINE

## 2019-01-09 PROCEDURE — 82728 ASSAY OF FERRITIN: CPT | Performed by: INTERNAL MEDICINE

## 2019-01-09 PROCEDURE — 86850 RBC ANTIBODY SCREEN: CPT | Performed by: INTERNAL MEDICINE

## 2019-01-09 PROCEDURE — 86923 COMPATIBILITY TEST ELECTRIC: CPT

## 2019-01-09 PROCEDURE — 36415 COLL VENOUS BLD VENIPUNCTURE: CPT

## 2019-01-09 RX ORDER — FUROSEMIDE 10 MG/ML
20 INJECTION INTRAMUSCULAR; INTRAVENOUS ONCE
Status: CANCELLED | OUTPATIENT
Start: 2019-01-09 | End: 2019-01-09

## 2019-01-09 RX ORDER — DIPHENHYDRAMINE HYDROCHLORIDE 50 MG/ML
25 INJECTION INTRAMUSCULAR; INTRAVENOUS ONCE
Status: CANCELLED | OUTPATIENT
Start: 2019-01-09 | End: 2019-01-09

## 2019-01-09 RX ORDER — SODIUM CHLORIDE 9 MG/ML
250 INJECTION, SOLUTION INTRAVENOUS AS NEEDED
Status: CANCELLED | OUTPATIENT
Start: 2019-01-09

## 2019-01-09 RX ADMIN — ERYTHROPOIETIN 40000 UNITS: 40000 INJECTION, SOLUTION INTRAVENOUS; SUBCUTANEOUS at 11:54

## 2019-01-09 NOTE — PROGRESS NOTES
1142 Called Dr. Julio and [facility] office and spoke with Sierra WORRELL about lab results. Our ferritin level is taking a long time to result and need to discuss blood scheduling. Sierra d/w doctor and ok to give procrit based on todays labs resulted and ferritin level from 12/11/18 that was greater than 1000. We are scheduling blood for early tomorrow and doing type and cross today. Lorin Urias RN

## 2019-01-10 ENCOUNTER — INFUSION (OUTPATIENT)
Dept: ONCOLOGY | Facility: HOSPITAL | Age: 84
End: 2019-01-10

## 2019-01-10 VITALS
HEIGHT: 70 IN | OXYGEN SATURATION: 93 % | BODY MASS INDEX: 20.47 KG/M2 | HEART RATE: 63 BPM | SYSTOLIC BLOOD PRESSURE: 130 MMHG | RESPIRATION RATE: 18 BRPM | DIASTOLIC BLOOD PRESSURE: 62 MMHG | TEMPERATURE: 97.9 F | WEIGHT: 143 LBS

## 2019-01-10 DIAGNOSIS — D63.8 ANEMIA OF CHRONIC DISEASE: ICD-10-CM

## 2019-01-10 PROCEDURE — 96375 TX/PRO/DX INJ NEW DRUG ADDON: CPT

## 2019-01-10 PROCEDURE — 25010000002 FUROSEMIDE PER 20 MG: Performed by: INTERNAL MEDICINE

## 2019-01-10 PROCEDURE — 86900 BLOOD TYPING SEROLOGIC ABO: CPT

## 2019-01-10 PROCEDURE — P9016 RBC LEUKOCYTES REDUCED: HCPCS

## 2019-01-10 PROCEDURE — 25010000002 DIPHENHYDRAMINE PER 50 MG: Performed by: INTERNAL MEDICINE

## 2019-01-10 PROCEDURE — 96374 THER/PROPH/DIAG INJ IV PUSH: CPT

## 2019-01-10 PROCEDURE — 36430 TRANSFUSION BLD/BLD COMPNT: CPT

## 2019-01-10 RX ORDER — FUROSEMIDE 10 MG/ML
20 INJECTION INTRAMUSCULAR; INTRAVENOUS ONCE
Status: COMPLETED | OUTPATIENT
Start: 2019-01-10 | End: 2019-01-10

## 2019-01-10 RX ORDER — FUROSEMIDE 10 MG/ML
20 INJECTION INTRAMUSCULAR; INTRAVENOUS ONCE
Status: DISCONTINUED | OUTPATIENT
Start: 2019-01-10 | End: 2019-01-01 | Stop reason: HOSPADM

## 2019-01-10 RX ORDER — DIPHENHYDRAMINE HYDROCHLORIDE 50 MG/ML
25 INJECTION INTRAMUSCULAR; INTRAVENOUS ONCE
Status: COMPLETED | OUTPATIENT
Start: 2019-01-10 | End: 2019-01-10

## 2019-01-10 RX ORDER — ACETAMINOPHEN 500 MG
500 TABLET ORAL ONCE
Status: COMPLETED | OUTPATIENT
Start: 2019-01-10 | End: 2019-01-10

## 2019-01-10 RX ORDER — SODIUM CHLORIDE 9 MG/ML
250 INJECTION, SOLUTION INTRAVENOUS AS NEEDED
Status: DISCONTINUED | OUTPATIENT
Start: 2019-01-10 | End: 2019-01-01 | Stop reason: HOSPADM

## 2019-01-10 RX ADMIN — SODIUM CHLORIDE 250 ML: 9 INJECTION, SOLUTION INTRAVENOUS at 08:15

## 2019-01-10 RX ADMIN — FUROSEMIDE 20 MG: 10 INJECTION, SOLUTION INTRAMUSCULAR; INTRAVENOUS at 11:34

## 2019-01-10 RX ADMIN — DIPHENHYDRAMINE HYDROCHLORIDE 25 MG: 50 INJECTION, SOLUTION INTRAMUSCULAR; INTRAVENOUS at 08:28

## 2019-01-10 RX ADMIN — ACETAMINOPHEN 500 MG: 500 TABLET, FILM COATED ORAL at 08:28

## 2019-01-11 LAB
ABO + RH BLD: NORMAL
ABO + RH BLD: NORMAL
BH BB BLOOD EXPIRATION DATE: NORMAL
BH BB BLOOD EXPIRATION DATE: NORMAL
BH BB BLOOD TYPE BARCODE: 8400
BH BB BLOOD TYPE BARCODE: 8400
BH BB DISPENSE STATUS: NORMAL
BH BB DISPENSE STATUS: NORMAL
BH BB PRODUCT CODE: NORMAL
BH BB PRODUCT CODE: NORMAL
BH BB UNIT NUMBER: NORMAL
BH BB UNIT NUMBER: NORMAL
UNIT  ABO: NORMAL
UNIT  ABO: NORMAL
UNIT  RH: NORMAL
UNIT  RH: NORMAL

## 2019-01-23 ENCOUNTER — INFUSION (OUTPATIENT)
Dept: ONCOLOGY | Facility: HOSPITAL | Age: 84
End: 2019-01-23

## 2019-01-23 ENCOUNTER — LAB (OUTPATIENT)
Dept: LAB | Facility: HOSPITAL | Age: 84
End: 2019-01-23

## 2019-01-23 VITALS
HEART RATE: 88 BPM | TEMPERATURE: 97.7 F | DIASTOLIC BLOOD PRESSURE: 66 MMHG | OXYGEN SATURATION: 96 % | HEIGHT: 70 IN | BODY MASS INDEX: 20.04 KG/M2 | WEIGHT: 140 LBS | RESPIRATION RATE: 18 BRPM | SYSTOLIC BLOOD PRESSURE: 116 MMHG

## 2019-01-23 DIAGNOSIS — D63.8 ANEMIA OF CHRONIC DISORDER: ICD-10-CM

## 2019-01-23 DIAGNOSIS — C90.00 LIGHT CHAIN MYELOMA (HCC): ICD-10-CM

## 2019-01-23 LAB
ALBUMIN SERPL-MCNC: 4 G/DL (ref 3.5–5)
ALBUMIN/GLOB SERPL: 1.5 G/DL (ref 1.1–2.5)
ALP SERPL-CCNC: 58 U/L (ref 24–120)
ALT SERPL W P-5'-P-CCNC: 20 U/L (ref 0–54)
ANION GAP SERPL CALCULATED.3IONS-SCNC: 11 MMOL/L (ref 4–13)
AST SERPL-CCNC: 32 U/L (ref 7–45)
BASOPHILS # BLD AUTO: 0.02 10*3/MM3 (ref 0–0.2)
BASOPHILS NFR BLD AUTO: 0.2 % (ref 0–2)
BILIRUB SERPL-MCNC: 0.7 MG/DL (ref 0.1–1)
BUN BLD-MCNC: 60 MG/DL (ref 5–21)
BUN/CREAT SERPL: 33.9 (ref 7–25)
CALCIUM SPEC-SCNC: 9.4 MG/DL (ref 8.4–10.4)
CHLORIDE SERPL-SCNC: 99 MMOL/L (ref 98–110)
CO2 SERPL-SCNC: 28 MMOL/L (ref 24–31)
CREAT BLD-MCNC: 1.77 MG/DL (ref 0.5–1.4)
DEPRECATED RDW RBC AUTO: 67.7 FL (ref 40–54)
EOSINOPHIL # BLD AUTO: 0.14 10*3/MM3 (ref 0–0.7)
EOSINOPHIL NFR BLD AUTO: 1.7 % (ref 0–4)
ERYTHROCYTE [DISTWIDTH] IN BLOOD BY AUTOMATED COUNT: 19.8 % (ref 12–15)
FERRITIN SERPL-MCNC: 1270 NG/ML (ref 17.9–464)
GFR SERPL CREATININE-BSD FRML MDRD: 37 ML/MIN/1.73
GLOBULIN UR ELPH-MCNC: 2.6 GM/DL
GLUCOSE BLD-MCNC: 246 MG/DL (ref 70–100)
HCT VFR BLD AUTO: 36.4 % (ref 40–52)
HGB BLD-MCNC: 11.2 G/DL (ref 14–18)
IMM GRANULOCYTES # BLD AUTO: 0.03 10*3/MM3 (ref 0–0.03)
IMM GRANULOCYTES NFR BLD AUTO: 0.4 % (ref 0–5)
IRON 24H UR-MRATE: 59 MCG/DL (ref 42–180)
IRON SATN MFR SERPL: 26 % (ref 20–45)
LYMPHOCYTES # BLD AUTO: 0.8 10*3/MM3 (ref 0.72–4.86)
LYMPHOCYTES NFR BLD AUTO: 9.8 % (ref 15–45)
MCH RBC QN AUTO: 29.1 PG (ref 28–32)
MCHC RBC AUTO-ENTMCNC: 30.8 G/DL (ref 33–36)
MCV RBC AUTO: 94.5 FL (ref 82–95)
MONOCYTES # BLD AUTO: 0.68 10*3/MM3 (ref 0.19–1.3)
MONOCYTES NFR BLD AUTO: 8.4 % (ref 4–12)
NEUTROPHILS # BLD AUTO: 6.46 10*3/MM3 (ref 1.87–8.4)
NEUTROPHILS NFR BLD AUTO: 79.5 % (ref 39–78)
NRBC BLD AUTO-RTO: 0 /100 WBC (ref 0–0)
PLATELET # BLD AUTO: 102 10*3/MM3 (ref 130–400)
PMV BLD AUTO: 9.3 FL (ref 6–12)
POTASSIUM BLD-SCNC: 4.7 MMOL/L (ref 3.5–5.3)
PROT SERPL-MCNC: 6.6 G/DL (ref 6.3–8.7)
RBC # BLD AUTO: 3.85 10*6/MM3 (ref 4.8–5.9)
SODIUM BLD-SCNC: 138 MMOL/L (ref 135–145)
TIBC SERPL-MCNC: 228 MCG/DL (ref 225–420)
WBC NRBC COR # BLD: 8.13 10*3/MM3 (ref 4.8–10.8)

## 2019-01-23 PROCEDURE — 83540 ASSAY OF IRON: CPT | Performed by: INTERNAL MEDICINE

## 2019-01-23 PROCEDURE — 36415 COLL VENOUS BLD VENIPUNCTURE: CPT

## 2019-01-23 PROCEDURE — 83550 IRON BINDING TEST: CPT | Performed by: INTERNAL MEDICINE

## 2019-01-23 PROCEDURE — 80053 COMPREHEN METABOLIC PANEL: CPT | Performed by: INTERNAL MEDICINE

## 2019-01-23 PROCEDURE — 85025 COMPLETE CBC W/AUTO DIFF WBC: CPT | Performed by: INTERNAL MEDICINE

## 2019-01-23 PROCEDURE — 82728 ASSAY OF FERRITIN: CPT | Performed by: INTERNAL MEDICINE

## 2019-02-06 ENCOUNTER — INFUSION (OUTPATIENT)
Dept: ONCOLOGY | Facility: HOSPITAL | Age: 84
End: 2019-02-06

## 2019-02-06 ENCOUNTER — LAB (OUTPATIENT)
Dept: LAB | Facility: HOSPITAL | Age: 84
End: 2019-02-06

## 2019-02-06 VITALS
WEIGHT: 140 LBS | HEART RATE: 95 BPM | OXYGEN SATURATION: 90 % | HEIGHT: 70 IN | TEMPERATURE: 97.3 F | RESPIRATION RATE: 20 BRPM | BODY MASS INDEX: 20.04 KG/M2 | DIASTOLIC BLOOD PRESSURE: 75 MMHG | SYSTOLIC BLOOD PRESSURE: 116 MMHG

## 2019-02-06 DIAGNOSIS — C90.00 LIGHT CHAIN MYELOMA (HCC): ICD-10-CM

## 2019-02-06 DIAGNOSIS — D63.8 ANEMIA OF CHRONIC DISORDER: ICD-10-CM

## 2019-02-06 LAB
BASOPHILS # BLD AUTO: 0.03 10*3/MM3 (ref 0–0.2)
BASOPHILS NFR BLD AUTO: 0.4 % (ref 0–2)
DEPRECATED RDW RBC AUTO: 65.7 FL (ref 40–54)
EOSINOPHIL # BLD AUTO: 0.2 10*3/MM3 (ref 0–0.7)
EOSINOPHIL NFR BLD AUTO: 2.8 % (ref 0–4)
ERYTHROCYTE [DISTWIDTH] IN BLOOD BY AUTOMATED COUNT: 19.3 % (ref 12–15)
HCT VFR BLD AUTO: 35.4 % (ref 40–52)
HGB BLD-MCNC: 11 G/DL (ref 14–18)
IMM GRANULOCYTES # BLD AUTO: 0.03 10*3/MM3 (ref 0–0.03)
IMM GRANULOCYTES NFR BLD AUTO: 0.4 % (ref 0–5)
LYMPHOCYTES # BLD AUTO: 0.9 10*3/MM3 (ref 0.72–4.86)
LYMPHOCYTES NFR BLD AUTO: 12.7 % (ref 15–45)
MCH RBC QN AUTO: 29 PG (ref 28–32)
MCHC RBC AUTO-ENTMCNC: 31.1 G/DL (ref 33–36)
MCV RBC AUTO: 93.4 FL (ref 82–95)
MONOCYTES # BLD AUTO: 0.55 10*3/MM3 (ref 0.19–1.3)
MONOCYTES NFR BLD AUTO: 7.7 % (ref 4–12)
NEUTROPHILS # BLD AUTO: 5.4 10*3/MM3 (ref 1.87–8.4)
NEUTROPHILS NFR BLD AUTO: 76 % (ref 39–78)
NRBC BLD AUTO-RTO: 0 /100 WBC (ref 0–0)
PLATELET # BLD AUTO: 83 10*3/MM3 (ref 130–400)
PMV BLD AUTO: 9.5 FL (ref 6–12)
RBC # BLD AUTO: 3.79 10*6/MM3 (ref 4.8–5.9)
WBC NRBC COR # BLD: 7.11 10*3/MM3 (ref 4.8–10.8)

## 2019-02-06 PROCEDURE — 85025 COMPLETE CBC W/AUTO DIFF WBC: CPT | Performed by: INTERNAL MEDICINE

## 2019-02-06 PROCEDURE — 36415 COLL VENOUS BLD VENIPUNCTURE: CPT

## 2019-02-06 NOTE — PROGRESS NOTES
Injection held per MD orders.  Patient aware to RTC in 2 weeks.  Patient v/u.  No interventions today.  BOWEN Gardiner RN

## 2019-02-20 ENCOUNTER — LAB (OUTPATIENT)
Dept: LAB | Facility: HOSPITAL | Age: 84
End: 2019-02-20

## 2019-02-20 ENCOUNTER — INFUSION (OUTPATIENT)
Dept: ONCOLOGY | Facility: HOSPITAL | Age: 84
End: 2019-02-20

## 2019-02-20 VITALS
BODY MASS INDEX: 20.33 KG/M2 | DIASTOLIC BLOOD PRESSURE: 79 MMHG | SYSTOLIC BLOOD PRESSURE: 112 MMHG | HEIGHT: 70 IN | RESPIRATION RATE: 20 BRPM | HEART RATE: 118 BPM | TEMPERATURE: 97.6 F | OXYGEN SATURATION: 99 % | WEIGHT: 142 LBS

## 2019-02-20 DIAGNOSIS — D63.8 ANEMIA OF CHRONIC DISEASE: Primary | ICD-10-CM

## 2019-02-20 DIAGNOSIS — N18.9 ANEMIA ASSOCIATED WITH CHRONIC RENAL FAILURE: ICD-10-CM

## 2019-02-20 DIAGNOSIS — C90.00 LIGHT CHAIN MYELOMA (HCC): ICD-10-CM

## 2019-02-20 DIAGNOSIS — N18.9 CHRONIC KIDNEY DISEASE, UNSPECIFIED CKD STAGE: ICD-10-CM

## 2019-02-20 DIAGNOSIS — D63.8 ANEMIA OF CHRONIC DISORDER: ICD-10-CM

## 2019-02-20 DIAGNOSIS — D63.1 ANEMIA ASSOCIATED WITH CHRONIC RENAL FAILURE: ICD-10-CM

## 2019-02-20 LAB
ALBUMIN SERPL-MCNC: 4.2 G/DL (ref 3.5–5)
ALBUMIN/GLOB SERPL: 1.5 G/DL (ref 1.1–2.5)
ALP SERPL-CCNC: 58 U/L (ref 24–120)
ALT SERPL W P-5'-P-CCNC: 19 U/L (ref 0–54)
ANION GAP SERPL CALCULATED.3IONS-SCNC: 7 MMOL/L (ref 4–13)
AST SERPL-CCNC: 39 U/L (ref 7–45)
BASOPHILS # BLD AUTO: 0.02 10*3/MM3 (ref 0–0.2)
BASOPHILS NFR BLD AUTO: 0.3 % (ref 0–2)
BILIRUB SERPL-MCNC: 0.8 MG/DL (ref 0.1–1)
BUN BLD-MCNC: 50 MG/DL (ref 5–21)
BUN/CREAT SERPL: 30.1 (ref 7–25)
CALCIUM SPEC-SCNC: 9.2 MG/DL (ref 8.4–10.4)
CHLORIDE SERPL-SCNC: 100 MMOL/L (ref 98–110)
CO2 SERPL-SCNC: 29 MMOL/L (ref 24–31)
CREAT BLD-MCNC: 1.66 MG/DL (ref 0.5–1.4)
DEPRECATED RDW RBC AUTO: 67.2 FL (ref 40–54)
EOSINOPHIL # BLD AUTO: 0.13 10*3/MM3 (ref 0–0.7)
EOSINOPHIL NFR BLD AUTO: 2.1 % (ref 0–4)
ERYTHROCYTE [DISTWIDTH] IN BLOOD BY AUTOMATED COUNT: 19.3 % (ref 12–15)
FERRITIN SERPL-MCNC: 1320 NG/ML (ref 17.9–464)
GFR SERPL CREATININE-BSD FRML MDRD: 39 ML/MIN/1.73
GLOBULIN UR ELPH-MCNC: 2.8 GM/DL
GLUCOSE BLD-MCNC: 161 MG/DL (ref 70–100)
HCT VFR BLD AUTO: 34.2 % (ref 40–52)
HGB BLD-MCNC: 10.6 G/DL (ref 14–18)
IMM GRANULOCYTES # BLD AUTO: 0.03 10*3/MM3 (ref 0–0.05)
IMM GRANULOCYTES NFR BLD AUTO: 0.5 % (ref 0–5)
IRON 24H UR-MRATE: 51 MCG/DL (ref 42–180)
IRON SATN MFR SERPL: 24 % (ref 20–45)
LYMPHOCYTES # BLD AUTO: 0.72 10*3/MM3 (ref 0.72–4.86)
LYMPHOCYTES NFR BLD AUTO: 11.4 % (ref 15–45)
MCH RBC QN AUTO: 29.4 PG (ref 28–32)
MCHC RBC AUTO-ENTMCNC: 31 G/DL (ref 33–36)
MCV RBC AUTO: 94.7 FL (ref 82–95)
MONOCYTES # BLD AUTO: 0.64 10*3/MM3 (ref 0.19–1.3)
MONOCYTES NFR BLD AUTO: 10.1 % (ref 4–12)
NEUTROPHILS # BLD AUTO: 4.8 10*3/MM3 (ref 1.87–8.4)
NEUTROPHILS NFR BLD AUTO: 75.6 % (ref 39–78)
NRBC BLD AUTO-RTO: 0 /100 WBC (ref 0–0)
PLATELET # BLD AUTO: 90 10*3/MM3 (ref 130–400)
PMV BLD AUTO: 9.3 FL (ref 6–12)
POTASSIUM BLD-SCNC: 4.5 MMOL/L (ref 3.5–5.3)
PROT SERPL-MCNC: 7 G/DL (ref 6.3–8.7)
RBC # BLD AUTO: 3.61 10*6/MM3 (ref 4.8–5.9)
SODIUM BLD-SCNC: 136 MMOL/L (ref 135–145)
TIBC SERPL-MCNC: 217 MCG/DL (ref 225–420)
WBC NRBC COR # BLD: 6.34 10*3/MM3 (ref 4.8–10.8)

## 2019-02-20 PROCEDURE — 83540 ASSAY OF IRON: CPT | Performed by: INTERNAL MEDICINE

## 2019-02-20 PROCEDURE — 96372 THER/PROPH/DIAG INJ SC/IM: CPT

## 2019-02-20 PROCEDURE — 80053 COMPREHEN METABOLIC PANEL: CPT | Performed by: INTERNAL MEDICINE

## 2019-02-20 PROCEDURE — 36415 COLL VENOUS BLD VENIPUNCTURE: CPT

## 2019-02-20 PROCEDURE — 85025 COMPLETE CBC W/AUTO DIFF WBC: CPT | Performed by: INTERNAL MEDICINE

## 2019-02-20 PROCEDURE — 25010000002 EPOETIN ALFA PER 1000 UNITS: Performed by: INTERNAL MEDICINE

## 2019-02-20 PROCEDURE — 82728 ASSAY OF FERRITIN: CPT | Performed by: INTERNAL MEDICINE

## 2019-02-20 PROCEDURE — 83550 IRON BINDING TEST: CPT | Performed by: INTERNAL MEDICINE

## 2019-02-20 RX ADMIN — ERYTHROPOIETIN 40000 UNITS: 40000 INJECTION, SOLUTION INTRAVENOUS; SUBCUTANEOUS at 11:26

## 2019-03-05 ENCOUNTER — LAB REQUISITION (OUTPATIENT)
Dept: LAB | Facility: HOSPITAL | Age: 84
End: 2019-03-05

## 2019-03-05 DIAGNOSIS — Z00.00 ENCOUNTER FOR GENERAL ADULT MEDICAL EXAMINATION WITHOUT ABNORMAL FINDINGS: ICD-10-CM

## 2019-03-05 PROCEDURE — 84155 ASSAY OF PROTEIN SERUM: CPT | Performed by: INTERNAL MEDICINE

## 2019-03-05 PROCEDURE — 83883 ASSAY NEPHELOMETRY NOT SPEC: CPT | Performed by: INTERNAL MEDICINE

## 2019-03-05 PROCEDURE — 84165 PROTEIN E-PHORESIS SERUM: CPT | Performed by: INTERNAL MEDICINE

## 2019-03-06 ENCOUNTER — LAB (OUTPATIENT)
Dept: LAB | Facility: HOSPITAL | Age: 84
End: 2019-03-06

## 2019-03-06 ENCOUNTER — LAB (OUTPATIENT)
Dept: ONCOLOGY | Facility: HOSPITAL | Age: 84
End: 2019-03-06

## 2019-03-06 DIAGNOSIS — C90.00 LIGHT CHAIN MYELOMA (HCC): Primary | ICD-10-CM

## 2019-03-06 DIAGNOSIS — D63.8 ANEMIA OF CHRONIC DISORDER: ICD-10-CM

## 2019-03-06 LAB
BASOPHILS # BLD AUTO: 0.02 10*3/MM3 (ref 0–0.2)
BASOPHILS NFR BLD AUTO: 0.5 % (ref 0–2)
DEPRECATED RDW RBC AUTO: 68.5 FL (ref 40–54)
EOSINOPHIL # BLD AUTO: 0.1 10*3/MM3 (ref 0–0.7)
EOSINOPHIL NFR BLD AUTO: 2.3 % (ref 0–4)
ERYTHROCYTE [DISTWIDTH] IN BLOOD BY AUTOMATED COUNT: 19.4 % (ref 12–15)
HCT VFR BLD AUTO: 35.6 % (ref 40–52)
HGB BLD-MCNC: 10.9 G/DL (ref 14–18)
HOLD SPECIMEN: NORMAL
IMM GRANULOCYTES # BLD AUTO: 0.02 10*3/MM3 (ref 0–0.05)
IMM GRANULOCYTES NFR BLD AUTO: 0.5 % (ref 0–5)
KAPPA LC SERPL-MCNC: 46.5 MG/L (ref 3.3–19.4)
KAPPA LC/LAMBDA SER: 1.72 {RATIO} (ref 0.26–1.65)
LAMBDA LC FREE SERPL-MCNC: 27.1 MG/L (ref 5.7–26.3)
LYMPHOCYTES # BLD AUTO: 0.84 10*3/MM3 (ref 0.72–4.86)
LYMPHOCYTES NFR BLD AUTO: 19.7 % (ref 15–45)
MCH RBC QN AUTO: 29.5 PG (ref 28–32)
MCHC RBC AUTO-ENTMCNC: 30.6 G/DL (ref 33–36)
MCV RBC AUTO: 96.2 FL (ref 82–95)
MONOCYTES # BLD AUTO: 0.39 10*3/MM3 (ref 0.19–1.3)
MONOCYTES NFR BLD AUTO: 9.1 % (ref 4–12)
NEUTROPHILS # BLD AUTO: 2.9 10*3/MM3 (ref 1.87–8.4)
NEUTROPHILS NFR BLD AUTO: 67.9 % (ref 39–78)
NRBC BLD AUTO-RTO: 0 /100 WBC (ref 0–0)
PLATELET # BLD AUTO: 89 10*3/MM3 (ref 130–400)
PMV BLD AUTO: 9.2 FL (ref 6–12)
RBC # BLD AUTO: 3.7 10*6/MM3 (ref 4.8–5.9)
WBC NRBC COR # BLD: 4.27 10*3/MM3 (ref 4.8–10.8)

## 2019-03-06 PROCEDURE — 96372 THER/PROPH/DIAG INJ SC/IM: CPT

## 2019-03-06 PROCEDURE — 25010000002 DENOSUMAB 120 MG/1.7ML SOLUTION: Performed by: INTERNAL MEDICINE

## 2019-03-06 PROCEDURE — 36415 COLL VENOUS BLD VENIPUNCTURE: CPT

## 2019-03-06 PROCEDURE — 85025 COMPLETE CBC W/AUTO DIFF WBC: CPT

## 2019-03-06 RX ORDER — FUROSEMIDE 10 MG/ML
20 INJECTION INTRAMUSCULAR; INTRAVENOUS 2 TIMES DAILY PRN
Status: CANCELLED
Start: 2019-03-06

## 2019-03-06 RX ORDER — ACETAMINOPHEN 500 MG
500 TABLET ORAL ONCE AS NEEDED
Status: CANCELLED
Start: 2019-03-06

## 2019-03-06 RX ADMIN — DENOSUMAB 120 MG: 120 INJECTION SUBCUTANEOUS at 13:10

## 2019-03-07 LAB
ALBUMIN SERPL-MCNC: 3.4 G/DL (ref 2.9–4.4)
ALBUMIN/GLOB SERPL: 1.1 {RATIO} (ref 0.7–1.7)
ALPHA1 GLOB FLD ELPH-MCNC: 0.3 G/DL (ref 0–0.4)
ALPHA2 GLOB SERPL ELPH-MCNC: 0.8 G/DL (ref 0.4–1)
B-GLOBULIN SERPL ELPH-MCNC: 1 G/DL (ref 0.7–1.3)
GAMMA GLOB SERPL ELPH-MCNC: 1.1 G/DL (ref 0.4–1.8)
GLOBULIN SER CALC-MCNC: 3.1 G/DL (ref 2.2–3.9)
Lab: ABNORMAL
M-SPIKE: 0.5 G/DL
PROT PATTERN SERPL ELPH-IMP: ABNORMAL
PROT SERPL-MCNC: 6.5 G/DL (ref 6–8.5)

## 2019-04-17 NOTE — PROGRESS NOTES
I have reviewed the notes, assessments, and/or procedures performed by  Vi Goldberg RN, I concur with her  documentation of Jesús Long.

## 2019-05-28 NOTE — PROGRESS NOTES
Jesús Long  [id number]  7/29/1931  87 y.o.  male    Referring Provider: Yocasta Baumann APRN    Reason for Follow-up Visit:  Routine follow up.   atrial fibrillation   sick sinus syndrome s/p pacemaker   Gastrointestinal bleed in past  Coronary artery bypass grafting twice    Subjective    No new events or complaints since last visit     Moderate chronic exertional shortness of breath on exertion relieved with rest  No significant cough or wheezing    Going on for several months  No palpitations    No associated chest pain  No significant pedal edema  No fever or chills    No significant expectoration  No hemoptysis  No presyncope or syncope   Easy fatiguability   Feels tired     Worsening  shortness of breath       History of present illness:  Jesús Long is a 87 y.o. yo male with history of Paroxysmal atrial fibrillation Gastrointestinal bleed who presents today for   Chief Complaint   Patient presents with   • Atrial Fibrillation     6 month follow up    • Shortness of Breath     patient stated he has a little bit of shortness of breath    .    History  Past Medical History:   Diagnosis Date   • Anemia    • Atrial fibrillation by electrocardiogram (CMS/HCC)    • CAD in native artery    • Carotid bruit    • CHF (congestive heart failure) (CMS/HCC)    • Chronic kidney disease     stage III   • Diabetes mellitus (CMS/HCC)     diet controlled   • History of coronary artery bypass graft    • Hyperlipidemia    • Hypertension    • Monoclonal (M) protein disease, multiple 'M' protein    • Multiple myeloma (CMS/HCC)    • Murmur    • Pulmonary hypertension (CMS/HCC)    • Sick sinus syndrome (CMS/HCC)     s/p pace maker   • Sleep apnea    ,   Past Surgical History:   Procedure Laterality Date   • BACK SURGERY     • CARDIAC CATHETERIZATION  09/16/1998    Left Heart; candidate for re-do CABG   • COLONOSCOPY  12/10/2013    Diverticulosis repeat prn   • CORONARY ARTERY BYPASS GRAFT      X 8   • ENDOSCOPY N/A  2018    Normal exam   • HEMORROIDECTOMY     • PACEMAKER IMPLANTATION     ,   Family History   Problem Relation Age of Onset   • Cancer Mother    • Heart disease Mother    • Colon cancer Mother    • Heart disease Father    • Coronary artery disease Father    • Dementia Sister    • Colon polyps Neg Hx    ,   Social History     Tobacco Use   • Smoking status: Former Smoker     Types: Cigarettes     Last attempt to quit: 1986     Years since quittin.5   • Smokeless tobacco: Former User   Substance Use Topics   • Alcohol use: No   • Drug use: No   ,     Medications  Current Outpatient Medications   Medication Sig Dispense Refill   • aspirin  MG tablet Take 81 mg by mouth Daily.     • carvedilol (COREG) 6.25 MG tablet Take 6.25 mg by mouth 2 (Two) Times a Day With Meals.     • Cholecalciferol (VITAMIN D3) 5000 UNITS capsule capsule Take 5,000 Units by mouth Daily.     • coenzyme Q10 100 MG capsule Take 100 mg by mouth Daily.     • metoprolol succinate XL (TOPROL-XL) 25 MG 24 hr tablet Take 25 mg by mouth Daily.     • sertraline (ZOLOFT) 25 MG tablet Take 25 mg by mouth Daily.     • sodium bicarbonate 650 MG tablet Take 1 tablet by mouth 2 (Two) Times a Day.       No current facility-administered medications for this visit.        Allergies:  Patient has no known allergies.    Review of Systems  Review of Systems   Constitution: Positive for weakness and malaise/fatigue.   HENT: Negative.    Eyes: Negative.    Cardiovascular: Positive for dyspnea on exertion. Negative for chest pain, claudication, cyanosis, irregular heartbeat, leg swelling, near-syncope, orthopnea, palpitations, paroxysmal nocturnal dyspnea and syncope.   Respiratory: Negative.    Endocrine: Negative.    Hematologic/Lymphatic: Negative.    Skin: Negative.    Musculoskeletal: Positive for arthritis, joint pain and stiffness.   Gastrointestinal: Negative for anorexia.   Genitourinary: Negative.    Psychiatric/Behavioral: Negative.         Objective     Physical Exam:  /55 (BP Location: Right arm, Patient Position: Sitting, Cuff Size: Adult)   Pulse 76   Wt 66.2 kg (146 lb)   BMI 20.95 kg/m²   Physical Exam   Constitutional: He appears well-developed.   HENT:   Head: Normocephalic.   Neck: Normal carotid pulses and no JVD present. No tracheal tenderness present. Carotid bruit is not present. No tracheal deviation and no edema present.   Cardiovascular: Regular rhythm and normal pulses.   Murmur heard.   Systolic murmur is present with a grade of 2/6.  Pulmonary/Chest: Effort normal. No stridor.   Abdominal: Soft.   Neurological: He is alert. He has normal strength. No cranial nerve deficit or sensory deficit.   Skin: Skin is warm.   Psychiatric: He has a normal mood and affect. His speech is normal and behavior is normal.       Results Review:    Results for orders placed during the hospital encounter of 05/17/17   STAT Adult Transthoracic Echo Complete    Narrative · Left ventricular wall thickness is consistent with mild concentric   hypertrophy.  · Left ventricular function is low normal. Estimated EF = 45%.  · Left ventricular diastolic dysfunction (grade I) consistent with   impaired relaxation.  · Moderate tricuspid valve regurgitation is present.  · Severe pulmonary hypertension is present  ·                ECG 12 Lead  Date/Time: 5/28/2019 9:28 AM  Performed by: Mauri Lopez MD  Authorized by: Mauri Lopez MD   Comparison: compared with previous ECG from 12/17/2018  Comparison to previous ECG: premature ventricular contractions not seen  Rhythm: paced  Rate: normal    Clinical impression: abnormal EKG            Assessment/Plan   Patient Active Problem List   Diagnosis   • Anemia associated with chronic renal failure   • PAF (paroxysmal atrial fibrillation) (CMS/HCC) No AC DUE TO GI  BLEED   • Anxiety   • Pacemaker   • Anemia of chronic disease   • Multiple myeloma (CMS/HCC)   • Chronic kidney disease   • Hypertension   •  Coronary artery disease   • Pulmonary hypertension (CMS/HCC)   • Bright red blood per rectum   • Controlled type 2 diabetes mellitus diet controlled without complication, without long-term current use of insulin (CMS/HCC)   • Light chain myeloma (CMS/HCC)        No palpitations. No significant pedal edema. Compliant with medications and diet. Latest labs and medications reviewed.      Plan      Recommend oxygen therapy as as resting oxygen saturation is 96% . Desaturation with light exertion to 62%. 100 % with 2 LPM supplemental oxygen in recovery on exertion.       He declined Watchman again    Monitor cardiac rhythm device function regularly per established schedule or PRN     He declined evaluation for Oxygen initially subsequently agreed    Orders Placed This Encounter   Procedures   • Oxygen Therapy     Order Specific Question:   Delivery Modality     Answer:   Nasal Cannula     Order Specific Question:   Liters Per Minute:     Answer:   2     Order Specific Question:   Duration:     Answer:   Continuous     Order Specific Question:   Equipment     Answer:    Oxygen Concentrator &  &  Portable Gaseous Oxygen System & Portable Oxygen Contents Gaseous &  Conserving Regulator     Order Specific Question:   Length of Need (99 Months = Lifetime)     Answer:   99 Months = Lifetime   • ECG 12 Lead     This order was created via procedure documentation   • Adult Transthoracic Echo Complete W/ Cont if Necessary Per Protocol     Standing Status:   Future     Standing Expiration Date:   5/27/2020     Order Specific Question:   Reason for exam?     Answer:   Dyspnea      ______________________________________________________________________________________________________________________________________  Health maintenance and recommendations      Low salt/ HTN/ Heart healthy carbohydrate restricted cardiac diet   The patient is advised to reduce or avoid caffeine or other cardiac stimulants.     The  patient was advised to avoid long term NSAIDS , use Tylenol PRN instead  Avoid cardiac stimulants including common drugs like Pseudoephedrine or excessive amounts of caffeine    Monitor for any signs of bleeding including red or dark stools. Fall precautions.        Advised staying uptodate with immunizations per established standard guidelines.      Offered to give patient  a copy      Questions were encouraged, asked and answered to the patient's  understanding and satisfaction. Questions if any regarding current medications and side effects, need for refills and importance of compliance to medications stressed.    Reviewed available prior notes, consults, prior visits, laboratory findings, radiology and cardiology relevant reports. Updated chart as applicable. I have reviewed the patient's medical history in detail and updated the computerized patient record as relevant.      Updated patient regarding any new or relevant abnormalities on review of records or any new findings on physical exam. Mentioned to patient about purpose of visit and desirable health short and long term goals and objectives.    Primary to monitor CBC CMP Lipid panel and TSH as applicable    ___________________________________________________________________________________________________________________________________________          Return in about 3 months (around 8/28/2019).

## 2019-06-12 NOTE — PROGRESS NOTES
1025 Called Dr. Julio's office and spoke with Sierra WORRELL to report lab results. Sierra instructed patient did not require any injections today. Lorin Urias RN

## 2019-06-24 PROBLEM — D70.9 NEUTROPENIC FEVER (HCC): Status: ACTIVE | Noted: 2019-01-01

## 2019-06-24 PROBLEM — I49.5 SICK SINUS SYNDROME (HCC): Status: ACTIVE | Noted: 2019-01-01

## 2019-06-24 PROBLEM — N17.9 AKI (ACUTE KIDNEY INJURY) (HCC): Status: ACTIVE | Noted: 2019-01-01

## 2019-06-24 PROBLEM — N18.9 ACUTE KIDNEY INJURY SUPERIMPOSED ON CHRONIC KIDNEY DISEASE (HCC): Status: ACTIVE | Noted: 2019-01-01

## 2019-06-24 PROBLEM — R50.81 NEUTROPENIC FEVER (HCC): Status: ACTIVE | Noted: 2019-01-01

## 2019-06-26 PROBLEM — E44.0 MODERATE MALNUTRITION (HCC): Status: ACTIVE | Noted: 2019-01-01

## 2019-06-28 PROBLEM — D61.818 PANCYTOPENIA (HCC): Status: ACTIVE | Noted: 2019-01-01

## 2019-06-28 PROBLEM — G93.41 METABOLIC ENCEPHALOPATHY: Status: ACTIVE | Noted: 2019-01-01

## 2019-06-28 PROBLEM — R41.0 DELIRIUM: Status: ACTIVE | Noted: 2019-01-01

## 2019-06-30 PROBLEM — L89.93: Status: ACTIVE | Noted: 2019-01-01

## 2019-07-01 NOTE — PROGRESS NOTES
Nephrology ([facility]) Progress Note      Patient:  Jesús Long  YOB: 1931  Date of Service: 7/1/2019  MRN: [id number]   Acct: [id number]   Primary Care Physician: Yocasta Baumann APRN  Advance Directive:   Code Status and Medical Interventions:   Ordered at: 06/24/19 1621     Code Status:    CPR     Medical Interventions (Level of Support Prior to Arrest):    Full     Admit Date: 6/24/2019       Hospital Day: 7  Referring Provider: Josue Brown MD      Patient personally seen and examined.  Complete chart including Consults, Notes, Operative Reports, Labs, Cardiology, and Radiology studies reviewed as able.        Subjective:  Jesús Long is a 87 y.o. male  whom we were consulted for acute kidney injury.  Baseline chronic kidney disease stage 3; baseline creatinine 1.7. Follows with KENNY George in our office.  History of multiple myeloma, paroxysmal atrial fibrillation, hypertension, type 2 diabetes, congestive heart failure.  Presented with several days of confusion, fever, weakness.   Admitted for evaluation of possible tick-borne illness.  Renal function has declined since admission and nephrology is consulted.    Currently patient is awake and responsive.  He was somewhat confused.  No NSAIDs.  Urine output has been nonoliguric.  No new events.         Allergies:  Patient has no known allergies.    Home Meds:  Medications Prior to Admission   Medication Sig Dispense Refill Last Dose   • carvedilol (COREG) 6.25 MG tablet Take 6.25 mg by mouth 2 (Two) Times a Day With Meals.   Past Week at Unknown time   • Cholecalciferol (VITAMIN D3) 5000 UNITS capsule capsule Take 5,000 Units by mouth Daily.   Past Week at Unknown time   • coenzyme Q10 100 MG capsule Take 100 mg by mouth Daily.   Past Week at Unknown time   • metoprolol succinate XL (TOPROL-XL) 25 MG 24 hr tablet Take 25 mg by mouth Daily.   Past Week at Unknown time   • sertraline (ZOLOFT) 50  MG tablet Take 75 mg by mouth Daily.   Past Week at Unknown time   • sodium bicarbonate 650 MG tablet Take 650 mg by mouth 4 (Four) Times a Day.   Past Week at Unknown time       Medicines:  Current Facility-Administered Medications   Medication Dose Route Frequency Provider Last Rate Last Dose   • acetaminophen (TYLENOL) tablet 650 mg  650 mg Oral Q4H PRN Crystal Oshea APRN   650 mg at 06/29/19 1951   • calcitriol (ROCALTROL) capsule 0.25 mcg  0.25 mcg Oral Daily Casimiro Romero MD   0.25 mcg at 07/01/19 1023   • famotidine (PEPCID) tablet 20 mg  20 mg Oral Daily Josue Brown MD   20 mg at 07/01/19 1023   • lidocaine (LIDODERM) 5 % 1 patch  1 patch Transdermal Q24H Crystal Oshea APRN   1 patch at 07/01/19 1023   • megestrol (MEGACE) 40 MG/ML suspension 800 mg  800 mg Oral Daily Nicky Nielsen APRN   800 mg at 07/01/19 1023   • melatonin tablet 3 mg  3 mg Oral Nightly Crystal Oshea APRN   3 mg at 06/30/19 2116   • metaxalone (SKELAXIN) tablet 400 mg  400 mg Oral TID Nicky Nielsen APRN   400 mg at 07/01/19 1022   • ondansetron (ZOFRAN) injection 4 mg  4 mg Intravenous Q6H PRN Crystal Oshea APRN       • [START ON 7/2/2019] potassium chloride (MICRO-K) CR capsule 20 mEq  20 mEq Oral Daily Nicky Nielsen APRN       • saccharomyces boulardii (FLORASTOR) capsule 250 mg  250 mg Oral BID Crystal Oshea APRN   250 mg at 07/01/19 1023   • sertraline (ZOLOFT) tablet 75 mg  75 mg Oral Daily Crystal Oshea APRN   75 mg at 07/01/19 1022   • sodium bicarbonate tablet 650 mg  650 mg Oral 4x Daily Crystal Oshea, APRN   650 mg at 07/01/19 1442   • sodium chloride 0.9 % flush 10 mL  10 mL Intravenous PRN Romie Velasco Jr., MD       • sodium chloride 0.9 % flush 3 mL  3 mL Intravenous Q12H Crystal Oshea APRN   3 mL at 07/01/19 1024   • sodium chloride 0.9 % flush 3-10 mL  3-10 mL Intravenous PRCrystal Bills APRN           Past  Medical History:  Past Medical History:   Diagnosis Date   • Anemia    • Atrial fibrillation by electrocardiogram (CMS/[facility])    • CAD in native artery    • Carotid bruit    • CHF (congestive heart failure) (CMS/[facility])    • Chronic kidney disease     stage III   • Diabetes mellitus (CMS/[facility])     diet controlled   • History of coronary artery bypass graft    • Hospital psychosis (CMS/[facility])    • Hyperlipidemia    • Hypertension    • Monoclonal (M) protein disease, multiple 'M' protein    • Multiple myeloma (CMS/[facility])    • Murmur    • Pulmonary hypertension (CMS/[facility])    • Sick sinus syndrome (CMS/[facility])     s/p pace maker   • Sleep apnea        Past Surgical History:  Past Surgical History:   Procedure Laterality Date   • BACK SURGERY     • CARDIAC CATHETERIZATION  1998    Left Heart; candidate for re-do CABG   • COLONOSCOPY  12/10/2013    Diverticulosis repeat prn   • CORONARY ARTERY BYPASS GRAFT      X 8   • ENDOSCOPY N/A 2018    Normal exam   • HEMORROIDECTOMY     • PACEMAKER IMPLANTATION         Family History  Family History   Problem Relation Age of Onset   • Cancer Mother    • Heart disease Mother    • Colon cancer Mother    • Heart disease Father    • Coronary artery disease Father    • Dementia Sister    • Colon polyps Neg Hx        Social History  Social History     Socioeconomic History   • Marital status:      Spouse name: Not on file   • Number of children: Not on file   • Years of education: Not on file   • Highest education level: Not on file   Tobacco Use   • Smoking status: Former Smoker     Types: Cigarettes     Last attempt to quit: 1986     Years since quittin.6   • Smokeless tobacco: Former User   Substance and Sexual Activity   • Alcohol use: No   • Drug use: No   • Sexual activity: Defer         Review of Systems:  History obtained from chart review and the patient  General ROS: No fever or chills  Respiratory ROS: No cough, +shortness of breath,- wheezing  Cardiovascular  ROS: No chest pain or palpitations  Gastrointestinal ROS: No abdominal pain or melena  Genito-Urinary ROS: No dysuria or hematuria    Objective:  Patient Vitals for the past 24 hrs:   BP Temp Temp src Pulse Resp SpO2   07/01/19 1609 101/50 98.8 °F (37.1 °C) Oral 72 16 95 %   07/01/19 1115 107/51 98.3 °F (36.8 °C) Oral 60 16 93 %   07/01/19 0729 117/59 98.1 °F (36.7 °C) Oral 64 16 93 %   07/01/19 0436 106/54 98.4 °F (36.9 °C) Axillary 67 18 95 %   07/01/19 0049 114/68 97.9 °F (36.6 °C) Oral 66 18 --   06/30/19 2026 120/56 98.2 °F (36.8 °C) Oral 73 18 93 %   06/30/19 2000 -- -- -- 60 18 93 %       Intake/Output Summary (Last 24 hours) at 7/1/2019 1704  Last data filed at 7/1/2019 0304  Gross per 24 hour   Intake --   Output 200 ml   Net -200 ml     General: awake/alert   Chest:  Bilateral air entry, with decreased breath sounds at the bases and scattered fine rales  CVS: regular rate and rhythm  Abdominal: soft, nontender, positive bowel sounds  Extremities: No cyanosis, trace leg edema  Skin: warm and dry without rash      Labs:  Results from last 7 days   Lab Units 07/01/19 0530 06/30/19 0755 06/29/19 0459   WBC 10*3/mm3 5.00 5.18 5.29   HEMOGLOBIN g/dL 9.3* 9.1* 9.3*   HEMATOCRIT % 28.6* 27.0* 27.5*   PLATELETS 10*3/mm3 56* 48* 53*         Results from last 7 days   Lab Units 07/01/19  0530 06/30/19  0755 06/29/19 0459 06/28/19 0437 06/27/19  0429   SODIUM mmol/L 139 139 140 140 133*   POTASSIUM mmol/L 4.2 3.8 3.2* 3.3* 3.3*   CHLORIDE mmol/L 101 98 97* 107 108   CO2 mmol/L 32.0* 33.0* 35.0* 23.0* 16.0*   BUN mg/dL 46* 50* 55* 63* 65*   CREATININE mg/dL 2.36* 2.36* 2.53* 2.76* 2.54*   CALCIUM mg/dL 6.9* 6.7* 6.8* 7.2* 6.8*   BILIRUBIN mg/dL  --   --  1.2* 1.2* 0.8   ALK PHOS U/L  --   --  53 58 56   ALT (SGPT) U/L  --   --  47 36 34   AST (SGOT) U/L  --   --  65* 51* 51*   GLUCOSE mg/dL 121* 115* 167* 150* 106*       Radiology:   Imaging Results (last 72 hours)     Procedure Component Value Units Date/Time     US Renal Bilateral [[id number]] Collected:  06/27/19 1456     Updated:  06/27/19 1511    Narrative:       EXAMINATION:  US RENAL BILATERAL-  6/27/2019 12:07 PM CDT     HISTORY: Acute renal insufficiency superimposed on chronic kidney  disease. N17.9-Acute kidney failure, unspecified; R41.82-Altered mental  status, unspecified; R68.89-Other general symptoms and signs;  Z74.09-Other reduced mobility.      COMPARISON: No comparison study.     TECHNIQUE: Grayscale and color flow images were performed.     FINDINGS: The visualized abdominal aorta and inferior vena cava are  normal caliber. The liver echogenicity is normal. The right kidney  measures 8.9 cm and the left kidney measures 10.7 cm. There is cortical  thinning of the right kidney compared to the left kidney. There is  increased echogenicity of the right renal cortex compared to the  adjacent liver. There is a 2.3 x 1.5 cm hypoechoic renal lesion in the  right mid kidney. There is a 2.7 x 2.2 cm somewhat lobular hypoechoic  renal lesion in the left mid kidney. These lesions are probably cysts  but they are not fully characterized on this study. There is no internal  blood flow identified. There is no hydronephrosis. The urinary bladder  is unremarkable.       Impression:       1. Cortical thinning of the right kidney. There is increased cortical  echogenicity of the right kidney. There is no hydronephrosis.  2. The cortical thickness and echogenicity of the left kidney are  normal.  3. Bilateral renal lesions are likely cysts but not fully characterized  on this study.  This report was finalized on 06/27/2019 15:08 by Dr. Pawel Benson MD.    CT Guided Biopsy Bone Marrow [[id number]] Collected:  06/27/19 1229     Updated:  06/27/19 1233    Narrative:       CT GUIDED BIOPSY BONE MARROW- 6/27/2019 11:31 AM CDT     INDICATION: pancytopenia; N17.9-Acute kidney failure, unspecified;  R41.82-Altered mental status, unspecified; R68.89-Other general symptoms  and signs;  Z74.09-Other reduced mobility      CONSENT: An informed written consent was obtained from the patient after  discussing the risks, benefits, potential complications and  alternatives. Potential complications included bleeding and infection.  All questions answered to the patient's satisfaction.       COMPLICATIONS: None.      MEDICATIONS: None     DOSE LENGTH PRODUCT: 178 mGy cm. Automated exposure control was also  utilized to decrease patient radiation dose.     ESTIMATED BLOOD LOSS: Less than 5 ml.      PROCEDURE: An informed consent was obtained as above. Prior to sterile  preparation and local anesthetic, noninfused axial CT scans were  obtained. The optimal site for biopsy was marked. A 13-gauge needle was  advanced into the ilium with a drill. Approximately 10 mL of bone marrow  were aspirated through the needle. Subsequently, a 2 cm core was  obtained utilizing the drill with a 13-gauge sampling needle.     No immediate complications.        Impression:       Successful CT guided bone marrow aspirate and bone biopsy.         This report was finalized on 06/27/2019 12:30 by Dr. Javy Roman MD.          Culture:  Blood Culture   Date Value Ref Range Status   06/24/2019 No growth at 4 days  Preliminary   06/24/2019 No growth at 4 days  Preliminary         Assessment   Acute renal failure  Chronic kidney disease stage III  Hypokalemia  Hyponatremia  Pancytopenia  Hypertension  Diabetes type 2  Metabolic acidosis  Multiple myeloma  Secondary Hyperparathyroidism    Plan:   We will hold on IV fluid to prevent further overload, will hold sodium bicarbonate , provide diuretics and follow-up labs    Reji Go MD  7/1/2019  5:04 PM

## 2019-07-01 NOTE — PLAN OF CARE
Problem: Patient Care Overview  Goal: Plan of Care Review   07/01/19 1602   Coping/Psychosocial   Plan of Care Reviewed With patient   Plan of Care Review   Progress improving   OTHER   Outcome Summary up in sorto with walker x1, possible discharge to SNF tomorrow, await bone marrow bx, on chronic 2L/NC, vss, will continue to monitor       Problem: Skin Injury Risk (Adult)  Goal: Skin Health and Integrity  Outcome: Ongoing (interventions implemented as appropriate)      Problem: Fall Risk (Adult)  Goal: Absence of Fall  Outcome: Ongoing (interventions implemented as appropriate)      Problem: Nutrition, Imbalanced: Inadequate Oral Intake (Adult)  Goal: Improved Oral Intake  Outcome: Ongoing (interventions implemented as appropriate)    Goal: Prevent Further Weight Loss  Outcome: Ongoing (interventions implemented as appropriate)      Problem: Wound (Includes Pressure Injury) (Adult)  Goal: Signs and Symptoms of Listed Potential Problems Will be Absent, Minimized or Managed (Wound)  Outcome: Ongoing (interventions implemented as appropriate)

## 2019-07-01 NOTE — THERAPY TREATMENT NOTE
Acute Care - Occupational Therapy Treatment Note  [facility]     Patient Name: Jesús Long  : 1931  MRN: [id number]  Today's Date: 2019  Onset of Illness/Injury or Date of Surgery: 19  Date of Referral to OT: 19  Referring Physician: Dr. Brown    Admit Date: 2019       ICD-10-CM ICD-9-CM   1. CHRISTINE (acute kidney injury) (CMS/[facility]) N17.9 584.9   2. Altered mental status, unspecified altered mental status type R41.82 780.97   3. Decreased activities of daily living (ADL) R68.89 780.99   4. Decreased mobility and endurance Z74.09 780.99     Patient Active Problem List   Diagnosis   • Anemia associated with chronic renal failure   • PAF (paroxysmal atrial fibrillation) (CMS/[facility]) No AC DUE TO GI  BLEED   • Anxiety   • Pacemaker   • Anemia of chronic disease   • Multiple myeloma (CMS/[facility])   • Chronic kidney disease   • Hypertension   • Coronary artery disease   • Pulmonary hypertension (CMS/[facility])   • Bright red blood per rectum   • Controlled type 2 diabetes mellitus diet controlled without complication, without long-term current use of insulin (CMS/[facility])   • Light chain myeloma (CMS/[facility])   • Acute kidney injury superimposed on chronic kidney disease (CMS/[facility])   • Neutropenic fever (CMS/[facility])   • Sick sinus syndrome (CMS/[facility])   • Moderate malnutrition (CMS/[facility])   • Delirium   • Metabolic encephalopathy   • Pancytopenia (CMS/[facility])   • Pressure ulcer with suspected deep tissue injury, stage III (CMS/[facility])     Past Medical History:   Diagnosis Date   • Anemia    • Atrial fibrillation by electrocardiogram (CMS/[facility])    • CAD in native artery    • Carotid bruit    • CHF (congestive heart failure) (CMS/[facility])    • Chronic kidney disease     stage III   • Diabetes mellitus (CMS/[facility])     diet controlled   • History of coronary artery bypass graft    • Hospital psychosis (CMS/[facility])    • Hyperlipidemia    • Hypertension    • Monoclonal (M) protein disease, multiple 'M' protein    • Multiple myeloma (CMS/[facility])     • Murmur    • Pulmonary hypertension (CMS/[facility])    • Sick sinus syndrome (CMS/[facility])     s/p pace maker   • Sleep apnea      Past Surgical History:   Procedure Laterality Date   • BACK SURGERY     • CARDIAC CATHETERIZATION  09/16/1998    Left Heart; candidate for re-do CABG   • COLONOSCOPY  12/10/2013    Diverticulosis repeat prn   • CORONARY ARTERY BYPASS GRAFT      X 8   • ENDOSCOPY N/A 6/20/2018    Normal exam   • HEMORROIDECTOMY     • PACEMAKER IMPLANTATION         Therapy Treatment    Rehabilitation Treatment Summary     Row Name 07/01/19 1111 07/01/19 1035          Treatment Time/Intention    Discipline  physical therapy assistant  -AF  occupational therapy assistant  -TS     Document Type  --  therapy note (daily note)  -TS     Subjective Information  --  complains of;weakness;fatigue  -TS2     Patient/Family Observations  daughter present  -AF  daughter present  -TS2     Patient Effort  --  fair  -TS2     Comment  Just finished OT treatment. Will check back this afternoon.   -AF  --     Reason Treatment Not Performed  patient/family declined treatment  -AF  --     Existing Precautions/Restrictions  --  fall;oxygen therapy device and L/min  -TS2     Treatment Considerations/Comments  --  increased confusion  -TS3     Recorded by [AF] Jovita Smith, JEB 07/01/19 1112 [TS] Cailin Hinton, LEIJA/L 07/01/19 1113  [TS2] Cailin Hinton, LEIJA/L 07/01/19 1249  [TS3] Cailin Hinton, LEIJA/L 07/01/19 1253     Row Name 07/01/19 1035             Cognitive Assessment/Intervention- PT/OT    Personal Safety Interventions  fall prevention program maintained;gait belt;nonskid shoes/slippers when out of bed  -TS      Recorded by [TS] Cailin Hinton LEIJA/L 07/01/19 1253      Row Name 07/01/19 1035             Bed Mobility Assessment/Treatment    Sit-Supine [place] (Bed Mobility)  contact guard  -TS      Assistive Device (Bed Mobility)  bed rails  -TS      Recorded by [TS] Cailin Hinton,  LEIJA/L 07/01/19 1253      Row Name 07/01/19 1035             Functional Mobility    Functional Mobility- Ind. Level  contact guard assist;verbal cues required  -TS      Functional Mobility- Device  rolling walker  -TS      Functional Mobility- Safety Issues  supplemental O2  -TS      Functional Mobility- Comment  in room, in BR  -TS      Recorded by [TS] Cailin Hinton COTA/L 07/01/19 1253      Row Name 07/01/19 1035             Transfer Assessment/Treatment    Transfer Assessment/Treatment  sit-stand transfer;stand-sit transfer  -TS      Recorded by [TS] Cailin Hinton LEIJA/L 07/01/19 1253      Row Name 07/01/19 1035             Sit-Stand Transfer    Sit-Stand [place] (Transfers)  minimum assist (75% patient effort);verbal cues  -TS      Assistive Device (Sit-Stand Transfers)  walker, front-wheeled  -TS      Recorded by [TS] Cailin Hinton LEIJA/L 07/01/19 1253      Row Name 07/01/19 1035             Stand-Sit Transfer    Stand-Sit [place] (Transfers)  minimum assist (75% patient effort)  -TS      Assistive Device (Stand-Sit Transfers)  walker, front-wheeled  -TS      Recorded by [TS] Cailin Hinton LEIJA/L 07/01/19 1253      Row Name 07/01/19 1035             ADL Assessment/Intervention    BADL Assessment/Intervention  grooming  -TS      Recorded by [TS] Cailin Hinton LEIJA/L 07/01/19 1253      Row Name 07/01/19 1035             Grooming Assessment/Training    [place] Level (Grooming)  wash face, hands;shave face;oral care regimen;set up;verbal cues;contact guard assist;supervision  -TS      Grooming Position  supported sitting;sink side  -TS      Recorded by [TS] Cailin Hinton LEIJA/L 07/01/19 1253      Row Name 07/01/19 1035             Positioning and Restraints    Pre-Treatment Position  sitting in chair/recliner  -TS      Post Treatment Position  bed  -TS      In Bed  side lying left;call light within reach;encouraged to call for assist;side rails up  x3;exit alarm on  -TS      Recorded by [TS] Cailin Hinton COTA/L 07/01/19 1253      Row Name 07/01/19 1035             Pain Scale: Numbers Pre/Post-Treatment    Pain Scale: Numbers, Pretreatment  0/10 - no pain  -TS      Pain Scale: Numbers, Post-Treatment  0/10 - no pain  -TS      Recorded by [TS] Cailin Hinton COTA/L 07/01/19 1253      Row Name                Wound 06/29/19 0800 Left gluteal pressure injury    Wound - Properties Group Date first assessed: 06/29/19 [AG] Time first assessed: 0800 [AG] Side: Left [AG] Location: gluteal [AG] Type: pressure injury [AG] Recorded by:  [AG] Dianne Desai, RN 06/29/19 1435    Row Name                Wound 06/29/19 1444 Right gluteal pressure injury    Wound - Properties Group Date first assessed: 06/29/19 [AG] Time first assessed: 1444 [AG] Side: Right [AG] Location: gluteal [AG] Type: pressure injury [AG] Recorded by:  [AG] Dianne Desai, RN 06/29/19 1444    Row Name 07/01/19 1035             Outcome Summary/Treatment Plan (OT)    Daily Summary of Progress (OT)  progress towards functional goals is fair  -TS      Recorded by [TS] Cailin Hinton COTA/GARTH 07/01/19 1253        User Key  (r) = Recorded By, (t) = Taken By, (c) = Cosigned By    Initials Name Effective Dates Discipline    TS Cailin Hinton COTA/L 08/02/16 -  OT    AG Dianne Desai RN 08/02/16 -  Nurse    Jovita Christensen, PTA 02/11/19 -  PT        Wound 06/29/19 0800 Left gluteal pressure injury (Active)   Base red 6/30/2019  8:34 PM   Periwound excoriated 6/30/2019  8:34 PM   Drainage Amount none 6/30/2019  8:34 PM   Care, Wound barrier applied 6/30/2019  8:34 PM   Dressing Care, Wound open to air 6/30/2019  8:34 PM       Wound 06/29/19 1444 Right gluteal pressure injury (Active)   Base red 6/30/2019  8:34 PM   Drainage Amount none 6/30/2019  8:34 PM   Care, Wound barrier applied 6/30/2019  8:34 PM   Dressing Care, Wound open to air 6/30/2019  8:34 PM       Occupational Therapy  Education     Title: PT OT SLP Therapies (In Progress)     Topic: Occupational Therapy (In Progress)     Point: ADL training (Done)     Description: Instruct learner(s) on proper safety adaptation and remediation techniques during self care or transfers.   Instruct in proper use of assistive devices.    Learning Progress Summary           Patient Evie, JOHN, VU by NIKKI at 6/25/2019 10:36 AM    Comment:  OT POC, adls, t/fs, bed mobility.                               User Key     Initials Effective Dates Name Provider Type Discipline    NIKKI 10/12/18 -  Nicky Phelan OTR/L Occupational Therapist OT                OT Recommendation and Plan  Outcome Summary/Treatment Plan (OT)  Daily Summary of Progress (OT): progress towards functional goals is fair  Daily Summary of Progress (OT): progress towards functional goals is fair  Plan of Care Review  Plan of Care Reviewed With: patient  Plan of Care Reviewed With: patient  Outcome Summary: Pt demonstrates some confusion but alert and cooperative with OT tx. Pt min A for bed mobility and CGA for transfers and ambulation with RW. Pt completed LB dressing with mod A and grooming at side sink with CGA/SBA both sitting and standing. Pt would benefit from continued rehab. Continue OT POC   Outcome Measures     Row Name 07/01/19 1200 06/28/19 1400          How much help from another is currently needed...    Putting on and taking off regular lower body clothing?  2  -TS  2  -TS     Bathing (including washing, rinsing, and drying)  2  -TS  2  -TS     Toileting (which includes using toilet bed pan or urinal)  3  -TS  3  -TS     Putting on and taking off regular upper body clothing  3  -TS  3  -TS     Taking care of personal grooming (such as brushing teeth)  3  -TS  3  -TS     Eating meals  4  -TS  4  -TS     Score  17  -TS  17  -TS        Functional Assessment    Outcome Measure Options  AM-PAC 6 Clicks Daily Activity (OT)  -TS  AM-PAC 6 Clicks Daily Activity (OT)  -TS        User Key  (r) = Recorded By, (t) = Taken By, (c) = Cosigned By    Initials Name Provider Type     Cailin Hinton COTA/L Occupational Therapy Assistant           Time Calculation:   Time Calculation- OT     Row Name 07/01/19 1253             Time Calculation- OT    OT Start Time  1035  -TS      OT Stop Time  1114  -TS      OT Time Calculation (min)  39 min  -TS      Total Timed Code Minutes- OT  39 minute(s)  -TS      OT Received On  07/01/19  -TS         Timed Charges    14680 - OT Self Care/Mgmt Minutes  39  -TS        User Key  (r) = Recorded By, (t) = Taken By, (c) = Cosigned By    Initials Name Provider Type     Cailin Hinton COTA/L Occupational Therapy Assistant        Therapy Charges for Today     Code Description Service Date Service Provider Modifiers Qty    [id number] HC OT SELF CARE/MGMT/TRAIN EA 15 MIN 7/1/2019 Cailin Hinton COTA/L GO 3               Cailin KIM. ANGELITA Hinton  7/1/2019

## 2019-07-01 NOTE — PLAN OF CARE
Problem: Patient Care Overview  Goal: Plan of Care Review  Outcome: Ongoing (interventions implemented as appropriate)   07/01/19 1657   Coping/Psychosocial   Plan of Care Reviewed With patient   Plan of Care Review   Progress no change   OTHER   Outcome Summary Pt continues on Soft texture/chopped diet with Boost glucose control BID. PO intake avg 35%/5meals over the past 2 days. Will continue to follow.       Problem: Nutrition, Imbalanced: Inadequate Oral Intake (Adult)  Goal: Improved Oral Intake  Outcome: Ongoing (interventions implemented as appropriate)   07/01/19 1657   Nutrition, Imbalanced: Inadequate Oral Intake (Adult)   Improved Oral Intake making progress toward outcome     Goal: Prevent Further Weight Loss  Outcome: Ongoing (interventions implemented as appropriate)   07/01/19 1657   Nutrition, Imbalanced: Inadequate Oral Intake (Adult)   Prevent Further Weight Loss making progress toward outcome

## 2019-07-01 NOTE — PLAN OF CARE
Problem: Patient Care Overview  Goal: Plan of Care Review  Outcome: Ongoing (interventions implemented as appropriate)   07/01/19 4721   Coping/Psychosocial   Plan of Care Reviewed With patient   Plan of Care Review   Progress no change   OTHER   Outcome Summary VSS. Patient has been oriented to self only but has been very pleasant. Multiple loose bowel movements. Meds given in pudding & patient ate 2 whole puddings. Per family patient is eating better with assistance. Bed check on. Safety maintained. Cont to monitor.        Problem: Skin Injury Risk (Adult)  Goal: Skin Health and Integrity  Outcome: Ongoing (interventions implemented as appropriate)      Problem: Fall Risk (Adult)  Goal: Absence of Fall  Outcome: Ongoing (interventions implemented as appropriate)      Problem: Nutrition, Imbalanced: Inadequate Oral Intake (Adult)  Goal: Improved Oral Intake  Outcome: Ongoing (interventions implemented as appropriate)    Goal: Prevent Further Weight Loss  Outcome: Ongoing (interventions implemented as appropriate)

## 2019-07-01 NOTE — THERAPY TREATMENT NOTE
Acute Care - Physical Therapy Treatment Note  [facility]     Patient Name: Jesús Long  : 1931  MRN: [id number]  Today's Date: 2019  Onset of Illness/Injury or Date of Surgery: 19  Date of Referral to PT: 19  Referring Physician: Dr. Brown    Admit Date: 2019    Visit Dx:    ICD-10-CM ICD-9-CM   1. CHRISTINE (acute kidney injury) (CMS/HCC) N17.9 584.9   2. Altered mental status, unspecified altered mental status type R41.82 780.97   3. Decreased activities of daily living (ADL) R68.89 780.99   4. Decreased mobility and endurance Z74.09 780.99     Patient Active Problem List   Diagnosis   • Anemia associated with chronic renal failure   • PAF (paroxysmal atrial fibrillation) (CMS/HCC) No AC DUE TO GI  BLEED   • Anxiety   • Pacemaker   • Anemia of chronic disease   • Multiple myeloma (CMS/HCC)   • Chronic kidney disease   • Hypertension   • Coronary artery disease   • Pulmonary hypertension (CMS/HCC)   • Bright red blood per rectum   • Controlled type 2 diabetes mellitus diet controlled without complication, without long-term current use of insulin (CMS/HCC)   • Light chain myeloma (CMS/HCC)   • Acute kidney injury superimposed on chronic kidney disease (CMS/HCC)   • Neutropenic fever (CMS/HCC)   • Sick sinus syndrome (CMS/HCC)   • Moderate malnutrition (CMS/HCC)   • Delirium   • Metabolic encephalopathy   • Pancytopenia (CMS/HCC)   • Pressure ulcer with suspected deep tissue injury, stage III (CMS/HCC)       Therapy Treatment    Rehabilitation Treatment Summary     Row Name 19 1404 19 1357 19 1337       Treatment Time/Intention    Discipline  physical therapy assistant  -LG  --  -LG  --    Document Type  therapy note (daily note)  -LG  --  --    Subjective Information  no complaints  -LG2  --  --    Mode of Treatment  individual therapy;physical therapy  -LG2  --  --    Patient/Family Observations  no family present  -LG2  --  --    Comment  Pt lethargic, took verbal and  tactile cues to keep him alert.  Pt had BM assisted to get cleaned up as his bed/gown was soiled.   -LG3  --  -LG  --  -LG    Reason Treatment Not Performed  --  --  -LG  --  -LG    Existing Precautions/Restrictions  fall;oxygen therapy device and L/min  -LG  --  --    Recorded by [LG] Jose Luis Dawkins R, PTA 07/01/19 1415  [LG2] Jose Luis Dawkins R, PTA 07/01/19 1451  [LG3] Jose Luis Dawkins R, PTA 07/01/19 1504 [LG] DawkinsJose Luis R, PTA 07/01/19 1415 [LG] Jose Luis Dawkins R, PTA 07/01/19 1357    Row Name 07/01/19 1111 07/01/19 1035          Treatment Time/Intention    Discipline  physical therapy assistant  -AF  occupational therapy assistant  -TS     Document Type  --  therapy note (daily note)  -TS     Subjective Information  --  complains of;weakness;fatigue  -TS2     Patient/Family Observations  daughter present  -AF  daughter present  -TS2     Patient Effort  --  fair  -TS2     Comment  Just finished OT treatment. Will check back this afternoon.   -AF  --     Reason Treatment Not Performed  patient/family declined treatment  -AF  --     Existing Precautions/Restrictions  --  fall;oxygen therapy device and L/min  -TS2     Treatment Considerations/Comments  --  increased confusion  -TS3     Recorded by [AF] Jovita Smith, PTA 07/01/19 1112 [TS] Cailin Hinton LEIJA/L 07/01/19 1113  [TS2] Cailin Hinton LEIJA/L 07/01/19 1249  [TS3] Cailin Hinton LEIJA/L 07/01/19 1253     Row Name 07/01/19 1035             Cognitive Assessment/Intervention- PT/OT    Personal Safety Interventions  fall prevention program maintained;gait belt;nonskid shoes/slippers when out of bed  -TS      Recorded by [TS] Cailin Hinton LEIJA/L 07/01/19 1253      Row Name 07/01/19 1404 07/01/19 1035          Bed Mobility Assessment/Treatment    Supine-Sit [place] (Bed Mobility)  supervision  -LG  --     Sit-Supine [place] (Bed Mobility)  supervision  -LG  contact guard  -TS     Bed Mobility, Safety Issues  decreased use of arms for  pushing/pulling;decreased use of legs for bridging/pushing  -LG  --     Assistive Device (Bed Mobility)  bed rails  -LG  bed rails  -TS     Recorded by [LG] Jose Luis Dawkins, PTA 07/01/19 1451 [TS] Cailin Hinton LEIJA/L 07/01/19 1253     Row Name 07/01/19 1035             Functional Mobility    Functional Mobility- Ind. Level  contact guard assist;verbal cues required  -TS      Functional Mobility- Device  rolling walker  -TS      Functional Mobility- Safety Issues  supplemental O2  -TS      Functional Mobility- Comment  in room, in BR  -TS      Recorded by [TS] Cailin Hinton LEIJA/L 07/01/19 1253      Row Name 07/01/19 1035             Transfer Assessment/Treatment    Transfer Assessment/Treatment  sit-stand transfer;stand-sit transfer  -TS      Recorded by [TS] Cailin Hinton LEIJA/L 07/01/19 1253      Row Name 07/01/19 1404             Bed-Chair Transfer    Bed-Chair [place] (Transfers)  verbal cues;contact guard;minimum assist (75% patient effort)  -LG      Assistive Device (Bed-Chair Transfers)  walker, front-wheeled  -LG      Recorded by [LG] Jose Luis Dawkins, PTA 07/01/19 1451      Row Name 07/01/19 1404 07/01/19 1035          Sit-Stand Transfer    Sit-Stand [place] (Transfers)  verbal cues;minimum assist (75% patient effort)  -LG  minimum assist (75% patient effort);verbal cues  -TS     Assistive Device (Sit-Stand Transfers)  walker, front-wheeled  -LG  walker, front-wheeled  -TS     Recorded by [LG] Jose Luis Dawkins, PTA 07/01/19 1451 [TS] Cailin Hinton LEIJA/L 07/01/19 1253     Row Name 07/01/19 1035             Stand-Sit Transfer    Stand-Sit [place] (Transfers)  minimum assist (75% patient effort)  -TS      Assistive Device (Stand-Sit Transfers)  walker, front-wheeled  -TS      Recorded by [TS] Cailin Hinton LEIJA/L 07/01/19 1253      Row Name 07/01/19 1404             Toilet Transfer    Type (Toilet Transfer)  sit-stand;stand-sit  -LG      [place] Level  (Toilet Transfer)  verbal cues;minimum assist (75% patient effort)  -LG      Assistive Device (Toilet Transfer)  commode;walker, front-wheeled  -LG      Recorded by [LG] Jose Luis Dawkins, PTA 07/01/19 1504      Row Name 07/01/19 1404             Gait/Stairs Assessment/Training    Gait/Stairs Assessment/Training  gait/ambulation assistive device  -LG      [place] Level (Gait)  verbal cues;contact guard;minimum assist (75% patient effort)  -LG      Assistive Device (Gait)  walker, front-wheeled  -LG      Distance in Feet (Gait)  40' standing rest, then 40' back to room. Pt took very small steps and had to take several short standing rest breaks.   -LG2      Pattern (Gait)  step-to  -LG2      Deviations/Abnormal Patterns (Gait)  bilateral deviations;base of support, narrow;gait speed decreased;stride length decreased  -LG2      Bilateral Gait Deviations  forward flexed posture  -LG2      Recorded by [LG] Jose Luis Dawkins, PTA 07/01/19 1451  [LG2] Jose Luis Dawkins, PTA 07/01/19 1504      Row Name 07/01/19 1035             ADL Assessment/Intervention    BADL Assessment/Intervention  grooming  -TS      Recorded by [TS] Cailin Hinton COTA/L 07/01/19 1253      Row Name 07/01/19 1035             Grooming Assessment/Training    [place] Level (Grooming)  wash face, hands;shave face;oral care regimen;set up;verbal cues;contact guard assist;supervision  -TS      Grooming Position  supported sitting;sink side  -TS      Recorded by [TS] Cailin Hinton COTA/L 07/01/19 1253      Row Name 07/01/19 1404             Therapeutic Exercise    Comment (Therapeutic Exercise)  B LE AROM 2 sets of 10 reps sitting bedside.  Pt required verbal and tactile cues to complete exercises.  APs, LAQs, Hip Abd/Add, Marching.   -LG      Recorded by [LG] Jose Luis Dawkins, PTA 07/01/19 1504      Row Name 07/01/19 1404 07/01/19 1035          Positioning and Restraints    Pre-Treatment Position  in bed  -LG  sitting in chair/recliner  -TS      Post Treatment Position  bed  -LG  bed  -TS     In Bed  fowlers;call light within reach;encouraged to call for assist;with nsg  -LG  side lying left;call light within reach;encouraged to call for assist;side rails up x3;exit alarm on  -TS     Recorded by [LG] Jose Luis Dawkins, PTA 07/01/19 1504 [TS] Cailin Hinton, LEIJA/L 07/01/19 1253     Row Name 07/01/19 1404 07/01/19 1035          Pain Scale: Numbers Pre/Post-Treatment    Pain Scale: Numbers, Pretreatment  0/10 - no pain  -LG  0/10 - no pain  -TS     Pain Scale: Numbers, Post-Treatment  0/10 - no pain  -LG  0/10 - no pain  -TS     Recorded by [LG] Jose Luis Dawkins, PTA 07/01/19 1504 [TS] Cailin Hinton, LEIJA/L 07/01/19 1253     Row Name                Wound 06/29/19 0800 Left gluteal pressure injury    Wound - Properties Group Date first assessed: 06/29/19 [AG] Time first assessed: 0800 [AG] Side: Left [AG] Location: gluteal [AG] Type: pressure injury [AG] Recorded by:  [AG] Dianne Desai, RN 06/29/19 1435    Row Name                Wound 06/29/19 1444 Right gluteal pressure injury    Wound - Properties Group Date first assessed: 06/29/19 [AG] Time first assessed: 1444 [AG] Side: Right [AG] Location: gluteal [AG] Type: pressure injury [AG] Recorded by:  [AG] Dianne Desai, RN 06/29/19 1444    Row Name 07/01/19 1035             Outcome Summary/Treatment Plan (OT)    Daily Summary of Progress (OT)  progress towards functional goals is fair  -TS      Recorded by [TS] Cailin Hinton, LEIJA/L 07/01/19 1253        User Key  (r) = Recorded By, (t) = Taken By, (c) = Cosigned By    Initials Name Effective Dates Discipline     Jose Luis Dawkins, PTA 08/02/16 -  PT    TS Cailin Hinton, LEIJA/L 08/02/16 -  OT    AG Dianne Desai, RN 08/02/16 -  Nurse    Jovita Christensen, PTA 02/11/19 -  PT          Wound 06/29/19 0800 Left gluteal pressure injury (Active)   Base red 6/30/2019  8:34 PM   Periwound excoriated 6/30/2019  8:34 PM   Drainage Amount none 6/30/2019   8:34 PM   Care, Wound barrier applied 6/30/2019  8:34 PM   Dressing Care, Wound open to air 6/30/2019  8:34 PM       Wound 06/29/19 1444 Right gluteal pressure injury (Active)   Base red 6/30/2019  8:34 PM   Drainage Amount none 6/30/2019  8:34 PM   Care, Wound barrier applied 6/30/2019  8:34 PM   Dressing Care, Wound open to air 6/30/2019  8:34 PM           Physical Therapy Education     Title: PT OT SLP Therapies (In Progress)     Topic: Physical Therapy (In Progress)     Point: Mobility training (Done)     Learning Progress Summary           Patient Acceptance, E, VU by SC at 6/25/2019 10:43 AM    Comment:  pt understands his limitations at this time. precautions were reviewed w/ pt and proper body mechanics when transferring and mobile                   Point: Body mechanics (Done)     Learning Progress Summary           Patient Acceptance, E, VU by SC at 6/25/2019 10:43 AM    Comment:  pt understands his limitations at this time. precautions were reviewed w/ pt and proper body mechanics when transferring and mobile                   Point: Precautions (Done)     Learning Progress Summary           Patient Acceptance, E, VU by SC at 6/25/2019 10:43 AM    Comment:  pt understands his limitations at this time. precautions were reviewed w/ pt and proper body mechanics when transferring and mobile                               User Key     Initials Effective Dates Name Provider Type Discipline    SC 05/15/19 -  Carl Campoverde PT Student PT Student PT                PT Recommendation and Plan        Outcome Measures     Row Name 07/01/19 1200             How much help from another is currently needed...    Putting on and taking off regular lower body clothing?  2  -TS      Bathing (including washing, rinsing, and drying)  2  -TS      Toileting (which includes using toilet bed pan or urinal)  3  -TS      Putting on and taking off regular upper body clothing  3  -TS      Taking care of personal grooming (such as  brushing teeth)  3  -TS      Eating meals  4  -TS      Score  17  -TS         Functional Assessment    Outcome Measure Options  AM-PAC 6 Clicks Daily Activity (OT)  -TS        User Key  (r) = Recorded By, (t) = Taken By, (c) = Cosigned By    Initials Name Provider Type    TS Cailin Hinton COTA/L Occupational Therapy Assistant         Time Calculation:   PT Charges     Row Name 07/01/19 1404             Time Calculation    Start Time  1404  -      Stop Time  1442  -      Time Calculation (min)  38 min  -      PT Received On  07/01/19  -      PT Goal Re-Cert Due Date  07/05/19  -         Time Calculation- PT    Total Timed Code Minutes- PT  38 minute(s)  -        User Key  (r) = Recorded By, (t) = Taken By, (c) = Cosigned By    Initials Name Provider Type     Jose Luis Dawkins, JEB Physical Therapy Assistant        Therapy Charges for Today     Code Description Service Date Service Provider Modifiers Qty    34429171377 HC GAIT TRAINING EA 15 MIN 7/1/2019 Jose Luis Dawkins, PTA GP 1    10790591887 HC PT THERAPEUTIC ACT EA 15 MIN 7/1/2019 Jose Luis Dawkins, PTA GP 1    34013554597 HC PT THER PROC EA 15 MIN 7/1/2019 Jose Luis Dawkins, PTA GP 1          PT G-Codes  Outcome Measure Options: AM-PAC 6 Clicks Daily Activity (OT)  AM-PAC 6 Clicks Score: 20  Score: 17    Jose Luis Dawkins PTA  7/1/2019

## 2019-07-01 NOTE — PROGRESS NOTES
[facility] Medicine Services  INPATIENT PROGRESS NOTE    Patient Name: Jesús Long  Date of Admission: 6/24/2019  Today's Date: 07/01/19  Length of Stay: 7  Primary Care Physician: Yocasta Baumann APRN    Subjective   Chief Complaint: follow up delerium.   HPI   Pt is lying in bed. No family at bedside. He states he is feeling better. States his neck pain is improved but not resolved. He is chronically on oxygen at 2L. Had 3 loose bowel movements last pm. Family fed him supper and he ate better. Nursing staff fed him 2 cups of pudding overnight. He tells me he is eating better. More calm this am. He is denying any chest pain.     Review of Systems   All pertinent negatives and positives are as above. All other systems have been reviewed and are negative unless otherwise stated.     Objective    Temp:  [97.9 °F (36.6 °C)-98.9 °F (37.2 °C)] 98.4 °F (36.9 °C)  Heart Rate:  [60-73] 67  Resp:  [18] 18  BP: (106-130)/(47-68) 106/54  Physical Exam   Constitutional: He appears well-developed.   Chronically ill appearing.    HENT:   Head: Normocephalic and atraumatic.   Eyes: EOM are normal. Pupils are equal, round, and reactive to light. No scleral icterus.   Neck: Normal range of motion. Neck supple. No JVD present. Carotid bruit is not present. No tracheal deviation present. No thyromegaly present.   Cardiovascular: Normal rate and regular rhythm. Exam reveals no gallop and no friction rub.   No murmur heard.  vpacing 60-68   Pulmonary/Chest: Effort normal and breath sounds normal. No respiratory distress. He has no wheezes. He has no rales. He exhibits no tenderness.   Abdominal: Soft. Bowel sounds are normal. He exhibits no distension. There is no tenderness.   Musculoskeletal: Normal range of motion. He exhibits edema (LUE edema with bruising. ).   Neurological: He is alert. No cranial nerve deficit.   Oriented to self only.    Skin: Skin is warm and dry. No rash noted.    Stage II pressure injury buttocks.    Psychiatric: He has a normal mood and affect.     Results Review:  I have reviewed the labs, radiology results, and diagnostic studies.    Laboratory Data:   Results from last 7 days   Lab Units 07/01/19  0530 06/30/19 0755 06/29/19 0459   WBC 10*3/mm3 5.00 5.18 5.29   HEMOGLOBIN g/dL 9.3* 9.1* 9.3*   HEMATOCRIT % 28.6* 27.0* 27.5*   PLATELETS 10*3/mm3 56* 48* 53*        Results from last 7 days   Lab Units 07/01/19  0530 06/30/19  0755 06/29/19 0459 06/28/19  0437 06/27/19  0429   SODIUM mmol/L 139 139 140 140 133*   POTASSIUM mmol/L 4.2 3.8 3.2* 3.3* 3.3*   CHLORIDE mmol/L 101 98 97* 107 108   CO2 mmol/L 32.0* 33.0* 35.0* 23.0* 16.0*   BUN mg/dL 46* 50* 55* 63* 65*   CREATININE mg/dL 2.36* 2.36* 2.53* 2.76* 2.54*   CALCIUM mg/dL 6.9* 6.7* 6.8* 7.2* 6.8*   BILIRUBIN mg/dL  --   --  1.2* 1.2* 0.8   ALK PHOS U/L  --   --  53 58 56   ALT (SGPT) U/L  --   --  47 36 34   AST (SGOT) U/L  --   --  65* 51* 51*   GLUCOSE mg/dL 121* 115* 167* 150* 106*       Culture Data:   Blood Culture   Date Value Ref Range Status   06/24/2019 No growth at 5 days  Final   06/24/2019 No growth at 5 days  Final       Radiology Data:   Imaging Results (last 24 hours)     ** No results found for the last 24 hours. **          I have reviewed the patient's current medications.     Assessment/Plan     Active Hospital Problems    Diagnosis   • **Acute kidney injury superimposed on chronic kidney disease (CMS/HCC)   • Pressure ulcer with suspected deep tissue injury, stage III (CMS/HCC)   • Delirium   • Metabolic encephalopathy   • Pancytopenia (CMS/HCC)   • Moderate malnutrition (CMS/HCC)   • Neutropenic fever (CMS/HCC)   • Sick sinus syndrome (CMS/HCC)     s/p pace maker     • Controlled type 2 diabetes mellitus diet controlled without complication, without long-term current use of insulin (CMS/HCC)   • Pulmonary hypertension (CMS/HCC)   • Anemia of chronic disease   • Coronary artery disease   •  Hypertension   • Multiple myeloma (CMS/HCC)   • PAF (paroxysmal atrial fibrillation) (CMS/HCC) No AC DUE TO GI  BLEED     Plan:  1. His GFr is the same as yesterday.   2. He needs to increase his activity.   3. Megace day 2  4. Repeat labs in am.   5. Will check STAT venous doppler left upper extremity given much worse edema   6. If he continues to improve, [facility]   7. Await bone marrow bx.   8. He has completed doxycycline therapy.     Discharge Planning: I expect the patient to be discharged to SNf in 1-2 days.    KENNY Leary   07/01/19   7:14 AM     I personally discussed the patient in conjunction with KENNY Garcia and agree with the assessment, treatment plan, and disposition of the patient as recorded by her. My history, exam, and further recommendations are:     Agree with assessment and plan.  BMBx results pending.  Mental status improved.      Jax Patel MD  07/01/19  8:26 PM

## 2019-07-01 NOTE — PROGRESS NOTES
[facility] Progress Note    Progress Note    Patient:  Jesús Long  YOB: 1931  Date of Service: 7/1/2019  MRN: [id number]   Acct: [id number]   Primary Care Physician: Yocasta Baumann APRN  Advance Directive:   Code Status and Medical Interventions:   Ordered at: 06/24/19 1621     Code Status:    CPR     Medical Interventions (Level of Support Prior to Arrest):    Full     Admit Date: 6/24/2019       Hospital Day: 7      Subjective:     Chief Compliant: Unobtainable.  Seen with nurse Goodwin at bedside.      Review of Systems:   Review of Systems   Reason unable to perform ROS: confuse.           Medications:   Scheduled Meds:  calcitriol 0.25 mcg Oral Daily   famotidine 20 mg Oral Daily   lidocaine 1 patch Transdermal Q24H   megestrol 800 mg Oral Daily   melatonin 3 mg Oral Nightly   metaxalone 400 mg Oral TID   potassium chloride 20 mEq Oral BID With Meals   saccharomyces boulardii 250 mg Oral BID   sertraline 75 mg Oral Daily   sodium bicarbonate 650 mg Oral 4x Daily   sodium chloride 3 mL Intravenous Q12H       Continuous Infusions:     Labs:     Lab Results (last 72 hours)     Procedure Component Value Units Date/Time    Basic Metabolic Panel [[id number]]  (Abnormal) Collected:  07/01/19 0530    Specimen:  Blood Updated:  07/01/19 0637     Glucose 121 mg/dL      BUN 46 mg/dL      Creatinine 2.36 mg/dL      Sodium 139 mmol/L      Potassium 4.2 mmol/L      Chloride 101 mmol/L      CO2 32.0 mmol/L      Calcium 6.9 mg/dL      eGFR Non African Amer 26 mL/min/1.73      BUN/Creatinine Ratio 19.5     Anion Gap 6.0 mmol/L     Narrative:       GFR Normal >60  Chronic Kidney Disease <60  Kidney Failure <15    CBC & Differential [[id number]] Collected:  07/01/19 0530    Specimen:  Blood Updated:  07/01/19 0630    Narrative:       The following orders were created for panel order CBC & Differential.  Procedure                               Abnormality         Status                      ---------                               -----------         ------                     CBC Auto Differential[[id number]]        Abnormal            Final result                 Please view results for these tests on the individual orders.    CBC Auto Differential [[id number]]  (Abnormal) Collected:  07/01/19 0530    Specimen:  Blood Updated:  07/01/19 0630     WBC 5.00 10*3/mm3      RBC 3.06 10*6/mm3      Hemoglobin 9.3 g/dL      Hematocrit 28.6 %      MCV 93.5 fL      MCH 30.4 pg      MCHC 32.5 g/dL      RDW 17.2 %      RDW-SD 57.0 fl      MPV 11.4 fL      Platelets 56 10*3/mm3      Neutrophil % 80.2 %      Lymphocyte % 10.8 %      Monocyte % 7.4 %      Eosinophil % 0.6 %      Basophil % 0.2 %      Neutrophils, Absolute 4.01 10*3/mm3      Lymphocytes, Absolute 0.54 10*3/mm3      Monocytes, Absolute 0.37 10*3/mm3      Eosinophils, Absolute 0.03 10*3/mm3      Basophils, Absolute 0.01 10*3/mm3     Basic Metabolic Panel [[id number]]  (Abnormal) Collected:  06/30/19 0755    Specimen:  Blood Updated:  06/30/19 1043     Glucose 115 mg/dL      BUN 50 mg/dL      Creatinine 2.36 mg/dL      Sodium 139 mmol/L      Potassium 3.8 mmol/L      Chloride 98 mmol/L      CO2 33.0 mmol/L      Calcium 6.7 mg/dL      eGFR Non African Amer 26 mL/min/1.73      BUN/Creatinine Ratio 21.2     Anion Gap 8.0 mmol/L     Narrative:       GFR Normal >60  Chronic Kidney Disease <60  Kidney Failure <15    Bone Marrow Exam [[id number]] Collected:  06/30/19 0755    Specimen:  Bone Marrow Aspirate Updated:  06/30/19 0818    Narrative:       The following orders were created for panel order Bone Marrow Exam.  Procedure                               Abnormality         Status                     ---------                               -----------         ------                     Bone Marrow Exam[[id number]]                                                            CBC Auto Differential[[id number]]        Abnormal            Final result                  Please view results for these tests on the individual orders.    CBC Auto Differential [[id number]]  (Abnormal) Collected:  06/30/19 0755    Specimen:  Blood Updated:  06/30/19 0818     WBC 5.18 10*3/mm3      RBC 2.96 10*6/mm3      Hemoglobin 9.1 g/dL      Hematocrit 27.0 %      MCV 91.2 fL      MCH 30.7 pg      MCHC 33.7 g/dL      RDW 16.8 %      RDW-SD 54.8 fl      MPV 10.4 fL      Platelets 48 10*3/mm3      Neutrophil % 79.8 %      Lymphocyte % 12.4 %      Monocyte % 5.8 %      Eosinophil % 0.6 %      Basophil % 0.4 %      Neutrophils, Absolute 4.14 10*3/mm3      Lymphocytes, Absolute 0.64 10*3/mm3      Monocytes, Absolute 0.30 10*3/mm3      Eosinophils, Absolute 0.03 10*3/mm3      Basophils, Absolute 0.02 10*3/mm3     Blood Culture With MARJORIE - Blood, Arm, Right [[id number]] Collected:  06/24/19 1123    Specimen:  Blood from Arm, Right Updated:  06/29/19 1145     Blood Culture No growth at 5 days    Blood Culture With MARJORIE - Blood, Arm, Left [[id number]] Collected:  06/24/19 1131    Specimen:  Blood from Arm, Left Updated:  06/29/19 1145     Blood Culture No growth at 5 days    Manual Differential [[id number]]  (Abnormal) Collected:  06/29/19 0459    Specimen:  Blood Updated:  06/29/19 0747     Neutrophil % 84.0 %      Lymphocyte % 10.0 %      Monocyte % 2.0 %      Atypical Lymphocyte % 4.0 %      Neutrophils Absolute 4.44 10*3/mm3      Lymphocytes Absolute 0.53 10*3/mm3      Monocytes Absolute 0.11 10*3/mm3      Anisocytosis Slight/1+     Dacrocytes Slight/1+     Ovalocytes Slight/1+     Poikilocytes Slight/1+     WBC Morphology Normal     Platelet Estimate Decreased    Comprehensive Metabolic Panel [[id number]]  (Abnormal) Collected:  06/29/19 0459    Specimen:  Blood Updated:  06/29/19 0609     Glucose 167 mg/dL      BUN 55 mg/dL      Creatinine 2.53 mg/dL      Sodium 140 mmol/L      Potassium 3.2 mmol/L      Chloride 97 mmol/L      CO2 35.0 mmol/L      Calcium 6.8 mg/dL      Total Protein 5.3 g/dL      Albumin  2.80 g/dL      ALT (SGPT) 47 U/L      AST (SGOT) 65 U/L      Alkaline Phosphatase 53 U/L      Total Bilirubin 1.2 mg/dL      eGFR Non African Amer 24 mL/min/1.73      Globulin 2.5 gm/dL      A/G Ratio 1.1 g/dL      BUN/Creatinine Ratio 21.7     Anion Gap 8.0 mmol/L     Narrative:       GFR Normal >60  Chronic Kidney Disease <60  Kidney Failure <15    Vitamin D 25 Hydroxy [[id number]]  (Normal) Collected:  06/29/19 0459    Specimen:  Blood Updated:  06/29/19 0608     25 Hydroxy, Vitamin D 77.6 ng/ml     PTH, Intact [[id number]]  (Abnormal) Collected:  06/29/19 0459    Specimen:  Blood Updated:  06/29/19 0603     PTH, Intact 331.0 pg/mL     Magnesium [[id number]]  (Normal) Collected:  06/29/19 0459    Specimen:  Blood Updated:  06/29/19 0551     Magnesium 1.7 mg/dL     Phosphorus [[id number]]  (Abnormal) Collected:  06/29/19 0459    Specimen:  Blood Updated:  06/29/19 0551     Phosphorus 1.5 mg/dL     Uric Acid [[id number]]  (Normal) Collected:  06/29/19 0459    Specimen:  Blood Updated:  06/29/19 0551     Uric Acid 5.5 mg/dL     CBC & Differential [[id number]] Collected:  06/29/19 0459    Specimen:  Blood Updated:  06/29/19 0547    Narrative:       The following orders were created for panel order CBC & Differential.  Procedure                               Abnormality         Status                     ---------                               -----------         ------                     CBC Auto Differential[[id number]]        Abnormal            Final result                 Please view results for these tests on the individual orders.    CBC Auto Differential [[id number]]  (Abnormal) Collected:  06/29/19 0459    Specimen:  Blood Updated:  06/29/19 0547     WBC 5.29 10*3/mm3      RBC 3.12 10*6/mm3      Hemoglobin 9.3 g/dL      Hematocrit 27.5 %      MCV 88.1 fL      MCH 29.8 pg      MCHC 33.8 g/dL      RDW 16.7 %      RDW-SD 52.9 fl      MPV 9.8 fL      Platelets 53 10*3/mm3     HCBSCP35 Activity [[id number]] Collected:   06/26/19 1238    Specimen:  Blood Updated:  06/28/19 1807     NXUVNI43 Activity 77.0 %      Comment: This test was developed and its performance characteristics  determined by [facility]. It has not been cleared or approved  by the Food and Drug Administration.       Narrative:       Performed at:  [address] [place] [address]  [id number]  : Mishel Morelos MD, Phone:  [phone]    Comment [[id number]] Collected:  06/26/19 1238    Specimen:  Blood Updated:  06/28/19 1807     Comment Comment     Comment: Severe deficiency of AIPLUP01 (less than 10% activity) is a  relatively specific finding in patients with a clinical diagnosis of  either hereditary or acquired thrombotic thrombocytopenic purpura  (TTP). Normal to moderately reduced HRTIYC60 activity results do not  exclude a diagnosis of TTP. Conditions that could have YKPGGA53  activity greater than 10% include hemolytic uremic syndrome (HUS),  atypical hemolytic uremic syndrome (aHUS), and other thrombotic  microangiopathies associated with hematopoietic stem cell and solid  organ transplantation, liver disease, DIC, sepsis, pregnancy, or  effects of certain medications (eg, clopidogrel, cyclosporine,  mitomycin C, quinine).       Narrative:       Performed at:  [address] [place] [address]  [id number]  : Mihsel Morelos MD, Phone:  [phone]    Platelet Antibody Profile [[id number]] Collected:  06/26/19 1238    Specimen:  Blood Updated:  06/28/19 1511     HLA Class 1 Antibody Negative     IIb/IIIa Antibody Negative     Ib/IX Antibody Negative     Ia/IIa Antibody Negative     Glycoprotein IV Antibody Negative    Narrative:       Performed at:  [address] [place] [address]  [id number]  : Mishel Morelos MD, Phone:  [phone]    Ehrlichia Antibody Panel [[id number]] Collected:  06/24/19 1654    Specimen:  Blood Updated:  06/28/19 1410     E. chaffeensis  (HME) IgG Titer Negative     E. chaffeensis (HME) IgM Titer Negative     Comment: IgG titers if 1:64 or greater indicate exposure or  acute and  convalescent samples showing a four-fold increase, and/or the presence  of IgM indicate recent or current infection.        HGE IgG Titer Negative     Comment: HGE IgG levels are detectable 7 to 10 days post infection and persist  approximately one year.        HGE IgM Titer Negative     Comment: Due to a reagent backorder, this test was performed using a different  assay. The reference interval for this alternate assay is:                                         Negative      <1:64                                         Positive       1:64 or greater  IgM levels usually rise 3 to 5 days post infection and fall to normal  levels in approximately 30 to 60 days.       Narrative:       Performed at:  [address] [place] [address]  [id number]  : Mishel Morelos MD, Phone:  [phone]    OMID by IFA, Reflex 9-biomarkers profile [[id number]]  (Abnormal) Collected:  06/26/19 1238    Specimen:  Blood Updated:  06/28/19 0913     OMID Positive     Comment:                                      Negative   <1:80                                       Borderline  1:80                                       Positive   >1:80  Serum is slightly lipemic.        Please note Comment     Comment: OMID Multiplex methodology was designed to detect up to 11 antibodies  of the 100+ antibodies that may be detected by OMID IFA methodology.       Narrative:       Performed at:  [facility]  [id number]  : Jose Luis Mojica PhD, Phone:  [phone]    TIN+DNA/DS+Antich+Centro+FA.. [[id number]]  (Abnormal) Collected:  06/26/19 1238    Specimen:  Blood Updated:  06/28/19 0913     Homogeneous Pattern 1:160     Note: (Reference) Comment     Comment: A positive OMID result may occur in healthy individuals (low  titer) or be  associated with a variety of diseases.  See  interpretation chart which is not all inclusive:  Pattern      Antigen Detected  Suggested Disease Association  -----------  ----------------  -----------------------------  Homogeneous  DNA(ds,ss),       SLE - High titers               Nucleosomes,               Histones          Drug-induced SLE  -----------  ----------------  -----------------------------  Speckled     Sm, RNP, SCL-70,  SLE,MCTD,PSS (diffuse form),               SS-A/SS-B         Sjogrens  -----------  ----------------  -----------------------------  Nucleolar    SCL-70, PM-1/SCL  High titers Scleroderma,                                 PM/DM  -----------  ----------------  -----------------------------  Centromere   Centromere        PSS (limited form) w/Crest                                 syndrome variable  -----------  ----------------  -----------------------------  Nuclear Dot  [id number]  Primary Biliary Cirrhosis  -----------  ----------------  -----------------------------  Nuclear      ,            Primary Biliary Cirrhosis  Membrane     luis angel A,B,C  -----------  ----------------  -----------------------------        Anti-DNA (DS) Ab Qn <1 IU/mL      Comment:                                    Negative      <5                                     Equivocal  5 - 9                                     Positive      >9        RNP Antibodies <0.2 AI      Rendon Antibodies <0.2 AI      Antiscleroderma-70 Antibodies <0.2 AI      TIN SSA (RO) Ab <0.2 AI      TIN SSB (LA) Ab <0.2 AI      Antichromatin Antibodies <0.2 AI      DEIRDRE-1 IgG <0.2 AI      Anti-Centromere B Antibodies <0.2 AI      See below: Comment     Comment: Autoantibody                       Disease Association  ------------------------------------------------------------                          Condition                  Frequency  ---------------------   ------------------------   ---------  Antinuclear Antibody,    SLE, mixed  connective  Direct (OMID-D)           tissue diseases  ---------------------   ------------------------   ---------  dsDNA                    SLE                        40 - 60%  ---------------------   ------------------------   ---------  Chromatin                Drug induced SLE                90%                           SLE                        48 - 97%  ---------------------   ------------------------   ---------  SSA (Ro)                 SLE                        25 - 35%                           Sjogren's Syndrome         40 - 70%                            Lupus                 100%  ---------------------   ------------------------   ---------  SSB (La)                 SLE                             10%                           Sjogren's Syndrome              30%  ---------------------   -----------------------    ---------  Sm (anti-Smith)          SLE                        15 - 30%  ---------------------   -----------------------    ---------  RNP                      Mixed Connective Tissue                           Disease                         95%  (U1 nRNP,                SLE                        30 - 50%  anti-ribonucleoprotein)  Polymyositis and/or                           Dermatomyositis                 20%  ---------------------   ------------------------   ---------  Scl-70 (antiDNA          Scleroderma (diffuse)      20 - 35%  topoisomerase)           Crest                           13%  ---------------------   ------------------------   ---------  Chitra-1                     Polymyositis and/or                           Dermatomyositis            20 - 40%  ---------------------   ------------------------   ---------  Centromere B             Scleroderma - Crest                           variant                         80%       Narrative:       Performed at:   [address]  [id number]  : Jose Luis Mojica PhD, Phone:  [phone]     "Peripheral Blood Smear [[id number]] Collected:  06/26/19 1238    Specimen:  Blood Updated:  06/28/19 0859     Performed by: Shakila Boles M.D.     Pathologist Interpretation Neutrophils 67%  Bands 1%  Lymphocytes 24%  Monocytes 8%  Severe peripheral thrombocytopenia.    No schistocytes seen.  No platelet clumps seen.  No morulae seen in granulocytes or monocytes.    Magnesium [[id number]]  (Normal) Collected:  06/28/19 0437    Specimen:  Blood Updated:  06/28/19 0658     Magnesium 2.0 mg/dL             Radiology:     Imaging Results (last 72 hours)     ** No results found for the last 72 hours. **            Objective:   Vitals: /54 (BP Location: Left arm, Patient Position: Lying)   Pulse 67   Temp 98.4 °F (36.9 °C) (Axillary)   Resp 18   Ht 175.3 cm (69\")   Wt 75.8 kg (167 lb)   SpO2 95%   BMI 24.66 kg/m²   Physical Exam   Constitutional:   Frail looking.   HENT:   Head: Normocephalic and atraumatic.   Eyes: No scleral icterus.   Neck: No JVD present.   Cardiovascular: Normal rate and regular rhythm.   Pulmonary/Chest: No respiratory distress. He has no wheezes.   Abdominal: Soft. Bowel sounds are normal. There is no tenderness.   Musculoskeletal: He exhibits no edema.   Neurological:   Awake.  Oriented to person. Moves all extremities.  Follow simple commands.   Skin: There is pallor.   Nursing note and vitals reviewed.    24HR INTAKE/OUTPUT:      Intake/Output Summary (Last 24 hours) at 7/1/2019 0655  Last data filed at 7/1/2019 0304  Gross per 24 hour   Intake 1124 ml   Output 850 ml   Net 274 ml        Problem list:       Acute kidney injury superimposed on chronic kidney disease (CMS/HCC)    PAF (paroxysmal atrial fibrillation) (CMS/HCC) No AC DUE TO GI  BLEED    Anemia of chronic disease    Multiple myeloma (CMS/HCC)    Hypertension    Coronary artery disease    Pulmonary hypertension (CMS/HCC)    Controlled type 2 diabetes mellitus diet controlled without complication, without long-term " current use of insulin (CMS/HCC)    Neutropenic fever (CMS/HCC)    Sick sinus syndrome (CMS/HCC)    Moderate malnutrition (CMS/HCC)    Delirium    Metabolic encephalopathy    Pancytopenia (CMS/HCC)    Pressure ulcer with suspected deep tissue injury, stage III (CMS/HCC)        Assessment/Plan:       ASSESSMENT:  1.    Pancytopenia, bone marrow biopsy 06/27/2019 pending.  a.  Normocytic anemia from chronic kidney disease stage IV, GFR 24 06/27/2019. Post 2 units PRBCs 06/27/2019.  b.  Leukopenia without neutropenia, resolved.  c.   Thrombocytopenia. No peripheral schistocytes to suggest TTP/HUS/DIC.   Post platelet transfusion 06/27/2019.   2.  Acute on chronic kidney disease. GFR 26 mL/minute, 07/01/2019 (from 29 mL/minute, 06/24/2019; usual baseline is 37-44 mL/minute).   3.    Confusion likely metabolic.  Negative CT head.   4.    Sick sinus syndrome.  5.    Coronary artery disease.  6.    Paroxysmal atrial fibrillation (no anticoagulation).  7.  History of IgG lambda myeloma, Stage III. Last received cycle 6 of melphalan, prednisone, and thalidomide on 01/15/2014.  8.  Positive OMID 1:160.     RECOMMENDATIONS:  1.   WBC 5 from 5 from 5.2, hgb 9.3 from 9.1 from 9.3, platelet 56 from 48.     2.   Await bone marrow biopsy report.   3.   Anemia profile consistent with chronic disease, elevated LDH, negative HIV screen, SPIEP showed stable M spike 0.3 from 0.1 (no 0.5 increase from previous), normal T4, normal TSH, OMID positive 1:160, mildly prolonged PT/PTT, normal fibrinogen, and negative antiplatelet antibodies.  4.    Normal [id number].  5.   Transfuse packed red blood cells if hemoglobin less than 8. Observe for infusion reactions.  6.   Transfuse platelets if less than 10,000 or bleeding. Observe for infusion reactions.  7.    Above discussed with nurse Goodwin.  She verbalized understanding.   8.    Plan for [facility] on discharge.  9.    Clinic appointment 07/17/2019 at 1315.

## 2019-07-01 NOTE — PROGRESS NOTES
Continued Stay Note   Sirisha     Patient Name: Jesús Long  MRN: [id number]  Today's Date: 7/1/2019    Admit Date: 6/24/2019    Discharge Plan     Row Name 07/01/19 0854       Plan    Plan  [facility]    Patient/Family in Agreement with Plan  yes    Plan Comments  Pt still has bed available at [facility] upon discharge. Will follow. [phone]        Discharge Codes    No documentation.             PONCE Mittal

## 2019-07-02 NOTE — THERAPY TREATMENT NOTE
Acute Care - Physical Therapy Treatment Note  [facility]     Patient Name: Jesús Long  : 1931  MRN: [id number]  Today's Date: 2019  Onset of Illness/Injury or Date of Surgery: 19  Date of Referral to PT: 19  Referring Physician: Dr. Brown    Admit Date: 2019    Visit Dx:    ICD-10-CM ICD-9-CM   1. CHRISTINE (acute kidney injury) (CMS/HCC) N17.9 584.9   2. Altered mental status, unspecified altered mental status type R41.82 780.97   3. Decreased activities of daily living (ADL) R68.89 780.99   4. Decreased mobility and endurance Z74.09 780.99   5. Oropharyngeal dysphagia R13.12 787.22     Patient Active Problem List   Diagnosis   • Anemia associated with chronic renal failure   • PAF (paroxysmal atrial fibrillation) (CMS/HCC) No AC DUE TO GI  BLEED   • Anxiety   • Pacemaker   • Anemia of chronic disease   • Multiple myeloma (CMS/HCC)   • Chronic kidney disease   • Hypertension   • Coronary artery disease   • Pulmonary hypertension (CMS/HCC)   • Bright red blood per rectum   • Controlled type 2 diabetes mellitus diet controlled without complication, without long-term current use of insulin (CMS/HCC)   • Light chain myeloma (CMS/HCC)   • Acute kidney injury superimposed on chronic kidney disease (CMS/HCC)   • Neutropenic fever (CMS/HCC)   • Sick sinus syndrome (CMS/HCC)   • Moderate malnutrition (CMS/HCC)   • Delirium   • Metabolic encephalopathy   • Pancytopenia (CMS/HCC)   • Pressure ulcer with suspected deep tissue injury, stage III (CMS/HCC)       Therapy Treatment    Rehabilitation Treatment Summary     Row Name 19 1515 19 1125 19 0957       Treatment Time/Intention    Discipline  physical therapy assistant  -LG  occupational therapist  -CH  physical therapy assistant  -LG    Document Type  therapy note (daily note)  -LG  therapy note (daily note)  -CH  therapy note (daily note)  -LG    Subjective Information  no complaints  -LG2  --  no complaints  -LG2    Mode of  Treatment  individual therapy;physical therapy  -LG2  --  individual therapy;physical therapy  -LG2    Patient/Family Observations  no family in room, Jelena WORRELL in room  -LG2  --  daughter present  -LG2    Patient Effort  good  -LG2  --  good  -LG2    Comment  pt more confused in afternoons  -LG2  --  also assisted to//from Br  -LG3    Existing Precautions/Restrictions  fall;oxygen therapy device and L/min  -LG2  --  fall;oxygen therapy device and L/min  -LG2    Recorded by [LG] Jose Luis Dawkins, PTA 07/02/19 1535  [LG2] Jose Luis Dawkins, PTA 07/02/19 1554 [CH] Cora Mar, OTR/L 07/02/19 1125 [LG] Jose Luis Dawkins, PTA 07/02/19 0957  [LG2] Jose Luis Dawkins, PTA 07/02/19 1034  [LG3] Jose Luis Dawkins, PTA 07/02/19 1042    Row Name 07/02/19 1515 07/02/19 1125 07/02/19 0957       Bed Mobility Assessment/Treatment    Bed Mobility Assessment/Treatment  --  rolling left;rolling right  -CH  --    Rolling Left [place] (Bed Mobility)  --  minimum assist (75% patient effort);verbal cues  -CH  --    Rolling Right [place] (Bed Mobility)  --  minimum assist (75% patient effort);verbal cues  -CH  --    Supine-Sit [place] (Bed Mobility)  verbal cues;minimum assist (75% patient effort)  -LG  --  --    Sit-Supine [place] (Bed Mobility)  verbal cues;minimum assist (75% patient effort)  -LG  --  verbal cues;minimum assist (75% patient effort)  -LG    Bed Mobility, Safety Issues  decreased use of arms for pushing/pulling;decreased use of legs for bridging/pushing  -LG  --  decreased use of arms for pushing/pulling;decreased use of legs for bridging/pushing  -LG    Assistive Device (Bed Mobility)  bed rails  -LG  --  --    Recorded by [LG] Jose Luis Dawkins, PTA 07/02/19 1554 [CH] Cora Mar, OTR/L 07/02/19 1142 [LG] Jose Luis Dawkins, PTA 07/02/19 1034    Row Name 07/02/19 1515 07/02/19 0957          Sit-Stand Transfer    Sit-Stand [place] (Transfers)  verbal cues;contact guard  -LG  verbal cues;minimum assist (75% patient  effort)  -LG     Assistive Device (Sit-Stand Transfers)  walker, front-wheeled  -LG  walker, front-wheeled  -LG     Recorded by [LG] Jose Luis Dawkins, PTA 07/02/19 1554 [LG] Jose Luis Dawkins, PTA 07/02/19 1034     Row Name 07/02/19 1515 07/02/19 0957          Stand-Sit Transfer    Stand-Sit [place] (Transfers)  verbal cues;contact guard  -LG  verbal cues;minimum assist (75% patient effort)  -LG     Assistive Device (Stand-Sit Transfers)  walker, front-wheeled  -LG  walker, front-wheeled  -LG     Recorded by [LG] Jose Luis Dawkins, PTA 07/02/19 1554 [LG] Jose Luis Dawkins, PTA 07/02/19 1034     Row Name 07/02/19 1515 07/02/19 0957          Toilet Transfer    Type (Toilet Transfer)  sit-stand;stand-sit  -LG  sit-stand;stand-sit  -LG     [place] Level (Toilet Transfer)  verbal cues;minimum assist (75% patient effort)  -LG  verbal cues;contact guard;minimum assist (75% patient effort)  -LG     Assistive Device (Toilet Transfer)  commode;grab bars/safety frame Min A with personal care cleaning after BM  -LG2  commode;grab bars/safety frame;walker, front-wheeled  -LG     Recorded by [LG] Jose Luis Dawkins, PTA 07/02/19 1554  [LG2] Jose Luis Dawkins, PTA 07/02/19 1555 [LG] Jose Luis Dawkins, PTA 07/02/19 1042     Row Name 07/02/19 1515 07/02/19 0957          Gait/Stairs Assessment/Training    Gait/Stairs Assessment/Training  gait/ambulation assistive device  -LG  gait/ambulation assistive device  -LG     [place] Level (Gait)  verbal cues;contact guard  -LG  verbal cues;contact guard  -LG     Assistive Device (Gait)  walker, front-wheeled  -LG  walker, front-wheeled  -LG     Distance in Feet (Gait)  45' x 2 reps  -LG  45' x 4 reps with 3 standing rest breaks  -LG2     Pattern (Gait)  step-to  -LG  step-to  -LG2     Deviations/Abnormal Patterns (Gait)  bilateral deviations;base of support, narrow  -LG  bilateral deviations;gait speed decreased;stride length decreased  -LG2     Bilateral Gait Deviations  forward flexed posture  -LG   forward flexed posture  -LG2     Recorded by [LG] Jose Luis Dawkins, PTA 07/02/19 1554 [LG] Jose Luis Dawkins, PTA 07/02/19 1034  [LG2] Jose Luis Dawkins, PTA 07/02/19 1039     Row Name 07/02/19 1125             BADL Safety/Performance    Impairments, BADL Safety/Performance  cognition  -CH      Cognitive Impairments, BADL Safety/Performance  attention;insight into deficits/self awareness;judgment;problem solving/reasoning;awareness, need for assistance  -CH      Recorded by [CH] Cora Mar OTR/L 07/02/19 1142      Row Name 07/02/19 1125             Motor Skills Assessment/Interventions    Additional Documentation  Therapeutic Exercise Interventions (Group);Therapeutic Exercise (Group)  -CH      Recorded by [CH] Cora Mar OTR/L 07/02/19 1142      Row Name 07/02/19 1515 07/02/19 1125 07/02/19 0957       Therapeutic Exercise    Upper Extremity Range of Motion (Therapeutic Exercise)  --  shoulder flexion/extension, bilateral;elbow flexion/extension, bilateral;forearm supination/pronation, right;wrist flexion/extension, bilateral  -  --    Comment (Therapeutic Exercise)  B LE x 20 reps sitting eob  -LG  BUE AROM 3 sets of 10  -CH  B LE AROM 2 sets of 10 reps sitting EOB.   -LG    Recorded by [LG] Jose Luis Dawkins, PTA 07/02/19 1554 [CH] Cora Mar OTR/L 07/02/19 1142 [LG] Jose Luis Dawkins, PTA 07/02/19 1039    Row Name 07/02/19 1515 07/02/19 1125 07/02/19 0957       Positioning and Restraints    Pre-Treatment Position  in bed  -LG  in bed  -CH  sitting in chair/recliner  -LG    Post Treatment Position  bed  -LG  bed  -CH  bed  -LG    In Bed  fowlers;call light within reach;encouraged to call for assist;exit alarm on;with SLP;notified nsg  -LG2  side lying left;call light within reach;encouraged to call for assist;with family/caregiver;side rails up x2  -CH  fowlers;call light within reach;encouraged to call for assist  -LG    Recorded by [LG] Jose Luis Dawkins, PTA 07/02/19 1554  [LG2] Jose Luis Dawkins, PTA 07/02/19 1556 [CH]  Cora Mar OTR/L 07/02/19 1142 [LG] Mariza Amaya FRANCIS, PTA 07/02/19 1039    Row Name 07/02/19 1125 07/02/19 0957          Pain Scale: Numbers Pre/Post-Treatment    Pain Scale: Numbers, Pretreatment  0/10 - no pain  -CH  0/10 - no pain  -LG     Pain Scale: Numbers, Post-Treatment  0/10 - no pain  -CH  0/10 - no pain  -LG     Recorded by [CH] Cora Mar OTR/L 07/02/19 1142 [LG] Mariza Jose Luis BLANCO, PTA 07/02/19 1039     Row Name                Wound 06/29/19 0800 Left gluteal pressure injury    Wound - Properties Group Date first assessed: 06/29/19 [AG] Time first assessed: 0800 [AG] Side: Left [AG] Location: gluteal [AG] Type: pressure injury [AG] Recorded by:  [AG] Dianne Desai, RN 06/29/19 1435    Row Name                Wound 06/29/19 1444 Right gluteal pressure injury    Wound - Properties Group Date first assessed: 06/29/19 [AG] Time first assessed: 1444 [AG] Side: Right [AG] Location: gluteal [AG] Type: pressure injury [AG] Recorded by:  [AG] Dianne Desai, RN 06/29/19 1444    Row Name 07/02/19 1125             Outcome Summary/Treatment Plan (OT)    Daily Summary of Progress (OT)  progress toward functional goals is gradual  -CH      Barriers to Overall Progress (OT)  confusion  -CH      Plan for Continued Treatment (OT)  cont with OT POF  -CH      Anticipated Discharge Disposition (OT)  skilled nursing facility  -      Recorded by [CH] Cora Mar OTR/L 07/02/19 1142        User Key  (r) = Recorded By, (t) = Taken By, (c) = Cosigned By    Initials Name Effective Dates Discipline    LG Mariza Jose Luis BLANCO, PTA 08/02/16 -  PT    CH Cora Mar OTR/L 08/02/16 -  OT    AG Dianne Desai, RN 08/02/16 -  Nurse          Wound 06/29/19 0800 Left gluteal pressure injury (Active)   Base red 7/1/2019  9:13 PM   Periwound excoriated 7/1/2019  9:13 PM   Drainage Amount none 7/1/2019  9:13 PM   Care, Wound barrier applied 7/1/2019  9:13 PM   Dressing Care, Wound open to air 7/1/2019  9:13 PM       Wound 06/29/19 1444 Right  gluteal pressure injury (Active)   Base red 7/1/2019  9:13 PM   Drainage Amount none 7/1/2019  9:13 PM   Care, Wound barrier applied 7/1/2019  9:13 PM   Dressing Care, Wound open to air 7/1/2019  9:13 PM       Rehab Goal Summary     Row Name 07/02/19 1350 07/02/19 1015          Swallow Goals (SLP)    Oral Nutrition/Hydration Goal Selection (SLP)  oral nutrition/hydration, SLP goal 1  -CS  oral nutrition/hydration, SLP goal 1  (Pended)   -MB     Lingual Strengthening Goal Selection (SLP)  lingual strengthening, SLP goal 1  -CS  --     Pharyngeal Strengthening Exercise Goal Selection (SLP)  pharyngeal strengthening exercise, SLP goal 1  -CS  --     Additional Documentation  lingual strengthening goal selection (SLP);pharyngeal strengthening exercise goal selection (SLP)  -CS  --        Oral Nutrition/Hydration Goal 1 (SLP)    Oral Nutrition/Hydration Goal 1, SLP  Pt. will tolerate LRD with no overt s/s of aspiration.   -CS  Pt. will tolerate LRD with no overt s/s of aspiration.   (Pended)   -MB     Time Frame (Oral Nutrition/Hydration Goal 1, SLP)  by discharge  -CS  by discharge  (Pended)   -MB     Barriers (Oral Nutrition/Hydration Goal 1, SLP)  Decreased attention/confusion  -CS  Decreased attention/confusion  (Pended)   -MB     Progress/Outcomes (Oral Nutrition/Hydration Goal 1, SLP)  goal ongoing  -CS  goal ongoing  (Pended)   -MB        Lingual Strengthening Goal 1 (SLP)    Activity (Lingual Strengthening Goal 1, SLP)  increase lingual tone/sensation/control/coordination/movement  -CS  --     Increase Lingual Tone/Sensation/Control/Coordination/Movement  lingual movement exercises  -CS  --     [place]/Accuracy (Lingual Strengthening Goal 1, SLP)  with minimal cues (75-90% accuracy)  -CS  --     Time Frame (Lingual Strengthening Goal 1, SLP)  by discharge  -CS  --     Barriers (Lingual Strengthening Goal 1, SLP)  n/a  -CS  --     Progress/Outcomes (Lingual Strengthening Goal 1, SLP)  goal ongoing  -CS  --         Pharyngeal Strengthening Exercise Goal 1 (SLP)    Activity (Pharyngeal Strengthening Goal 1, SLP)  increase pharyngeal sensation;increase timing;increase superior movement of the hyolaryngeal complex;increase squeeze/positive pressure generation  -CS  --     Increase Pharyngeal Sensation  gustatory stimulation (sour/cold)  -CS  --     Increase Timing  supraglottic swallow;gustatory stimulation (sour/cold)  -CS  --     Increase Superior Movement of the Hyolaryngeal Complex  falsetto  -CS  --     Increase Squeeze/Positive Pressure Generation  hard effortful swallow;feliciano  -CS  --     [place]/Accuracy (Pharyngeal Strengthening Goal 1, SLP)  with minimal cues (75-90% accuracy)  -CS  --     Time Frame (Pharyngeal Strengthening Goal 1, SLP)  by discharge  -CS  --     Barriers (Pharyngeal Strengthening Goal 1, SLP)  n/a  -CS  --     Progress/Outcomes (Pharyngeal Strengthening Goal 1, SLP)  goal ongoing  -CS  --       User Key  (r) = Recorded By, (t) = Taken By, (c) = Cosigned By    Initials Name Provider Type Discipline    CS Kenneth Rendon, MS CCC-SLP Speech and Language Pathologist SLP    Peggy Munguia, Speech Therapy Student Speech Therapy Student SLP          Physical Therapy Education     Title: PT OT SLP Therapies (In Progress)     Topic: Physical Therapy (In Progress)     Point: Mobility training (Done)     Learning Progress Summary           Patient Acceptance, E, VU by SC at 6/25/2019 10:43 AM    Comment:  pt understands his limitations at this time. precautions were reviewed w/ pt and proper body mechanics when transferring and mobile                   Point: Body mechanics (Done)     Learning Progress Summary           Patient Acceptance, E, VU by SC at 6/25/2019 10:43 AM    Comment:  pt understands his limitations at this time. precautions were reviewed w/ pt and proper body mechanics when transferring and mobile                   Point: Precautions (Done)     Learning Progress Summary            Patient Acceptance, E, VU by SC at 6/25/2019 10:43 AM    Comment:  pt understands his limitations at this time. precautions were reviewed w/ pt and proper body mechanics when transferring and mobile                               User Key     Initials Effective Dates Name Provider Type Discipline    SC 05/15/19 -  Carl Campoverde, PT Student PT Student PT                PT Recommendation and Plan     Plan of Care Reviewed With: patient, daughter  Progress: improving  Outcome Summary: Pt Supervision for Sit to Stand transfers, CGA for Gait with RW 45' x 4 reps with 3 standing rest breaks. Completed B LE Strengthening Exercises sitting EOB.   Outcome Measures     Row Name 07/01/19 1200             How much help from another is currently needed...    Putting on and taking off regular lower body clothing?  2  -TS      Bathing (including washing, rinsing, and drying)  2  -TS      Toileting (which includes using toilet bed pan or urinal)  3  -TS      Putting on and taking off regular upper body clothing  3  -TS      Taking care of personal grooming (such as brushing teeth)  3  -TS      Eating meals  4  -TS      AM-PAC 6 Clicks Score (OT)  17  -TS         Functional Assessment    Outcome Measure Options  AM-PAC 6 Clicks Daily Activity (OT)  -TS        User Key  (r) = Recorded By, (t) = Taken By, (c) = Cosigned By    Initials Name Provider Type    TS Cailin Hinton, LISS/L Occupational Therapy Assistant         Time Calculation:   PT Charges     Row Name 07/02/19 1515 07/02/19 0957          Time Calculation    Start Time  1515  -LG  0957  -LG     Stop Time  1554  -LG  1040  -LG     Time Calculation (min)  39 min  -LG  43 min  -LG     PT Received On  07/02/19  -LG  07/02/19  -LG     PT Goal Re-Cert Due Date  07/05/19  -LG  07/05/19  -LG        Time Calculation- PT    Total Timed Code Minutes- PT  39 minute(s)  -LG  43 minute(s)  -LG       User Key  (r) = Recorded By, (t) = Taken By, (c) = Cosigned By    Initials  Name Provider Type    LG Dawkins Jose Luis BLANCO, PTA Physical Therapy Assistant        Therapy Charges for Today     Code Description Service Date Service Provider Modifiers Qty    61942867645 HC GAIT TRAINING EA 15 MIN 7/1/2019 Jose Luis Dawkins, PTA GP 1    30179170447 HC PT THERAPEUTIC ACT EA 15 MIN 7/1/2019 Jose Luis Dawkins, PTA GP 1    80456307251 HC PT THER PROC EA 15 MIN 7/1/2019 DawkinsJose Luis, PTA GP 1    96358066265 HC GAIT TRAINING EA 15 MIN 7/2/2019 DawkinsJose Luis, PTA GP 1    79707780498 HC PT THERAPEUTIC ACT EA 15 MIN 7/2/2019 Jose Luis Dawkins, PTA GP 1    14917683074 HC PT THER PROC EA 15 MIN 7/2/2019 Jose Luis Dawkins, PTA GP 1    31682107499 HC GAIT TRAINING EA 15 MIN 7/2/2019 Jose Luis Dawkins, PTA GP 1    71582553456 HC PT THER PROC EA 15 MIN 7/2/2019 DawkinsJose Luis FRANCIS, PTA GP 1    78986055435 HC PT THERAPEUTIC ACT EA 15 MIN 7/2/2019 DawkinsJose Luis FRANCIS, PTA GP 1          PT G-Codes  Outcome Measure Options: AM-PAC 6 Clicks Daily Activity (OT)  AM-PAC 6 Clicks Score (PT): 20  AM-PAC 6 Clicks Score (OT): 17    Jose Luis Dawkins PTA  7/2/2019

## 2019-07-02 NOTE — PLAN OF CARE
Problem: Patient Care Overview  Goal: Plan of Care Review   07/02/19 9322   Coping/Psychosocial   Plan of Care Reviewed With patient   Plan of Care Review   Progress improving   OTHER   Outcome Summary awaiting BMBx, pureed and nectar thick, hem/onc signed off, oral lasix, renal function stable, vss, will continue to monitor       Problem: Skin Injury Risk (Adult)  Goal: Skin Health and Integrity  Outcome: Ongoing (interventions implemented as appropriate)      Problem: Fall Risk (Adult)  Goal: Absence of Fall  Outcome: Ongoing (interventions implemented as appropriate)      Problem: Nutrition, Imbalanced: Inadequate Oral Intake (Adult)  Goal: Improved Oral Intake  Outcome: Ongoing (interventions implemented as appropriate)    Goal: Prevent Further Weight Loss  Outcome: Ongoing (interventions implemented as appropriate)

## 2019-07-02 NOTE — PLAN OF CARE
Problem: Patient Care Overview  Goal: Plan of Care Review  Outcome: Ongoing (interventions implemented as appropriate)   07/02/19 1520   Coping/Psychosocial   Plan of Care Reviewed With patient   Plan of Care Review   Progress no change   OTHER   Outcome Summary VFSS complete. Pt presented with a full range of PO consistencies excpet for regular solids secondary to poor oral control, manipulation, and swallow initiation of mechanical soft solids. Pt noted to experince moderate-severe premature spillage to the point of the vallecula with all consistencies except for honey thick which spilled to the point of the lateral channels and thin liquids in which a estrella amount of aspiration was observed prior to swallow initiation. Trace laryngeal penetration was also noted 1x with both nectar thick and mechanical soft consistencies due to delayed swallow initiation as well as mild impairment with hyolaryngeal excursion and epiglottic inversion. Post trails a mild amount of residue was noted throughout the oropharyngeal area. SLP notes all instances of penetration and aspiration were silent. Pt was unable to clear residue from his airway even with a cued cough. SLP recommends a pureed diet with nectar thick liquids. Ok for ice chips or sips of water via spoon 30 minutes following any other PO intake. Meds to be administered crushed in pudding/applesauce. SLP will continue to follow and treat.

## 2019-07-02 NOTE — THERAPY TREATMENT NOTE
Acute Care - Physical Therapy Treatment Note  [facility]     Patient Name: Jesús Long  : 1931  MRN: [id number]  Today's Date: 2019  Onset of Illness/Injury or Date of Surgery: 19  Date of Referral to PT: 19  Referring Physician: Dr. Brown    Admit Date: 2019    Visit Dx:    ICD-10-CM ICD-9-CM   1. CHRISTINE (acute kidney injury) (CMS/HCC) N17.9 584.9   2. Altered mental status, unspecified altered mental status type R41.82 780.97   3. Decreased activities of daily living (ADL) R68.89 780.99   4. Decreased mobility and endurance Z74.09 780.99     Patient Active Problem List   Diagnosis   • Anemia associated with chronic renal failure   • PAF (paroxysmal atrial fibrillation) (CMS/HCC) No AC DUE TO GI  BLEED   • Anxiety   • Pacemaker   • Anemia of chronic disease   • Multiple myeloma (CMS/HCC)   • Chronic kidney disease   • Hypertension   • Coronary artery disease   • Pulmonary hypertension (CMS/HCC)   • Bright red blood per rectum   • Controlled type 2 diabetes mellitus diet controlled without complication, without long-term current use of insulin (CMS/HCC)   • Light chain myeloma (CMS/HCC)   • Acute kidney injury superimposed on chronic kidney disease (CMS/HCC)   • Neutropenic fever (CMS/HCC)   • Sick sinus syndrome (CMS/HCC)   • Moderate malnutrition (CMS/HCC)   • Delirium   • Metabolic encephalopathy   • Pancytopenia (CMS/HCC)   • Pressure ulcer with suspected deep tissue injury, stage III (CMS/HCC)       Therapy Treatment    Rehabilitation Treatment Summary     Row Name 19 0957             Treatment Time/Intention    Discipline  physical therapy assistant  -LG      Document Type  therapy note (daily note)  -LG      Subjective Information  no complaints  -LG2      Mode of Treatment  individual therapy;physical therapy  -LG2      Patient/Family Observations  daughter present  -LG2      Patient Effort  good  -LG2      Comment  also assisted to//from Br  -LG3      Existing  Precautions/Restrictions  fall;oxygen therapy device and L/min  -LG2      Recorded by [LG] Jose Luis Dawkins, PTA 07/02/19 0957  [LG2] Jose Luis Dawkins, PTA 07/02/19 1034  [LG3] Jose Luis Dawkins, PTA 07/02/19 1042      Row Name 07/02/19 0957             Bed Mobility Assessment/Treatment    Sit-Supine [place] (Bed Mobility)  verbal cues;minimum assist (75% patient effort)  -LG      Bed Mobility, Safety Issues  decreased use of arms for pushing/pulling;decreased use of legs for bridging/pushing  -LG      Recorded by [LG] Jose Luis Dawkins, PTA 07/02/19 1034      Row Name 07/02/19 0957             Sit-Stand Transfer    Sit-Stand [place] (Transfers)  verbal cues;minimum assist (75% patient effort)  -LG      Assistive Device (Sit-Stand Transfers)  walker, front-wheeled  -LG      Recorded by [LG] Jose Luis Dawkins, PTA 07/02/19 1034      Row Name 07/02/19 0957             Stand-Sit Transfer    Stand-Sit [place] (Transfers)  verbal cues;minimum assist (75% patient effort)  -LG      Assistive Device (Stand-Sit Transfers)  walker, front-wheeled  -LG      Recorded by [LG] Jose Luis Dawkins, PTA 07/02/19 1034      Row Name 07/02/19 0957             Toilet Transfer    Type (Toilet Transfer)  sit-stand;stand-sit  -LG      [place] Level (Toilet Transfer)  verbal cues;contact guard;minimum assist (75% patient effort)  -LG      Assistive Device (Toilet Transfer)  commode;grab bars/safety frame;walker, front-wheeled  -LG      Recorded by [LG] Jose Luis Dawkins, PTA 07/02/19 1042      Row Name 07/02/19 0957             Gait/Stairs Assessment/Training    Gait/Stairs Assessment/Training  gait/ambulation assistive device  -LG      [place] Level (Gait)  verbal cues;contact guard  -LG      Assistive Device (Gait)  walker, front-wheeled  -LG      Distance in Feet (Gait)  45' x 4 reps with 3 standing rest breaks  -LG2      Pattern (Gait)  step-to  -LG2      Deviations/Abnormal Patterns (Gait)  bilateral deviations;gait speed decreased;stride  length decreased  -LG2      Bilateral Gait Deviations  forward flexed posture  -LG2      Recorded by [LG] Jose Luis Dawkins, PTA 07/02/19 1034  [LG2] Jose Luis Dawkins, PTA 07/02/19 1039      Row Name 07/02/19 0957             Therapeutic Exercise    Comment (Therapeutic Exercise)  B LE AROM 2 sets of 10 reps sitting EOB.   -LG      Recorded by [LG] Jose Luis Dawkins, PTA 07/02/19 1039      Row Name 07/02/19 0957             Positioning and Restraints    Pre-Treatment Position  sitting in chair/recliner  -LG      Post Treatment Position  bed  -LG      In Bed  fowlers;call light within reach;encouraged to call for assist  -LG      Recorded by [LG] Jose Luis Dawkins, PTA 07/02/19 1039      Row Name 07/02/19 0957             Pain Scale: Numbers Pre/Post-Treatment    Pain Scale: Numbers, Pretreatment  0/10 - no pain  -LG      Pain Scale: Numbers, Post-Treatment  0/10 - no pain  -LG      Recorded by [LG] Jose Luis Dawkins, PTA 07/02/19 1039      Row Name                Wound 06/29/19 0800 Left gluteal pressure injury    Wound - Properties Group Date first assessed: 06/29/19 [AG] Time first assessed: 0800 [AG] Side: Left [AG] Location: gluteal [AG] Type: pressure injury [AG] Recorded by:  [AG] Dianne Desai RN 06/29/19 1435    Row Name                Wound 06/29/19 1444 Right gluteal pressure injury    Wound - Properties Group Date first assessed: 06/29/19 [AG] Time first assessed: 1444 [AG] Side: Right [AG] Location: gluteal [AG] Type: pressure injury [AG] Recorded by:  [AG] Dianne Desai RN 06/29/19 1444      User Key  (r) = Recorded By, (t) = Taken By, (c) = Cosigned By    Initials Name Effective Dates Discipline    Jose Luis Almanzar, PTA 08/02/16 -  PT    AG Dianne Desai RN 08/02/16 -  Nurse          Wound 06/29/19 0800 Left gluteal pressure injury (Active)   Base red 7/1/2019  9:13 PM   Periwound excoriated 7/1/2019  9:13 PM   Drainage Amount none 7/1/2019  9:13 PM   Care, Wound barrier applied 7/1/2019  9:13 PM   Dressing Care, Wound  open to air 7/1/2019  9:13 PM       Wound 06/29/19 1444 Right gluteal pressure injury (Active)   Base red 7/1/2019  9:13 PM   Drainage Amount none 7/1/2019  9:13 PM   Care, Wound barrier applied 7/1/2019  9:13 PM   Dressing Care, Wound open to air 7/1/2019  9:13 PM           Physical Therapy Education     Title: PT OT SLP Therapies (In Progress)     Topic: Physical Therapy (In Progress)     Point: Mobility training (Done)     Learning Progress Summary           Patient Acceptance, E, VU by SC at 6/25/2019 10:43 AM    Comment:  pt understands his limitations at this time. precautions were reviewed w/ pt and proper body mechanics when transferring and mobile                   Point: Body mechanics (Done)     Learning Progress Summary           Patient Acceptance, E, VU by SC at 6/25/2019 10:43 AM    Comment:  pt understands his limitations at this time. precautions were reviewed w/ pt and proper body mechanics when transferring and mobile                   Point: Precautions (Done)     Learning Progress Summary           Patient Acceptance, E, VU by SC at 6/25/2019 10:43 AM    Comment:  pt understands his limitations at this time. precautions were reviewed w/ pt and proper body mechanics when transferring and mobile                               User Key     Initials Effective Dates Name Provider Type Discipline    SC 05/15/19 -  Carl, PT Student PT Student PT                PT Recommendation and Plan     Plan of Care Reviewed With: patient, daughter  Progress: improving  Outcome Summary: Pt Supervision for Sit to Stand transfers, CGA for Gait with RW 45' x 4 reps with 3 standing rest breaks. Completed B LE Strengthening Exercises sitting EOB.   Outcome Measures     Row Name 07/01/19 1200             How much help from another is currently needed...    Putting on and taking off regular lower body clothing?  2  -TS      Bathing (including washing, rinsing, and drying)  2  -TS      Toileting (which  includes using toilet bed pan or urinal)  3  -TS      Putting on and taking off regular upper body clothing  3  -TS      Taking care of personal grooming (such as brushing teeth)  3  -TS      Eating meals  4  -TS      AM-PAC 6 Clicks Score (OT)  17  -TS         Functional Assessment    Outcome Measure Options  AM-PAC 6 Clicks Daily Activity (OT)  -TS        User Key  (r) = Recorded By, (t) = Taken By, (c) = Cosigned By    Initials Name Provider Type     Cailin Hinton COTA/L Occupational Therapy Assistant         Time Calculation:   PT Charges     Row Name 07/02/19 0957             Time Calculation    Start Time  0957  -      Stop Time  1040  -      Time Calculation (min)  43 min  -      PT Received On  07/02/19  -      PT Goal Re-Cert Due Date  07/05/19  -         Time Calculation- PT    Total Timed Code Minutes- PT  43 minute(s)  -        User Key  (r) = Recorded By, (t) = Taken By, (c) = Cosigned By    Initials Name Provider Type     Jose Luis Dawkins, PTA Physical Therapy Assistant        Therapy Charges for Today     Code Description Service Date Service Provider Modifiers Qty    03491419913 HC GAIT TRAINING EA 15 MIN 7/1/2019 Jose Luis Dawkins, PTA GP 1    02415617942 HC PT THERAPEUTIC ACT EA 15 MIN 7/1/2019 Jose Luis Dawkins, PTA GP 1    32803641951 HC PT THER PROC EA 15 MIN 7/1/2019 Jose Luis Dawkins, PTA GP 1    04921249831 HC GAIT TRAINING EA 15 MIN 7/2/2019 Jose Luis Dawkins, PTA GP 1    85147019602 HC PT THERAPEUTIC ACT EA 15 MIN 7/2/2019 Jose Luis Dawkins, PTA GP 1    38799157177 HC PT THER PROC EA 15 MIN 7/2/2019 Jose Luis Dawkins, PTA GP 1          PT G-Codes  Outcome Measure Options: AM-PAC 6 Clicks Daily Activity (OT)  AM-PAC 6 Clicks Score (PT): 20  AM-PAC 6 Clicks Score (OT): 17    Jose Luis Dawkins PTA  7/2/2019

## 2019-07-02 NOTE — PLAN OF CARE
Problem: Patient Care Overview  Goal: Plan of Care Review  Outcome: Ongoing (interventions implemented as appropriate)   07/02/19 1410   Coping/Psychosocial   Plan of Care Reviewed With patient;daughter;family   Plan of Care Review   Progress no change  (Initial evaluation)   OTHER   Outcome Summary Clinical bedside swallow evaluation complete. Pt. was alert and cooperative, but pleasantly confused and exhibited difficulty following commands to complete oral mechanism exam prior to PO trials. Pt. reports he wears upper/lower dentures. Full range of PO consistencies presented except for mechanical soft. Per RN, pt. has exhibited throat clearing and coughing when taking medication. Since this, RN has started adminstering medication in pudding/applesauce. Since then, pt. has not had difficulty completing pills. Pt. presents with multiple swallows with all consistencies througout the evaluation. Pt. exhibited 2x throat clear following nectar thick consistencies and a mild non-productive cough following thin water trials. Pt. had difficulty clearing residue from regular consistency trial and was given mod-max cues to complete multiple swallows to clear residue. Pt. is currently on mechanical soft diet with thin liquids. SLP reccomends mechanical soft diet due to ill-fitting dentures and prolonged mastication, pt. to begin on honey thick liquids to ensure safety. Meds to be administered in pudding/applesauce as tolerated. SLP will complete VFSS to further evaluate swallow function. SLP will continue to follow and treat.

## 2019-07-02 NOTE — THERAPY EVALUATION
Acute Care - Speech Language Pathology   Swallow Initial Evaluation [facility]     Patient Name: Jesús Long  : 1931  MRN: [id number]  Today's Date: 2019  Onset of Illness/Injury or Date of Surgery: 19     Referring Physician: Dr. Brown      Admit Date: 2019    Clinical bedside swallow evaluation complete. Pt. was alert and cooperative, but pleasantly confused and exhibited difficulty following commands to complete oral mechanism exam prior to PO trials. Pt. reports he wears upper/lower dentures. Full range of PO consistencies presented except for mechanical soft. Per RN, pt. has exhibited throat clearing and coughing when taking medication and has started adminstering medication in pudding/applesauce. Since then, pt. has not had difficulty completing pills. Pt. presents with multiple swallows with all consistencies througout the evaluation. Pt. exhibited 2x throat clear following nectar thick consistencies and a mild non-productive cough following thin water trials. Pt. had difficulty clearing residue from regular consistency trial and was given mod-max cues to complete multiple swallows to clear. Pt. is currently on mechanical soft diet with thin liquids. SLP reccomends continuing mechanical soft diet due to ill-fitting dentures and prolonged mastication, pt. to begin on honey thick liquids to ensure safety. Meds to be administered in pudding/applesauce as tolerated. SLP will complete VFSS to further evaluate swallow function. SLP will continue to follow and treat.   Peggy South, Speech Therapy Student 2019 2:14 PM    Visit Dx:     ICD-10-CM ICD-9-CM   1. CHRISTINE (acute kidney injury) (CMS/[facility]) N17.9 584.9   2. Altered mental status, unspecified altered mental status type R41.82 780.97   3. Decreased activities of daily living (ADL) R68.89 780.99   4. Decreased mobility and endurance Z74.09 780.99   5. Oropharyngeal dysphagia R13.12 787.22     Patient Active Problem List   Diagnosis    • Anemia associated with chronic renal failure   • PAF (paroxysmal atrial fibrillation) (CMS/HCC) No AC DUE TO GI  BLEED   • Anxiety   • Pacemaker   • Anemia of chronic disease   • Multiple myeloma (CMS/HCC)   • Chronic kidney disease   • Hypertension   • Coronary artery disease   • Pulmonary hypertension (CMS/HCC)   • Bright red blood per rectum   • Controlled type 2 diabetes mellitus diet controlled without complication, without long-term current use of insulin (CMS/HCC)   • Light chain myeloma (CMS/HCC)   • Acute kidney injury superimposed on chronic kidney disease (CMS/HCC)   • Neutropenic fever (CMS/HCC)   • Sick sinus syndrome (CMS/HCC)   • Moderate malnutrition (CMS/HCC)   • Delirium   • Metabolic encephalopathy   • Pancytopenia (CMS/HCC)   • Pressure ulcer with suspected deep tissue injury, stage III (CMS/HCC)     Past Medical History:   Diagnosis Date   • Anemia    • Atrial fibrillation by electrocardiogram (CMS/HCC)    • CAD in native artery    • Carotid bruit    • CHF (congestive heart failure) (CMS/HCC)    • Chronic kidney disease     stage III   • Diabetes mellitus (CMS/HCC)     diet controlled   • History of coronary artery bypass graft    • Hospital psychosis (CMS/HCC)    • Hyperlipidemia    • Hypertension    • Monoclonal (M) protein disease, multiple 'M' protein    • Multiple myeloma (CMS/HCC)    • Murmur    • Pulmonary hypertension (CMS/HCC)    • Sick sinus syndrome (CMS/HCC)     s/p pace maker   • Sleep apnea      Past Surgical History:   Procedure Laterality Date   • BACK SURGERY     • CARDIAC CATHETERIZATION  09/16/1998    Left Heart; candidate for re-do CABG   • COLONOSCOPY  12/10/2013    Diverticulosis repeat prn   • CORONARY ARTERY BYPASS GRAFT      X 8   • ENDOSCOPY N/A 6/20/2018    Normal exam   • HEMORROIDECTOMY     • PACEMAKER IMPLANTATION          SWALLOW EVALUATION (last 72 hours)      SLP Adult Swallow Evaluation     Row Name 07/02/19 1015                   Rehab Evaluation     Document Type  evaluation  (Pended)   -MB        Subjective Information  no complaints  (Pended)   -MB        Patient Observations  alert;cooperative  (Pended)   -MB        Patient/Family Observations  Daughter and family present  (Pended)   -MB        Patient Effort  good  (Pended)   -MB        Comment  Pt. had difficulty following commands and has decreased attention to current activity possibly due to multiple visitors in the room.  (Pended)   -MB        Symptoms Noted During/After Treatment  none  (Pended)   -MB           General Information    Patient Profile Reviewed  yes  (Pended)   -MB        Pertinent History Of Current Problem  Generalized weakness, increased chest congestion, throat clear/coughing noted during med administration per RN report  (Pended)   -MB        Current Method of Nutrition  soft textures;thin liquids  (Pended)   -MB        Precautions/Limitations, Vision  WFL;for purposes of eval  (Pended)   -MB        Precautions/Limitations, Hearing  WFL;for purposes of eval  (Pended)   -MB        Prior Level of Function-Communication  WFL  (Pended)   -MB        Prior Level of Function-Swallowing  mechanical soft textures;dietary restrictions (e.g. consistent carb, low salt, etc.)  (Pended)   -MB        Plans/Goals Discussed with  patient and family;agreed upon  (Pended)   -MB        Barriers to Rehab  cognitive status  (Pended)   -MB        Patient's Goals for Discharge  return to all previous roles/activities  (Pended)   -MB        Family Goals for Discharge  patient able to return to all previous activities/roles  (Pended)   -MB           Pain Assessment    Additional Documentation  Pain Scale: FACES Pre/Post-Treatment (Group)  (Pended)   -MB           Pain Scale: FACES Pre/Post-Treatment    Pain: FACES Scale, Pretreatment  0-->no hurt  (Pended)   -MB        Pain: FACES Scale, Post-Treatment  0-->no hurt  (Pended)   -MB           Oral Motor and Function    Oral Lesions or Structural Abnormalities  and/or variants  N/A  (Pended)   -MB        Dentition Assessment  upper dentures/partial in place;lower dentures/partial in place;dentures are ill fitting  (Pended)   -MB        Secretion Management  WNL/WFL  (Pended)   -MB        Mucosal Quality  moist, healthy  (Pended)   -MB        Volitional Swallow  weak;delayed  (Pended)   -MB        Volitional Cough  non-productive  (Pended)   -MB           Oral Musculature and Cranial Nerve Assessment    Oral Motor General Assessment  generalized oral motor weakness  (Pended)   -MB           General Eating/Swallowing Observations    Respiratory Support Currently in Use  nasal cannula  (Pended)   -MB        Eating/Swallowing Skills  fed by SLP;self-fed  (Pended)   -MB        Positioning During Eating  upright in bed  (Pended)   -MB        Utensils Used  spoon;straw  (Pended)   -MB        Consistencies Trialed  regular textures;pureed;honey-thick liquids;nectar/syrup-thick liquids;thin liquids  (Pended)   -MB           Clinical Swallow Eval    Oral Prep Phase  impaired  (Pended)   -MB        Oral Transit  WFL  (Pended)   -MB        Oral Residue  impaired  (Pended)   -MB        Pharyngeal Phase  suspected pharyngeal impairment  (Pended)   -MB        Esophageal Phase  unremarkable  (Pended)   -MB        Clinical Swallow Evaluation Summary  Clinical bedside swallow evaluation complete. Pt. was alert and cooperative, but pleasantly confused and exhibited difficulty following commands to complete oral mechanism exam prior to PO trials. Pt. reports he wears upper/lower dentures. Full range of PO consistencies presented except for mechanical soft. Per RN, pt. has exhibited throat clearing and coughing when taking medication and has started adminstering medication in pudding/applesauce. Since then, pt. has not had difficulty completing pills. Pt. presents with multiple swallows with all consistencies througout the evaluation. Pt. exhibited 2x throat clear following nectar thick  consistencies and a mild non-productive cough following thin water trials. Pt. had difficulty clearing residue from regular consistency trial and was given mod-max cues to complete multiple swallows to clear. Pt. is currently on mechanical soft diet with thin liquids. SLP reccomends continuing mechanical soft diet due to ill-fitting dentures and prolonged mastication, pt. to begin on honey thick liquids to ensure safety. Meds to be administered in pudding/applesauce as tolerated. SLP will complete VFSS to further evaluate swallow function. SLP will continue to follow and treat.   (Pended)   -MB           Oral Prep Concerns    Oral Prep Concerns  prolonged mastication;increased prep time  (Pended)   -MB        Prolonged Mastication  regular consistencies;pudding  (Pended)   -MB        Increased Prep Time  regular consistencies  (Pended)   -MB        Oral Prep Concerns, Comment  Pt. exhibited multiple swallows with pureed, honey, and regular conssitencies.   (Pended)   -MB           Oral Residue Concerns    Oral Residue Concerns  diffuse residue throughout oral cavity  (Pended)   -MB        Diffuse Residue Throughout Oral Cavity  regular consistencies  (Pended)   -MB        Oral Residue Concerns, Comment  Pt and pt's family reports difficulty clearing applesauce and seems to have residue throughout his oral cavity.  (Pended)   -MB           Pharyngeal Phase Concerns    Pharyngeal Phase Concerns  throat clear;multiple swallows;wet vocal quality  (Pended)   -MB        Wet Vocal Quality  nectar;thin  (Pended)   -MB        Multiple Swallows  regular consistencies;pudding;honey;nectar  (Pended)   -MB        Throat Clear  thin;nectar  (Pended)   -MB        Pharyngeal Phase Concerns, Comment  2x throat clear following 2x nectar trials, multiple swallows noted with all consistencies.  (Pended)   -MB           Esophageal Phase Concerns    Esophageal Phase Concerns  sensation of material sticking  (Pended)   -MB         Sensation of Material Sticking  regular consistencies  (Pended)   -MB        Esophageal Phase Concerns, Comment  Pt's family reports he occasionally complains of meat feeling stuck in his throat during meal completion.  (Pended)   -MB           Clinical Impression    SLP Swallowing Diagnosis  mild-moderate;oral dysfunction;suspected pharyngeal dysfunction  (Pended)   -MB        Functional Impact  risk of aspiration/pneumonia  (Pended)   -MB        Rehab Potential/Prognosis, Swallowing  good, to achieve stated therapy goals  (Pended)   -MB        Swallow Criteria for Skilled Therapeutic Interventions Met  demonstrates skilled criteria  (Pended)   -MB           Recommendations    Therapy Frequency (Swallow)  at least;3 days per week  (Pended)   -MB        Predicted Duration Therapy Intervention (Days)  until discharge  (Pended)   -MB        SLP Diet Recommendation  soft textures;honey thick liquids  (Pended)   -MB        Recommended Diagnostics  VFSS (MBS)  (Pended)   -MB        Recommended Precautions and Strategies  upright posture during/after eating;small bites of food and sips of liquid;check mouth frequently for oral residue/pocketing;multiple swallows per bite of food  (Pended)   -MB        SLP Rec. for Method of Medication Administration  with pudding or applesauce;as tolerated  (Pended)   -MB        Monitor for Signs of Aspiration  yes;cough;gurgly voice;throat clearing;notify SLP if any concerns  (Pended)   -MB        Anticipated Dischage Disposition  unknown  (Pended)   -MB           Swallow Goals (SLP)    Oral Nutrition/Hydration Goal Selection (SLP)  oral nutrition/hydration, SLP goal 1  (Pended)   -MB           Oral Nutrition/Hydration Goal 1 (SLP)    Oral Nutrition/Hydration Goal 1, SLP  Pt. will tolerate LRD with no overt s/s of aspiration.   (Pended)   -MB        Time Frame (Oral Nutrition/Hydration Goal 1, SLP)  by discharge  (Pended)   -MB        Barriers (Oral Nutrition/Hydration Goal 1, SLP)   Decreased attention/confusion  (Pended)   -MB        Progress/Outcomes (Oral Nutrition/Hydration Goal 1, SLP)  goal ongoing  (Pended)   -MB          User Key  (r) = Recorded By, (t) = Taken By, (c) = Cosigned By    Initials Name Effective Dates    Peggy Munguia, Speech Therapy Student 04/24/19 -           EDUCATION  The patient has been educated in the following areas:   Dysphagia (Swallowing Impairment).    SLP Recommendation and Plan  SLP Swallowing Diagnosis: (P) mild-moderate, oral dysfunction, suspected pharyngeal dysfunction  SLP Diet Recommendation: (P) soft textures, honey thick liquids  Recommended Precautions and Strategies: (P) upright posture during/after eating, small bites of food and sips of liquid, check mouth frequently for oral residue/pocketing, multiple swallows per bite of food  SLP Rec. for Method of Medication Administration: (P) with pudding or applesauce, as tolerated     Monitor for Signs of Aspiration: (P) yes, cough, gurgly voice, throat clearing, notify SLP if any concerns  Recommended Diagnostics: (P) VFSS (MBS)  Swallow Criteria for Skilled Therapeutic Interventions Met: (P) demonstrates skilled criteria  Anticipated Dischage Disposition: (P) unknown  Rehab Potential/Prognosis, Swallowing: (P) good, to achieve stated therapy goals  Therapy Frequency (Swallow): (P) at least, 3 days per week  Predicted Duration Therapy Intervention (Days): (P) until discharge       Plan of Care Reviewed With: (P) patient, daughter, family  Plan of Care Review  Plan of Care Reviewed With: (P) patient, daughter, family  Progress: (P) no change(Initial evaluation)  Outcome Summary: (P) Clinical bedside swallow evaluation complete. Pt. was alert and cooperative, but pleasantly confused and exhibited difficulty following commands to complete oral mechanism exam prior to PO trials. Pt. reports he wears upper/lower dentures. Full range of PO consistencies presented except for mechanical soft. Per RN, pt.  has exhibited throat clearing and coughing when taking medication and has started adminstering medication in pudding/applesauce. Since then, pt. has not had difficulty completing pills. Pt. presents with multiple swallows with all consistencies througout the evaluation. Pt. exhibited 2x throat clear following nectar thick consistencies and a mild non-productive cough following thin water trials. Pt. had difficulty clearing residue from regular consistency trial and was given mod-max cues to complete multiple swallows to clear. Pt. is currently on mechanical soft diet with thin liquids. SLP reccomends continuing mechanical soft diet due to ill-fitting dentures and prolonged mastication, pt. to begin on honey thick liquids to ensure safety. Meds to be administered in pudding/applesauce as tolerated. SLP will complete VFSS to further evaluate swallow function. SLP will continue to follow and treat.     SLP GOALS     Row Name 07/02/19 1015             Oral Nutrition/Hydration Goal 1 (SLP)    Oral Nutrition/Hydration Goal 1, SLP  Pt. will tolerate LRD with no overt s/s of aspiration.   (Pended)   -MB      Time Frame (Oral Nutrition/Hydration Goal 1, SLP)  by discharge  (Pended)   -MB      Barriers (Oral Nutrition/Hydration Goal 1, SLP)  Decreased attention/confusion  (Pended)   -MB      Progress/Outcomes (Oral Nutrition/Hydration Goal 1, SLP)  goal ongoing  (Pended)   -MB        User Key  (r) = Recorded By, (t) = Taken By, (c) = Cosigned By    Initials Name Provider Type    Peggy Munguia, Speech Therapy Student Speech Therapy Student             Time Calculation:   Time Calculation- SLP     Row Name 07/02/19 1413             Time Calculation- SLP    SLP Start Time  1015  (Pended)   -MB      SLP Stop Time  1125  (Pended)   -MB      SLP Time Calculation (min)  70 min  (Pended)   -MB      SLP Received On  07/02/19  (Pended)   -MB      SLP Goal Re-Cert Due Date  07/12/19  (Pended)   -MB        User Key  (r) = Recorded  By, (t) = Taken By, (c) = Cosigned By    Initials Name Provider Type    Peggy Munguia, Speech Therapy Student Speech Therapy Student          Therapy Charges for Today     Code Description Service Date Service Provider Modifiers Qty    [id number] HC ST EVAL ORAL PHARYNG SWALLOW 5 7/2/2019 Peggy South, Speech Therapy Student GN 1               Peggy South Speech Therapy Student  7/2/2019

## 2019-07-02 NOTE — PLAN OF CARE
Problem: Patient Care Overview  Goal: Plan of Care Review  Outcome: Ongoing (interventions implemented as appropriate)   07/02/19 3650   Coping/Psychosocial   Plan of Care Reviewed With patient;daughter   Plan of Care Review   Progress improving   OTHER   Outcome Summary Pt Supervision for Sit to Stand transfers, CGA for Gait with RW 45' x 4 reps with 3 standing rest breaks. Completed B LE Strengthening Exercises sitting EOB.

## 2019-07-02 NOTE — MBS/VFSS/FEES
Acute Care - Speech Language Pathology   Swallow Initial Evaluation [facility]     Patient Name: Jesús Long  : 1931  MRN: [id number]  Today's Date: 2019  Onset of Illness/Injury or Date of Surgery: 19     Referring Physician: Dr. Brown      Admit Date: 2019  VFSS complete. Pt presented with a full range of PO consistencies excpet for regular solids secondary to poor oral control, manipulation, and swallow initiation of mechanical soft solids. Pt noted to experince moderate-severe premature spillage to the point of the vallecula with all consistencies except for honey thick which spilled to the point of the lateral channels, and thin liquids in which a estrella amount of aspiration was observed prior to swallow initiation. Trace laryngeal penetration was also noted 1x with both nectar thick and mechanical soft consistencies due to delayed swallow initiation as well as mild impairment with hyolaryngeal excursion and epiglottic inversion. Post trails a mild amount of residue was noted throughout the oropharyngeal area. SLP notes all instances of penetration and aspiration were silent. Pt was unable to clear residue from his airway even with a cued cough.   Recommendations:  1.) Pureed diet with nectar thick liquids.   2.) Ok for ice chips or sips of water via spoon 30 minutes following any other PO intake.   3.) Meds to be administered crushed in pudding/applesauce.   4.) SLP will continue to follow and treat.   Kenneth Rendon, MS CCC-SLP 2019 3:24 PM    Visit Dx:     ICD-10-CM ICD-9-CM   1. CHRISTINE (acute kidney injury) (CMS/[facility]ly [facility]) N17.9 584.9   2. Altered mental status, unspecified altered mental status type R41.82 780.97   3. Decreased activities of daily living (ADL) R68.89 780.99   4. Decreased mobility and endurance Z74.09 780.99   5. Oropharyngeal dysphagia R13.12 787.22     Patient Active Problem List   Diagnosis   • Anemia associated with chronic renal failure   • PAF (paroxysmal atrial  fibrillation) (CMS/HCC) No AC DUE TO GI  BLEED   • Anxiety   • Pacemaker   • Anemia of chronic disease   • Multiple myeloma (CMS/HCC)   • Chronic kidney disease   • Hypertension   • Coronary artery disease   • Pulmonary hypertension (CMS/HCC)   • Bright red blood per rectum   • Controlled type 2 diabetes mellitus diet controlled without complication, without long-term current use of insulin (CMS/HCC)   • Light chain myeloma (CMS/HCC)   • Acute kidney injury superimposed on chronic kidney disease (CMS/HCC)   • Neutropenic fever (CMS/HCC)   • Sick sinus syndrome (CMS/HCC)   • Moderate malnutrition (CMS/HCC)   • Delirium   • Metabolic encephalopathy   • Pancytopenia (CMS/HCC)   • Pressure ulcer with suspected deep tissue injury, stage III (CMS/HCC)     Past Medical History:   Diagnosis Date   • Anemia    • Atrial fibrillation by electrocardiogram (CMS/HCC)    • CAD in native artery    • Carotid bruit    • CHF (congestive heart failure) (CMS/HCC)    • Chronic kidney disease     stage III   • Diabetes mellitus (CMS/HCC)     diet controlled   • History of coronary artery bypass graft    • Hospital psychosis (CMS/HCC)    • Hyperlipidemia    • Hypertension    • Monoclonal (M) protein disease, multiple 'M' protein    • Multiple myeloma (CMS/HCC)    • Murmur    • Pulmonary hypertension (CMS/HCC)    • Sick sinus syndrome (CMS/HCC)     s/p pace maker   • Sleep apnea      Past Surgical History:   Procedure Laterality Date   • BACK SURGERY     • CARDIAC CATHETERIZATION  09/16/1998    Left Heart; candidate for re-do CABG   • COLONOSCOPY  12/10/2013    Diverticulosis repeat prn   • CORONARY ARTERY BYPASS GRAFT      X 8   • ENDOSCOPY N/A 6/20/2018    Normal exam   • HEMORROIDECTOMY     • PACEMAKER IMPLANTATION          SWALLOW EVALUATION (last 72 hours)      [facility] Adult Swallow Evaluation     Row Name 07/02/19 1350 07/02/19 1015                Rehab Evaluation    Document Type  evaluation  -CS  evaluation  (Pended)   -MB        Subjective Information  no complaints  -CS  no complaints  (Pended)   -MB       Patient Observations  cooperative;lethargic  -CS  alert;cooperative  (Pended)   -MB       Patient/Family Observations  --  Daughter and family present  (Pended)   -MB       Patient Effort  fair  -CS  good  (Pended)   -MB       Comment  --  Pt. had difficulty following commands and has decreased attention to current activity possibly due to multiple visitors in the room.  (Pended)   -MB       Symptoms Noted During/After Treatment  --  none  (Pended)   -MB          General Information    Patient Profile Reviewed  yes  -CS  yes  (Pended)   -MB       Pertinent History Of Current Problem  Generalized weakness, CXR 7/02/2019-patchy lower lobe opacities.   -CS  Generalized weakness, increased chest congestion, throat clear/coughing noted during med administration per RN report  (Pended)   -MB       Current Method of Nutrition  soft textures;thin liquids  -CS  soft textures;thin liquids  (Pended)   -MB       Precautions/Limitations, Vision  WFL;for purposes of eval  -CS  WFL;for purposes of eval  (Pended)   -MB       Precautions/Limitations, Hearing  WFL;for purposes of eval  -CS  WFL;for purposes of eval  (Pended)   -MB       Prior Level of Function-Communication  WFL  -CS  WFL  (Pended)   -MB       Prior Level of Function-Swallowing  mechanical soft textures;dietary restrictions (e.g. consistent carb, low salt, etc.)  -CS  mechanical soft textures;dietary restrictions (e.g. consistent carb, low salt, etc.)  (Pended)   -MB       Plans/Goals Discussed with  patient and family;agreed upon  -CS  patient and family;agreed upon  (Pended)   -MB       Barriers to Rehab  cognitive status  -CS  cognitive status  (Pended)   -MB       Patient's Goals for Discharge  patient did not state  -CS  return to all previous roles/activities  (Pended)   -MB       Family Goals for Discharge  patient able to return to all previous activities/roles  -CS  patient able  to return to all previous activities/roles  (Pended)   -MB          Pain Assessment    Additional Documentation  Pain Scale: FACES Pre/Post-Treatment (Group)  -CS  Pain Scale: FACES Pre/Post-Treatment (Group)  (Pended)   -MB          Pain Scale: FACES Pre/Post-Treatment    Pain: FACES Scale, Pretreatment  0-->no hurt  -CS  0-->no hurt  (Pended)   -MB       Pain: FACES Scale, Post-Treatment  0-->no hurt  -CS  0-->no hurt  (Pended)   -MB          Oral Motor and Function    Oral Lesions or Structural Abnormalities and/or variants  --  N/A  (Pended)   -MB       Dentition Assessment  upper dentures/partial in place;lower dentures/partial in place;dentures are ill fitting  -CS  upper dentures/partial in place;lower dentures/partial in place;dentures are ill fitting  (Pended)   -MB       Secretion Management  WNL/WFL  -CS  WNL/WFL  (Pended)   -MB       Mucosal Quality  moist, healthy  -CS  moist, healthy  (Pended)   -MB       Volitional Swallow  weak;delayed  -CS  weak;delayed  (Pended)   -MB       Volitional Cough  non-productive  -CS  non-productive  (Pended)   -MB          Oral Musculature and Cranial Nerve Assessment    Oral Motor General Assessment  generalized oral motor weakness  -CS  generalized oral motor weakness  (Pended)   -MB          General Eating/Swallowing Observations    Respiratory Support Currently in Use  --  nasal cannula  (Pended)   -MB       Eating/Swallowing Skills  --  fed by SLP;self-fed  (Pended)   -MB       Positioning During Eating  --  upright in bed  (Pended)   -MB       Utensils Used  --  spoon;straw  (Pended)   -MB       Consistencies Trialed  --  regular textures;pureed;honey-thick liquids;nectar/syrup-thick liquids;thin liquids  (Pended)   -MB          Clinical Swallow Eval    Oral Prep Phase  --  impaired  (Pended)   -MB       Oral Transit  --  WFL  (Pended)   -MB       Oral Residue  --  impaired  (Pended)   -MB       Pharyngeal Phase  --  suspected pharyngeal impairment  (Pended)   -MB        Esophageal Phase  --  unremarkable  (Pended)   -MB       Clinical Swallow Evaluation Summary  --  Clinical bedside swallow evaluation complete. Pt. was alert and cooperative, but pleasantly confused and exhibited difficulty following commands to complete oral mechanism exam prior to PO trials. Pt. reports he wears upper/lower dentures. Full range of PO consistencies presented except for mechanical soft. Per RN, pt. has exhibited throat clearing and coughing when taking medication and has started adminstering medication in pudding/applesauce. Since then, pt. has not had difficulty completing pills. Pt. presents with multiple swallows with all consistencies througout the evaluation. Pt. exhibited 2x throat clear following nectar thick consistencies and a mild non-productive cough following thin water trials. Pt. had difficulty clearing residue from regular consistency trial and was given mod-max cues to complete multiple swallows to clear. Pt. is currently on mechanical soft diet with thin liquids. SLP reccomends continuing mechanical soft diet due to ill-fitting dentures and prolonged mastication, pt. to begin on honey thick liquids to ensure safety. Meds to be administered in pudding/applesauce as tolerated. SLP will complete VFSS to further evaluate swallow function. SLP will continue to follow and treat.   (Pended)   -MB          Oral Prep Concerns    Oral Prep Concerns  --  prolonged mastication;increased prep time  (Pended)   -MB       Prolonged Mastication  --  regular consistencies;pudding  (Pended)   -MB       Increased Prep Time  --  regular consistencies  (Pended)   -MB       Oral Prep Concerns, Comment  --  Pt. exhibited multiple swallows with pureed, honey, and regular conssitencies.   (Pended)   -MB          Oral Residue Concerns    Oral Residue Concerns  --  diffuse residue throughout oral cavity  (Pended)   -MB       Diffuse Residue Throughout Oral Cavity  --  regular consistencies  (Pended)    -MB       Oral Residue Concerns, Comment  --  Pt and pt's family reports difficulty clearing applesauce and seems to have residue throughout his oral cavity.  (Pended)   -MB          Pharyngeal Phase Concerns    Pharyngeal Phase Concerns  --  throat clear;multiple swallows;wet vocal quality  (Pended)   -MB       Wet Vocal Quality  --  nectar;thin  (Pended)   -MB       Multiple Swallows  --  regular consistencies;pudding;honey;nectar  (Pended)   -MB       Throat Clear  --  thin;nectar  (Pended)   -MB       Pharyngeal Phase Concerns, Comment  --  2x throat clear following 2x nectar trials, multiple swallows noted with all consistencies.  (Pended)   -MB          Esophageal Phase Concerns    Esophageal Phase Concerns  --  sensation of material sticking  (Pended)   -MB       Sensation of Material Sticking  --  regular consistencies  (Pended)   -MB       Esophageal Phase Concerns, Comment  --  Pt's family reports he occasionally complains of meat feeling stuck in his throat during meal completion.  (Pended)   -MB          MBS/VFSS    Utensils Used  spoon;straw  -CS  --       Consistencies Trialed  soft textures;pudding thick;honey-thick liquids;nectar/syrup-thick liquids;thin liquids  -CS  --          MBS/VFSS Interpretation    Oral Prep Phase  impaired oral phase of swallowing  -CS  --       Oral Transit Phase  impaired  -CS  --       Oral Residue  impaired  -CS  --       VFSS Summary  VFSS complete. Pt presented with a full range of PO consistencies excpet for regular solids secondary to poor oral control, manipulation, and swallow initiation of mechanical soft solids. Pt noted to experince moderate-severe premature spillage to the point of the vallecula with all consistencies except for honey thick which spilled to the point of the lateral channels and thin liquids in which a estrella amount of aspiration was observed prior to swallow initiation. Trace laryngeal penetration was also noted 1x with both nectar thick and  mechanical soft consistencies due to delayed swallow initiation as well as mild impairment with hyolaryngeal excursion and epiglottic inversion. Post trails a mild amount of residue was noted throughout the oropharyngeal area. SLP notes all instances of penetration and aspiration were silent. Pt was unable to clear residue from his airway even with a cued cough. SLP recommends a pureed diet with nectar thick liquids. Ok for ice chips or sips of water via spoon 30 minutes following any other PO intake. Meds to be administered crushed in pudding/applesauce. SLP will continue to follow and treat.   -CS  --          Oral Preparatory Phase    Oral Preparatory Phase  prolonged manipulation;inadequate manipulation  -CS  --       Prolonged Manipulation  mechanical soft  -CS  --       Inadequate Manipulation  mechanical soft  -CS  --          Oral Transit Phase    Impaired Oral Transit Phase  increased A-P transit time;premature spillage of liquids into pharynx  -CS  --       Increased A-P Transit Time  honey-thick liquids;nectar-thick liquids;mechanical soft  -CS  --          Oral Residue    Impaired Oral Residue  lingual residue  -CS  --       Lingual Residue  honey-thick liquids;nectar-thick liquids;thin liquids;pudding/puree  -CS  --          Initiation of Pharyngeal Swallow    Initiation of Pharyngeal Swallow  bolus in valleculae;other (see comments) Aspiration of thin  -CS  --       Pharyngeal Phase  impaired pharyngeal phase of swallowing  -CS  --       Aspiration Before the Swallow  thin liquids  -CS  --       Penetration During the Swallow  nectar-thick liquids;mechanical soft  -CS  --       Response to Aspiration  no response, silent aspiration;response with cue only;could not clear subglottic material  -CS  --          Clinical Impression    SLP Swallowing Diagnosis  moderate;oral dysfunction;pharyngeal dysfunction  -CS  mild-moderate;oral dysfunction;suspected pharyngeal dysfunction  (Pended)   -MB        Functional Impact  risk of aspiration/pneumonia  -CS  risk of aspiration/pneumonia  (Pended)   -MB       Rehab Potential/Prognosis, Swallowing  good, to achieve stated therapy goals  -CS  good, to achieve stated therapy goals  (Pended)   -MB       Swallow Criteria for Skilled Therapeutic Interventions Met  demonstrates skilled criteria  -CS  demonstrates skilled criteria  (Pended)   -MB          Recommendations    Therapy Frequency (Swallow)  at least;3 days per week  -CS  at least;3 days per week  (Pended)   -MB       Predicted Duration Therapy Intervention (Days)  until discharge  -CS  until discharge  (Pended)   -MB       SLP Diet Recommendation  puree;nectar thick liquids  -CS  soft textures;honey thick liquids  (Pended)   -MB       Recommended Diagnostics  VFSS (MBS)  -CS  VFSS (MBS)  (Pended)   -MB       Recommended Precautions and Strategies  upright posture during/after eating;small bites of food and sips of liquid;check mouth frequently for oral residue/pocketing;multiple swallows per bite of food  -CS  upright posture during/after eating;small bites of food and sips of liquid;check mouth frequently for oral residue/pocketing;multiple swallows per bite of food  (Pended)   -MB       SLP Rec. for Method of Medication Administration  with pudding or applesauce;as tolerated  -CS  with pudding or applesauce;as tolerated  (Pended)   -MB       Monitor for Signs of Aspiration  yes;cough;gurgly voice;throat clearing;notify SLP if any concerns  -CS  yes;cough;gurgly voice;throat clearing;notify SLP if any concerns  (Pended)   -MB       Anticipated Dischage Disposition  unknown  -CS  unknown  (Pended)   -MB          Swallow Goals (SLP)    Oral Nutrition/Hydration Goal Selection (SLP)  oral nutrition/hydration, SLP goal 1  -CS  oral nutrition/hydration, SLP goal 1  (Pended)   -MB       Lingual Strengthening Goal Selection (SLP)  lingual strengthening, SLP goal 1  -CS  --       Pharyngeal Strengthening Exercise Goal  Selection (SLP)  pharyngeal strengthening exercise, SLP goal 1  -CS  --       Additional Documentation  lingual strengthening goal selection (SLP);pharyngeal strengthening exercise goal selection (SLP)  -CS  --          Oral Nutrition/Hydration Goal 1 (SLP)    Oral Nutrition/Hydration Goal 1, SLP  Pt. will tolerate LRD with no overt s/s of aspiration.   -CS  Pt. will tolerate LRD with no overt s/s of aspiration.   (Pended)   -MB       Time Frame (Oral Nutrition/Hydration Goal 1, SLP)  by discharge  -CS  by discharge  (Pended)   -MB       Barriers (Oral Nutrition/Hydration Goal 1, SLP)  Decreased attention/confusion  -CS  Decreased attention/confusion  (Pended)   -MB       Progress/Outcomes (Oral Nutrition/Hydration Goal 1, SLP)  goal ongoing  -CS  goal ongoing  (Pended)   -MB          Lingual Strengthening Goal 1 (SLP)    Activity (Lingual Strengthening Goal 1, SLP)  increase lingual tone/sensation/control/coordination/movement  -CS  --       Increase Lingual Tone/Sensation/Control/Coordination/Movement  lingual movement exercises  -CS  --       [place]/Accuracy (Lingual Strengthening Goal 1, SLP)  with minimal cues (75-90% accuracy)  -CS  --       Time Frame (Lingual Strengthening Goal 1, SLP)  by discharge  -CS  --       Barriers (Lingual Strengthening Goal 1, SLP)  n/a  -CS  --       Progress/Outcomes (Lingual Strengthening Goal 1, SLP)  goal ongoing  -CS  --          Pharyngeal Strengthening Exercise Goal 1 (SLP)    Activity (Pharyngeal Strengthening Goal 1, SLP)  increase pharyngeal sensation;increase timing;increase superior movement of the hyolaryngeal complex;increase squeeze/positive pressure generation  -CS  --       Increase Pharyngeal Sensation  gustatory stimulation (sour/cold)  -CS  --       Increase Timing  supraglottic swallow;gustatory stimulation (sour/cold)  -CS  --       Increase Superior Movement of the Hyolaryngeal Complex  falsetto  -CS  --       Increase Squeeze/Positive Pressure  Generation  hard effortful swallow;feliciano  -CS  --       [place]/Accuracy (Pharyngeal Strengthening Goal 1, SLP)  with minimal cues (75-90% accuracy)  -CS  --       Time Frame (Pharyngeal Strengthening Goal 1, SLP)  by discharge  -CS  --       Barriers (Pharyngeal Strengthening Goal 1, SLP)  n/a  -CS  --       Progress/Outcomes (Pharyngeal Strengthening Goal 1, SLP)  goal ongoing  -CS  --         User Key  (r) = Recorded By, (t) = Taken By, (c) = Cosigned By    Initials Name Effective Dates    Kenneth Dhaliwal, MS CCC-SLP 04/03/18 -     Peggy Munguia, Speech Therapy Student 04/24/19 -           EDUCATION  The patient has been educated in the following areas:   Dysphagia (Swallowing Impairment).    SLP Recommendation and Plan  SLP Swallowing Diagnosis: moderate, oral dysfunction, pharyngeal dysfunction  SLP Diet Recommendation: puree, nectar thick liquids  Recommended Precautions and Strategies: upright posture during/after eating, small bites of food and sips of liquid, check mouth frequently for oral residue/pocketing, multiple swallows per bite of food  SLP Rec. for Method of Medication Administration: with pudding or applesauce, as tolerated     Monitor for Signs of Aspiration: yes, cough, gurgly voice, throat clearing, notify SLP if any concerns  Recommended Diagnostics: VFSS (MBS)  Swallow Criteria for Skilled Therapeutic Interventions Met: demonstrates skilled criteria  Anticipated Dischage Disposition: unknown  Rehab Potential/Prognosis, Swallowing: good, to achieve stated therapy goals  Therapy Frequency (Swallow): at least, 3 days per week  Predicted Duration Therapy Intervention (Days): until discharge       Plan of Care Reviewed With: patient  Plan of Care Review  Plan of Care Reviewed With: patient  Progress: no change  Outcome Summary: VFSS complete. Pt presented with a full range of PO consistencies excpet for regular solids secondary to poor oral control, manipulation, and swallow initiation  of mechanical soft solids. Pt noted to experince moderate-severe premature spillage to the point of the vallecula with all consistencies except for honey thick which spilled to the point of the lateral channels and thin liquids in which a estrella amount of aspiration was observed prior to swallow initiation. Trace laryngeal penetration was also noted 1x with both nectar thick and mechanical soft consistencies due to delayed swallow initiation as well as mild impairment with hyolaryngeal excursion and epiglottic inversion. Post trails a mild amount of residue was noted throughout the oropharyngeal area. SLP notes all instances of penetration and aspiration were silent. Pt was unable to clear residue from his airway even with a cued cough. SLP recommends a pureed diet with nectar thick liquids. Ok for ice chips or sips of water via spoon 30 minutes following any other PO intake. Meds to be administered crushed in pudding/applesauce. SLP will continue to follow and treat.     SLP GOALS     Row Name 07/02/19 1350 07/02/19 1015          Oral Nutrition/Hydration Goal 1 (SLP)    Oral Nutrition/Hydration Goal 1, SLP  Pt. will tolerate LRD with no overt s/s of aspiration.   -CS  Pt. will tolerate LRD with no overt s/s of aspiration.   (Pended)   -MB     Time Frame (Oral Nutrition/Hydration Goal 1, SLP)  by discharge  -CS  by discharge  (Pended)   -MB     Barriers (Oral Nutrition/Hydration Goal 1, SLP)  Decreased attention/confusion  -CS  Decreased attention/confusion  (Pended)   -MB     Progress/Outcomes (Oral Nutrition/Hydration Goal 1, SLP)  goal ongoing  -CS  goal ongoing  (Pended)   -MB        Lingual Strengthening Goal 1 (SLP)    Activity (Lingual Strengthening Goal 1, SLP)  increase lingual tone/sensation/control/coordination/movement  -CS  --     Increase Lingual Tone/Sensation/Control/Coordination/Movement  lingual movement exercises  -CS  --     [place]/Accuracy (Lingual Strengthening Goal 1, SLP)  with minimal  cues (75-90% accuracy)  -CS  --     Time Frame (Lingual Strengthening Goal 1, SLP)  by discharge  -CS  --     Barriers (Lingual Strengthening Goal 1, SLP)  n/a  -CS  --     Progress/Outcomes (Lingual Strengthening Goal 1, SLP)  goal ongoing  -CS  --        Pharyngeal Strengthening Exercise Goal 1 (SLP)    Activity (Pharyngeal Strengthening Goal 1, SLP)  increase pharyngeal sensation;increase timing;increase superior movement of the hyolaryngeal complex;increase squeeze/positive pressure generation  -CS  --     Increase Pharyngeal Sensation  gustatory stimulation (sour/cold)  -CS  --     Increase Timing  supraglottic swallow;gustatory stimulation (sour/cold)  -CS  --     Increase Superior Movement of the Hyolaryngeal Complex  falsetto  -CS  --     Increase Squeeze/Positive Pressure Generation  hard effortful swallow;feliciano  -CS  --     [place]/Accuracy (Pharyngeal Strengthening Goal 1, SLP)  with minimal cues (75-90% accuracy)  -CS  --     Time Frame (Pharyngeal Strengthening Goal 1, SLP)  by discharge  -CS  --     Barriers (Pharyngeal Strengthening Goal 1, SLP)  n/a  -CS  --     Progress/Outcomes (Pharyngeal Strengthening Goal 1, SLP)  goal ongoing  -CS  --       User Key  (r) = Recorded By, (t) = Taken By, (c) = Cosigned By    Initials Name Provider Type    Kenneth Dhaliwal MS CCC-SLP Speech and Language Pathologist    Peggy Munguia, Speech Therapy Student Speech Therapy Student             Time Calculation:   Time Calculation- SLP     Row Name 07/02/19 1522 07/02/19 1413          Time Calculation- SLP    SLP Start Time  1350  -CS  1015  -CS (r) MB (t) CS (c)     SLP Stop Time  1522  -CS  1125  -CS (r) MB (t) CS (c)     SLP Time Calculation (min)  92 min  -CS  70 min  -CS (r) MB (t)     SLP Received On  07/02/19  -CS  07/02/19  -CS (r) MB (t) CS (c)     SLP Goal Re-Cert Due Date  07/12/19  -CS  07/12/19  -CS (r) MB (t) CS (c)       User Key  (r) = Recorded By, (t) = Taken By, (c) = Cosigned By    Initials  Name Provider Type    CS Kenneth Rendon, MS CCC-SLP Speech and Language Pathologist    Peggy Munguia, Speech Therapy Student Speech Therapy Student          Therapy Charges for Today     Code Description Service Date Service Provider Modifiers Qty    [id number] HC ST MOTION FLUORO EVAL SWALLOW 6 7/2/2019 Kenneth Rendon, MS CCC-SLP GN 1               Kenneth Rendon MS CCC-SLP  7/2/2019

## 2019-07-02 NOTE — PROGRESS NOTES
[facility] Medicine Services  INPATIENT PROGRESS NOTE    Patient Name: Jesús Long  Date of Admission: 6/24/2019  Today's Date: 07/02/19  Length of Stay: 8  Primary Care Physician: Yocasta Baumann APRN    Subjective   Chief Complaint: follow up confusion  HPI   Pt is sitting up in the chair. Daughter at bedside. Nursing staff and granddaughter report concern for potential aspiration. States he has had congested voice for some time even prior to admission. Daughter admitted that he had some minor memory issues at home prior to admission. He is more talkative and states he feels better today. He is eating his oatmeal. He has complained of left thigh pain.     Review of Systems   All pertinent negatives and positives are as above. All other systems have been reviewed and are negative unless otherwise stated.     Objective    Temp:  [97.9 °F (36.6 °C)-98.8 °F (37.1 °C)] 98.3 °F (36.8 °C)  Heart Rate:  [55-72] 62  Resp:  [16] 16  BP: (101-133)/(43-53) 133/52  Physical Exam   Constitutional: He appears well-developed and well-nourished.   HENT:   Head: Normocephalic and atraumatic.   Eyes: EOM are normal. Pupils are equal, round, and reactive to light. No scleral icterus.   Neck: Normal range of motion. Neck supple. No JVD present. Carotid bruit is not present. No tracheal deviation present. No thyromegaly present.   Cardiovascular: Normal rate and regular rhythm. Exam reveals no gallop and no friction rub.   No murmur heard.  vpacing 61-91   Pulmonary/Chest: Effort normal. No respiratory distress. He has no wheezes. He has no rales. He exhibits no tenderness.   Crackles in the bases.    Abdominal: Soft. Bowel sounds are normal. He exhibits no distension. There is no tenderness.   Musculoskeletal: Normal range of motion. He exhibits edema (trace edema bilaterally. ).   Neurological: He is alert. No cranial nerve deficit.   He is speaking better today. Seems to be able to  conversate better.    Skin: Skin is warm and dry. No rash noted.   ecchymosis where previous telemetry leads have been placed.    Psychiatric: He has a normal mood and affect.     Results Review:  I have reviewed the labs, radiology results, and diagnostic studies.    Laboratory Data:   Results from last 7 days   Lab Units 07/02/19 0417 07/01/19 0530 06/30/19  0755   WBC 10*3/mm3 4.37* 5.00 5.18   HEMOGLOBIN g/dL 9.4* 9.3* 9.1*   HEMATOCRIT % 29.8* 28.6* 27.0*   PLATELETS 10*3/mm3 65* 56* 48*        Results from last 7 days   Lab Units 07/02/19 0417 07/01/19  0530 06/30/19 0755 06/29/19  0459 06/28/19  0437 06/27/19  0429   SODIUM mmol/L 136 139 139 140 140 133*   POTASSIUM mmol/L 4.0 4.2 3.8 3.2* 3.3* 3.3*   CHLORIDE mmol/L 100 101 98 97* 107 108   CO2 mmol/L 31.0 32.0* 33.0* 35.0* 23.0* 16.0*   BUN mg/dL 48* 46* 50* 55* 63* 65*   CREATININE mg/dL 2.38* 2.36* 2.36* 2.53* 2.76* 2.54*   CALCIUM mg/dL 7.3* 6.9* 6.7* 6.8* 7.2* 6.8*   BILIRUBIN mg/dL  --   --   --  1.2* 1.2* 0.8   ALK PHOS U/L  --   --   --  53 58 56   ALT (SGPT) U/L  --   --   --  47 36 34   AST (SGOT) U/L  --   --   --  65* 51* 51*   GLUCOSE mg/dL 107* 121* 115* 167* 150* 106*     Radiology Data:   Imaging Results (last 24 hours)     Procedure Component Value Units Date/Time    US Venous Doppler Upper Extremity Left (duplex) [[id number]] Collected:  07/01/19 1544     Updated:  07/01/19 1548    Narrative:       History: Left arm swelling       Impression:       Impression: There is no evidence of deep venous thrombosis or  superficial thrombophlebitis of the left upper extremity.     Comments: Left upper extremity venous duplex exam was performed using  color Doppler flow, Doppler wave form analysis, and grayscale imaging,  with and without compression. There is no evidence of deep venous  thrombosis of the internal jugular, subclavian, axillary, and brachial  veins. There is no evidence of superficial thrombophlebitis involving  the cephalic or  basilic veins.  This report was finalized on 07/01/2019 15:45 by Dr. Cody Del Valle MD.          I have reviewed the patient's current medications.     Assessment/Plan     Active Hospital Problems    Diagnosis   • **Acute kidney injury superimposed on chronic kidney disease (CMS/HCC)   • Pressure ulcer with suspected deep tissue injury, stage III (CMS/HCC)   • Delirium   • Metabolic encephalopathy   • Pancytopenia (CMS/HCC)   • Moderate malnutrition (CMS/HCC)   • Neutropenic fever (CMS/HCC)   • Sick sinus syndrome (CMS/[facility])     s/p pace maker     • Controlled type 2 diabetes mellitus diet controlled without complication, without long-term current use of insulin (CMS/HCC)   • Pulmonary hypertension (CMS/HCC)   • Anemia of chronic disease   • Coronary artery disease   • Hypertension   • Multiple myeloma (CMS/HCC)   • PAF (paroxysmal atrial fibrillation) (CMS/[facility]) No AC DUE TO GI  BLEED     Plan:  1. Will check CXR today.   2. Speech therapy to see  3. Continue Iv lasix bid per Dr. Go  4. Will discontinue telemetry.   5. Awaiting BMBx.   6. I discussed with family and nurse unit manager that pt's dog can visit. He was very excited.   7. No DVT in LUE    Discharge Planning: I expect the patient to be discharged to SNF in 1-2 days.    KENNY Leary   07/02/19   9:59 AM     I personally evaluated and examined the patient in conjunction with KENNY Garcia and agree with the assessment, treatment plan, and disposition of the patient as recorded by her. My history, exam, and further recommendations are:     Seen and examined at bedside.  Patient improved.  Mild bibasilar crackles.  Granddaughter and daughter at bedside.  CXR pending.     Jax Patel MD  07/02/19  8:25 PM

## 2019-07-02 NOTE — PLAN OF CARE
Problem: Patient Care Overview  Goal: Plan of Care Review  Outcome: Ongoing (interventions implemented as appropriate)   07/02/19 0318   Coping/Psychosocial   Plan of Care Reviewed With patient   Plan of Care Review   Progress no change   OTHER   Outcome Summary Pt continues to be pleasantly confused. Incontinent at times. Good output with IV lasix given. Turn Q2. SNF at discharge. Cont to monitor.        Problem: Skin Injury Risk (Adult)  Goal: Skin Health and Integrity  Outcome: Ongoing (interventions implemented as appropriate)   07/02/19 0318   Skin Injury Risk (Adult)   Skin Health and Integrity making progress toward outcome       Problem: Fall Risk (Adult)  Goal: Absence of Fall  Outcome: Ongoing (interventions implemented as appropriate)   07/02/19 0318   Fall Risk (Adult)   Absence of Fall making progress toward outcome       Problem: Nutrition, Imbalanced: Inadequate Oral Intake (Adult)  Goal: Improved Oral Intake  Outcome: Ongoing (interventions implemented as appropriate)   07/02/19 0318   Nutrition, Imbalanced: Inadequate Oral Intake (Adult)   Improved Oral Intake making progress toward outcome     Goal: Prevent Further Weight Loss  Outcome: Ongoing (interventions implemented as appropriate)   07/02/19 0318   Nutrition, Imbalanced: Inadequate Oral Intake (Adult)   Prevent Further Weight Loss making progress toward outcome       Problem: Wound (Includes Pressure Injury) (Adult)  Goal: Signs and Symptoms of Listed Potential Problems Will be Absent, Minimized or Managed (Wound)  Outcome: Ongoing (interventions implemented as appropriate)   07/02/19 0318   Goal/Outcome Evaluation   Problems Assessed (Wound) all   Problems Present (Wound) situational response;skin injury, new onset

## 2019-07-02 NOTE — PROGRESS NOTES
[facility] Progress Note    Progress Note    Patient:  Jesús Long  YOB: 1931  Date of Service: 7/2/2019  MRN: [id number]   Acct: [id number]   Primary Care Physician: Yocasta Baumann APRN  Advance Directive:   Code Status and Medical Interventions:   Ordered at: 06/24/19 1621     Code Status:    CPR     Medical Interventions (Level of Support Prior to Arrest):    Full     Admit Date: 6/24/2019       Hospital Day: 8      Subjective:     Chief Compliant: Unobtainable, confused.       Review of Systems:   Review of Systems   Reason unable to perform ROS: Change in mental status.           Medications:   Scheduled Meds:  b complex-vitamin c-folic acid 1 tablet Oral Daily   calcitriol 0.25 mcg Oral Daily   famotidine 20 mg Oral Daily   furosemide 40 mg Intravenous Q12H   lidocaine 1 patch Transdermal Q24H   megestrol 800 mg Oral Daily   melatonin 3 mg Oral Nightly   metaxalone 400 mg Oral TID   potassium chloride 20 mEq Oral Daily   saccharomyces boulardii 250 mg Oral BID   sertraline 75 mg Oral Daily   sodium chloride 3 mL Intravenous Q12H       Continuous Infusions:     Labs:     Lab Results (last 72 hours)     Procedure Component Value Units Date/Time    Basic Metabolic Panel [[id number]]  (Abnormal) Collected:  07/02/19 0417    Specimen:  Blood Updated:  07/02/19 0514     Glucose 107 mg/dL      BUN 48 mg/dL      Creatinine 2.38 mg/dL      Sodium 136 mmol/L      Potassium 4.0 mmol/L      Chloride 100 mmol/L      CO2 31.0 mmol/L      Calcium 7.3 mg/dL      eGFR Non African Amer 26 mL/min/1.73      BUN/Creatinine Ratio 20.2     Anion Gap 5.0 mmol/L     Narrative:       GFR Normal >60  Chronic Kidney Disease <60  Kidney Failure <15    CBC & Differential [[id number]] Collected:  07/02/19 0417    Specimen:  Blood Updated:  07/02/19 0503    Narrative:       The following orders were created for panel order CBC & Differential.  Procedure                               Abnormality         Status                      ---------                               -----------         ------                     CBC Auto Differential[[id number]]        Abnormal            Final result                 Please view results for these tests on the individual orders.    CBC Auto Differential [[id number]]  (Abnormal) Collected:  07/02/19 0417    Specimen:  Blood Updated:  07/02/19 0503     WBC 4.37 10*3/mm3      RBC 3.13 10*6/mm3      Hemoglobin 9.4 g/dL      Hematocrit 29.8 %      MCV 95.2 fL      MCH 30.0 pg      MCHC 31.5 g/dL      RDW 17.2 %      RDW-SD 58.8 fl      MPV 11.0 fL      Platelets 65 10*3/mm3      Neutrophil % 78.9 %      Lymphocyte % 11.2 %      Monocyte % 8.5 %      Eosinophil % 0.5 %      Basophil % 0.2 %      Immature Grans % 0.7 %      Neutrophils, Absolute 3.45 10*3/mm3      Lymphocytes, Absolute 0.49 10*3/mm3      Monocytes, Absolute 0.37 10*3/mm3      Eosinophils, Absolute 0.02 10*3/mm3      Basophils, Absolute 0.01 10*3/mm3      Immature Grans, Absolute 0.03 10*3/mm3      nRBC 0.0 /100 WBC     Basic Metabolic Panel [[id number]]  (Abnormal) Collected:  07/01/19 0530    Specimen:  Blood Updated:  07/01/19 0637     Glucose 121 mg/dL      BUN 46 mg/dL      Creatinine 2.36 mg/dL      Sodium 139 mmol/L      Potassium 4.2 mmol/L      Chloride 101 mmol/L      CO2 32.0 mmol/L      Calcium 6.9 mg/dL      eGFR Non African Amer 26 mL/min/1.73      BUN/Creatinine Ratio 19.5     Anion Gap 6.0 mmol/L     Narrative:       GFR Normal >60  Chronic Kidney Disease <60  Kidney Failure <15    CBC & Differential [[id number]] Collected:  07/01/19 0530    Specimen:  Blood Updated:  07/01/19 0630    Narrative:       The following orders were created for panel order CBC & Differential.  Procedure                               Abnormality         Status                     ---------                               -----------         ------                     CBC Auto Differential[[id number]]        Abnormal            Final result                  Please view results for these tests on the individual orders.    CBC Auto Differential [[id number]]  (Abnormal) Collected:  07/01/19 0530    Specimen:  Blood Updated:  07/01/19 0630     WBC 5.00 10*3/mm3      RBC 3.06 10*6/mm3      Hemoglobin 9.3 g/dL      Hematocrit 28.6 %      MCV 93.5 fL      MCH 30.4 pg      MCHC 32.5 g/dL      RDW 17.2 %      RDW-SD 57.0 fl      MPV 11.4 fL      Platelets 56 10*3/mm3      Neutrophil % 80.2 %      Lymphocyte % 10.8 %      Monocyte % 7.4 %      Eosinophil % 0.6 %      Basophil % 0.2 %      Neutrophils, Absolute 4.01 10*3/mm3      Lymphocytes, Absolute 0.54 10*3/mm3      Monocytes, Absolute 0.37 10*3/mm3      Eosinophils, Absolute 0.03 10*3/mm3      Basophils, Absolute 0.01 10*3/mm3     Basic Metabolic Panel [[id number]]  (Abnormal) Collected:  06/30/19 0755    Specimen:  Blood Updated:  06/30/19 1043     Glucose 115 mg/dL      BUN 50 mg/dL      Creatinine 2.36 mg/dL      Sodium 139 mmol/L      Potassium 3.8 mmol/L      Chloride 98 mmol/L      CO2 33.0 mmol/L      Calcium 6.7 mg/dL      eGFR Non African Amer 26 mL/min/1.73      BUN/Creatinine Ratio 21.2     Anion Gap 8.0 mmol/L     Narrative:       GFR Normal >60  Chronic Kidney Disease <60  Kidney Failure <15    Bone Marrow Exam [[id number]] Collected:  06/30/19 0755    Specimen:  Bone Marrow Aspirate Updated:  06/30/19 0818    Narrative:       The following orders were created for panel order Bone Marrow Exam.  Procedure                               Abnormality         Status                     ---------                               -----------         ------                     Bone Marrow Exam[[id number]]                                                            CBC Auto Differential[[id number]]        Abnormal            Final result                 Please view results for these tests on the individual orders.    CBC Auto Differential [[id number]]  (Abnormal) Collected:  06/30/19 0755    Specimen:  Blood Updated:   06/30/19 0818     WBC 5.18 10*3/mm3      RBC 2.96 10*6/mm3      Hemoglobin 9.1 g/dL      Hematocrit 27.0 %      MCV 91.2 fL      MCH 30.7 pg      MCHC 33.7 g/dL      RDW 16.8 %      RDW-SD 54.8 fl      MPV 10.4 fL      Platelets 48 10*3/mm3      Neutrophil % 79.8 %      Lymphocyte % 12.4 %      Monocyte % 5.8 %      Eosinophil % 0.6 %      Basophil % 0.4 %      Neutrophils, Absolute 4.14 10*3/mm3      Lymphocytes, Absolute 0.64 10*3/mm3      Monocytes, Absolute 0.30 10*3/mm3      Eosinophils, Absolute 0.03 10*3/mm3      Basophils, Absolute 0.02 10*3/mm3     Blood Culture With MARJORIE - Blood, Arm, Right [[id number]] Collected:  06/24/19 1123    Specimen:  Blood from Arm, Right Updated:  06/29/19 1145     Blood Culture No growth at 5 days    Blood Culture With MARJORIE - Blood, Arm, Left [[id number]] Collected:  06/24/19 1131    Specimen:  Blood from Arm, Left Updated:  06/29/19 1145     Blood Culture No growth at 5 days    Manual Differential [[id number]]  (Abnormal) Collected:  06/29/19 0459    Specimen:  Blood Updated:  06/29/19 0747     Neutrophil % 84.0 %      Lymphocyte % 10.0 %      Monocyte % 2.0 %      Atypical Lymphocyte % 4.0 %      Neutrophils Absolute 4.44 10*3/mm3      Lymphocytes Absolute 0.53 10*3/mm3      Monocytes Absolute 0.11 10*3/mm3      Anisocytosis Slight/1+     Dacrocytes Slight/1+     Ovalocytes Slight/1+     Poikilocytes Slight/1+     WBC Morphology Normal     Platelet Estimate Decreased            Radiology:     Imaging Results (last 72 hours)     Procedure Component Value Units Date/Time    US Venous Doppler Upper Extremity Left (duplex) [[id number]] Collected:  07/01/19 1544     Updated:  07/01/19 1548    Narrative:       History: Left arm swelling       Impression:       Impression: There is no evidence of deep venous thrombosis or  superficial thrombophlebitis of the left upper extremity.     Comments: Left upper extremity venous duplex exam was performed using  color Doppler flow, Doppler wave  "form analysis, and grayscale imaging,  with and without compression. There is no evidence of deep venous  thrombosis of the internal jugular, subclavian, axillary, and brachial  veins. There is no evidence of superficial thrombophlebitis involving  the cephalic or basilic veins.  This report was finalized on 07/01/2019 15:45 by Dr. Cody Del Valle MD.            Objective:   Vitals: /48 (BP Location: Left arm, Patient Position: Lying)   Pulse 61   Temp 97.9 °F (36.6 °C) (Oral)   Resp 16   Ht 175.3 cm (69\")   Wt 73.3 kg (161 lb 8 oz)   SpO2 93%   BMI 23.85 kg/m²   Physical Exam   Constitutional:   Frail looking.   HENT:   Head: Normocephalic and atraumatic.   Eyes: No scleral icterus.   Neck: No JVD present.   Cardiovascular: Normal rate and regular rhythm.   Pulmonary/Chest: No respiratory distress. He has no wheezes.   Abdominal: Soft. Bowel sounds are normal. There is no tenderness.   Musculoskeletal: He exhibits no edema.   Neurological:   Awake, alert, and oriented to person.   Skin: There is pallor.   Nursing note and vitals reviewed.    24HR INTAKE/OUTPUT:      Intake/Output Summary (Last 24 hours) at 7/2/2019 0644  Last data filed at 7/2/2019 0555  Gross per 24 hour   Intake 360 ml   Output 1350 ml   Net -990 ml        Problem list:       Acute kidney injury superimposed on chronic kidney disease (CMS/HCC)    PAF (paroxysmal atrial fibrillation) (CMS/HCC) No AC DUE TO GI  BLEED    Anemia of chronic disease    Multiple myeloma (CMS/HCC)    Hypertension    Coronary artery disease    Pulmonary hypertension (CMS/HCC)    Controlled type 2 diabetes mellitus diet controlled without complication, without long-term current use of insulin (CMS/HCC)    Neutropenic fever (CMS/HCC)    Sick sinus syndrome (CMS/HCC)    Moderate malnutrition (CMS/HCC)    Delirium    Metabolic encephalopathy    Pancytopenia (CMS/HCC)    Pressure ulcer with suspected deep tissue injury, stage III (CMS/HCC)        Assessment/Plan: "       ASSESSMENT:  1.  Pancytopenia, bone marrow biopsy 06/27/2019 is pending.  a.  Normocytic anemia from chronic kidney disease stage IV, GFR 24 06/27/2019. Post 2 units PRBCs 06/27/2019.  b.  Leukopenia without neutropenia.  c.   Thrombocytopenia. No peripheral schistocytes to suggest TTP/HUS/DIC.   Post platelet transfusion 06/27/2019.   2.  Acute on chronic kidney disease. GFR 26 mL/minute, 07/02/2019 (from 29 mL/minute, 06/24/2019; usual baseline is 37-44 mL/minute).   3.    Confusion likely metabolic.  Negative CT head.   4.    Sick sinus syndrome.  5.    Coronary artery disease.  6.    Paroxysmal atrial fibrillation (no anticoagulation).  7.  History of IgG lambda myeloma, Stage III. Last received cycle 6 of melphalan, prednisone, and thalidomide on 01/15/2014.  8.  Positive OMID 1:160.     RECOMMENDATIONS:  1.   WBC 4.3 with normal ANC 3.4, from 5 from 5 from 5.2, hgb 9.4 from 9.3 from 9.1 from 9.3, platelet 65 from 56 from 48.     2.   Await bone marrow biopsy report. Venous doppler 07/01/2019 negative.  3.   Transfuse packed red blood cells if hemoglobin less than 8. Observe for infusion reactions.  4.   Transfuse platelets if less than 10,000 or bleeding. Observe for infusion reactions.  5.    Above discussed with nurse Tillman.  She verbalized understanding.   6.    Plan for [facility] on discharge.  Discussed with claudia Fatima to discharge today.  7.    Clinic appointment 07/17/2019 at 1315.

## 2019-07-02 NOTE — PROGRESS NOTES
Nephrology ([facility]) Progress Note      Patient:  Jesús Long  YOB: 1931  Date of Service: 7/2/2019  MRN: [id number]   Acct: [id number]   Primary Care Physician: Yocasta Baumann APRN  Advance Directive:   Code Status and Medical Interventions:   Ordered at: 06/24/19 1621     Code Status:    CPR     Medical Interventions (Level of Support Prior to Arrest):    Full     Admit Date: 6/24/2019       Hospital Day: 8  Referring Provider: Josue Brown MD      Patient personally seen and examined.  Complete chart including Consults, Notes, Operative Reports, Labs, Cardiology, and Radiology studies reviewed as able.        Subjective:  Jesús Long is a 87 y.o. male  whom we were consulted for acute kidney injury.  Baseline chronic kidney disease stage 3; baseline creatinine 1.7 mg. Follows with Rosita COX in our office.  History of multiple myeloma, paroxysmal atrial fibrillation, hypertension, type 2 diabetes, congestive heart failure.  Presented with several days of confusion, fever, weakness.   Admitted for evaluation of possible tick-borne illness.  Renal function has declined since admission and nephrology is consulted.    Patient's daughter provides history; she denies any known change in urination, no recent n/v/d.  No NSAIDs. Received 1 unit PRBC on 6/27.    Today mild confusion. Good response to IV Lasix. Urine output nonoliguric    Allergies:  Patient has no known allergies.    Home Meds:  Medications Prior to Admission   Medication Sig Dispense Refill Last Dose   • carvedilol (COREG) 6.25 MG tablet Take 6.25 mg by mouth 2 (Two) Times a Day With Meals.   Past Week at Unknown time   • Cholecalciferol (VITAMIN D3) 5000 UNITS capsule capsule Take 5,000 Units by mouth Daily.   Past Week at Unknown time   • coenzyme Q10 100 MG capsule Take 100 mg by mouth Daily.   Past Week at Unknown time   • metoprolol succinate XL (TOPROL-XL) 25 MG 24 hr tablet Take  25 mg by mouth Daily.   Past Week at Unknown time   • sertraline (ZOLOFT) 50 MG tablet Take 75 mg by mouth Daily.   Past Week at Unknown time   • sodium bicarbonate 650 MG tablet Take 650 mg by mouth 4 (Four) Times a Day.   Past Week at Unknown time       Medicines:  Current Facility-Administered Medications   Medication Dose Route Frequency Provider Last Rate Last Dose   • acetaminophen (TYLENOL) tablet 650 mg  650 mg Oral Q4H PRN Crystal Oshea APRN   650 mg at 06/29/19 1951   • b complex-vitamin c-folic acid (NEPHRO-MILENA) tablet 1 tablet  1 tablet Oral Daily Reji Go MD       • calcitriol (ROCALTROL) capsule 0.25 mcg  0.25 mcg Oral Daily Casimiro Romero MD   0.25 mcg at 07/01/19 1023   • famotidine (PEPCID) tablet 20 mg  20 mg Oral Daily Josue Brown MD   20 mg at 07/01/19 1023   • furosemide (LASIX) injection 40 mg  40 mg Intravenous Q12H Reji Go MD   40 mg at 07/02/19 0658   • lidocaine (LIDODERM) 5 % 1 patch  1 patch Transdermal Q24H Crystal Oshea APRN   1 patch at 07/01/19 1023   • megestrol (MEGACE) 40 MG/ML suspension 800 mg  800 mg Oral Daily Nicky Nielsen APRN   800 mg at 07/01/19 1023   • melatonin tablet 6 mg  6 mg Oral Nightly Nicky Nielsen APRN       • metaxalone (SKELAXIN) tablet 400 mg  400 mg Oral TID Nicky Nielsen APRN   400 mg at 07/01/19 2113   • ondansetron (ZOFRAN) injection 4 mg  4 mg Intravenous Q6H PRN Crystal Oshea APRJUDY       • potassium chloride (MICRO-K) CR capsule 20 mEq  20 mEq Oral Daily Nicky Nielsen APRN       • saccharomyces boulardii (FLORASTOR) capsule 250 mg  250 mg Oral BID Crystal Oshea APRN   250 mg at 07/01/19 2113   • sertraline (ZOLOFT) tablet 75 mg  75 mg Oral Daily Crystal Oshea APRN   75 mg at 07/01/19 1022   • sodium chloride 0.9 % flush 10 mL  10 mL Intravenous PRN Romie Velasco Jr., MD       • sodium chloride 0.9 % flush 3 mL  3 mL  Intravenous Q12H Crystal Oshea APRN   3 mL at 19   • sodium chloride 0.9 % flush 3-10 mL  3-10 mL Intravenous PRN Crystal Oshea APRN           Past Medical History:  Past Medical History:   Diagnosis Date   • Anemia    • Atrial fibrillation by electrocardiogram (CMS/Formerly [facility])    • CAD in native artery    • Carotid bruit    • CHF (congestive heart failure) (CMS/Formerly [facility])    • Chronic kidney disease     stage III   • Diabetes mellitus (CMS/Formerly [facility])     diet controlled   • History of coronary artery bypass graft    • Hospital psychosis (CMS/Formerly [facility])    • Hyperlipidemia    • Hypertension    • Monoclonal (M) protein disease, multiple 'M' protein    • Multiple myeloma (CMS/Formerly [facility])    • Murmur    • Pulmonary hypertension (CMS/Formerly [facility])    • Sick sinus syndrome (CMS/Formerly [facility])     s/p pace maker   • Sleep apnea        Past Surgical History:  Past Surgical History:   Procedure Laterality Date   • BACK SURGERY     • CARDIAC CATHETERIZATION  1998    Left Heart; candidate for re-do CABG   • COLONOSCOPY  12/10/2013    Diverticulosis repeat prn   • CORONARY ARTERY BYPASS GRAFT      X 8   • ENDOSCOPY N/A 2018    Normal exam   • HEMORROIDECTOMY     • PACEMAKER IMPLANTATION         Family History  Family History   Problem Relation Age of Onset   • Cancer Mother    • Heart disease Mother    • Colon cancer Mother    • Heart disease Father    • Coronary artery disease Father    • Dementia Sister    • Colon polyps Neg Hx        Social History  Social History     Socioeconomic History   • Marital status:      Spouse name: Not on file   • Number of children: Not on file   • Years of education: Not on file   • Highest education level: Not on file   Tobacco Use   • Smoking status: Former Smoker     Types: Cigarettes     Last attempt to quit: 1986     Years since quittin.6   • Smokeless tobacco: Former User   Substance and Sexual Activity   • Alcohol use: No   • Drug use: No   • Sexual activity: Defer         Review of  Systems:  History obtained from chart review and the patient  General ROS: No fever or chills  Respiratory ROS: No cough, shortness of breath, wheezing  Cardiovascular ROS: No chest pain or palpitations  Gastrointestinal ROS: No abdominal pain or melena  Genito-Urinary ROS: No dysuria or hematuria  Neurological ROS; no headache or dizziness    Objective:  Patient Vitals for the past 24 hrs:   BP Temp Temp src Pulse Resp SpO2 Weight   07/02/19 0829 -- -- -- -- -- 92 % --   07/02/19 0722 133/52 98.3 °F (36.8 °C) Oral 62 16 96 % --   07/02/19 0411 123/48 97.9 °F (36.6 °C) Oral 61 16 93 % --   07/01/19 2341 116/53 98 °F (36.7 °C) Oral 62 16 91 % --   07/01/19 2046 -- -- -- 72 -- 90 % --   07/01/19 2023 102/43 98.4 °F (36.9 °C) Oral 55 16 91 % 73.3 kg (161 lb 8 oz)   07/01/19 1609 101/50 98.8 °F (37.1 °C) Oral 72 16 95 % --   07/01/19 1115 107/51 98.3 °F (36.8 °C) Oral 60 16 93 % --       Intake/Output Summary (Last 24 hours) at 7/2/2019 1108  Last data filed at 7/2/2019 0706  Gross per 24 hour   Intake 360 ml   Output 1550 ml   Net -1190 ml     General: awake, oriented X 2   Neck: supple, no JVD  Chest:  diminished bases, clear  CVS: regular rate and rhythm  Abdominal: soft, nontender, positive bowel sounds  Extremities: no cyanosis or edema  Skin: warm and dry without rash  Neuro: no focal motor deficits    Labs:  Results from last 7 days   Lab Units 07/02/19 0417 07/01/19  0530 06/30/19  0755   WBC 10*3/mm3 4.37* 5.00 5.18   HEMOGLOBIN g/dL 9.4* 9.3* 9.1*   HEMATOCRIT % 29.8* 28.6* 27.0*   PLATELETS 10*3/mm3 65* 56* 48*         Results from last 7 days   Lab Units 07/02/19 0417 07/01/19  0530 06/30/19  0755 06/29/19  0459 06/28/19  0437 06/27/19  0429   SODIUM mmol/L 136 139 139 140 140 133*   POTASSIUM mmol/L 4.0 4.2 3.8 3.2* 3.3* 3.3*   CHLORIDE mmol/L 100 101 98 97* 107 108   CO2 mmol/L 31.0 32.0* 33.0* 35.0* 23.0* 16.0*   BUN mg/dL 48* 46* 50* 55* 63* 65*   CREATININE mg/dL 2.38* 2.36* 2.36* 2.53* 2.76* 2.54*    CALCIUM mg/dL 7.3* 6.9* 6.7* 6.8* 7.2* 6.8*   BILIRUBIN mg/dL  --   --   --  1.2* 1.2* 0.8   ALK PHOS U/L  --   --   --  53 58 56   ALT (SGPT) U/L  --   --   --  47 36 34   AST (SGOT) U/L  --   --   --  65* 51* 51*   GLUCOSE mg/dL 107* 121* 115* 167* 150* 106*       Radiology:   Imaging Results (last 72 hours)     Procedure Component Value Units Date/Time    US Renal Bilateral [[id number]] Collected:  06/27/19 1456     Updated:  06/27/19 1511    Narrative:       EXAMINATION:  US RENAL BILATERAL-  6/27/2019 12:07 PM CDT     HISTORY: Acute renal insufficiency superimposed on chronic kidney  disease. N17.9-Acute kidney failure, unspecified; R41.82-Altered mental  status, unspecified; R68.89-Other general symptoms and signs;  Z74.09-Other reduced mobility.      COMPARISON: No comparison study.     TECHNIQUE: Grayscale and color flow images were performed.     FINDINGS: The visualized abdominal aorta and inferior vena cava are  normal caliber. The liver echogenicity is normal. The right kidney  measures 8.9 cm and the left kidney measures 10.7 cm. There is cortical  thinning of the right kidney compared to the left kidney. There is  increased echogenicity of the right renal cortex compared to the  adjacent liver. There is a 2.3 x 1.5 cm hypoechoic renal lesion in the  right mid kidney. There is a 2.7 x 2.2 cm somewhat lobular hypoechoic  renal lesion in the left mid kidney. These lesions are probably cysts  but they are not fully characterized on this study. There is no internal  blood flow identified. There is no hydronephrosis. The urinary bladder  is unremarkable.       Impression:       1. Cortical thinning of the right kidney. There is increased cortical  echogenicity of the right kidney. There is no hydronephrosis.  2. The cortical thickness and echogenicity of the left kidney are  normal.  3. Bilateral renal lesions are likely cysts but not fully characterized  on this study.  This report was finalized on  06/27/2019 15:08 by Dr. Pawel Benson MD.    CT Guided Biopsy Bone Marrow [[id number]] Collected:  06/27/19 1229     Updated:  06/27/19 1233    Narrative:       CT GUIDED BIOPSY BONE MARROW- 6/27/2019 11:31 AM CDT     INDICATION: pancytopenia; N17.9-Acute kidney failure, unspecified;  R41.82-Altered mental status, unspecified; R68.89-Other general symptoms  and signs; Z74.09-Other reduced mobility      CONSENT: An informed written consent was obtained from the patient after  discussing the risks, benefits, potential complications and  alternatives. Potential complications included bleeding and infection.  All questions answered to the patient's satisfaction.       COMPLICATIONS: None.      MEDICATIONS: None     DOSE LENGTH PRODUCT: 178 mGy cm. Automated exposure control was also  utilized to decrease patient radiation dose.     ESTIMATED BLOOD LOSS: Less than 5 ml.      PROCEDURE: An informed consent was obtained as above. Prior to sterile  preparation and local anesthetic, noninfused axial CT scans were  obtained. The optimal site for biopsy was marked. A 13-gauge needle was  advanced into the ilium with a drill. Approximately 10 mL of bone marrow  were aspirated through the needle. Subsequently, a 2 cm core was  obtained utilizing the drill with a 13-gauge sampling needle.     No immediate complications.        Impression:       Successful CT guided bone marrow aspirate and bone biopsy.         This report was finalized on 06/27/2019 12:30 by Dr. Javy Roman MD.          Culture:  Blood Culture   Date Value Ref Range Status   06/24/2019 No growth at 3 days  Preliminary   06/24/2019 No growth at 3 days  Preliminary   06/21/2019 No growth at 5 days  Final   06/21/2019 No growth at 5 days  Final         Assessment   1.  Acute kidney injury  2.  Baseline chronic kidney disease stage 3  3.. Hyponatremia--improved  4.  Hypokalemia--improved  5.  Pancytopenia   6.  Type 2 diabetes  7.  Hypertension   8.  Multiple  myeloma   9.  Metabolic acidosis-improved    Plan:  1.  Renal function stable  2.  OK to change Lasix to PO  3.  Monitor labs      Pedro Hickman, APRN  7/2/2019  11:08 AM

## 2019-07-03 NOTE — THERAPY TREATMENT NOTE
Acute Care - Physical Therapy Treatment Note  [facility]     Patient Name: Jesús Long  : 1931  MRN: [id number]  Today's Date: 7/3/2019  Onset of Illness/Injury or Date of Surgery: 19  Date of Referral to PT: 19  Referring Physician: Dr. Brown    Admit Date: 2019    Visit Dx:    ICD-10-CM ICD-9-CM   1. CHRISTINE (acute kidney injury) (CMS/HCC) N17.9 584.9   2. Altered mental status, unspecified altered mental status type R41.82 780.97   3. Decreased activities of daily living (ADL) R68.89 780.99   4. Decreased mobility and endurance Z74.09 780.99   5. Oropharyngeal dysphagia R13.12 787.22     Patient Active Problem List   Diagnosis   • Anemia associated with chronic renal failure   • PAF (paroxysmal atrial fibrillation) (CMS/HCC) No AC DUE TO GI  BLEED   • Anxiety   • Pacemaker   • Anemia of chronic disease   • Multiple myeloma (CMS/HCC)   • Chronic kidney disease   • Hypertension   • Coronary artery disease   • Pulmonary hypertension (CMS/HCC)   • Bright red blood per rectum   • Controlled type 2 diabetes mellitus diet controlled without complication, without long-term current use of insulin (CMS/HCC)   • Light chain myeloma (CMS/HCC)   • Acute kidney injury superimposed on chronic kidney disease (CMS/HCC)   • Neutropenic fever (CMS/HCC)   • Sick sinus syndrome (CMS/HCC)   • Moderate malnutrition (CMS/HCC)   • Delirium   • Metabolic encephalopathy   • Pancytopenia (CMS/HCC)   • Pressure ulcer with suspected deep tissue injury, stage III (CMS/HCC)       Therapy Treatment    Rehabilitation Treatment Summary     Row Name 19 1103             Treatment Time/Intention    Discipline  physical therapy assistant  -MF      Document Type  therapy note (daily note)  -MF2      Subjective Information  no complaints  -MF3      Mode of Treatment  physical therapy  -MF2      Existing Precautions/Restrictions  fall;oxygen therapy device and L/min  -MF2      Patient Response to Treatment  slightly  confused at times  -MF3      Recorded by [MF] Olga Lidia Vega, [facility] 07/03/19 1104  [MF2] Olga Lidia Vega, [facility] 07/03/19 1120  [MF3] Olga Lidia Vega, PTA 07/03/19 1125      Row Name 07/03/19 1103             Bed Mobility Assessment/Treatment    Comment (Bed Mobility)  up in chair  -MF      Recorded by [MF] Olga Lidia Vega, PTA 07/03/19 1120      Row Name 07/03/19 1103             Sit-Stand Transfer    Sit-Stand [place] (Transfers)  verbal cues;contact guard  -MF      Recorded by [MF] Olga Lidia Vega, [facility] 07/03/19 1120      Row Name 07/03/19 1103             Stand-Sit Transfer    Stand-Sit [place] (Transfers)  contact guard  -MF      Recorded by [MF] Olga Lidia Vega, [facility] 07/03/19 1120      Row Name 07/03/19 1103             Gait/Stairs Assessment/Training    [place] Level (Gait)  verbal cues;contact guard  -MF      Assistive Device (Gait)  walker, front-wheeled  -MF      Distance in Feet (Gait)  50' x 4 with stops to correct walker placement and gait.   -MF2      Deviations/Abnormal Patterns (Gait)  stride length decreased;michelle decreased;base of support, narrow  -MF2      Bilateral Gait Deviations  forward flexed posture  -MF      Comment (Gait/Stairs)  cues for walker placement.   -MF2      Recorded by [MF] Olga Lidia Vega, PTA 07/03/19 1120  [MF2] Olga Lidia Vega, [facility] 07/03/19 1125      Row Name 07/03/19 1103             Therapeutic Exercise    Lower Extremity (Therapeutic Exercise)  LAQ (long arc quad), bilateral  -MF      Lower Extremity Range of Motion (Therapeutic Exercise)  hip flexion/extension, bilateral;hip abduction/adduction, bilateral;ankle dorsiflexion/plantar flexion, bilateral  -MF      Exercise Type (Therapeutic Exercise)  AROM (active range of motion)  -MF      Position (Therapeutic Exercise)  seated  -MF      Sets/Reps (Therapeutic Exercise)  20  -MF      Recorded by [MF] Olga Lidia Vega, PTA 07/03/19 1120      Row Name 07/03/19 1103              Positioning and Restraints    Pre-Treatment Position  sitting in chair/recliner  -      Post Treatment Position  chair  -MF      In Chair  reclined;call light within reach;encouraged to call for assist;exit alarm on  -      Recorded by [] Olga Lidia Vega, PTA 07/03/19 1120      Row Name 07/03/19 1103             Pain Scale: Numbers Pre/Post-Treatment    Pain Scale: Numbers, Pretreatment  0/10 - no pain  -      Pain Scale: Numbers, Post-Treatment  0/10 - no pain  -      Recorded by [] Olga Lidia Vega, PTA 07/03/19 1125      Row Name                Wound 06/29/19 0800 Left gluteal pressure injury    Wound - Properties Group Date first assessed: 06/29/19 [AG] Time first assessed: 0800 [AG] Side: Left [AG] Location: gluteal [AG] Type: pressure injury [AG] Recorded by:  [AG] Dianne Desai RN 06/29/19 1435    Row Name                Wound 06/29/19 1444 Right gluteal pressure injury    Wound - Properties Group Date first assessed: 06/29/19 [AG] Time first assessed: 1444 [AG] Side: Right [AG] Location: gluteal [AG] Type: pressure injury [AG] Recorded by:  [MARIA EUGENIA] Dianne Desai RN 06/29/19 1444      User Key  (r) = Recorded By, (t) = Taken By, (c) = Cosigned By    Initials Name Effective Dates Discipline     Olga Lidia Vega, PTA 08/02/16 -  PT    Dianne Read RN 08/02/16 -  Nurse          Wound 06/29/19 0800 Left gluteal pressure injury (Active)   Base red 7/2/2019  8:00 PM   Periwound excoriated 7/2/2019  8:00 PM   Dressing Care, Wound open to air 7/2/2019  8:00 PM       Wound 06/29/19 1444 Right gluteal pressure injury (Active)   Base red 7/2/2019  8:00 PM   Drainage Amount none 7/2/2019  8:00 PM   Dressing Care, Wound open to air 7/2/2019  8:00 PM           Physical Therapy Education     Title: PT OT SLP Therapies (In Progress)     Topic: Physical Therapy (In Progress)     Point: Mobility training (Done)     Learning Progress Summary           Patient Acceptance, E, VU by SC at 6/25/2019 10:43  AM    Comment:  pt understands his limitations at this time. precautions were reviewed w/ pt and proper body mechanics when transferring and mobile                   Point: Body mechanics (Done)     Learning Progress Summary           Patient Acceptance, E, VU by SC at 6/25/2019 10:43 AM    Comment:  pt understands his limitations at this time. precautions were reviewed w/ pt and proper body mechanics when transferring and mobile                   Point: Precautions (Done)     Learning Progress Summary           Patient Acceptance, E, VU by SC at 6/25/2019 10:43 AM    Comment:  pt understands his limitations at this time. precautions were reviewed w/ pt and proper body mechanics when transferring and mobile                               User Key     Initials Effective Dates Name Provider Type Discipline    SC 05/15/19 -  Carl Campoverde, PT Student PT Student PT                PT Recommendation and Plan     Plan of Care Reviewed With: patient  Progress: no change  Outcome Summary: Pt. continues to be weak and decreased endurance. Pt. requires CGA for transfers and ambulation. Pt. is need of further therapy for further strengthening and safety training.   Outcome Measures     Row Name 07/01/19 1200             How much help from another is currently needed...    Putting on and taking off regular lower body clothing?  2  -TS      Bathing (including washing, rinsing, and drying)  2  -TS      Toileting (which includes using toilet bed pan or urinal)  3  -TS      Putting on and taking off regular upper body clothing  3  -TS      Taking care of personal grooming (such as brushing teeth)  3  -TS      Eating meals  4  -TS      AM-PAC 6 Clicks Score (OT)  17  -TS         Functional Assessment    Outcome Measure Options  AM-PAC 6 Clicks Daily Activity (OT)  -TS        User Key  (r) = Recorded By, (t) = Taken By, (c) = Cosigned By    Initials Name Provider Type    TS Cailin Hinton COTA/L Occupational Therapy  Assistant         Time Calculation:   PT Charges     Row Name 07/03/19 1132             Time Calculation    Start Time  1103  -MF      Stop Time  1129  -MF      Time Calculation (min)  26 min  -MF      PT Received On  07/03/19  -      PT Goal Re-Cert Due Date  07/05/19  -MF         Time Calculation- PT    Total Timed Code Minutes- PT  26 minute(s)  -MF         Timed Charges    02048 - PT Therapeutic Exercise Minutes  12  -MF      64759 - Gait Training Minutes   14  -MF        User Key  (r) = Recorded By, (t) = Taken By, (c) = Cosigned By    Initials Name Provider Type     Olga Lidia Vega PTA Physical Therapy Assistant        Therapy Charges for Today     Code Description Service Date Service Provider Modifiers Qty    29393908915 HC PT THER PROC EA 15 MIN 7/3/2019 Olga Lidia Vega PTA GP 1    60990097885 HC GAIT TRAINING EA 15 MIN 7/3/2019 Olga Lidia Vega PTA GP 1          PT G-Codes  Outcome Measure Options: AM-PAC 6 Clicks Daily Activity (OT)  AM-PAC 6 Clicks Score (PT): 20  AM-PAC 6 Clicks Score (OT): 17    Olga Lidia Vega PTA  7/3/2019

## 2019-07-03 NOTE — PLAN OF CARE
Problem: Patient Care Overview  Goal: Plan of Care Review  Outcome: Outcome(s) achieved Date Met: 07/03/19 07/03/19 9282   Coping/Psychosocial   Plan of Care Reviewed With patient   Plan of Care Review   Progress improving   OTHER   Outcome Summary OT treament completed. Pt completes functional transfers and mobility with GCA/min A. Pt completes total body bathing and total body dressing with CGA and max vcs for initiation, sequencing, and problem solving. Pt is limited due to impaired balance, decreased cognition, and wekaness. Pt is discharging to SNF today.

## 2019-07-03 NOTE — PLAN OF CARE
Problem: Patient Care Overview  Goal: Plan of Care Review  Outcome: Ongoing (interventions implemented as appropriate)   07/03/19 1103   Coping/Psychosocial   Plan of Care Reviewed With patient   Plan of Care Review   Progress no change   OTHER   Outcome Summary Pt. continues to be weak and decreased endurance. Pt. requires CGA for transfers and ambulation. Pt. is need of further therapy for further strengthening and safety training.

## 2019-07-03 NOTE — PLAN OF CARE
Problem: Patient Care Overview  Goal: Plan of Care Review  Outcome: Ongoing (interventions implemented as appropriate)   07/03/19 0415   Coping/Psychosocial   Plan of Care Reviewed With patient   Plan of Care Review   Progress improving   OTHER   Outcome Summary VSS. Patient still pleasantly confused. Slept some of shift. Lasix changed to oral. Diet changed to pureed/[place] thick. Awaiting bone marrow results. Cont to monitor.        Problem: Skin Injury Risk (Adult)  Goal: Skin Health and Integrity  Outcome: Ongoing (interventions implemented as appropriate)   07/03/19 0415   Skin Injury Risk (Adult)   Skin Health and Integrity making progress toward outcome       Problem: Fall Risk (Adult)  Goal: Absence of Fall  Outcome: Ongoing (interventions implemented as appropriate)   07/03/19 0415   Fall Risk (Adult)   Absence of Fall making progress toward outcome       Problem: Nutrition, Imbalanced: Inadequate Oral Intake (Adult)  Goal: Improved Oral Intake  Outcome: Ongoing (interventions implemented as appropriate)   07/03/19 0415   Nutrition, Imbalanced: Inadequate Oral Intake (Adult)   Improved Oral Intake making progress toward outcome     Goal: Prevent Further Weight Loss  Outcome: Ongoing (interventions implemented as appropriate)      Problem: Wound (Includes Pressure Injury) (Adult)  Goal: Signs and Symptoms of Listed Potential Problems Will be Absent, Minimized or Managed (Wound)  Outcome: Ongoing (interventions implemented as appropriate)   07/02/19 1839 07/03/19 0415   Goal/Outcome Evaluation   Problems Assessed (Wound) --  all   Problems Present (Wound) situational response;skin injury, new onset --

## 2019-07-03 NOTE — PROGRESS NOTES
[facility] Progress Note    Progress Note    Patient:  Jesús Long  YOB: 1931  Date of Service: 7/3/2019  MRN: [id number]   Acct: [id number]   Primary Care Physician: Yocasta Baumann APRN  Advance Directive:   Code Status and Medical Interventions:   Ordered at: 06/24/19 1621     Code Status:    CPR     Medical Interventions (Level of Support Prior to Arrest):    Full     Admit Date: 6/24/2019       Hospital Day: 9      Subjective:     Chief Compliant: Unobtainable.  Seen with nurse Goodwin at bedside.  Remains confused.      Review of Systems:   Review of Systems   Unable to perform ROS: Mental status change (confuse)           Medications:   Scheduled Meds:  b complex-vitamin c-folic acid 1 tablet Oral Daily   barium sulfate 20 mL Oral Once in imaging   barium sulfate 20 mL Oral Once in imaging   barium sulfate 20 mL Oral Once in imaging   barium sulfate 20 mL Oral Once in imaging   calcitriol 0.25 mcg Oral Daily   famotidine 20 mg Oral Daily   furosemide 40 mg Oral Daily   lidocaine 1 patch Transdermal Q24H   megestrol 800 mg Oral Daily   melatonin 6 mg Oral Nightly   metaxalone 400 mg Oral TID   potassium chloride 20 mEq Oral Daily   saccharomyces boulardii 250 mg Oral BID   sertraline 75 mg Oral Daily   sodium chloride 3 mL Intravenous Q12H       Continuous Infusions:     Labs:     Lab Results (last 72 hours)     Procedure Component Value Units Date/Time    Basic Metabolic Panel [[id number]]  (Abnormal) Collected:  07/03/19 0503    Specimen:  Blood Updated:  07/03/19 0538     Glucose 97 mg/dL      BUN 46 mg/dL      Creatinine 2.45 mg/dL      Sodium 137 mmol/L      Potassium 4.0 mmol/L      Chloride 101 mmol/L      CO2 29.0 mmol/L      Calcium 7.5 mg/dL      eGFR Non African Amer 25 mL/min/1.73      BUN/Creatinine Ratio 18.8     Anion Gap 7.0 mmol/L     Narrative:       GFR Normal >60  Chronic Kidney Disease <60  Kidney Failure <15    CBC & Differential [[id number]] Collected:   07/03/19 0503    Specimen:  Blood Updated:  07/03/19 0517    Narrative:       The following orders were created for panel order CBC & Differential.  Procedure                               Abnormality         Status                     ---------                               -----------         ------                     CBC Auto Differential[[id number]]        Abnormal            Final result                 Please view results for these tests on the individual orders.    CBC Auto Differential [[id number]]  (Abnormal) Collected:  07/03/19 0503    Specimen:  Blood Updated:  07/03/19 0517     WBC 4.26 10*3/mm3      RBC 3.11 10*6/mm3      Hemoglobin 9.5 g/dL      Hematocrit 29.5 %      MCV 94.9 fL      MCH 30.5 pg      MCHC 32.2 g/dL      RDW 17.2 %      RDW-SD 58.4 fl      MPV 10.0 fL      Platelets 72 10*3/mm3      Neutrophil % 74.2 %      Lymphocyte % 14.3 %      Monocyte % 10.1 %      Eosinophil % 0.5 %      Basophil % 0.2 %      Immature Grans % 0.7 %      Neutrophils, Absolute 3.16 10*3/mm3      Lymphocytes, Absolute 0.61 10*3/mm3      Monocytes, Absolute 0.43 10*3/mm3      Eosinophils, Absolute 0.02 10*3/mm3      Basophils, Absolute 0.01 10*3/mm3      Immature Grans, Absolute 0.03 10*3/mm3      nRBC 0.0 /100 WBC     Tissue Pathology Exam [[id number]] Collected:  06/27/19 1345    Specimen:  Tissue from Iliac Crest, Left - Aspirate; Tissue from Iliac Crest, Left - Biopsy Updated:  07/02/19 1245     Case Report --     Surgical Pathology Report                         Case: [id number]                                  Authorizing Provider:  Gerard Donis MD  Collected:           06/27/2019 01:45 PM          Ordering Location:     [address]  Received:            06/27/2019 01:46 PM          Pathologist:           Shakila Boles MD                                                        Specimens:   1) - Iliac Crest, Left - Aspirate                                                                    2) - Iliac Crest, Left - Biopsy                                                             Final Diagnosis --     1.  Peripheral blood:  A.  Moderate normochromic normocytic anemia.  B.  2+ anisocytosis and 2+ poikilocytosis with elliptocytes, dacrocytes and slight/1+ schistocytes.  C.  Circulating nucleated red blood cells (1 nucleated red blood cell per 100 white blood cells counted).  D.  Leukocytopenia with normal differential.  E.  Severe peripheral thrombocytopenia.  F.  No platelet clumps seen.  G.  No morulae seen in granulocytes or monocytes.    2.  Bone marrow, left posterior iliac crest, aspirate smears, aspirate clot and core biopsy:  A.  Normocellular bone marrow core biopsy for patient's age (estimated at 40% cellularity).  B.  Mild erythroid hyperplasia in the marrow.  C.  Myelopoiesis demonstrates full maturation sequence without maturation arrest.  D.  No increase in lymphocytes (19%) or plasma cells (2%) in the marrow.  E.  Adequate megakaryocytes in the marrow with scattered atypical megakaryocytes seen.  F.  No collagen fibrosis in the marrow.  G.  Increased stainable iron in the marrow (4+).  H.  No evidence of metastatic carcinoma or granulomatous disease in the marrow.    Comment: Flow cytometric analysis performed by [facility] reveals plasma cells (2.3% of total) showing lambda excess.  In the sample analyzed, there is no evidence of abnormal myeloid maturation or an increased blast population.  There is no evidence of a lymphoproliferative disorder.  Please refer to the complete flow cytometry report from [facility] which is appended.  Because of the presence of schistocytes, a microangiopathic hemolytic disorder cannot be entirely excluded.  The presence of scattered atypical megakaryocytes also raises the concern for a low grade myelodysplastic process.  Although flow cytometric analysis indicates a small number of plasma cells showing lambda excess, an increase in  plasma cells is not seen in the marrow.  Representative slides and the paraffin blocks will be submitted to [facility] for a hematopathology consultation to exclude the possibility of a low grade myelodysplastic process.  Results of this consult will be reported as an addendum upon receipt from the reference laboratory.  Results of cytogenetic analysis are pending at this time and these results may be contributory.  These results will be reported as an addendum upon receipt from the reference laboratory.       Gross Description --     1.  Received in a purple stopper tube and a red stopper tube both of which are labeled with the patient's name medical record number and date of birth and designated as bone marrow aspirate left posterior iliac crest include several cc of liquid red-purple blood as well as a red-purple clot measures 2.5 cm in length and 0.8 cm in diameter.  The specimen is submitted in toto in block 1A.  2.  Received in a formalin decal solution and labeled bone marrow core biopsy left posterior iliac crest is a red brown core biopsy measuring 1.2 cm in length and 0.2 cm in diameter.  Specimen submitted in toto in block 2A.       Microscopic Description --     1.  Hematologic data of peripheral blood reveal a hemoglobin of 7.6 g/dL and hematocrit of 22.1%.  Total white blood cell count is 3090 and total red blood cell count is 2.51 million.  Red cell indices reveal an MCV of 88 fl, MCH of 30.3 pg and MCHC of 34.4 g/dL.  Red cell distribution width index is 17.1% and total platelet count is 22,000.  Examination of the peripheral blood reveal erythrocytes to be normochromic and normocytic.  There is 2+ anisocytosis and 2+ poikilocytosis.  Elliptocytes, dacrocytes and slight/1+ schistocytes are seen.  No definite basophilic stippling is identified.  One nucleated red blood cell is noted per 100 white blood cells counted. White  blood cell differential count is as follows: Polymorphonuclear  leukocytes 64%, lymphocytes 32%, monocytes 1%, eosinophils 1% and basophils 2%.  No blasts are observed in the peripheral blood.  Platelets are markedly decreased in the marrow with visual estimate agreeing with automated platelet count.  There are no platelet clumps seen.  Morulae are not seen in granulocytes or monocytes.    2.  Examination of bone marrow clot, biopsy and aspirate smears reveal maturing trilineage hematopoiesis.  The bone marrow core biopsy is normocellular for the patient's age and is estimated at 40% cellularity.  There is mild erythroid hyperplasia in the marrow with the myeloid to erythroid ratio calculated at 2.2:1.  Myelopoiesis demonstrates a full maturation sequence without maturation arrest.  Lymphocytes comprise approximately 19% of cells counted in the marrow and plasma cells approximately 2% of cells counted in the marrow.  Megakaryocytes are adequate in the marrow.  The core biopsy demonstrates normally formed megakaryocytes although scattered atypical megakaryocytes are seen.  These atypical megakaryocytes demonstrate abnormalities in lobation and nuclear hyperchromasia.  There is no collagen fibrosis.  There is no evidence of metastatic carcinoma or granulomatous disease in the marrow.  Stainable iron in the marrow is increased, graded at 4+.  The control stains appropriately.      Basic Metabolic Panel [[id number]]  (Abnormal) Collected:  07/02/19 0417    Specimen:  Blood Updated:  07/02/19 0514     Glucose 107 mg/dL      BUN 48 mg/dL      Creatinine 2.38 mg/dL      Sodium 136 mmol/L      Potassium 4.0 mmol/L      Chloride 100 mmol/L      CO2 31.0 mmol/L      Calcium 7.3 mg/dL      eGFR Non African Amer 26 mL/min/1.73      BUN/Creatinine Ratio 20.2     Anion Gap 5.0 mmol/L     Narrative:       GFR Normal >60  Chronic Kidney Disease <60  Kidney Failure <15    CBC & Differential [[id number]] Collected:  07/02/19 0417    Specimen:  Blood Updated:  07/02/19 0503    Narrative:       The  following orders were created for panel order CBC & Differential.  Procedure                               Abnormality         Status                     ---------                               -----------         ------                     CBC Auto Differential[[id number]]        Abnormal            Final result                 Please view results for these tests on the individual orders.    CBC Auto Differential [[id number]]  (Abnormal) Collected:  07/02/19 0417    Specimen:  Blood Updated:  07/02/19 0503     WBC 4.37 10*3/mm3      RBC 3.13 10*6/mm3      Hemoglobin 9.4 g/dL      Hematocrit 29.8 %      MCV 95.2 fL      MCH 30.0 pg      MCHC 31.5 g/dL      RDW 17.2 %      RDW-SD 58.8 fl      MPV 11.0 fL      Platelets 65 10*3/mm3      Neutrophil % 78.9 %      Lymphocyte % 11.2 %      Monocyte % 8.5 %      Eosinophil % 0.5 %      Basophil % 0.2 %      Immature Grans % 0.7 %      Neutrophils, Absolute 3.45 10*3/mm3      Lymphocytes, Absolute 0.49 10*3/mm3      Monocytes, Absolute 0.37 10*3/mm3      Eosinophils, Absolute 0.02 10*3/mm3      Basophils, Absolute 0.01 10*3/mm3      Immature Grans, Absolute 0.03 10*3/mm3      nRBC 0.0 /100 WBC     Basic Metabolic Panel [[id number]]  (Abnormal) Collected:  07/01/19 0530    Specimen:  Blood Updated:  07/01/19 0637     Glucose 121 mg/dL      BUN 46 mg/dL      Creatinine 2.36 mg/dL      Sodium 139 mmol/L      Potassium 4.2 mmol/L      Chloride 101 mmol/L      CO2 32.0 mmol/L      Calcium 6.9 mg/dL      eGFR Non African Amer 26 mL/min/1.73      BUN/Creatinine Ratio 19.5     Anion Gap 6.0 mmol/L     Narrative:       GFR Normal >60  Chronic Kidney Disease <60  Kidney Failure <15    CBC & Differential [[id number]] Collected:  07/01/19 0530    Specimen:  Blood Updated:  07/01/19 0630    Narrative:       The following orders were created for panel order CBC & Differential.  Procedure                               Abnormality         Status                     ---------                                -----------         ------                     CBC Auto Differential[[id number]]        Abnormal            Final result                 Please view results for these tests on the individual orders.    CBC Auto Differential [[id number]]  (Abnormal) Collected:  07/01/19 0530    Specimen:  Blood Updated:  07/01/19 0630     WBC 5.00 10*3/mm3      RBC 3.06 10*6/mm3      Hemoglobin 9.3 g/dL      Hematocrit 28.6 %      MCV 93.5 fL      MCH 30.4 pg      MCHC 32.5 g/dL      RDW 17.2 %      RDW-SD 57.0 fl      MPV 11.4 fL      Platelets 56 10*3/mm3      Neutrophil % 80.2 %      Lymphocyte % 10.8 %      Monocyte % 7.4 %      Eosinophil % 0.6 %      Basophil % 0.2 %      Neutrophils, Absolute 4.01 10*3/mm3      Lymphocytes, Absolute 0.54 10*3/mm3      Monocytes, Absolute 0.37 10*3/mm3      Eosinophils, Absolute 0.03 10*3/mm3      Basophils, Absolute 0.01 10*3/mm3     Basic Metabolic Panel [[id number]]  (Abnormal) Collected:  06/30/19 0755    Specimen:  Blood Updated:  06/30/19 1043     Glucose 115 mg/dL      BUN 50 mg/dL      Creatinine 2.36 mg/dL      Sodium 139 mmol/L      Potassium 3.8 mmol/L      Chloride 98 mmol/L      CO2 33.0 mmol/L      Calcium 6.7 mg/dL      eGFR Non African Amer 26 mL/min/1.73      BUN/Creatinine Ratio 21.2     Anion Gap 8.0 mmol/L     Narrative:       GFR Normal >60  Chronic Kidney Disease <60  Kidney Failure <15    Bone Marrow Exam [[id number]] Collected:  06/30/19 0755    Specimen:  Bone Marrow Aspirate Updated:  06/30/19 0818    Narrative:       The following orders were created for panel order Bone Marrow Exam.  Procedure                               Abnormality         Status                     ---------                               -----------         ------                     Bone Marrow Exam[[id number]]                                                            CBC Auto Differential[[id number]]        Abnormal            Final result                 Please view results for these  tests on the individual orders.    CBC Auto Differential [[id number]]  (Abnormal) Collected:  06/30/19 0755    Specimen:  Blood Updated:  06/30/19 0818     WBC 5.18 10*3/mm3      RBC 2.96 10*6/mm3      Hemoglobin 9.1 g/dL      Hematocrit 27.0 %      MCV 91.2 fL      MCH 30.7 pg      MCHC 33.7 g/dL      RDW 16.8 %      RDW-SD 54.8 fl      MPV 10.4 fL      Platelets 48 10*3/mm3      Neutrophil % 79.8 %      Lymphocyte % 12.4 %      Monocyte % 5.8 %      Eosinophil % 0.6 %      Basophil % 0.4 %      Neutrophils, Absolute 4.14 10*3/mm3      Lymphocytes, Absolute 0.64 10*3/mm3      Monocytes, Absolute 0.30 10*3/mm3      Eosinophils, Absolute 0.03 10*3/mm3      Basophils, Absolute 0.02 10*3/mm3             Radiology:     Imaging Results (last 72 hours)     Procedure Component Value Units Date/Time    FL Video Swallow With Speech [[id number]] Collected:  07/02/19 1414     Updated:  07/02/19 1420    Narrative:       Routine Dysphagia exam under fluoroscopy with the patient seated.   A speech therapist conducted the exam.   The exam was recorded on DVD.     A single fluoroscopic image was captured.  No radiographs were exposed.  Fluoroscopy time = 3.6 minutes.     Honey consistency:  Delayed oral phase of swallowing.  Normal handling of this consistency. No aspiration.     Nectar consistency:  Delayed oral phase with sluggish epiglottic inversion.  Normal handling of this consistency. No aspiration.     Thin liquid consistency:  Aspiration at the initiation of the swallow with contrast extending at  least to the level of the vocal cords. No significant cough reflex.     Pudding consistency:  Normal handling of this consistency. No aspiration.     Fruit:  Very slow oral phase with multiple prompts required before the patient  could initiate a swallow.  Normal handling of this consistency. No aspiration.     Repeat Nectar:  Normal handling of this consistency. No aspiration.     This report was finalized on 07/02/2019 14:16 by  Dr. Mauricio Michel MD.    XR Chest PA & Lateral [[id number]] Collected:  07/02/19 1052     Updated:  07/02/19 1058    Narrative:       EXAMINATION: XR CHEST PA AND LATERAL-  7/2/2019 10:52 AM CDT     TWO-VIEW CHEST:      HISTORY: Increasing congestion      Frontal and lateral projection chest radiograph obtained.     COMPARISON:  06/24/2019, 6/21/2019 and 12/17/2018      FINDINGS:     There is cardiomegaly with changes from median sternotomy. Permanent  cardiac pacemaker noted.     There is reticulointerstitial changes diffusely in the lungs with mild  patchy airspace opacities particularly in the lower lobes with probable  effusions. Differential considerations include pulmonary edema,  cardiogenic etiology suspected.     Changes from right shoulder surgery noted with bone anchor screws.     Extensive aortic calcifications observed.                                                                                       Impression:       1. Cardiomegaly with diffuse reticular interstitial and patchy lower  lobe airspace opacities, differential considerations include pulmonary  edema.        This report was finalized on 07/02/2019 10:54 by Dr. Cliff Lomeli MD.    US Venous Doppler Upper Extremity Left (duplex) [[id number]] Collected:  07/01/19 1544     Updated:  07/01/19 1548    Narrative:       History: Left arm swelling       Impression:       Impression: There is no evidence of deep venous thrombosis or  superficial thrombophlebitis of the left upper extremity.     Comments: Left upper extremity venous duplex exam was performed using  color Doppler flow, Doppler wave form analysis, and grayscale imaging,  with and without compression. There is no evidence of deep venous  thrombosis of the internal jugular, subclavian, axillary, and brachial  veins. There is no evidence of superficial thrombophlebitis involving  the cephalic or basilic veins.  This report was finalized on 07/01/2019 15:45 by Dr. Cody Del Valle MD.     "        Objective:   Vitals: /59 (BP Location: Left arm, Patient Position: Lying)   Pulse 67   Temp 98 °F (36.7 °C) (Oral)   Resp 14   Ht 175.3 cm (69\")   Wt 73.3 kg (161 lb 8 oz)   SpO2 94%   BMI 23.85 kg/m²   Physical Exam   Constitutional:   Chronically ill looking.   HENT:   Head: Normocephalic and atraumatic.   Eyes: No scleral icterus.   Neck: No JVD present.   Cardiovascular: Normal rate and regular rhythm.   Pulmonary/Chest: No respiratory distress. He has no wheezes.   Abdominal: Soft. Bowel sounds are normal. There is no tenderness.   Musculoskeletal: He exhibits no edema.   Neurological:   Drowsy.  He mumbles.    Skin: There is pallor.   Nursing note and vitals reviewed.    24HR INTAKE/OUTPUT:      Intake/Output Summary (Last 24 hours) at 7/3/2019 0643  Last data filed at 7/2/2019 1429  Gross per 24 hour   Intake --   Output 600 ml   Net -600 ml        Problem list:       Acute kidney injury superimposed on chronic kidney disease (CMS/HCC)    PAF (paroxysmal atrial fibrillation) (CMS/HCC) No AC DUE TO GI  BLEED    Anemia of chronic disease    Multiple myeloma (CMS/HCC)    Hypertension    Coronary artery disease    Pulmonary hypertension (CMS/HCC)    Controlled type 2 diabetes mellitus diet controlled without complication, without long-term current use of insulin (CMS/HCC)    Neutropenic fever (CMS/HCC)    Sick sinus syndrome (CMS/HCC)    Moderate malnutrition (CMS/HCC)    Delirium    Metabolic encephalopathy    Pancytopenia (CMS/HCC)    Pressure ulcer with suspected deep tissue injury, stage III (CMS/HCC)        Assessment/Plan:     ASSESSMENT:  1.  Pancytopenia, bone marrow biopsy 06/27/2019 is normocelular.  a.  Normocytic anemia from chronic kidney disease stage IV, GFR 24 06/27/2019. Post 2 units PRBCs 06/27/2019.  b.  Leukopenia without neutropenia.  c.   Thrombocytopenia. No peripheral schistocytes to suggest TTP/HUS/DIC.   Post platelet transfusion 06/27/2019.   2.  Acute on chronic " kidney disease. GFR 25 from 26 mL/minute (from 29 mL/minute, 06/24/2019; usual baseline is 37-44 mL/minute).   3.    Confusion likely metabolic.  Negative CT head.   4.    Sick sinus syndrome.  5.    Coronary artery disease.  6.    Paroxysmal atrial fibrillation (no anticoagulation).  7.  History of IgG lambda myeloma, Stage III. Last received cycle 6 of melphalan, prednisone, and thalidomide on 01/15/2014.  8.  Positive OMID 1:160.     RECOMMENDATIONS:  1.   WBC 4.2 from 4.3 with normal ANC 3.1, from 5 from 5 from 5.2, hgb 9.5 from 9.4 from 9.3 from 9.1 from 9.3, platelet 72 from 65 from 56 from 48.     2.   Bone marrow biopsy report.  Normocellular bone marrow core biopsy for patient's age (estimated at 40% cellularity). Flow cytometric analysis performed by [facility] reveals plasma cells (2.3% of total) showing lambda excess.  Not diagnostic for myeloma.  3.   Transfuse packed red blood cells if hemoglobin less than 8. Observe for infusion reactions.  4.   Transfuse platelets if less than 10,000 or bleeding. Observe for infusion reactions.  5.    Above discussed with nurse Goodwin.  She verbalized understanding.   6.    Plan for [facility] on discharge.  Ok kayley Donohue.  7.    Clinic appointment 07/17/2019 at 1315.

## 2019-07-03 NOTE — PROGRESS NOTES
Nephrology ([facility]) Progress Note      Patient:  Jesús Long  YOB: 1931  Date of Service: 7/3/2019  MRN: [id number]   Acct: [id number]   Primary Care Physician: Yocasta Baumann APRN  Advance Directive:   Code Status and Medical Interventions:   Ordered at: 06/24/19 1621     Code Status:    CPR     Medical Interventions (Level of Support Prior to Arrest):    Full     Admit Date: 6/24/2019       Hospital Day: 9  Referring Provider: Josue Brown MD      Subjective:  Jesús Long is a 87 y.o. male  whom we were consulted for acute kidney injury.  Baseline chronic kidney disease stage 3; baseline creatinine 1.7 mg. Follows with Rosita COX in our office.  History of multiple myeloma, paroxysmal atrial fibrillation, hypertension, type 2 diabetes, congestive heart failure.  Presented with several days of confusion, fever, weakness.   Admitted for evaluation of possible tick-borne illness.  Renal function has declined since admission and nephrology is consulted.    Patient's daughter provides history; she denies any known change in urination, no recent n/v/d.  No NSAIDs. Received 1 unit PRBC on 6/27.    Today, he was awake but still confused some.     Allergies:  Patient has no known allergies.    Home Meds:  Medications Prior to Admission   Medication Sig Dispense Refill Last Dose   • carvedilol (COREG) 6.25 MG tablet Take 6.25 mg by mouth 2 (Two) Times a Day With Meals.   Past Week at Unknown time   • Cholecalciferol (VITAMIN D3) 5000 UNITS capsule capsule Take 5,000 Units by mouth Daily.   Past Week at Unknown time   • coenzyme Q10 100 MG capsule Take 100 mg by mouth Daily.   Past Week at Unknown time   • metoprolol succinate XL (TOPROL-XL) 25 MG 24 hr tablet Take 25 mg by mouth Daily.   Past Week at Unknown time   • sertraline (ZOLOFT) 50 MG tablet Take 75 mg by mouth Daily.   Past Week at Unknown time   • sodium bicarbonate 650 MG tablet Take 650 mg by  mouth 4 (Four) Times a Day.   Past Week at Unknown time       Medicines:  Current Facility-Administered Medications   Medication Dose Route Frequency Provider Last Rate Last Dose   • acetaminophen (TYLENOL) tablet 650 mg  650 mg Oral Q4H PRN Crystal Oshea APRN   650 mg at 06/29/19 1951   • b complex-vitamin c-folic acid (NEPHRO-MILENA) tablet 1 tablet  1 tablet Oral Daily Reji Go MD   1 tablet at 07/03/19 0917   • barium sulfate (VARIBAR PUDDING) oral paste 20 mL  20 mL Oral Once in imaging Jax Patel MD       • barium sulfate (VARIBAR THIN LIQUID) oral suspension 20 mL  20 mL Oral Once in imaging Jax Patel MD       • barium sulfate oral suspension 20 mL  20 mL Oral Once in imaging Jax Patel MD       • barium sulfate oral suspension 20 mL  20 mL Oral Once in imaging Jax Patel MD       • calcitriol (ROCALTROL) capsule 0.25 mcg  0.25 mcg Oral Daily Casimiro Romero MD   0.25 mcg at 07/03/19 0917   • famotidine (PEPCID) tablet 20 mg  20 mg Oral Daily Josue Brown MD   20 mg at 07/03/19 0918   • furosemide (LASIX) tablet 20 mg  20 mg Oral Daily Nicky Nielsen APRN   20 mg at 07/03/19 1036   • lidocaine (LIDODERM) 5 % 1 patch  1 patch Transdermal Q24H Crystal Oshea APRN   1 patch at 07/03/19 0918   • megestrol (MEGACE) 40 MG/ML suspension 800 mg  800 mg Oral Daily Nicky Nielsen APRN   800 mg at 07/03/19 0918   • melatonin tablet 6 mg  6 mg Oral Nightly Nicky Nielsen APRN   6 mg at 07/02/19 2033   • metaxalone (SKELAXIN) tablet 400 mg  400 mg Oral TID Nicky Nielsen APRN   400 mg at 07/03/19 0918   • ondansetron (ZOFRAN) injection 4 mg  4 mg Intravenous Q6H PRN Crystal Oshea APRN       • potassium chloride (MICRO-K) CR capsule 20 mEq  20 mEq Oral Daily Nicky Nielsen APRN   20 mEq at 07/03/19 0917   • saccharomyces boulardii (FLORASTOR) capsule 250 mg  250 mg Oral BID Dorie,  Crystal K, APRN   250 mg at 07/03/19 0918   • sertraline (ZOLOFT) tablet 75 mg  75 mg Oral Daily Woeltedson, Crystal K, APRN   75 mg at 07/03/19 0917   • sodium chloride 0.9 % flush 10 mL  10 mL Intravenous PRN oRmie Velasco Jr., MD       • sodium chloride 0.9 % flush 3 mL  3 mL Intravenous Q12H Woeltedson, Crystal K, APRN   3 mL at 07/03/19 0918   • sodium chloride 0.9 % flush 3-10 mL  3-10 mL Intravenous PRN Woeltedson, Crystal K, APRN           Past Medical History:  Past Medical History:   Diagnosis Date   • Anemia    • Atrial fibrillation by electrocardiogram (CMS/[facility])    • CAD in native artery    • Carotid bruit    • CHF (congestive heart failure) (CMS/[facility])    • Chronic kidney disease     stage III   • Diabetes mellitus (CMS/[facility])     diet controlled   • History of coronary artery bypass graft    • Hospital psychosis (CMS/[facility])    • Hyperlipidemia    • Hypertension    • Monoclonal (M) protein disease, multiple 'M' protein    • Multiple myeloma (CMS/[facility])    • Murmur    • Pulmonary hypertension (CMS/[facility])    • Sick sinus syndrome (CMS/[facility])     s/p pace maker   • Sleep apnea        Past Surgical History:  Past Surgical History:   Procedure Laterality Date   • BACK SURGERY     • CARDIAC CATHETERIZATION  09/16/1998    Left Heart; candidate for re-do CABG   • COLONOSCOPY  12/10/2013    Diverticulosis repeat prn   • CORONARY ARTERY BYPASS GRAFT      X 8   • ENDOSCOPY N/A 6/20/2018    Normal exam   • HEMORROIDECTOMY     • PACEMAKER IMPLANTATION         Family History  Family History   Problem Relation Age of Onset   • Cancer Mother    • Heart disease Mother    • Colon cancer Mother    • Heart disease Father    • Coronary artery disease Father    • Dementia Sister    • Colon polyps Neg Hx        Social History  Social History     Socioeconomic History   • Marital status:      Spouse name: Not on file   • Number of children: Not on file   • Years of education: Not on file   • Highest education level: Not on file    Tobacco Use   • Smoking status: Former Smoker     Types: Cigarettes     Last attempt to quit: 1986     Years since quittin.6   • Smokeless tobacco: Former User   Substance and Sexual Activity   • Alcohol use: No   • Drug use: No   • Sexual activity: Defer         Review of Systems:  History obtained from chart review and the patient  General ROS: No fever or chills  Respiratory ROS: No cough, shortness of breath, wheezing  Cardiovascular ROS: No chest pain or palpitations  Gastrointestinal ROS: No abdominal pain or melena  Genito-Urinary ROS: No dysuria or hematuria  Neurological ROS; no headache or dizziness    Objective:  Patient Vitals for the past 24 hrs:   BP Temp Temp src Pulse Resp SpO2   19 0733 -- -- -- 60 15 96 %   19 0715 110/78 97.7 °F (36.5 °C) Oral 70 16 97 %   19 0348 123/59 98 °F (36.7 °C) Oral 67 14 94 %   19 2329 126/60 97.5 °F (36.4 °C) Oral 67 16 93 %   19 1953 113/77 97.8 °F (36.6 °C) Oral 58 16 98 %   19 1911 -- -- -- 63 -- 98 %   19 1651 110/54 97.8 °F (36.6 °C) Oral 63 18 96 %       Intake/Output Summary (Last 24 hours) at 7/3/2019 1317  Last data filed at 7/3/2019 0920  Gross per 24 hour   Intake --   Output 600 ml   Net -600 ml     General: awake, oriented X 2   Neck: supple, no JVD  Chest:  diminished bases, clear  CVS: regular rate and rhythm  Abdominal: soft, nontender, positive bowel sounds  Extremities: no cyanosis or edema  Skin: warm and dry without rash  Neuro: no focal motor deficits    Labs:  Results from last 7 days   Lab Units 19  0503 19  0417 19  0530   WBC 10*3/mm3 4.26* 4.37* 5.00   HEMOGLOBIN g/dL 9.5* 9.4* 9.3*   HEMATOCRIT % 29.5* 29.8* 28.6*   PLATELETS 10*3/mm3 72* 65* 56*         Results from last 7 days   Lab Units 19  0503 19  0417 19  0530  19  0459 19  0437 19  0429   SODIUM mmol/L 137 136 139   < > 140 140 133*   POTASSIUM mmol/L 4.0 4.0 4.2   < > 3.2* 3.3*  3.3*   CHLORIDE mmol/L 101 100 101   < > 97* 107 108   CO2 mmol/L 29.0 31.0 32.0*   < > 35.0* 23.0* 16.0*   BUN mg/dL 46* 48* 46*   < > 55* 63* 65*   CREATININE mg/dL 2.45* 2.38* 2.36*   < > 2.53* 2.76* 2.54*   CALCIUM mg/dL 7.5* 7.3* 6.9*   < > 6.8* 7.2* 6.8*   BILIRUBIN mg/dL  --   --   --   --  1.2* 1.2* 0.8   ALK PHOS U/L  --   --   --   --  53 58 56   ALT (SGPT) U/L  --   --   --   --  47 36 34   AST (SGOT) U/L  --   --   --   --  65* 51* 51*   GLUCOSE mg/dL 97 107* 121*   < > 167* 150* 106*    < > = values in this interval not displayed.       Radiology:   Imaging Results (last 72 hours)     Procedure Component Value Units Date/Time    US Renal Bilateral [[id number]] Collected:  06/27/19 1456     Updated:  06/27/19 1511    Narrative:       EXAMINATION:  US RENAL BILATERAL-  6/27/2019 12:07 PM CDT     HISTORY: Acute renal insufficiency superimposed on chronic kidney  disease. N17.9-Acute kidney failure, unspecified; R41.82-Altered mental  status, unspecified; R68.89-Other general symptoms and signs;  Z74.09-Other reduced mobility.      COMPARISON: No comparison study.     TECHNIQUE: Grayscale and color flow images were performed.     FINDINGS: The visualized abdominal aorta and inferior vena cava are  normal caliber. The liver echogenicity is normal. The right kidney  measures 8.9 cm and the left kidney measures 10.7 cm. There is cortical  thinning of the right kidney compared to the left kidney. There is  increased echogenicity of the right renal cortex compared to the  adjacent liver. There is a 2.3 x 1.5 cm hypoechoic renal lesion in the  right mid kidney. There is a 2.7 x 2.2 cm somewhat lobular hypoechoic  renal lesion in the left mid kidney. These lesions are probably cysts  but they are not fully characterized on this study. There is no internal  blood flow identified. There is no hydronephrosis. The urinary bladder  is unremarkable.       Impression:       1. Cortical thinning of the right kidney. There  is increased cortical  echogenicity of the right kidney. There is no hydronephrosis.  2. The cortical thickness and echogenicity of the left kidney are  normal.  3. Bilateral renal lesions are likely cysts but not fully characterized  on this study.  This report was finalized on 06/27/2019 15:08 by Dr. Pawel Benson MD.    CT Guided Biopsy Bone Marrow [[id number]] Collected:  06/27/19 1229     Updated:  06/27/19 1233    Narrative:       CT GUIDED BIOPSY BONE MARROW- 6/27/2019 11:31 AM CDT     INDICATION: pancytopenia; N17.9-Acute kidney failure, unspecified;  R41.82-Altered mental status, unspecified; R68.89-Other general symptoms  and signs; Z74.09-Other reduced mobility      CONSENT: An informed written consent was obtained from the patient after  discussing the risks, benefits, potential complications and  alternatives. Potential complications included bleeding and infection.  All questions answered to the patient's satisfaction.       COMPLICATIONS: None.      MEDICATIONS: None     DOSE LENGTH PRODUCT: 178 mGy cm. Automated exposure control was also  utilized to decrease patient radiation dose.     ESTIMATED BLOOD LOSS: Less than 5 ml.      PROCEDURE: An informed consent was obtained as above. Prior to sterile  preparation and local anesthetic, noninfused axial CT scans were  obtained. The optimal site for biopsy was marked. A 13-gauge needle was  advanced into the ilium with a drill. Approximately 10 mL of bone marrow  were aspirated through the needle. Subsequently, a 2 cm core was  obtained utilizing the drill with a 13-gauge sampling needle.     No immediate complications.        Impression:       Successful CT guided bone marrow aspirate and bone biopsy.         This report was finalized on 06/27/2019 12:30 by Dr. Javy Roman MD.          Culture:  Blood Culture   Date Value Ref Range Status   06/24/2019 No growth at 3 days  Preliminary   06/24/2019 No growth at 3 days  Preliminary   06/21/2019 No growth  at 5 days  Final   06/21/2019 No growth at 5 days  Final         Assessment   1.  Acute kidney injury  2.  Baseline chronic kidney disease stage 3  3.. Hyponatremia--improved  4.  Hypokalemia--improved  5.  Pancytopenia   6.  Type 2 diabetes  7.  Hypertension   8.  Multiple myeloma   9.  Metabolic acidosis-improved    Plan:  1.  Renal function is about stable  2.  It is discharged, he should follow-up at the renal clinic within 2 weeks.      Reji Go MD  7/3/2019  1:17 PM

## 2019-07-03 NOTE — THERAPY DISCHARGE NOTE
Acute Care - Occupational Therapy Treatment Note/Discharge  [facility]     Patient Name: Jesús Long  : 1931  MRN: [id number]  Today's Date: 7/3/2019  Onset of Illness/Injury or Date of Surgery: 19  Date of Referral to OT: 19  Referring Physician: Dr. Brown      Admit Date: 2019    Visit Dx:     ICD-10-CM ICD-9-CM   1. CHRISTINE (acute kidney injury) (CMS/HCC) N17.9 584.9   2. Altered mental status, unspecified altered mental status type R41.82 780.97   3. Decreased activities of daily living (ADL) R68.89 780.99   4. Decreased mobility and endurance Z74.09 780.99   5. Oropharyngeal dysphagia R13.12 787.22     Patient Active Problem List   Diagnosis   • Anemia associated with chronic renal failure   • PAF (paroxysmal atrial fibrillation) (CMS/HCC) No AC DUE TO GI  BLEED   • Anxiety   • Pacemaker   • Anemia of chronic disease   • Multiple myeloma (CMS/HCC)   • Chronic kidney disease   • Hypertension   • Coronary artery disease   • Pulmonary hypertension (CMS/HCC)   • Bright red blood per rectum   • Controlled type 2 diabetes mellitus diet controlled without complication, without long-term current use of insulin (CMS/HCC)   • Light chain myeloma (CMS/HCC)   • Acute kidney injury superimposed on chronic kidney disease (CMS/HCC)   • Neutropenic fever (CMS/HCC)   • Sick sinus syndrome (CMS/HCC)   • Moderate malnutrition (CMS/HCC)   • Delirium   • Metabolic encephalopathy   • Pancytopenia (CMS/HCC)   • Pressure ulcer with suspected deep tissue injury, stage III (CMS/HCC)       Therapy Treatment    Rehabilitation Treatment Summary     Row Name 19 1340 19 1312 19 1103       Treatment Time/Intention    Discipline  speech language pathologist  (Pended)   -MB  occupational therapist  -CS  physical therapy assistant  -MF    Document Type  therapy note (daily note)  (Pended)   -MB  therapy note (daily note)  -CS  therapy note (daily note)  -MF2    Subjective Information  no complaints   (Pended)   -MB  no complaints  -CS  no complaints  -MF3    Mode of Treatment  individual therapy;speech-language pathology  (Pended)   -MB  occupational therapy  -CS  physical therapy  -MF2    Patient/Family Observations  Daughter present  (Pended)   -MB  daughter present in room  -CS  --    Care Plan Review  care plan/treatment goals reviewed;risks/benefits reviewed  (Pended)   -MB  --  --    Care Plan Review, Other Participant(s)  daughter  (Pended)   -MB  --  --    Patient Effort  other (see comments)  (Pended)  educated family  -MB  good  -CS  --    Existing Precautions/Restrictions  --  --  fall;oxygen therapy device and L/min  -MF2    Patient Response to Treatment  --  --  slightly confused at times  -MF3    Recorded by [MB] Peggy South, Speech Therapy Student 07/03/19 9027 [CS] Neela Coughlin OTR/L, [facility] 07/03/19 1355 [MF] Olga Lidia Vega, [facility] 07/03/19 1104  [MF2] Olga Lidia Vega, [facility] 07/03/19 1120  [MF3] Olga Lidia Vega, [facility] 07/03/19 1125    Row Name 07/03/19 1312             Cognitive Assessment/Intervention- PT/OT    Affect/Mental Status (Cognitive)  confused  -CS      Follows Commands (Cognition)  follows one step commands;increased processing time needed;initiation impaired;repetition of directions required;physical/tactile prompts required;verbal cues/prompting required  -CS      Safety Deficit (Cognitive)  ability to follow commands;awareness of need for assistance;insight into deficits/self awareness;judgment;problem solving  -CS      Recorded by [CS] Neela Coughlin OTR/L, [facility] 07/03/19 1355      Row Name 07/03/19 1103             Bed Mobility Assessment/Treatment    Comment (Bed Mobility)  up in chair  -      Recorded by [] Olga Lidia Vega, [facility] 07/03/19 1120      Row Name 07/03/19 1312             Functional Mobility    Functional Mobility- Ind. Level  contact guard assist;minimum assist (75% patient effort);verbal cues required  -CS      Functional Mobility- Device   rolling walker  -CS      Functional Mobility- Safety Issues  supplemental O2  -CS      Functional Mobility- Comment  from bedside chair to shower in bathroom; min A required for intermittent posterior lean  -CS      Recorded by [CS] Neela Coughlin OTR/L, [facility] 07/03/19 1355      Row Name 07/03/19 1312             Transfer Assessment/Treatment    Transfer Assessment/Treatment  sit-stand transfer;stand-sit transfer;shower transfer  -CS      Recorded by [CS] Neela Coughlin OTR/L, [facility] 07/03/19 1355      Row Name 07/03/19 1312 07/03/19 1103          Sit-Stand Transfer    Sit-Stand [place] (Transfers)  minimum assist (75% patient effort);verbal cues  -CS  verbal cues;contact guard  -MF     Assistive Device (Sit-Stand Transfers)  walker, front-wheeled  -CS2  --     Recorded by [CS] Neela Coughlin OTR/L, CNT 07/03/19 1355  [CS2] Neela Coughlin OTR/L, [facility] 07/03/19 1449 [MF] Olga Lidia Vega, PTA 07/03/19 1120     Row Name 07/03/19 1312 07/03/19 1103          Stand-Sit Transfer    Stand-Sit [place] (Transfers)  minimum assist (75% patient effort);verbal cues  -CS  contact guard  -MF     Assistive Device (Stand-Sit Transfers)  walker, front-wheeled  -CS  --     Recorded by [CS] Neela Couglhin OTR/L, [facility] 07/03/19 1355 [MF] Olga Lidia Vega, PTA 07/03/19 1120     Row Name 07/03/19 1312             Shower Transfer    Type (Shower Transfer)  stand-sit;sit-stand step up x2 in into shower  -CS      [place] Level (Shower Transfer)  minimum assist (75% patient effort);verbal cues;nonverbal cues (demo/gesture)  -CS      Assistive Device (Shower Transfer)  shower chair;grab bars/tub rail  -CS      Recorded by [CS] Neela Coughlin OTR/L, [facility] 07/03/19 1355      Row Name 07/03/19 1103             Gait/Stairs Assessment/Training    [place] Level (Gait)  verbal cues;contact guard  -MF      Assistive Device (Gait)  walker, front-wheeled  -MF      Distance in Feet (Gait)  50' x 4 with stops to correct  walker placement and gait.   -MF2      Deviations/Abnormal Patterns (Gait)  stride length decreased;michelle decreased;base of support, narrow  -MF2      Bilateral Gait Deviations  forward flexed posture  -MF      Comment (Gait/Stairs)  cues for walker placement.   -MF2      Recorded by [MF] Olga Lidia Vega, \[facility]\"" 07/03/19 1120  [MF2] Olga Lidia Vega, \[facility]\"" 07/03/19 1125      Row Name 07/03/19 1312             ADL Assessment/Intervention    67873 - OT Self Care/Mgmt Minutes  76  -CS      BADL Assessment/Intervention  upper body dressing;lower body dressing;bathing;grooming  -CS2      Recorded by [CS] Neela Coughlin OTR/L, [facility] 07/03/19 1449  [CS2] Neela Coughlin OTR/L, [facility] 07/03/19 1355      Row Name 07/03/19 1312             Bathing Assessment/Intervention    Bathing [place] Level  contact guard assist;standby assist;verbal cues;nonverbal cues (demo/gesture)  -CS      Assistive Devices (Bathing)  grab bars/tub rail;shower chair  -CS      Bathing Position  supported sitting;supported standing  -CS      Comment (Bathing)  CGA provided for brennan hygiene  -CS      Recorded by [CS] Neela Coughlin OTR/L, [facility] 07/03/19 1449      Row Name 07/03/19 1312             Upper Body Dressing Assessment/Training    Upper Body Dressing [place] Level  don;doff;pull-over garment;supervision;verbal cues  -CS      Upper Body Dressing Position  edge of bed sitting  -CS      Comment (Upper Body Dressing)  vcs for initiation and problem solving  -CS      Recorded by [CS] Neela Coughlin OTR/L, [facility] 07/03/19 1449      Row Name 07/03/19 1312             Lower Body Dressing Assessment/Training    Lower Body Dressing [place] Level  don;doff;socks;pants/bottoms;shoes/slippers;undergarment;contact guard assist;supervision;verbal cues  -CS      Lower Body Dressing Position  edge of bed sitting;supported standing  -CS      Comment (Lower Body Dressing)  vcs for initiation and problem solving  -CS      Recorded by [CS]  Neela Coughlin OTR/L, [facility] 07/03/19 1449      Row Name 07/03/19 1312             BADL Safety/Performance    Impairments, BADL Safety/Performance  cognition;endurance/activity tolerance;strength;balance  -CS      Cognitive Impairments, BADL Safety/Performance  attention;insight into deficits/self awareness;awareness, need for assistance;impulsivity  -CS      Skilled BADL Treatment/Intervention  BADL process/adaptation training;cognitive/safety deficit modifications  -CS      Recorded by [CS] Neela Coughlin OTR/L, [facility] 07/03/19 1355      Row Name 07/03/19 1103             Therapeutic Exercise    Lower Extremity (Therapeutic Exercise)  LAQ (long arc quad), bilateral  -MF      Lower Extremity Range of Motion (Therapeutic Exercise)  hip flexion/extension, bilateral;hip abduction/adduction, bilateral;ankle dorsiflexion/plantar flexion, bilateral  -MF      Exercise Type (Therapeutic Exercise)  AROM (active range of motion)  -MF      Position (Therapeutic Exercise)  seated  -      Sets/Reps (Therapeutic Exercise)  20  -MF      Recorded by [MF] Olga Lidia Vega, [facility] 07/03/19 1120      Row Name 07/03/19 1312 07/03/19 1103          Positioning and Restraints    Pre-Treatment Position  sitting in chair/recliner  -CS  sitting in chair/recliner  -MF     Post Treatment Position  chair  -CS  chair  -MF     In Chair  sitting;call light within reach;encouraged to call for assist;with family/caregiver;notified nsg  -CS  reclined;call light within reach;encouraged to call for assist;exit alarm on  -MF     Recorded by [CS] Neela Coughlin OTR/L, [facility] 07/03/19 1355 [MF] Olga Lidia Vega, [facility] 07/03/19 1120     Row Name 07/03/19 1340 07/03/19 1312          Pain Assessment    Additional Documentation  Pain Scale: FACES Pre/Post-Treatment (Group)  (Pended)   -MB  Pain Scale: Numbers Pre/Post-Treatment (Group)  -CS     Recorded by [MB] Peggy South, Speech Therapy Student 07/03/19 6571 [CS] Neela Coughlin OTR/GARTH, [facility] 07/03/19  1355     Row Name 07/03/19 1312 07/03/19 1103          Pain Scale: Numbers Pre/Post-Treatment    Pain Scale: Numbers, Pretreatment  0/10 - no pain  -CS  0/10 - no pain  -MF     Pain Scale: Numbers, Post-Treatment  0/10 - no pain  -CS  0/10 - no pain  -MF     Recorded by [CS] Neela Coughlin, OTR/L, CNT 07/03/19 1355 [MF] Olga Lidia Vega, [facility] 07/03/19 1125     Row Name 07/03/19 1340             Pain Scale: FACES Pre/Post-Treatment    Pain: FACES Scale, Pretreatment  0-->no hurt  (Pended)   -MB      Pain: FACES Scale, Post-Treatment  0-->no hurt  (Pended)   -MB      Recorded by [MB] Peggy South, Speech Therapy Student 07/03/19 1457      Row Name                [REMOVED] Wound 06/29/19 0800 Left gluteal pressure injury    Wound - Properties Group Date first assessed: 06/29/19 [AG] Time first assessed: 0800 [AG] Side: Left [AG] Location: gluteal [AG] Type: pressure injury [AG] Resolution Date: 07/03/19 [RL] Resolution Time: 1203 [RL] Recorded by:  [AG] Dianne Desai RN 06/29/19 1435 [RL] Adán Royal RN 07/03/19 1203    Row Name                [REMOVED] Wound 06/29/19 1444 Right gluteal pressure injury    Wound - Properties Group Date first assessed: 06/29/19 [AG] Time first assessed: 1444 [AG] Side: Right [AG] Location: gluteal [AG] Type: pressure injury [AG] Resolution Date: 07/03/19 [RL] Resolution Time: 1203 [RL] Recorded by:  [AG] Dianne Desai RN 06/29/19 1444 [RL] Adán Royal RN 07/03/19 1203    Row Name 07/03/19 1312             Plan of Care Review    Plan of Care Reviewed With  patient;daughter  -CS      Recorded by [CS] Neela Coughlin OTR/L, CNT 07/03/19 1355      Row Name 07/03/19 1312             Outcome Summary/Treatment Plan (OT)    Daily Summary of Progress (OT)  progress toward functional goals is good  -CS      Barriers to Overall Progress (OT)  impaired cognition  -CS      Plan for Continued Treatment (OT)  cont OT POC  -CS      Anticipated Discharge Disposition (OT)  skilled  nursing facility  -      Recorded by [CS] Neela Coughlin, OTR/L, CNT 07/03/19 1355      Row Name 07/03/19 1340             Outcome Summary/Treatment Plan (SLP)    Daily Summary of Progress (SLP)  progress toward functional goals is good  (Pended)   -MB      Barriers to Overall Progress (SLP)  Cognitive status  (Pended)   -MB      Anticipated Dischage Disposition  skilled nursing facility  (Pended)   -MB      Reason for Discharge  discharge from this facility  (Pended)   -MB      Recorded by [MB] Peggy South, Speech Therapy Student 07/03/19 7311        User Key  (r) = Recorded By, (t) = Taken By, (c) = Cosigned By    Initials Name Effective Dates Discipline     Neela Coughlin, OTR/L, CNT 04/02/19 -  OT    RL Adán Royal, RN 08/02/16 -  Nurse    Olga Lidia Joyce, PTA 08/02/16 -  PT    Dianne Read RN 08/02/16 -  Nurse    Peggy Munguia Speech Therapy Student 04/24/19 -  SLP                 Occupational Therapy Education     Title: PT OT SLP Therapies (Resolved)     Topic: Occupational Therapy (Resolved)     Point: ADL training (Resolved)     Description: Instruct learner(s) on proper safety adaptation and remediation techniques during self care or transfers.   Instruct in proper use of assistive devices.    Learning Progress Summary           Patient Acceptance, E, VU by NIKKI at 6/25/2019 10:36 AM    Comment:  OT POC, adls, t/fs, bed mobility.   Family Acceptance, E,D, VU,NR by  at 7/2/2019 11:43 AM                   Point: Home exercise program (Resolved)     Description: Instruct learner(s) on appropriate technique for monitoring, assisting and/or progressing therapeutic exercises/activities.    Learning Progress Summary           Family Acceptance, E,D, VU,NR by  at 7/2/2019 11:43 AM                   Point: Precautions (Resolved)     Description: Instruct learner(s) on prescribed precautions during self-care and functional transfers.    Learning Progress Summary           Family  Acceptance, E,D, VU,NR by  at 7/2/2019 11:43 AM                               User Key     Initials Effective Dates Name Provider Type Discipline     08/02/16 -  Cora Mar, OTR/L Occupational Therapist OT    JBEREKET 10/12/18 -  Nicky Phelan OTR/L Occupational Therapist OT                OT Recommendation and Plan  Outcome Summary/Treatment Plan (OT)  Daily Summary of Progress (OT): progress toward functional goals is good  Barriers to Overall Progress (OT): impaired cognition  Plan for Continued Treatment (OT): cont OT POC  Anticipated Equipment Needs at Discharge (OT): (cont to asses)  Anticipated Discharge Disposition (OT): skilled nursing facility  Therapy Frequency (OT Eval): 3 times/wk  Daily Summary of Progress (OT): progress toward functional goals is good  Plan of Care Review  Plan of Care Reviewed With: patient  Plan of Care Reviewed With: patient  Outcome Summary: OT treament completed.  Pt completes functional transfers and mobility with GCA/min A.  Pt completes total body bathing and total body dressing with CGA and max vcs for initiation, sequencing, and problem solving.  Pt is limited due to impaired balance, decreased cognition, and wekaness.  Pt is discharging to SNF today.    Outcome Measures     Row Name 07/03/19 1312 07/01/19 1200          How much help from another is currently needed...    Putting on and taking off regular lower body clothing?  3  -CS  2  -TS     Bathing (including washing, rinsing, and drying)  3  -CS  2  -TS     Toileting (which includes using toilet bed pan or urinal)  3  -CS  3  -TS     Putting on and taking off regular upper body clothing  3  -CS  3  -TS     Taking care of personal grooming (such as brushing teeth)  3  -CS  3  -TS     Eating meals  3  -CS  4  -TS     AM-PAC 6 Clicks Score (OT)  18  -CS  17  -TS        Functional Assessment    Outcome Measure Options  AM-PAC 6 Clicks Daily Activity (OT)  -CS  AM-PAC 6 Clicks Daily Activity (OT)  -TS       User Key   (r) = Recorded By, (t) = Taken By, (c) = Cosigned By    Initials Name Provider Type    TS Cailin Hinton, LEIJA/L Occupational Therapy Assistant    Neela Rosa OTR/L, PHILIPP Occupational Therapist           Time Calculation:    Time Calculation- OT     Row Name 07/03/19 1457 07/03/19 1312 07/03/19 1132       Time Calculation- OT    OT Start Time  1312  -CS  --  --    OT Stop Time  1428  -CS  --  --    OT Time Calculation (min)  76 min  -CS  --  --    Total Timed Code Minutes- OT  76 minute(s)  -CS  --  --    OT Received On  07/03/19  -CS  --  --       Timed Charges    52087 - Gait Training Minutes   --  --  14  -    47447 - OT Self Care/Mgmt Minutes  76  -CS  76  -CS  --      User Key  (r) = Recorded By, (t) = Taken By, (c) = Cosigned By    Initials Name Provider Type    Neela Rosa OTR/L, PHILIPP Occupational Therapist    Olga Lidia Joyce, JEB Physical Therapy Assistant        Therapy Suggested Charges     Code   Minutes Charges    51834 (CPT®) Hc Ot Neuromusc Re Education Ea 15 Min      25214 (CPT®) Hc Ot Ther Proc Ea 15 Min      32742 (CPT®) Hc Ot Therapeutic Act Ea 15 Min      52856 (CPT®) Hc Ot Manual Therapy Ea 15 Min      88077 (CPT®) Hc Ot Iontophoresis Ea 15 Min      93769 (CPT®) Hc Ot Elec Stim Ea-Per 15 Min      06240 (CPT®) Hc Ot Ultrasound Ea 15 Min      72645 (CPT®) Hc Ot Self Care/Mgmt/Train Ea 15 Min 76 5    Total  76 5        Therapy Charges for Today     Code Description Service Date Service Provider Modifiers Qty    [id number] HC OT SELF CARE/MGMT/TRAIN EA 15 MIN 7/3/2019 Neela Coughlin OTR/GARTH, PHILIPP GO 5               OT Discharge Summary  Anticipated Discharge Disposition (OT): skilled nursing facility  Reason for Discharge: Discharge from facility  Outcomes Achieved: Refer to plan of care for updates on goals achieved  Discharge Destination: SNF    ROBBIE Orr/L, CNT  7/3/2019

## 2019-07-03 NOTE — THERAPY DISCHARGE NOTE
Acute Care - Speech Language Pathology   Swallow Treatment Note/Discharge   [facility]     Patient Name: Jesús Long  : 1931  MRN: [id number]  Today's Date: 7/3/2019  Onset of Illness/Injury or Date of Surgery: 19     Referring Physician: Dr. Brown      Admit Date: 2019    SSLP reviewed VFSS results from 2019 with pt's daughter upon arrival per daughter request. Pt and pt's daughter were educated on pt's current diet recommendations of pureed diet with nectar thick liquids. Pt's daughter educated on signs and symptoms of aspiration. SLP recommends to continue ST at this time to increase swallow initation time and to strengthen laryngeal and pharyngeal muscles to safely impact pt's overall swallow function.  Peggy South, Speech Therapy Student 7/3/2019 3:54 PM    Visit Dx:      ICD-10-CM ICD-9-CM   1. CHRISTINE (acute kidney injury) (CMS/HCC) N17.9 584.9   2. Altered mental status, unspecified altered mental status type R41.82 780.97   3. Decreased activities of daily living (ADL) R68.89 780.99   4. Decreased mobility and endurance Z74.09 780.99   5. Oropharyngeal dysphagia R13.12 787.22     Patient Active Problem List   Diagnosis   • Anemia associated with chronic renal failure   • PAF (paroxysmal atrial fibrillation) (CMS/HCC) No AC DUE TO GI  BLEED   • Anxiety   • Pacemaker   • Anemia of chronic disease   • Multiple myeloma (CMS/HCC)   • Chronic kidney disease   • Hypertension   • Coronary artery disease   • Pulmonary hypertension (CMS/HCC)   • Bright red blood per rectum   • Controlled type 2 diabetes mellitus diet controlled without complication, without long-term current use of insulin (CMS/HCC)   • Light chain myeloma (CMS/HCC)   • Acute kidney injury superimposed on chronic kidney disease (CMS/HCC)   • Neutropenic fever (CMS/HCC)   • Sick sinus syndrome (CMS/HCC)   • Moderate malnutrition (CMS/HCC)   • Delirium   • Metabolic encephalopathy   • Pancytopenia (CMS/HCC)   • Pressure ulcer  with suspected deep tissue injury, stage III (CMS/HCC)       Therapy Treatment  Rehabilitation Treatment Summary     Row Name 07/03/19 1340 07/03/19 1312 07/03/19 1103       Treatment Time/Intention    Discipline  speech language pathologist  (Pended)   -MB  occupational therapist  -  physical therapy assistant  -    Document Type  therapy note (daily note)  (Pended)   -MB  therapy note (daily note)  -  therapy note (daily note)  -MF2    Subjective Information  no complaints  (Pended)   -MB  no complaints  -CS  no complaints  -MF3    Mode of Treatment  individual therapy;speech-language pathology  (Pended)   -MB  occupational therapy  -CS  physical therapy  -MF2    Patient/Family Observations  Daughter present  (Pended)   -MB  daughter present in room  -CS  --    Care Plan Review  care plan/treatment goals reviewed;risks/benefits reviewed  (Pended)   -MB  --  --    Care Plan Review, Other Participant(s)  daughter  (Pended)   -MB  --  --    Patient Effort  other (see comments)  (Pended)  educated family  -MB  good  -  --    Existing Precautions/Restrictions  --  --  fall;oxygen therapy device and L/min  -MF2    Patient Response to Treatment  --  --  slightly confused at times  -MF3    Recorded by [MB] Peggy South, Speech Therapy Student 07/03/19 1457 [CS] Neela Coughlin, OTR/L, [facility] 07/03/19 1355 [MF] Olga Lidia Vega, PTA 07/03/19 1104  [MF2] Olga Lidia Vega, PTA 07/03/19 1120  [MF3] Olga Lidia Vega, PTA 07/03/19 1125    Row Name 07/03/19 1312             Cognitive Assessment/Intervention- PT/OT    Affect/Mental Status (Cognitive)  confused  -CS      Follows Commands (Cognition)  follows one step commands;increased processing time needed;initiation impaired;repetition of directions required;physical/tactile prompts required;verbal cues/prompting required  -CS      Safety Deficit (Cognitive)  ability to follow commands;awareness of need for assistance;insight into deficits/self  awareness;judgment;problem solving  -CS      Recorded by [CS] Neela Coughlin OTR/L, [facility] 07/03/19 1355      Row Name 07/03/19 1103             Bed Mobility Assessment/Treatment    Comment (Bed Mobility)  up in chair  -      Recorded by [] Olga Lidia Vega, PTA 07/03/19 1120      Row Name 07/03/19 1312             Functional Mobility    Functional Mobility- Ind. Level  contact guard assist;minimum assist (75% patient effort);verbal cues required  -CS      Functional Mobility- Device  rolling walker  -CS      Functional Mobility- Safety Issues  supplemental O2  -CS      Functional Mobility- Comment  from bedside chair to shower in bathroom; min A required for intermittent posterior lean  -CS      Recorded by [CS] Neela Coughlin OTR/L, [facility] 07/03/19 1355      Row Name 07/03/19 1312             Transfer Assessment/Treatment    Transfer Assessment/Treatment  sit-stand transfer;stand-sit transfer;shower transfer  -CS      Recorded by [CS] Neela Coughlin OTR/L, [facility] 07/03/19 1355      Row Name 07/03/19 1312 07/03/19 1103          Sit-Stand Transfer    Sit-Stand [place] (Transfers)  minimum assist (75% patient effort);verbal cues  -CS  verbal cues;contact guard  -     Assistive Device (Sit-Stand Transfers)  walker, front-wheeled  -CS2  --     Recorded by [CS] Neela Coughlin OTR/L, [facility] 07/03/19 1355  [CS2] Neela Coughlin OTR/L, [facility] 07/03/19 1449 [MF] Olga Lidia Vega, PTA 07/03/19 1120     Row Name 07/03/19 1312 07/03/19 1103          Stand-Sit Transfer    Stand-Sit [place] (Transfers)  minimum assist (75% patient effort);verbal cues  -CS  contact guard  -     Assistive Device (Stand-Sit Transfers)  walker, front-wheeled  -CS  --     Recorded by [CS] Neela Coughlin OTR/L, [facility] 07/03/19 1355 [MF] Olga Lidia Vega, PTA 07/03/19 1120     Row Name 07/03/19 1312             Shower Transfer    Type (Shower Transfer)  stand-sit;sit-stand step up x2 in into shower  -CS      [place] Level  (Shower Transfer)  minimum assist (75% patient effort);verbal cues;nonverbal cues (demo/gesture)  -CS      Assistive Device (Shower Transfer)  shower chair;grab bars/tub rail  -CS      Recorded by [CS] Neela Coughlin OTR/L, [facility] 07/03/19 1355      Row Name 07/03/19 1103             Gait/Stairs Assessment/Training    [place] Level (Gait)  verbal cues;contact guard  -MF      Assistive Device (Gait)  walker, front-wheeled  -      Distance in Feet (Gait)  50' x 4 with stops to correct walker placement and gait.   -MF2      Deviations/Abnormal Patterns (Gait)  stride length decreased;michelle decreased;base of support, narrow  -MF2      Bilateral Gait Deviations  forward flexed posture  -      Comment (Gait/Stairs)  cues for walker placement.   -MF2      Recorded by [] Olga Lidia Vega, [facility] 07/03/19 1120  [MF2] Olga Lidia Vega, [facility] 07/03/19 1125      Row Name 07/03/19 1312             ADL Assessment/Intervention    78796 - OT Self Care/Mgmt Minutes  76  -CS      BADL Assessment/Intervention  upper body dressing;lower body dressing;bathing;grooming  -CS2      Recorded by [CS] Neela Coughlin OTR/L, [facility] 07/03/19 1449  [CS2] Neela Coughlin OTR/L, [facility] 07/03/19 1355      Row Name 07/03/19 1312             Bathing Assessment/Intervention    Bathing [place] Level  contact guard assist;standby assist;verbal cues;nonverbal cues (demo/gesture)  -CS      Assistive Devices (Bathing)  grab bars/tub rail;shower chair  -CS      Bathing Position  supported sitting;supported standing  -CS      Comment (Bathing)  CGA provided for brennan hygiene  -CS      Recorded by [CS] Neela Coughlin OTR/L, [facility] 07/03/19 1449      Row Name 07/03/19 1312             Upper Body Dressing Assessment/Training    Upper Body Dressing [place] Level  don;doff;pull-over garment;supervision;verbal cues  -CS      Upper Body Dressing Position  edge of bed sitting  -CS      Comment (Upper Body Dressing)  vcs for initiation and  problem solving  -CS      Recorded by [CS] Neela Coughlin OTR/L, [facility] 07/03/19 1449      Row Name 07/03/19 1312             Lower Body Dressing Assessment/Training    Lower Body Dressing [place] Level  don;doff;socks;pants/bottoms;shoes/slippers;undergarment;contact guard assist;supervision;verbal cues  -CS      Lower Body Dressing Position  edge of bed sitting;supported standing  -CS      Comment (Lower Body Dressing)  vcs for initiation and problem solving  -CS      Recorded by [CS] Neela Coughlin OTR/L, [facility] 07/03/19 1449      Row Name 07/03/19 1312             BADL Safety/Performance    Impairments, BADL Safety/Performance  cognition;endurance/activity tolerance;strength;balance  -CS      Cognitive Impairments, BADL Safety/Performance  attention;insight into deficits/self awareness;awareness, need for assistance;impulsivity  -CS      Skilled BADL Treatment/Intervention  BADL process/adaptation training;cognitive/safety deficit modifications  -CS      Recorded by [CS] Neela Coughlin OTR/L, [facility] 07/03/19 1355      Row Name 07/03/19 1103             Therapeutic Exercise    Lower Extremity (Therapeutic Exercise)  LAQ (long arc quad), bilateral  -MF      Lower Extremity Range of Motion (Therapeutic Exercise)  hip flexion/extension, bilateral;hip abduction/adduction, bilateral;ankle dorsiflexion/plantar flexion, bilateral  -MF      Exercise Type (Therapeutic Exercise)  AROM (active range of motion)  -MF      Position (Therapeutic Exercise)  seated  -MF      Sets/Reps (Therapeutic Exercise)  20  -MF      Recorded by [MF] Olga Lidia Vega PTA 07/03/19 1120      Row Name 07/03/19 1312 07/03/19 1103          Positioning and Restraints    Pre-Treatment Position  sitting in chair/recliner  -CS  sitting in chair/recliner  -MF     Post Treatment Position  chair  -CS  chair  -MF     In Chair  sitting;call light within reach;encouraged to call for assist;with family/caregiver;notified nsg  -CS  reclined;call  light within reach;encouraged to call for assist;exit alarm on  -     Recorded by [CS] Neela Coughlin OTR/L, [facility] 07/03/19 1355 [MF] Olga Lidia Vega, PTA 07/03/19 1120     Row Name 07/03/19 1340 07/03/19 1312          Pain Assessment    Additional Documentation  Pain Scale: FACES Pre/Post-Treatment (Group)  (Pended)   -MB  Pain Scale: Numbers Pre/Post-Treatment (Group)  -     Recorded by [MB] Peggy South, Speech Therapy Student 07/03/19 1457 [CS] Neela Coughlin OTR/L, [facility] 07/03/19 1355     Row Name 07/03/19 1312 07/03/19 1103          Pain Scale: Numbers Pre/Post-Treatment    Pain Scale: Numbers, Pretreatment  0/10 - no pain  -CS  0/10 - no pain  -     Pain Scale: Numbers, Post-Treatment  0/10 - no pain  -CS  0/10 - no pain  -     Recorded by [CS] Neela Coughlin OTR/L, [facility] 07/03/19 1355 [MF] Olga Lidia Vega, PTA 07/03/19 1125     Row Name 07/03/19 1340             Pain Scale: FACES Pre/Post-Treatment    Pain: FACES Scale, Pretreatment  0-->no hurt  (Pended)   -MB      Pain: FACES Scale, Post-Treatment  0-->no hurt  (Pended)   -MB      Recorded by [MB] Peggy South, Speech Therapy Student 07/03/19 1457      Row Name                [REMOVED] Wound 06/29/19 0800 Left gluteal pressure injury    Wound - Properties Group Date first assessed: 06/29/19 [AG] Time first assessed: 0800 [AG] Side: Left [AG] Location: gluteal [AG] Type: pressure injury [AG] Resolution Date: 07/03/19 [RL] Resolution Time: 1203 [RL] Recorded by:  [AG] Dianne Desai RN 06/29/19 1435 [RL] Adán Royal RN 07/03/19 1203    Row Name                [REMOVED] Wound 06/29/19 1444 Right gluteal pressure injury    Wound - Properties Group Date first assessed: 06/29/19 [AG] Time first assessed: 1444 [AG] Side: Right [AG] Location: gluteal [AG] Type: pressure injury [AG] Resolution Date: 07/03/19 [RL] Resolution Time: 1203 [RL] Recorded by:  [AG] Dianne Desai, RN 06/29/19 1444 [RL] Adán Royal RN 07/03/19 1203    Row  Name 07/03/19 1312             Plan of Care Review    Plan of Care Reviewed With  patient;daughter  -CS      Recorded by [CS] Neela Coughlin OTR/L, [facility] 07/03/19 1355      Row Name 07/03/19 1312             Outcome Summary/Treatment Plan (OT)    Daily Summary of Progress (OT)  progress toward functional goals is good  -CS      Barriers to Overall Progress (OT)  impaired cognition  -CS      Plan for Continued Treatment (OT)  cont OT POC  -CS      Anticipated Discharge Disposition (OT)  skilled nursing facility  -CS      Recorded by [CS] Neela Coughlin OTR/L, [facility] 07/03/19 1355      Row Name 07/03/19 1340             Outcome Summary/Treatment Plan (SLP)    Daily Summary of Progress (SLP)  progress toward functional goals is good  (Pended)   -MB      Barriers to Overall Progress (SLP)  Cognitive status  (Pended)   -MB      Anticipated Dischage Disposition  skilled nursing facility  (Pended)   -MB      Reason for Discharge  discharge from this facility  (Pended)   -MB      Recorded by [MB] Peggy South Speech Therapy Student 07/03/19 1457        User Key  (r) = Recorded By, (t) = Taken By, (c) = Cosigned By    Initials Name Effective Dates Discipline    CS Neela Coughlin, OTR/L, [facility] 04/02/19 -  OT    RL Adán Royal, RN 08/02/16 -  Nurse    Olga Lidia Joyce, PTA 08/02/16 -  PT    Dianne Read RN 08/02/16 -  Nurse    Peggy Munguia, Speech Therapy Student 04/24/19 -  SLP        Outcome Summary  Outcome Summary/Treatment Plan (SLP)  Daily Summary of Progress (SLP): (P) progress toward functional goals is good (07/03/19 1340 : Peggy South Speech Therapy Student)  Barriers to Overall Progress (SLP): (P) Cognitive status (07/03/19 1340 : Peggy South Speech Therapy Student)  Anticipated Dischage Disposition: (P) skilled nursing facility (07/03/19 1502 : Peggy South Speech Therapy Student)  Reason for Discharge: (P) discharge from this facility (07/03/19 1502 : Peggy South Speech  Therapy Student)  SLP GOALS     Row Name 07/02/19 1350 07/02/19 1015          Oral Nutrition/Hydration Goal 1 (SLP)    Oral Nutrition/Hydration Goal 1, SLP  Pt. will tolerate LRD with no overt s/s of aspiration.   -CS  Pt. will tolerate LRD with no overt s/s of aspiration.   -CS (r) MB (t) CS (c)     Time Frame (Oral Nutrition/Hydration Goal 1, SLP)  by discharge  -CS  by discharge  -CS (r) MB (t) CS (c)     Barriers (Oral Nutrition/Hydration Goal 1, SLP)  Decreased attention/confusion  -CS  Decreased attention/confusion  -CS (r) MB (t) CS (c)     Progress/Outcomes (Oral Nutrition/Hydration Goal 1, SLP)  goal ongoing  -CS  goal ongoing  -CS (r) MB (t) CS (c)        Lingual Strengthening Goal 1 (SLP)    Activity (Lingual Strengthening Goal 1, SLP)  increase lingual tone/sensation/control/coordination/movement  -CS  --     Increase Lingual Tone/Sensation/Control/Coordination/Movement  lingual movement exercises  -CS  --     [place]/Accuracy (Lingual Strengthening Goal 1, SLP)  with minimal cues (75-90% accuracy)  -CS  --     Time Frame (Lingual Strengthening Goal 1, SLP)  by discharge  -CS  --     Barriers (Lingual Strengthening Goal 1, SLP)  n/a  -CS  --     Progress/Outcomes (Lingual Strengthening Goal 1, SLP)  goal ongoing  -CS  --        Pharyngeal Strengthening Exercise Goal 1 (SLP)    Activity (Pharyngeal Strengthening Goal 1, SLP)  increase pharyngeal sensation;increase timing;increase superior movement of the hyolaryngeal complex;increase squeeze/positive pressure generation  -CS  --     Increase Pharyngeal Sensation  gustatory stimulation (sour/cold)  -CS  --     Increase Timing  supraglottic swallow;gustatory stimulation (sour/cold)  -CS  --     Increase Superior Movement of the Hyolaryngeal Complex  falsetto  -CS  --     Increase Squeeze/Positive Pressure Generation  hard effortful swallow;feliciano  -CS  --     [place]/Accuracy (Pharyngeal Strengthening Goal 1, SLP)  with minimal cues (75-90%  accuracy)  -CS  --     Time Frame (Pharyngeal Strengthening Goal 1, SLP)  by discharge  -CS  --     Barriers (Pharyngeal Strengthening Goal 1, SLP)  n/a  -CS  --     Progress/Outcomes (Pharyngeal Strengthening Goal 1, SLP)  goal ongoing  -CS  --       User Key  (r) = Recorded By, (t) = Taken By, (c) = Cosigned By    Initials Name Provider Type    Kenneth Dhaliwal, MS CCC-SLP Speech and Language Pathologist    Peggy Munguia, Speech Therapy Student Speech Therapy Student          EDUCATION  The patient has been educated in the following areas:   Dysphagia (Swallowing Impairment).    SLP Recommendation and Plan  Daily Summary of Progress (SLP): (P) progress toward functional goals is good  Barriers to Overall Progress (SLP): (P) Cognitive status     Anticipated Dischage Disposition: (P) skilled nursing facility        Reason for Discharge: (P) discharge from this facility           Time Calculation:   Time Calculation- SLP     Row Name 07/03/19 1503             Time Calculation- SLP    SLP Start Time  1340  (Pended)   -MB      SLP Stop Time  1350  (Pended)   -MB      SLP Time Calculation (min)  10 min  (Pended)   -MB      SLP Received On  07/03/19  (Pended)   -MB        User Key  (r) = Recorded By, (t) = Taken By, (c) = Cosigned By    Initials Name Provider Type    Peggy Munguia Speech Therapy Student Speech Therapy Student          Therapy Charges for Today     Code Description Service Date Service Provider Modifiers Qty    [id number] HC ST EVAL ORAL PHARYNG SWALLOW 5 7/2/2019 Peggy South Speech Therapy Student GN 1    [id number] HC ST SELF CARE/MGMT/TRAIN EA 15 MIN 7/3/2019 Peggy South Speech Therapy Student GN 1               SLP Discharge Summary  Anticipated Dischage Disposition: (P) skilled nursing facility  Reason for Discharge: (P) discharge from this facility  Progress Toward Achieving Short/long Term Goals: (P) goals not met within established timelines  Discharge Destination: (P)  [facility]    Peggy South, Speech Therapy Student  7/3/2019

## 2019-07-03 NOTE — DISCHARGE SUMMARY
[facility] Medicine Services  DISCHARGE SUMMARY       Date of Admission: 6/24/2019  Date of Discharge:  7/3/2019  Primary Care Physician: Yocasta Baumann APRN    Presenting Problem/History of Present Illness:  Altered mental status    Final Discharge Diagnoses:  • **Acute kidney injury superimposed on chronic kidney disease (CMS/HCC)   • Pressure ulcer with suspected deep tissue injury, stage III (CMS/HCC)   • Delirium   • Metabolic encephalopathy   • Pancytopenia (CMS/HCC)   • Moderate malnutrition (CMS/HCC)   • Neutropenic fever (CMS/HCC)   • Sick sinus syndrome (CMS/HCC)       s/p pace maker      • Controlled type 2 diabetes mellitus diet controlled without complication, without long-term current use of insulin (CMS/HCC)   • Pulmonary hypertension (CMS/HCC)   • Anemia of chronic disease   • Coronary artery disease   • Hypertension   • Multiple myeloma (CMS/HCC)   • PAF (paroxysmal atrial fibrillation) (CMS/HCC) No AC DUE TO GI  BLEED     Consults:   1. Dr. Eric Regan-hematology/oncology  2. Dr. Casimiro Romero-nephrology    Procedures Performed:   1. Bone Marrow Biopsy-6/27/2019    Pertinent Test Results:   Imaging Results (last 7 days)     Procedure Component Value Units Date/Time    FL Video Swallow With Speech [[id number]] Collected:  07/02/19 1414     Updated:  07/02/19 1420    Narrative:       Routine Dysphagia exam under fluoroscopy with the patient seated.   A speech therapist conducted the exam.   The exam was recorded on DVD.     A single fluoroscopic image was captured.  No radiographs were exposed.  Fluoroscopy time = 3.6 minutes.     Honey consistency:  Delayed oral phase of swallowing.  Normal handling of this consistency. No aspiration.     Nectar consistency:  Delayed oral phase with sluggish epiglottic inversion.  Normal handling of this consistency. No aspiration.     Thin liquid consistency:  Aspiration at the initiation of the swallow with  contrast extending at  least to the level of the vocal cords. No significant cough reflex.     Pudding consistency:  Normal handling of this consistency. No aspiration.     Fruit:  Very slow oral phase with multiple prompts required before the patient  could initiate a swallow.  Normal handling of this consistency. No aspiration.     Repeat Nectar:  Normal handling of this consistency. No aspiration.     This report was finalized on 07/02/2019 14:16 by Dr. Mauricio Michel MD.    XR Chest PA & Lateral [[id number]] Collected:  07/02/19 1052     Updated:  07/02/19 1058    Narrative:       EXAMINATION: XR CHEST PA AND LATERAL-  7/2/2019 10:52 AM CDT     TWO-VIEW CHEST:      HISTORY: Increasing congestion      Frontal and lateral projection chest radiograph obtained.     COMPARISON:  06/24/2019, 6/21/2019 and 12/17/2018      FINDINGS:     There is cardiomegaly with changes from median sternotomy. Permanent  cardiac pacemaker noted.     There is reticulointerstitial changes diffusely in the lungs with mild  patchy airspace opacities particularly in the lower lobes with probable  effusions. Differential considerations include pulmonary edema,  cardiogenic etiology suspected.     Changes from right shoulder surgery noted with bone anchor screws.     Extensive aortic calcifications observed.                                                                                       Impression:       1. Cardiomegaly with diffuse reticular interstitial and patchy lower  lobe airspace opacities, differential considerations include pulmonary  edema.        This report was finalized on 07/02/2019 10:54 by Dr. Cliff Lomeli MD.    US Venous Doppler Upper Extremity Left (duplex) [[id number]] Collected:  07/01/19 1544     Updated:  07/01/19 1548    Narrative:       History: Left arm swelling       Impression:       Impression: There is no evidence of deep venous thrombosis or  superficial thrombophlebitis of the left upper extremity.      Comments: Left upper extremity venous duplex exam was performed using  color Doppler flow, Doppler wave form analysis, and grayscale imaging,  with and without compression. There is no evidence of deep venous  thrombosis of the internal jugular, subclavian, axillary, and brachial  veins. There is no evidence of superficial thrombophlebitis involving  the cephalic or basilic veins.  This report was finalized on 07/01/2019 15:45 by Dr. Cody Del Valle MD.    US Renal Bilateral [[id number]] Collected:  06/27/19 1456     Updated:  06/27/19 1511    Narrative:       EXAMINATION:  US RENAL BILATERAL-  6/27/2019 12:07 PM CDT     HISTORY: Acute renal insufficiency superimposed on chronic kidney  disease. N17.9-Acute kidney failure, unspecified; R41.82-Altered mental  status, unspecified; R68.89-Other general symptoms and signs;  Z74.09-Other reduced mobility.      COMPARISON: No comparison study.     TECHNIQUE: Grayscale and color flow images were performed.     FINDINGS: The visualized abdominal aorta and inferior vena cava are  normal caliber. The liver echogenicity is normal. The right kidney  measures 8.9 cm and the left kidney measures 10.7 cm. There is cortical  thinning of the right kidney compared to the left kidney. There is  increased echogenicity of the right renal cortex compared to the  adjacent liver. There is a 2.3 x 1.5 cm hypoechoic renal lesion in the  right mid kidney. There is a 2.7 x 2.2 cm somewhat lobular hypoechoic  renal lesion in the left mid kidney. These lesions are probably cysts  but they are not fully characterized on this study. There is no internal  blood flow identified. There is no hydronephrosis. The urinary bladder  is unremarkable.       Impression:       1. Cortical thinning of the right kidney. There is increased cortical  echogenicity of the right kidney. There is no hydronephrosis.  2. The cortical thickness and echogenicity of the left kidney are  normal.  3. Bilateral renal lesions  are likely cysts but not fully characterized  on this study.  This report was finalized on 06/27/2019 15:08 by Dr. Pawel Benson MD.    CT Guided Biopsy Bone Marrow [[id number]] Collected:  06/27/19 1229     Updated:  06/27/19 1233    Narrative:       CT GUIDED BIOPSY BONE MARROW- 6/27/2019 11:31 AM CDT     INDICATION: pancytopenia; N17.9-Acute kidney failure, unspecified;  R41.82-Altered mental status, unspecified; R68.89-Other general symptoms  and signs; Z74.09-Other reduced mobility      CONSENT: An informed written consent was obtained from the patient after  discussing the risks, benefits, potential complications and  alternatives. Potential complications included bleeding and infection.  All questions answered to the patient's satisfaction.       COMPLICATIONS: None.      MEDICATIONS: None     DOSE LENGTH PRODUCT: 178 mGy cm. Automated exposure control was also  utilized to decrease patient radiation dose.     ESTIMATED BLOOD LOSS: Less than 5 ml.      PROCEDURE: An informed consent was obtained as above. Prior to sterile  preparation and local anesthetic, noninfused axial CT scans were  obtained. The optimal site for biopsy was marked. A 13-gauge needle was  advanced into the ilium with a drill. Approximately 10 mL of bone marrow  were aspirated through the needle. Subsequently, a 2 cm core was  obtained utilizing the drill with a 13-gauge sampling needle.     No immediate complications.        Impression:       Successful CT guided bone marrow aspirate and bone biopsy.         This report was finalized on 06/27/2019 12:30 by Dr. Javy Roman MD.        Lab Results (last 7 days)     Procedure Component Value Units Date/Time    Basic Metabolic Panel [[id number]]  (Abnormal) Collected:  07/03/19 0503    Specimen:  Blood Updated:  07/03/19 0538     Glucose 97 mg/dL      BUN 46 mg/dL      Creatinine 2.45 mg/dL      Sodium 137 mmol/L      Potassium 4.0 mmol/L      Chloride 101 mmol/L      CO2 29.0 mmol/L       Calcium 7.5 mg/dL      eGFR Non African Amer 25 mL/min/1.73      BUN/Creatinine Ratio 18.8     Anion Gap 7.0 mmol/L     Narrative:       GFR Normal >60  Chronic Kidney Disease <60  Kidney Failure <15    CBC & Differential [[id number]] Collected:  07/03/19 0503    Specimen:  Blood Updated:  07/03/19 0517    Narrative:       The following orders were created for panel order CBC & Differential.  Procedure                               Abnormality         Status                     ---------                               -----------         ------                     CBC Auto Differential[[id number]]        Abnormal            Final result                 Please view results for these tests on the individual orders.    CBC Auto Differential [[id number]]  (Abnormal) Collected:  07/03/19 0503    Specimen:  Blood Updated:  07/03/19 0517     WBC 4.26 10*3/mm3      RBC 3.11 10*6/mm3      Hemoglobin 9.5 g/dL      Hematocrit 29.5 %      MCV 94.9 fL      MCH 30.5 pg      MCHC 32.2 g/dL      RDW 17.2 %      RDW-SD 58.4 fl      MPV 10.0 fL      Platelets 72 10*3/mm3      Neutrophil % 74.2 %      Lymphocyte % 14.3 %      Monocyte % 10.1 %      Eosinophil % 0.5 %      Basophil % 0.2 %      Immature Grans % 0.7 %      Neutrophils, Absolute 3.16 10*3/mm3      Lymphocytes, Absolute 0.61 10*3/mm3      Monocytes, Absolute 0.43 10*3/mm3      Eosinophils, Absolute 0.02 10*3/mm3      Basophils, Absolute 0.01 10*3/mm3      Immature Grans, Absolute 0.03 10*3/mm3      nRBC 0.0 /100 WBC     Tissue Pathology Exam [[id number]] Collected:  06/27/19 1345    Specimen:  Tissue from Iliac Crest, Left - Aspirate; Tissue from Iliac Crest, Left - Biopsy Updated:  07/02/19 1243     Case Report --     Surgical Pathology Report                         Case: [id number]                                  Authorizing Provider:  Gerard Donis MD  Collected:           06/27/2019 01:45 PM          Ordering Location:     [address]  Received:             06/27/2019 01:46 PM          Pathologist:           Shakila Boles MD                                                        Specimens:   1) - Iliac Crest, Left - Aspirate                                                                   2) - Iliac Crest, Left - Biopsy                                                             Final Diagnosis --     1.  Peripheral blood:  A.  Moderate normochromic normocytic anemia.  B.  2+ anisocytosis and 2+ poikilocytosis with elliptocytes, dacrocytes and slight/1+ schistocytes.  C.  Circulating nucleated red blood cells (1 nucleated red blood cell per 100 white blood cells counted).  D.  Leukocytopenia with normal differential.  E.  Severe peripheral thrombocytopenia.  F.  No platelet clumps seen.  G.  No morulae seen in granulocytes or monocytes.    2.  Bone marrow, left posterior iliac crest, aspirate smears, aspirate clot and core biopsy:  A.  Normocellular bone marrow core biopsy for patient's age (estimated at 40% cellularity).  B.  Mild erythroid hyperplasia in the marrow.  C.  Myelopoiesis demonstrates full maturation sequence without maturation arrest.  D.  No increase in lymphocytes (19%) or plasma cells (2%) in the marrow.  E.  Adequate megakaryocytes in the marrow with scattered atypical megakaryocytes seen.  F.  No collagen fibrosis in the marrow.  G.  Increased stainable iron in the marrow (4+).  H.  No evidence of metastatic carcinoma or granulomatous disease in the marrow.    Comment: Flow cytometric analysis performed by [facility] reveals plasma cells (2.3% of total) showing lambda excess.  In the sample analyzed, there is no evidence of abnormal myeloid maturation or an increased blast population.  There is no evidence of a lymphoproliferative disorder.  Please refer to the complete flow cytometry report from [facility] which is appended.  Because of the presence of schistocytes, a microangiopathic hemolytic disorder cannot be  entirely excluded.  The presence of scattered atypical megakaryocytes also raises the concern for a low grade myelodysplastic process.  Although flow cytometric analysis indicates a small number of plasma cells showing lambda excess, an increase in plasma cells is not seen in the marrow.  Representative slides and the paraffin blocks will be submitted to [facility] for a hematopathology consultation to exclude the possibility of a low grade myelodysplastic process.  Results of this consult will be reported as an addendum upon receipt from the reference laboratory.  Results of cytogenetic analysis are pending at this time and these results may be contributory.  These results will be reported as an addendum upon receipt from the reference laboratory.       Gross Description --     1.  Received in a purple stopper tube and a red stopper tube both of which are labeled with the patient's name medical record number and date of birth and designated as bone marrow aspirate left posterior iliac crest include several cc of liquid red-purple blood as well as a red-purple clot measures 2.5 cm in length and 0.8 cm in diameter.  The specimen is submitted in toto in block 1A.  2.  Received in a formalin decal solution and labeled bone marrow core biopsy left posterior iliac crest is a red brown core biopsy measuring 1.2 cm in length and 0.2 cm in diameter.  Specimen submitted in toto in block 2A.       Microscopic Description --     1.  Hematologic data of peripheral blood reveal a hemoglobin of 7.6 g/dL and hematocrit of 22.1%.  Total white blood cell count is 3090 and total red blood cell count is 2.51 million.  Red cell indices reveal an MCV of 88 fl, MCH of 30.3 pg and MCHC of 34.4 g/dL.  Red cell distribution width index is 17.1% and total platelet count is 22,000.  Examination of the peripheral blood reveal erythrocytes to be normochromic and normocytic.  There is 2+ anisocytosis and 2+ poikilocytosis.   Elliptocytes, dacrocytes and slight/1+ schistocytes are seen.  No definite basophilic stippling is identified.  One nucleated red blood cell is noted per 100 white blood cells counted. White  blood cell differential count is as follows: Polymorphonuclear leukocytes 64%, lymphocytes 32%, monocytes 1%, eosinophils 1% and basophils 2%.  No blasts are observed in the peripheral blood.  Platelets are markedly decreased in the marrow with visual estimate agreeing with automated platelet count.  There are no platelet clumps seen.  Morulae are not seen in granulocytes or monocytes.    2.  Examination of bone marrow clot, biopsy and aspirate smears reveal maturing trilineage hematopoiesis.  The bone marrow core biopsy is normocellular for the patient's age and is estimated at 40% cellularity.  There is mild erythroid hyperplasia in the marrow with the myeloid to erythroid ratio calculated at 2.2:1.  Myelopoiesis demonstrates a full maturation sequence without maturation arrest.  Lymphocytes comprise approximately 19% of cells counted in the marrow and plasma cells approximately 2% of cells counted in the marrow.  Megakaryocytes are adequate in the marrow.  The core biopsy demonstrates normally formed megakaryocytes although scattered atypical megakaryocytes are seen.  These atypical megakaryocytes demonstrate abnormalities in lobation and nuclear hyperchromasia.  There is no collagen fibrosis.  There is no evidence of metastatic carcinoma or granulomatous disease in the marrow.  Stainable iron in the marrow is increased, graded at 4+.  The control stains appropriately.      Basic Metabolic Panel [[id number]]  (Abnormal) Collected:  07/02/19 0417    Specimen:  Blood Updated:  07/02/19 0514     Glucose 107 mg/dL      BUN 48 mg/dL      Creatinine 2.38 mg/dL      Sodium 136 mmol/L      Potassium 4.0 mmol/L      Chloride 100 mmol/L      CO2 31.0 mmol/L      Calcium 7.3 mg/dL      eGFR Non African Amer 26 mL/min/1.73       BUN/Creatinine Ratio 20.2     Anion Gap 5.0 mmol/L     Narrative:       GFR Normal >60  Chronic Kidney Disease <60  Kidney Failure <15    CBC & Differential [[id number]] Collected:  07/02/19 0417    Specimen:  Blood Updated:  07/02/19 0503    Narrative:       The following orders were created for panel order CBC & Differential.  Procedure                               Abnormality         Status                     ---------                               -----------         ------                     CBC Auto Differential[[id number]]        Abnormal            Final result                 Please view results for these tests on the individual orders.    CBC Auto Differential [[id number]]  (Abnormal) Collected:  07/02/19 0417    Specimen:  Blood Updated:  07/02/19 0503     WBC 4.37 10*3/mm3      RBC 3.13 10*6/mm3      Hemoglobin 9.4 g/dL      Hematocrit 29.8 %      MCV 95.2 fL      MCH 30.0 pg      MCHC 31.5 g/dL      RDW 17.2 %      RDW-SD 58.8 fl      MPV 11.0 fL      Platelets 65 10*3/mm3      Neutrophil % 78.9 %      Lymphocyte % 11.2 %      Monocyte % 8.5 %      Eosinophil % 0.5 %      Basophil % 0.2 %      Immature Grans % 0.7 %      Neutrophils, Absolute 3.45 10*3/mm3      Lymphocytes, Absolute 0.49 10*3/mm3      Monocytes, Absolute 0.37 10*3/mm3      Eosinophils, Absolute 0.02 10*3/mm3      Basophils, Absolute 0.01 10*3/mm3      Immature Grans, Absolute 0.03 10*3/mm3      nRBC 0.0 /100 WBC     Basic Metabolic Panel [[id number]]  (Abnormal) Collected:  07/01/19 0530    Specimen:  Blood Updated:  07/01/19 0637     Glucose 121 mg/dL      BUN 46 mg/dL      Creatinine 2.36 mg/dL      Sodium 139 mmol/L      Potassium 4.2 mmol/L      Chloride 101 mmol/L      CO2 32.0 mmol/L      Calcium 6.9 mg/dL      eGFR Non African Amer 26 mL/min/1.73      BUN/Creatinine Ratio 19.5     Anion Gap 6.0 mmol/L     Narrative:       GFR Normal >60  Chronic Kidney Disease <60  Kidney Failure <15    CBC & Differential [[id number]] Collected:   07/01/19 0530    Specimen:  Blood Updated:  07/01/19 0630    Narrative:       The following orders were created for panel order CBC & Differential.  Procedure                               Abnormality         Status                     ---------                               -----------         ------                     CBC Auto Differential[[id number]]        Abnormal            Final result                 Please view results for these tests on the individual orders.    CBC Auto Differential [[id number]]  (Abnormal) Collected:  07/01/19 0530    Specimen:  Blood Updated:  07/01/19 0630     WBC 5.00 10*3/mm3      RBC 3.06 10*6/mm3      Hemoglobin 9.3 g/dL      Hematocrit 28.6 %      MCV 93.5 fL      MCH 30.4 pg      MCHC 32.5 g/dL      RDW 17.2 %      RDW-SD 57.0 fl      MPV 11.4 fL      Platelets 56 10*3/mm3      Neutrophil % 80.2 %      Lymphocyte % 10.8 %      Monocyte % 7.4 %      Eosinophil % 0.6 %      Basophil % 0.2 %      Neutrophils, Absolute 4.01 10*3/mm3      Lymphocytes, Absolute 0.54 10*3/mm3      Monocytes, Absolute 0.37 10*3/mm3      Eosinophils, Absolute 0.03 10*3/mm3      Basophils, Absolute 0.01 10*3/mm3     Basic Metabolic Panel [[id number]]  (Abnormal) Collected:  06/30/19 0755    Specimen:  Blood Updated:  06/30/19 1043     Glucose 115 mg/dL      BUN 50 mg/dL      Creatinine 2.36 mg/dL      Sodium 139 mmol/L      Potassium 3.8 mmol/L      Chloride 98 mmol/L      CO2 33.0 mmol/L      Calcium 6.7 mg/dL      eGFR Non African Amer 26 mL/min/1.73      BUN/Creatinine Ratio 21.2     Anion Gap 8.0 mmol/L     Narrative:       GFR Normal >60  Chronic Kidney Disease <60  Kidney Failure <15    Bone Marrow Exam [[id number]] Collected:  06/30/19 0755    Specimen:  Bone Marrow Aspirate Updated:  06/30/19 0818    Narrative:       The following orders were created for panel order Bone Marrow Exam.  Procedure                               Abnormality         Status                     ---------                                -----------         ------                     Bone Marrow Exam[[id number]]                                                            CBC Auto Differential[[id number]]        Abnormal            Final result                 Please view results for these tests on the individual orders.    CBC Auto Differential [[id number]]  (Abnormal) Collected:  06/30/19 0755    Specimen:  Blood Updated:  06/30/19 0818     WBC 5.18 10*3/mm3      RBC 2.96 10*6/mm3      Hemoglobin 9.1 g/dL      Hematocrit 27.0 %      MCV 91.2 fL      MCH 30.7 pg      MCHC 33.7 g/dL      RDW 16.8 %      RDW-SD 54.8 fl      MPV 10.4 fL      Platelets 48 10*3/mm3      Neutrophil % 79.8 %      Lymphocyte % 12.4 %      Monocyte % 5.8 %      Eosinophil % 0.6 %      Basophil % 0.4 %      Neutrophils, Absolute 4.14 10*3/mm3      Lymphocytes, Absolute 0.64 10*3/mm3      Monocytes, Absolute 0.30 10*3/mm3      Eosinophils, Absolute 0.03 10*3/mm3      Basophils, Absolute 0.02 10*3/mm3     Blood Culture With MARJORIE - Blood, Arm, Right [[id number]] Collected:  06/24/19 1123    Specimen:  Blood from Arm, Right Updated:  06/29/19 1145     Blood Culture No growth at 5 days    Blood Culture With MARJORIE - Blood, Arm, Left [[id number]] Collected:  06/24/19 1131    Specimen:  Blood from Arm, Left Updated:  06/29/19 1145     Blood Culture No growth at 5 days    Manual Differential [[id number]]  (Abnormal) Collected:  06/29/19 0459    Specimen:  Blood Updated:  06/29/19 0747     Neutrophil % 84.0 %      Lymphocyte % 10.0 %      Monocyte % 2.0 %      Atypical Lymphocyte % 4.0 %      Neutrophils Absolute 4.44 10*3/mm3      Lymphocytes Absolute 0.53 10*3/mm3      Monocytes Absolute 0.11 10*3/mm3      Anisocytosis Slight/1+     Dacrocytes Slight/1+     Ovalocytes Slight/1+     Poikilocytes Slight/1+     WBC Morphology Normal     Platelet Estimate Decreased    Comprehensive Metabolic Panel [[id number]]  (Abnormal) Collected:  06/29/19 0459    Specimen:  Blood Updated:  06/29/19 0609      Glucose 167 mg/dL      BUN 55 mg/dL      Creatinine 2.53 mg/dL      Sodium 140 mmol/L      Potassium 3.2 mmol/L      Chloride 97 mmol/L      CO2 35.0 mmol/L      Calcium 6.8 mg/dL      Total Protein 5.3 g/dL      Albumin 2.80 g/dL      ALT (SGPT) 47 U/L      AST (SGOT) 65 U/L      Alkaline Phosphatase 53 U/L      Total Bilirubin 1.2 mg/dL      eGFR Non African Amer 24 mL/min/1.73      Globulin 2.5 gm/dL      A/G Ratio 1.1 g/dL      BUN/Creatinine Ratio 21.7     Anion Gap 8.0 mmol/L     Narrative:       GFR Normal >60  Chronic Kidney Disease <60  Kidney Failure <15    Vitamin D 25 Hydroxy [[id number]]  (Normal) Collected:  06/29/19 0459    Specimen:  Blood Updated:  06/29/19 0608     25 Hydroxy, Vitamin D 77.6 ng/ml     PTH, Intact [[id number]]  (Abnormal) Collected:  06/29/19 0459    Specimen:  Blood Updated:  06/29/19 0603     PTH, Intact 331.0 pg/mL     Magnesium [[id number]]  (Normal) Collected:  06/29/19 0459    Specimen:  Blood Updated:  06/29/19 0551     Magnesium 1.7 mg/dL     Phosphorus [[id number]]  (Abnormal) Collected:  06/29/19 0459    Specimen:  Blood Updated:  06/29/19 0551     Phosphorus 1.5 mg/dL     Uric Acid [[id number]]  (Normal) Collected:  06/29/19 0459    Specimen:  Blood Updated:  06/29/19 0551     Uric Acid 5.5 mg/dL     CBC & Differential [[id number]] Collected:  06/29/19 0459    Specimen:  Blood Updated:  06/29/19 0547    Narrative:       The following orders were created for panel order CBC & Differential.  Procedure                               Abnormality         Status                     ---------                               -----------         ------                     CBC Auto Differential[[id number]]        Abnormal            Final result                 Please view results for these tests on the individual orders.    CBC Auto Differential [[id number]]  (Abnormal) Collected:  06/29/19 0459    Specimen:  Blood Updated:  06/29/19 0547     WBC 5.29 10*3/mm3      RBC 3.12 10*6/mm3       Hemoglobin 9.3 g/dL      Hematocrit 27.5 %      MCV 88.1 fL      MCH 29.8 pg      MCHC 33.8 g/dL      RDW 16.7 %      RDW-SD 52.9 fl      MPV 9.8 fL      Platelets 53 10*3/mm3     [id number] Activity [[id number]] Collected:  06/26/19 1238    Specimen:  Blood Updated:  06/28/19 1807     NBIAAC26 Activity 77.0 %      Comment: This test was developed and its performance characteristics  determined by [facility]. It has not been cleared or approved  by the Food and Drug Administration.       Narrative:       Performed at:  [address] [place] [address]  [id number]  : Mishel Morelos MD, Phone:  [phone]    Comment [[id number]] Collected:  06/26/19 1238    Specimen:  Blood Updated:  06/28/19 1807     Comment Comment     Comment: Severe deficiency of BDGMTQ38 (less than 10% activity) is a  relatively specific finding in patients with a clinical diagnosis of  either hereditary or acquired thrombotic thrombocytopenic purpura  (TTP). Normal to moderately reduced NSRJCE50 activity results do not  exclude a diagnosis of TTP. Conditions that could have AKXHZI50  activity greater than 10% include hemolytic uremic syndrome (HUS),  atypical hemolytic uremic syndrome (aHUS), and other thrombotic  microangiopathies associated with hematopoietic stem cell and solid  organ transplantation, liver disease, DIC, sepsis, pregnancy, or  effects of certain medications (eg, clopidogrel, cyclosporine,  mitomycin C, quinine).       Narrative:       Performed at:  [address] [place] [address]  [id number]  : Mishel Morelos MD, Phone:  [phone]    Platelet Antibody Profile [[id number]] Collected:  06/26/19 1238    Specimen:  Blood Updated:  06/28/19 1511     HLA Class 1 Antibody Negative     IIb/IIIa Antibody Negative     Ib/IX Antibody Negative     Ia/IIa Antibody Negative     Glycoprotein IV Antibody Negative    Narrative:       Performed at:  [address] [place] [address]  [address]  [id number]  : Mishel Morelos MD, Phone:  [phone]    Ehrlichia Antibody Panel [[id number]] Collected:  06/24/19 1654    Specimen:  Blood Updated:  06/28/19 1410     E. chaffeensis (HME) IgG Titer Negative     E. chaffeensis (HME) IgM Titer Negative     Comment: IgG titers if 1:64 or greater indicate exposure or  acute and  convalescent samples showing a four-fold increase, and/or the presence  of IgM indicate recent or current infection.        HGE IgG Titer Negative     Comment: HGE IgG levels are detectable 7 to 10 days post infection and persist  approximately one year.        HGE IgM Titer Negative     Comment: Due to a reagent backorder, this test was performed using a different  assay. The reference interval for this alternate assay is:                                         Negative      <1:64                                         Positive       1:64 or greater  IgM levels usually rise 3 to 5 days post infection and fall to normal  levels in approximately 30 to 60 days.       Narrative:       Performed at:  [address] [place] [address]  [id number]  : Mishel Morelos MD, Phone:  [phone]    OMID by IFA, Reflex 9-biomarkers profile [[id number]]  (Abnormal) Collected:  06/26/19 1238    Specimen:  Blood Updated:  06/28/19 0913     OMID Positive     Comment:                                      Negative   <1:80                                       Borderline  1:80                                       Positive   >1:80  Serum is slightly lipemic.        Please note Comment     Comment: OMID Multiplex methodology was designed to detect up to 11 antibodies  of the 100+ antibodies that may be detected by OMID IFA methodology.       Narrative:       Performed at:  [address] [place] [address]  [id number]  : Jose Lusi Mojica PhD, Phone:  [phone]    TIN+DNA/DS+Antich+Centro+FA..  [[id number]]  (Abnormal) Collected:  06/26/19 1238    Specimen:  Blood Updated:  06/28/19 0972     Homogeneous Pattern 1:160     Note: (Reference) Comment     Comment: A positive OMID result may occur in healthy individuals (low  titer) or be associated with a variety of diseases.  See  interpretation chart which is not all inclusive:  Pattern      Antigen Detected  Suggested Disease Association  -----------  ----------------  -----------------------------  Homogeneous  DNA(ds,ss),       SLE - High titers               Nucleosomes,               Histones          Drug-induced SLE  -----------  ----------------  -----------------------------  Speckled     Sm, RNP, SCL-70,  SLE,MCTD,PSS (diffuse form),               SS-A/SS-B         Sjogrens  -----------  ----------------  -----------------------------  Nucleolar    SCL-70, PM-1/SCL  High titers Scleroderma,                                 PM/DM  -----------  ----------------  -----------------------------  Centromere   Centromere        PSS (limited form) w/Crest                                 syndrome variable  -----------  ----------------  -----------------------------  Nuclear Dot  [id number]  Primary Biliary Cirrhosis  -----------  ----------------  -----------------------------  Nuclear      ,            Primary Biliary Cirrhosis  Membrane     luis angel A,B,C  -----------  ----------------  -----------------------------        Anti-DNA (DS) Ab Qn <1 IU/mL      Comment:                                    Negative      <5                                     Equivocal  5 - 9                                     Positive      >9        RNP Antibodies <0.2 AI      Rendon Antibodies <0.2 AI      Antiscleroderma-70 Antibodies <0.2 AI      TIN SSA (RO) Ab <0.2 AI      TIN SSB (LA) Ab <0.2 AI      Antichromatin Antibodies <0.2 AI      DEIRDRE-1 IgG <0.2 AI      Anti-Centromere B Antibodies <0.2 AI      See below: Comment     Comment: Autoantibody                        Disease Association  ------------------------------------------------------------                          Condition                  Frequency  ---------------------   ------------------------   ---------  Antinuclear Antibody,    SLE, mixed connective  Direct (OMID-D)           tissue diseases  ---------------------   ------------------------   ---------  dsDNA                    SLE                        40 - 60%  ---------------------   ------------------------   ---------  Chromatin                Drug induced SLE                90%                           SLE                        48 - 97%  ---------------------   ------------------------   ---------  SSA (Ro)                 SLE                        25 - 35%                           Sjogren's Syndrome         40 - 70%                            Lupus                 100%  ---------------------   ------------------------   ---------  SSB (La)                 SLE                             10%                           Sjogren's Syndrome              30%  ---------------------   -----------------------    ---------  Sm (anti-Smith)          SLE                        15 - 30%  ---------------------   -----------------------    ---------  RNP                      Mixed Connective Tissue                           Disease                         95%  (U1 nRNP,                SLE                        30 - 50%  anti-ribonucleoprotein)  Polymyositis and/or                           Dermatomyositis                 20%  ---------------------   ------------------------   ---------  Scl-70 (antiDNA          Scleroderma (diffuse)      20 - 35%  topoisomerase)           Crest                           13%  ---------------------   ------------------------   ---------  Chitra-1                     Polymyositis and/or                           Dermatomyositis            20 - 40%  ---------------------   ------------------------   ---------  Centromere B              Scleroderma - Crest                           variant                         80%       Narrative:       Performed at:  [address]  [id number]  : Jose Luis Mojica PhD, Phone:  [phone]    Peripheral Blood Smear [[id number]] Collected:  06/26/19 1238    Specimen:  Blood Updated:  06/28/19 0859     Performed by: Shakila Boles M.D.     Pathologist Interpretation Neutrophils 67%  Bands 1%  Lymphocytes 24%  Monocytes 8%  Severe peripheral thrombocytopenia.    No schistocytes seen.  No platelet clumps seen.  No morulae seen in granulocytes or monocytes.    Magnesium [[id number]]  (Normal) Collected:  06/28/19 0437    Specimen:  Blood Updated:  06/28/19 0658     Magnesium 2.0 mg/dL     Comprehensive Metabolic Panel [[id number]]  (Abnormal) Collected:  06/28/19 0437    Specimen:  Blood Updated:  06/28/19 0607     Glucose 150 mg/dL      BUN 63 mg/dL      Creatinine 2.76 mg/dL      Sodium 140 mmol/L      Potassium 3.3 mmol/L      Chloride 107 mmol/L      CO2 23.0 mmol/L      Calcium 7.2 mg/dL      Total Protein 5.5 g/dL      Albumin 3.00 g/dL      ALT (SGPT) 36 U/L      AST (SGOT) 51 U/L      Alkaline Phosphatase 58 U/L      Total Bilirubin 1.2 mg/dL      eGFR Non African Amer 22 mL/min/1.73      Globulin 2.5 gm/dL      A/G Ratio 1.2 g/dL      BUN/Creatinine Ratio 22.8     Anion Gap 10.0 mmol/L     Narrative:       GFR Normal >60  Chronic Kidney Disease <60  Kidney Failure <15    CBC & Differential [[id number]] Collected:  06/28/19 0437    Specimen:  Blood Updated:  06/28/19 0547    Narrative:       The following orders were created for panel order CBC & Differential.  Procedure                               Abnormality         Status                     ---------                               -----------         ------                     CBC Auto Differential[[id number]]        Abnormal            Final result                 Please view results for these tests on the  individual orders.    CBC Auto Differential [[id number]]  (Abnormal) Collected:  06/28/19 0437    Specimen:  Blood Updated:  06/28/19 0547     WBC 4.28 10*3/mm3      RBC 3.24 10*6/mm3      Hemoglobin 9.7 g/dL      Hematocrit 28.1 %      MCV 86.7 fL      MCH 29.9 pg      MCHC 34.5 g/dL      RDW 16.4 %      RDW-SD 51.1 fl      MPV 10.6 fL      Platelets 47 10*3/mm3     Manual Differential [[id number]] Collected:  06/28/19 0437    Specimen:  Blood Updated:  06/28/19 0547     Neutrophil % 64.0 %      Lymphocyte % 27.0 %      Monocyte % 6.0 %      Eosinophil % 2.0 %      Atypical Lymphocyte % 1.0 %      Neutrophils Absolute 2.74 10*3/mm3      Lymphocytes Absolute 1.16 10*3/mm3      Monocytes Absolute 0.26 10*3/mm3      Eosinophils Absolute 0.09 10*3/mm3      Anisocytosis Slight/1+     Poikilocytes Slight/1+     WBC Morphology Normal     Platelet Estimate Decreased    Bone Marrow Exam [[id number]] Collected:  06/27/19 1430    Specimen:  Bone Marrow Aspirate Updated:  06/27/19 1553    Narrative:       The following orders were created for panel order Bone Marrow Exam.  Procedure                               Abnormality         Status                     ---------                               -----------         ------                     Bone Marrow Exam[[id number]]                                                            CBC Auto Differential[[id number]]        Abnormal            Final result                 Please view results for these tests on the individual orders.    CBC Auto Differential [[id number]]  (Abnormal) Collected:  06/27/19 1430    Specimen:  Blood Updated:  06/27/19 1553     WBC 4.01 10*3/mm3      RBC 2.62 10*6/mm3      Hemoglobin 8.0 g/dL      Hematocrit 23.2 %      MCV 88.5 fL      MCH 30.5 pg      MCHC 34.5 g/dL      RDW 17.3 %      RDW-SD 55.3 fl      MPV 11.9 fL      Platelets 50 10*3/mm3     Manual Differential [[id number]] Collected:  06/27/19 1430    Specimen:  Blood Updated:  06/27/19 1553      Neutrophil % 65.7 %      Lymphocyte % 24.2 %      Monocyte % 8.1 %      Eosinophil % 1.0 %      Bands %  1.0 %      Neutrophils Absolute 2.67 10*3/mm3      Lymphocytes Absolute 0.97 10*3/mm3      Monocytes Absolute 0.32 10*3/mm3      Eosinophils Absolute 0.04 10*3/mm3      Anisocytosis Slight/1+     Ovalocytes Slight/1+     Poikilocytes Mod/2+     WBC Morphology Normal     Platelet Estimate Decreased     Clumped Platelets Present     Giant Platelets Mod/2+    Protein Elec + Interp, Serum [[id number]]  (Abnormal) Collected:  06/26/19 1238    Specimen:  Blood Updated:  06/27/19 1412     Total Protein 5.2 g/dL      Albumin 2.8 g/dL      Alpha-1-Globulin 0.3 g/dL      Alpha-2-Globulin 0.6 g/dL      Beta Globulin 0.8 g/dL      Gamma Globulin 0.8 g/dL      M-Diony 0.3 g/dL      Globulin 2.4 g/dL      A/G Ratio 1.2     Please note Comment     Comment: Protein electrophoresis scan will follow via computer, mail, or   delivery.        SPE Interpretation Comment     Comment: The SPE pattern demonstrates a single peak (M-spike) in the gamma  region which may represent monoclonal protein. This peak may also be  caused by circulating immune complexes, cryoglobulins, C-reactive  protein, fibrinogen or hemolysis.  If clinically indicated, the  presence of a monoclonal gammopathy may be confirmed by immuno-  fixation, as well as an evaluation of the urine for the presence of  Bence-Aceves protein.       Narrative:       Performed at:  [address] [place] [address]  [id number]  : Jose Luis Mojica PhD, Phone:  [phone]    Ammonia [[id number]]  (Abnormal) Collected:  06/27/19 1006    Specimen:  Blood Updated:  06/27/19 1027     Ammonia <9 umol/L     CBC & Differential [[id number]] Collected:  06/27/19 0429    Specimen:  Blood Updated:  06/27/19 0289    Narrative:       The following orders were created for panel order CBC & Differential.  Procedure                               Abnormality          Status                     ---------                               -----------         ------                     CBC Auto Differential[[id number]]        Abnormal            Final result                 Please view results for these tests on the individual orders.    CBC Auto Differential [[id number]]  (Abnormal) Collected:  06/27/19 0429    Specimen:  Blood Updated:  06/27/19 0751     WBC 3.10 10*3/mm3      RBC 2.53 10*6/mm3      Hemoglobin 7.7 g/dL      Hematocrit 22.8 %      MCV 90.1 fL      MCH 30.4 pg      MCHC 33.8 g/dL      RDW 17.2 %      RDW-SD 55.7 fl      MPV -- fL      Comment: Unable to calculate        Platelets 24 10*3/mm3     Manual Differential [[id number]]  (Abnormal) Collected:  06/27/19 0429    Specimen:  Blood Updated:  06/27/19 0751     Neutrophil % 43.0 %      Lymphocyte % 51.0 %      Monocyte % 3.0 %      Eosinophil % 1.0 %      Atypical Lymphocyte % 2.0 %      Neutrophils Absolute 1.33 10*3/mm3      Lymphocytes Absolute 1.58 10*3/mm3      Monocytes Absolute 0.09 10*3/mm3      Eosinophils Absolute 0.03 10*3/mm3      Elliptocytes Slight/1+     Hypochromia Slight/1+     Poikilocytes Mod/2+     WBC Morphology Normal     Platelet Estimate Decreased    Iron Profile [[id number]]  (Abnormal) Collected:  06/27/19 0429    Specimen:  Blood Updated:  06/27/19 0557     Iron 79 mcg/dL      TIBC 152 mcg/dL      Iron Saturation 52 %     Comprehensive Metabolic Panel [[id number]]  (Abnormal) Collected:  06/27/19 0429    Specimen:  Blood Updated:  06/27/19 0542     Glucose 106 mg/dL      BUN 65 mg/dL      Creatinine 2.54 mg/dL      Sodium 133 mmol/L      Potassium 3.3 mmol/L      Chloride 108 mmol/L      CO2 16.0 mmol/L      Calcium 6.8 mg/dL      Total Protein 5.1 g/dL      Albumin 2.70 g/dL      ALT (SGPT) 34 U/L      AST (SGOT) 51 U/L      Alkaline Phosphatase 56 U/L      Total Bilirubin 0.8 mg/dL      eGFR Non African Amer 24 mL/min/1.73      Globulin 2.4 gm/dL      A/G Ratio 1.1 g/dL      BUN/Creatinine  Ratio 25.6     Anion Gap 9.0 mmol/L     Narrative:       GFR Normal >60  Chronic Kidney Disease <60  Kidney Failure <15    [place] Spotted Fever, IgM [[id number]] Collected:  06/24/19 1654    Specimen:  Blood Updated:  06/27/19 0235     RMSF IgM 0.21 index      Comment:                                  Negative        <0.90                                   Equivocal 0.90 - 1.10                                   Positive        >1.10       Narrative:       Performed at:  [address] [place] [address]  [id number]  : Mishel Morelos MD, Phone:  [phone]    Ferritin [[id number]]  (Abnormal) Collected:  06/26/19 1238    Specimen:  Blood Updated:  06/26/19 1652     Ferritin >10,000.00 ng/mL     HIV-1 & HIV-2 Antibodies [[id number]] Collected:  06/26/19 1238    Specimen:  Blood Updated:  06/26/19 1448    Narrative:       The following orders were created for panel order HIV-1 & HIV-2 Antibodies.  Procedure                               Abnormality         Status                     ---------                               -----------         ------                     HIV-1 & HIV-2 Antibodies[[id number]]     Normal              Final result                 Please view results for these tests on the individual orders.    HIV-1 & HIV-2 Antibodies [[id number]]  (Normal) Collected:  06/26/19 1238    Specimen:  Blood Updated:  06/26/19 1448     HIV-1/ HIV-2 Negative    Folate [[id number]]  (Normal) Collected:  06/26/19 1238    Specimen:  Blood Updated:  06/26/19 1415     Folate >20.00 ng/mL     Vitamin B12 [[id number]]  (Abnormal) Collected:  06/26/19 1238    Specimen:  Blood Updated:  06/26/19 1415     Vitamin B-12 >1,000 pg/mL     TSH [[id number]]  (Normal) Collected:  06/26/19 1238    Specimen:  Blood Updated:  06/26/19 1340     TSH 3.940 mIU/mL     Fibrin Split Products [[id number]]  (Normal) Collected:  06/26/19 1238    Specimen:  Blood Updated:  06/26/19 1335     Fibrin Split Products  Less Than 5 mcg/mL    T4, Free [[id number]]  (Normal) Collected:  06/26/19 1238    Specimen:  Blood Updated:  06/26/19 1326     Free T4 1.34 ng/dL     Lactate Dehydrogenase [[id number]]  (Abnormal) Collected:  06/26/19 1238    Specimen:  Blood Updated:  06/26/19 1253     LDH 1,196 U/L     aPTT [[id number]]  (Abnormal) Collected:  06/26/19 1238    Specimen:  Blood Updated:  06/26/19 1252     PTT 36.8 seconds     Protime-INR [[id number]]  (Abnormal) Collected:  06/26/19 1238    Specimen:  Blood Updated:  06/26/19 1252     Protime 14.7 Seconds      INR 1.11    Fibrinogen [[id number]]  (Normal) Collected:  06/26/19 1238    Specimen:  Blood Updated:  06/26/19 1252     Fibrinogen 273 mg/dL     Protein, Urine, Random - Urine, Clean Catch [[id number]]  (Abnormal) Collected:  06/26/19 1127    Specimen:  Urine, Clean Catch Updated:  06/26/19 1159     Total Protein, Urine 68.0 mg/dL     Urea Nitrogen, Urine - Urine, Clean Catch [[id number]] Collected:  06/26/19 1127    Specimen:  Urine, Clean Catch Updated:  06/26/19 1159     Urea Nitrogen, Urine 627 mg/dL     Sodium, Urine, Random - Urine, Clean Catch [[id number]]  (Abnormal) Collected:  06/26/19 1127    Specimen:  Urine, Clean Catch Updated:  06/26/19 1159     Sodium, Urine 27 mmol/L     Creatinine, Urine, Random - Urine, Clean Catch [[id number]] Collected:  06/26/19 1127    Specimen:  Urine, Clean Catch Updated:  06/26/19 1159     Creatinine, Urine 68.0 mg/dL         Hospital Course:  The patient is a 87 y.o. male who follows with Alyssa Baumann for his primary care. He has a past medical history significant for paroxysmal atrial fibrillation- not anticoagulated secondary to history of GI bleed, coronary artery disease, diastolic congestive heart failure, stage III chronic kidney disease, diet-controlled diabetes mellitus, hypertension, hyperlipidemia, multiple myeloma, pulmonary hypertension, and sick sinus syndrome status post pacemaker. He presented to the emergence department on  6/24 with complaints of altered mental status. He had been seen prior in the emergency department with similar complaints and fever. He was discharged with Levaquin 750 mg. He did not improved and because Dr. Julio could not see him, he presented back to the ED. He was having some urine cloudiness and some neck pain. He did admit to pulling ticks off his dog recently. He was admitted to the hospitalist service for further evaluation and management.     Given no significant evidence of infection and suspect for tick removal, he was started on doxycycline and serologies were sent to Ehrlichia and lucia mountain spotted fever were sent. He did continue to have fevers. He continued on doxycycline. Dr. Julio was consulted for a history of multiple myeloma and with pancytopenia. He ultimately underwent bone marrow biopsy on 6/28 with no evidence of leukemia. Discussion with Dr. Julio he did not feel that his MM was not the cause of his confusion.     He was found to have CHRISTINE on admission and was seen in consultation by Dr. Romero with nephrology. He was given IV fluids. His previous baseline creatinine was 1.7. He was showing signs of volume overload and  His tick borne illness panel were both negative. He completed a total of 7 days of doxycycline. His confusion has persists but is overall better. He was able to tell me that [place] was President today. He was on 2 beta blockers prior to admission and these have been held. He did not have any ectopy on telemetry and his blood pressure has been stable. He will not be sent out on any antihypertensives.     He has exhibited some signs of oral pharyngeal dysphagia and was seen by speech therapy yesterday. Underwent video swallow and showed aspiration with thin liquids. He has been changed to pureed diet with nectar thick liquids. He has been placed on megace for appetite stimulation.     Today, he is stable with continued confusion. He is without fevers or chills. He denies  "any chest pain or shortness of breath. He is tolerating pureed diet without difficulty. He needs ongoing physical and occupational therapy and has been referred to [facility]. He will be sent there today via his family.     Physical Exam on Discharge:  /78 (BP Location: Left arm, Patient Position: Lying)   Pulse 60   Temp 97.7 °F (36.5 °C) (Oral)   Resp 15   Ht 175.3 cm (69\")   Wt 73.3 kg (161 lb 8 oz)   SpO2 96%   BMI 23.85 kg/m²   Physical Exam   Constitutional: He is oriented to person, place, and time. He appears well-developed.   Chronically ill appearing.    HENT:   Head: Normocephalic and atraumatic.   Eyes: EOM are normal. Pupils are equal, round, and reactive to light. No scleral icterus.   Neck: Normal range of motion. Neck supple. No JVD present. Carotid bruit is not present. No tracheal deviation present. No thyromegaly present.   Cardiovascular: Normal rate and regular rhythm. Exam reveals no gallop and no friction rub.   No murmur heard.  Pulmonary/Chest: Effort normal. No respiratory distress. He has no wheezes. He has no rales. He exhibits no tenderness.   Diminished bilaterally.    Abdominal: Soft. Bowel sounds are normal. He exhibits no distension. There is no tenderness.   Musculoskeletal: Normal range of motion. He exhibits edema (trace edema left upper extremity with bilateral lower extremity trace edema. ).   Neurological: He is alert and oriented to person, place, and time. No cranial nerve deficit.   Skin: Skin is warm and dry. No rash noted.   Psychiatric: He has a normal mood and affect.     Condition on Discharge: stable for discharge.     Discharge Disposition:  Skilled Nursing Facility (DC - External)    Discharge Medications:     Discharge Medications      New Medications      Instructions Start Date   b complex-vitamin c-folic acid 0.8 MG tablet tablet   1 tablet, Oral, Daily   Start Date:  7/4/2019     calcitriol 0.25 MCG capsule  Commonly known as:  " ROCALTROL   0.25 mcg, Oral, Daily   Start Date:  7/4/2019     furosemide 20 MG tablet  Commonly known as:  LASIX   20 mg, Oral, Daily   Start Date:  7/4/2019     megestrol 40 MG/ML suspension  Commonly known as:  MEGACE   800 mg, Oral, Daily   Start Date:  7/4/2019     melatonin 3 MG tablet   6 mg, Oral, Nightly      metaxalone 400 MG tablet  Commonly known as:  SKELAXIN   400 mg, Oral, 3 Times Daily      potassium chloride 10 MEQ CR capsule  Commonly known as:  MICRO-K   20 mEq, Oral, Daily   Start Date:  7/4/2019     saccharomyces boulardii 250 MG capsule  Commonly known as:  FLORASTOR   250 mg, Oral, 2 Times Daily         Continue These Medications      Instructions Start Date   coenzyme Q10 100 MG capsule   100 mg, Oral, Daily      sertraline 50 MG tablet  Commonly known as:  ZOLOFT   75 mg, Oral, Daily         Stop These Medications    carvedilol 6.25 MG tablet  Commonly known as:  COREG     metoprolol succinate XL 25 MG 24 hr tablet  Commonly known as:  TOPROL-XL     sodium bicarbonate 650 MG tablet     vitamin D3 5000 units capsule capsule          Discharge Diet:   Diet Instructions     Diet: Dysphagia; Nectar / Syrup Thick Liquids; Pureed      Discharge Diet:  Dysphagia    Fluid Consistency:  Nectar / Syrup Thick Liquids    Pureed Options:  Pureed        Activity at Discharge:   Activity Instructions     Activity as Tolerated            Follow-up Appointments:   Future Appointments   Date Time Provider Department Center   8/30/2019  9:00 AM [facility] Lopez MD [facility]   9/30/2019  8:45 AM [facility]   1. PCP at [facility] within 3 days.   2. Alyssa Baumann after discharge from rehab.   3. Dr. luciano in 2 weeks.   4. BMP on Monday.     Test Results Pending at Discharge: none    KENNY Leary  07/03/19  11:53 AM    Time: 45 minutes.     I personally evaluated and examined the patient in conjunction with KENNY Garcia and agree with the  assessment, treatment plan, and disposition of the patient as recorded by her. My history, exam, and further recommendations are:     Seen ambulating down sorto.  Patient doing well and probably back to his baseline.  Would strongly recommend patient getting appropriate hearing aids because he is more confused when he is having issues with hearing the conversation.     Jax Patel MD  07/03/19  8:45 PM

## 2019-07-03 NOTE — PROGRESS NOTES
Continued Stay Note  [facility]     Patient Name: Jesús Long  MRN: [id number]  Today's Date: 7/3/2019    Admit Date: 6/24/2019    Discharge Plan     Row Name 07/03/19 1118       Plan    Plan  [facility]    Patient/Family in Agreement with Plan  yes    Final Discharge Disposition Code  03 - skilled nursing facility (SNF)    Final Note  Pt will be discharged to [facility] today.  Left Evon from nursing facility a message, pt will be admitted at SNF level care. Joser Rodney in room and she will be taking him via car.      [facility] [phone]        Discharge Codes    No documentation.             PONCE Mittal

## 2019-07-03 NOTE — PLAN OF CARE
Problem: Patient Care Overview  Goal: Plan of Care Review  Outcome: Outcome(s) achieved Date Met: 07/03/19 07/03/19 2703   Coping/Psychosocial   Plan of Care Reviewed With patient;daughter   Plan of Care Review   Progress improving   OTHER   Outcome Summary SSLP reviewed VFSS results from 7/2/2019 with pt's daughter upon arrival per daughter request. Pt and pt's daughter were educated on pt's current diet recommendations of pureed diet with nectar thick liquids. Pt's daughter educated on signs and symptoms of aspriation. SLP recommends to continue ST at this time to increase swallow initation time and to strengthen laryngeal and pharyngeal muscles to safely impact pt's overall swallow function.

## 2019-07-03 NOTE — THERAPY DISCHARGE NOTE
Acute Care - Physical Therapy Discharge Summary  [facility]       Patient Name: Jesús Long  : 1931  MRN: [id number]    Today's Date: 7/3/2019  Onset of Illness/Injury or Date of Surgery: 19    Date of Referral to PT: 19  Referring Physician: Dr. Brown      Admit Date: 2019      PT Recommendation and Plan    Visit Dx:    ICD-10-CM ICD-9-CM   1. CHRISTINE (acute kidney injury) (CMS/[facility]) N17.9 584.9   2. Altered mental status, unspecified altered mental status type R41.82 780.97   3. Decreased activities of daily living (ADL) R68.89 780.99   4. Decreased mobility and endurance Z74.09 780.99   5. Oropharyngeal dysphagia R13.12 787.22       Outcome Measures     Row Name 19 1312 19 1200          How much help from another is currently needed...    Putting on and taking off regular lower body clothing?  3  -CS  2  -TS     Bathing (including washing, rinsing, and drying)  3  -CS  2  -TS     Toileting (which includes using toilet bed pan or urinal)  3  -CS  3  -TS     Putting on and taking off regular upper body clothing  3  -CS  3  -TS     Taking care of personal grooming (such as brushing teeth)  3  -CS  3  -TS     Eating meals  3  -CS  4  -TS     AM-PAC 6 Clicks Score (OT)  18  -CS  17  -TS        Functional Assessment    Outcome Measure Options  AM-PAC 6 Clicks Daily Activity (OT)  -CS  AM-PAC 6 Clicks Daily Activity (OT)  -TS       User Key  (r) = Recorded By, (t) = Taken By, (c) = Cosigned By    Initials Name Provider Type    TS Cailin Hinton, LEIJA/L Occupational Therapy Assistant    Neela Rosa, OTR/L, CNT Occupational Therapist          PT Charges     Row Name 19 1132             Time Calculation    Start Time  1103  -      Stop Time  1129  -MF      Time Calculation (min)  26 min  -MF      PT Received On  19  -      PT Goal Re-Cert Due Date  19  -         Time Calculation- PT    Total Timed Code Minutes- PT  26 minute(s)  -         Timed  Charges    60430 - PT Therapeutic Exercise Minutes  12  -MF      65162 - Gait Training Minutes   14  -MF        User Key  (r) = Recorded By, (t) = Taken By, (c) = Cosigned By    Initials Name Provider Type    Olga Lidia Joyce PTA Physical Therapy Assistant          Rehab Goal Summary     Row Name 07/03/19 1556 07/03/19 1312          Physical Therapy Goals    Bed Mobility Goal Selection (PT)  bed mobility, PT goal 1  -AH  --     Transfer Goal Selection (PT)  transfer, PT goal 1  -AH  --     Gait Training Goal Selection (PT)  gait training, PT goal 1  -AH  --        Bed Mobility Goal 1 (PT)    Activity/Assistive Device (Bed Mobility Goal 1, PT)  sit to supine;supine to sit  -AH  --     Travis Level/Cues Needed (Bed Mobility Goal 1, PT)  independent;supervision required  -AH  --     Time Frame (Bed Mobility Goal 1, PT)  long term goal (LTG);10 days  -AH  --     Progress/Outcomes (Bed Mobility Goal 1, PT)  goal not met  -AH  --        Transfer Goal 1 (PT)    Activity/Assistive Device (Transfer Goal 1, PT)  sit-to-stand/stand-to-sit;bed-to-chair/chair-to-bed  -AH  --     Travis Level/Cues Needed (Transfer Goal 1, PT)  independent;supervision required  -AH  --     Time Frame (Transfer Goal 1, PT)  long term goal (LTG);10 days  -AH  --     Progress/Outcome (Transfer Goal 1, PT)  goal not met  -AH  --        Gait Training Goal 1 (PT)    Activity/Assistive Device (Gait Training Goal 1, PT)  gait (walking locomotion);assistive device use;decrease fall risk;improve balance and speed;increase endurance/gait distance;increase energy conservation;walker, rolling  -AH  --     Travis Level (Gait Training Goal 1, PT)  independent;supervision required  -AH  --     Distance (Gait Goal 1, PT)  200  -AH  --     Time Frame (Gait Training Goal 1, PT)  long term goal (LTG);10 days  -AH  --     Progress/Outcome (Gait Training Goal 1, PT)  goal not met  -AH  --        Bathing Goal 1 (OT)    Activity/Assistive Device  (Bathing Goal 1, OT)  --  bathing skills, all  -CS     Gordon Level/Cues Needed (Bathing Goal 1, OT)  --  independent  -CS     Time Frame (Bathing Goal 1, OT)  --  long term goal (LTG);by discharge  -CS     Progress/Outcomes (Bathing Goal 1, OT)  --  goal not met  -CS        Toileting Goal 1 (OT)    Activity/Device (Toileting Goal 1, OT)  --  toileting skills, all;commode  -CS     Gordon Level/Cues Needed (Toileting Goal 1, OT)  --  independent  -CS     Time Frame (Toileting Goal 1, OT)  --  long term goal (LTG);by discharge  -CS     Progress/Outcome (Toileting Goal 1, OT)  --  goal not met  -CS        Strength Goal 1 (OT)    Strength Goal 1 (OT)  --  Pt will increase BUE strength to 4-/5 for increased independence with adls, t/fs, and mobility  -CS     Time Frame (Strength Goal 1, OT)  --  long term goal (LTG);by discharge  -CS     Progress/Outcome (Strength Goal 1, OT)  --  goal not met  -CS       User Key  (r) = Recorded By, (t) = Taken By, (c) = Cosigned By    Initials Name Provider Type Discipline    Katie Gregg PTA Physical Therapy Assistant PT    CS Neela Coughlin, OTR/L, CNT Occupational Therapist OT              PT Discharge Summary  Reason for Discharge: Discharge from facility  Outcomes Achieved: Refer to plan of care for updates on goals achieved  Discharge Destination: [facility]      Katie Yee PTA   7/3/2019

## 2019-07-05 NOTE — ED PROVIDER NOTES
Subjective   Very pleasant 87-year-old male presenting to the emergency department after fall out of bed last night.  Patient fell and was placed back in the bed however after short period time is complaining of head pain and left-sided chest pain.  Patient at this time states he has no complaints.  Patient is in a c-collar that was placed by EMS.  Patient is able to move all extremities well denies any other injuries.            Review of Systems   HENT:        Head trauma   Cardiovascular: Positive for chest pain.   All other systems reviewed and are negative.      Past Medical History:   Diagnosis Date   • Anemia    • Atrial fibrillation by electrocardiogram (CMS/[facility])    • CAD in native artery    • Carotid bruit    • CHF (congestive heart failure) (CMS/[facility])    • Chronic kidney disease     stage III   • Diabetes mellitus (CMS/[facility])     diet controlled   • History of coronary artery bypass graft    • Hospital psychosis (CMS/[facility])    • Hyperlipidemia    • Hypertension    • Monoclonal (M) protein disease, multiple 'M' protein    • Multiple myeloma (CMS/[facility])    • Murmur    • Pulmonary hypertension (CMS/[facility])    • Sick sinus syndrome (CMS/[facility])     s/p pace maker   • Sleep apnea        No Known Allergies    Past Surgical History:   Procedure Laterality Date   • BACK SURGERY     • CARDIAC CATHETERIZATION  09/16/1998    Left Heart; candidate for re-do CABG   • COLONOSCOPY  12/10/2013    Diverticulosis repeat prn   • CORONARY ARTERY BYPASS GRAFT      X 8   • ENDOSCOPY N/A 6/20/2018    Normal exam   • HEMORROIDECTOMY     • PACEMAKER IMPLANTATION         Family History   Problem Relation Age of Onset   • Cancer Mother    • Heart disease Mother    • Colon cancer Mother    • Heart disease Father    • Coronary artery disease Father    • Dementia Sister    • Colon polyps Neg Hx        Social History     Socioeconomic History   • Marital status:      Spouse name: Not on file   • Number of children: Not on file   • Years of  education: Not on file   • Highest education level: Not on file   Tobacco Use   • Smoking status: Former Smoker     Types: Cigarettes     Last attempt to quit: 1986     Years since quittin.6   • Smokeless tobacco: Former User   Substance and Sexual Activity   • Alcohol use: No   • Drug use: No   • Sexual activity: Defer           Objective   Physical Exam   Constitutional: He is oriented to person, place, and time. He appears well-developed and well-nourished.   HENT:   Head: Normocephalic and atraumatic.   Nose: Nose normal.   No obvious deformity to the head no obvious fractures or bleeding   Eyes: EOM are normal. Pupils are equal, round, and reactive to light.   Neck: Normal range of motion. Neck supple.   No CTL tenderness   Cardiovascular: Normal rate, regular rhythm and normal heart sounds.   Pulmonary/Chest: Effort normal and breath sounds normal.   Abdominal: Soft. He exhibits no distension. There is no tenderness. There is no rebound and no guarding.   Musculoskeletal: Normal range of motion.   No tenderness in any extremities good pulses all throughout   Neurological: He is alert and oriented to person, place, and time. No cranial nerve deficit or sensory deficit.   GCS 15   Skin: Skin is warm and dry. Capillary refill takes less than 2 seconds.   Psychiatric: He has a normal mood and affect. His behavior is normal.       Procedures           ED Course  ED Course as of    0603 Patient will be evaluated for agitation as well as a fall.  Patient at this time is calm and comfortable.  Patient be turned over to Dr. Velasco it is likely if he has no significant injuries he will be able to be discharged back to the nursing home.  [AP]   3035 I took over from Dr. Dinh at shift change.  I have talked with the family at length.  I told him all of his testing seems baseline.  His renal functions are still not good but they are certainly baseline to a little better than they  were before.  His x-rays of alternate negative for any other evidence of fracture.  He did later complain of some pain in his left shoulder so we x-rayed it and there may be a soft tissue damage but at his age I am not sure there was anything about it anyway.  Family is understanding of this and they want to take it back to the nursing home.  He will be discharged in stable condition.  [TR]      ED Course User Index  [AP] Sameer Dinh MD  [TR] Romie Velasco Jr., MD                  MDM  Number of Diagnoses or Management Options  Dementia with behavioral disturbance, unspecified dementia type: established and worsening  Fall, initial encounter: new and requires workup  Multiple contusions: new and requires workup     Amount and/or Complexity of Data Reviewed  Clinical lab tests: ordered and reviewed  Tests in the radiology section of CPT®: ordered and reviewed  Tests in the medicine section of CPT®: ordered and reviewed  Decide to obtain previous medical records or to obtain history from someone other than the patient: yes    Risk of Complications, Morbidity, and/or Mortality  Presenting problems: moderate  Diagnostic procedures: moderate  Management options: moderate    Patient Progress  Patient progress: stable        Final diagnoses:   Fall, initial encounter   Multiple contusions   Dementia with behavioral disturbance, unspecified dementia type            Romie Velasco Jr., MD  07/05/19 0876

## 2019-07-05 NOTE — ED NOTES
Pt family is bedside. Pt's daughter states that the pt does have a hx of dementia and states it was minimal until 10 days ago. Pt daughter states that the patient has been very confused for the last 10 days.      Roopa Koo, RN  07/05/19 0697

## 2019-08-30 NOTE — PROGRESS NOTES
Jesús Long  [id number]  7/29/1931  88 y.o.  male    Referring Provider: Yocasta Baumann APRN    Reason for Follow-up Visit:  Routine follow up.   atrial fibrillation   sick sinus syndrome s/p pacemaker   Gastrointestinal bleed in past  Coronary artery bypass grafting twice  Recent prolonged hospitalization for neutropenic fever  Using oxygen at home both during daytime and at night continously     Subjective    Events as above    Moderate chronic exertional shortness of breath on exertion relieved with rest  No significant cough or wheezing    Going on for several months  No palpitations    No associated chest pain  No significant pedal edema  No fever or chills    No significant expectoration  No hemoptysis  No presyncope or syncope   Easy fatiguability   Feels tired     History of present illness:  Jesús Long is a 88 y.o. yo male with history of Paroxysmal atrial fibrillation Gastrointestinal bleed who presents today for   Chief Complaint   Patient presents with   • Atrial Fibrillation     3 month follow up    .    History  Past Medical History:   Diagnosis Date   • Anemia    • Atrial fibrillation by electrocardiogram (CMS/[facility])    • CAD in native artery    • Carotid bruit    • CHF (congestive heart failure) (CMS/[facility])    • Chronic kidney disease     stage III   • Diabetes mellitus (CMS/[facility])     diet controlled   • History of coronary artery bypass graft    • Hospital psychosis (CMS/[facility])    • Hyperlipidemia    • Hypertension    • Monoclonal (M) protein disease, multiple 'M' protein    • Multiple myeloma (CMS/[facility])    • Murmur    • Pulmonary hypertension (CMS/[facility])    • Sick sinus syndrome (CMS/[facility])     s/p pace maker   • Sleep apnea    ,   Past Surgical History:   Procedure Laterality Date   • BACK SURGERY     • CARDIAC CATHETERIZATION  09/16/1998    Left Heart; candidate for re-do CABG   • COLONOSCOPY  12/10/2013    Diverticulosis repeat prn   • CORONARY ARTERY BYPASS GRAFT      X 8   • ENDOSCOPY N/A  2018    Normal exam   • HEMORROIDECTOMY     • PACEMAKER IMPLANTATION     ,   Family History   Problem Relation Age of Onset   • Cancer Mother    • Heart disease Mother    • Colon cancer Mother    • Heart disease Father    • Coronary artery disease Father    • Dementia Sister    • Colon polyps Neg Hx    ,   Social History     Tobacco Use   • Smoking status: Former Smoker     Types: Cigarettes     Last attempt to quit: 1986     Years since quittin.8   • Smokeless tobacco: Former User   Substance Use Topics   • Alcohol use: No   • Drug use: No   ,     Medications  Current Outpatient Medications   Medication Sig Dispense Refill   • b complex-vitamin c-folic acid (NEPHRO-MILENA) 0.8 MG tablet tablet Take 1 tablet by mouth Daily. 30 tablet    • calcitriol (ROCALTROL) 0.25 MCG capsule Take 1 capsule by mouth Daily.     • coenzyme Q10 100 MG capsule Take 100 mg by mouth Daily.     • furosemide (LASIX) 20 MG tablet Take 1 tablet by mouth Daily.     • megestrol (MEGACE) 40 MG/ML suspension Take 20 mL by mouth Daily.     • melatonin 3 MG tablet Take 2 tablets by mouth Every Night. (Patient taking differently: Take 15 mg by mouth Every Night.)     • metaxalone (SKELAXIN) 400 MG tablet Take 1 tablet by mouth 3 (Three) Times a Day.     • potassium chloride (MICRO-K) 10 MEQ CR capsule Take 2 capsules by mouth Daily.     • saccharomyces boulardii (FLORASTOR) 250 MG capsule Take 1 capsule by mouth 2 (Two) Times a Day.     • sertraline (ZOLOFT) 50 MG tablet Take 75 mg by mouth Daily.       No current facility-administered medications for this visit.        Allergies:  Patient has no known allergies.    Review of Systems  Review of Systems   Constitution: Positive for weakness and malaise/fatigue.   HENT: Negative.    Eyes: Negative.    Cardiovascular: Positive for dyspnea on exertion. Negative for chest pain, claudication, cyanosis, irregular heartbeat, leg swelling, near-syncope, orthopnea, palpitations, paroxysmal  "nocturnal dyspnea and syncope.   Respiratory: Negative.    Endocrine: Negative.    Hematologic/Lymphatic: Negative.    Skin: Negative.    Musculoskeletal: Positive for arthritis, joint pain and stiffness.   Gastrointestinal: Negative for anorexia.   Genitourinary: Negative.    Psychiatric/Behavioral: Negative.        Objective     Physical Exam:  /62 (BP Location: Right arm, Patient Position: Sitting, Cuff Size: Adult)   Ht 177.8 cm (70\")   Wt 62.6 kg (138 lb)   BMI 19.80 kg/m²   Physical Exam   Constitutional: He appears well-developed.   HENT:   Head: Normocephalic.   Neck: Normal carotid pulses and no JVD present. No tracheal tenderness present. Carotid bruit is not present. No tracheal deviation and no edema present.   Cardiovascular: Regular rhythm and normal pulses.   Murmur heard.   Systolic murmur is present with a grade of 2/6.  Pulmonary/Chest: Effort normal. No stridor.   Abdominal: Soft.   Neurological: He is alert. He has normal strength. No cranial nerve deficit or sensory deficit.   Skin: Skin is warm.   Psychiatric: He has a normal mood and affect. His speech is normal and behavior is normal.       Results Review:    Results for orders placed during the hospital encounter of 06/06/19   Adult Transthoracic Echo Complete W/ Cont if Necessary Per Protocol    Narrative · Estimated EF = 50%.  · Left ventricular diastolic dysfunction.  · Left atrial cavity size is severely dilated.  · Right atrial cavity size is severely dilated  · Moderate pulmonary hypertension is present.               ECG 12 Lead  Date/Time: 8/30/2019 10:29 AM  Performed by: Mauri Lopez MD  Authorized by: Mauri Lopez MD   Comparison: compared with previous ECG from 7/5/2019  Comparison to previous ECG: premature ventricular contractions   Rhythm: atrial fibrillation and paced  Ectopy: unifocal PVCs  Rate: normal    Clinical impression: abnormal EKG            Assessment/Plan   Patient Active Problem List   Diagnosis   • " Anemia associated with chronic renal failure   • PAF (paroxysmal atrial fibrillation) (CMS/HCC) No AC DUE TO GI  BLEED   • Anxiety   • Pacemaker   • Anemia of chronic disease   • Multiple myeloma (CMS/HCC)   • Chronic kidney disease   • Hypertension   • Coronary artery disease   • Pulmonary hypertension (CMS/HCC)   • Bright red blood per rectum   • Controlled type 2 diabetes mellitus diet controlled without complication, without long-term current use of insulin (CMS/HCC)   • Light chain myeloma (CMS/HCC)   • Acute kidney injury superimposed on chronic kidney disease (CMS/HCC)   • Neutropenic fever (CMS/HCC)   • Sick sinus syndrome (CMS/HCC)   • Moderate malnutrition (CMS/HCC)   • Delirium   • Metabolic encephalopathy   • Pancytopenia (CMS/HCC)   • Pressure ulcer with suspected deep tissue injury, stage III (CMS/HCC)        No palpitations. No significant pedal edema. Compliant with medications and diet. Latest labs and medications reviewed.      Plan      The current medical regimen is effective;  continue present plan and medications.     He declined evaluation for Oxygen initially subsequently agreed  Monitor cardiac rhythm device function regularly per established schedule or PRN       ______________________________________________________________________________________________________________________________________  Health maintenance and recommendations      Similar recommendations as last visit       Offered to give patient  a copy      Questions were encouraged, asked and answered to the patient's  understanding and satisfaction. Questions if any regarding current medications and side effects, need for refills and importance of compliance to medications stressed.    Reviewed available prior notes, consults, prior visits, laboratory findings, radiology and cardiology relevant reports. Updated chart as applicable. I have reviewed the patient's medical history in detail and updated the computerized patient  record as relevant.      Updated patient regarding any new or relevant abnormalities on review of records or any new findings on physical exam. Mentioned to patient about purpose of visit and desirable health short and long term goals and objectives.    Primary to monitor CBC CMP Lipid panel and TSH as applicable    ___________________________________________________________________________________________________________________________________________          Return in about 6 months (around 2/29/2020).

## 2019-09-03 NOTE — TELEPHONE ENCOUNTER
Pt requesting for lab work or if need for lab work to be drawn, does he need one dick, Home health can draw if necessary.  Last lab mid Aug and blood transfusion was given x 2 units, txt dick for 9/24/19 and to see you mid Oct 2019.

## 2019-09-03 NOTE — TELEPHONE ENCOUNTER
Received call from patient wife, she is concerned that they have been home from Nursing facility for over 2 weeks and she feels he needs to have his blood checked, he does not have txt until 9/24/19 and see Dr Julio until 11/14/19. Home health following now

## 2019-09-11 NOTE — TELEPHONE ENCOUNTER
Patient's wife called and asked if a lab order could be faxed to homehealth in [place] today for patient, he rather have homehealth do his labs. Pt's wife gave me a fax [phone].

## 2019-09-20 NOTE — TELEPHONE ENCOUNTER
Received call from patient wife, says she was called this am and informed that her , Jesús patient meets parameters for a Procrit injection today, Labs drawn yesterday. Wife questions who will give him the injection.     Spoke with Alexandr @ [facility], she says she will [facility] and make arrangements for the Procrit injection today, and notify the patient and wife.

## 2019-09-24 NOTE — PROGRESS NOTES
Corrected calcium 8.7, Xgeva indicated.  Patient was unable to receive Procrit at [facility]. Last week due to confusion over the order.  Patient scheduled to RTC in 2 weeks for possible procrit.  Patient is having HH come weekly for labs so he may not need the lab visit in 2 weeks but scheduled just in case.  BOWEN Gardiner RN

## 2019-10-10 NOTE — TELEPHONE ENCOUNTER
Received call from Nurse Margo Burgos with [facility], she reports that she is at patients home today and patient and wife inform her that he was seen earlier this week and had lab work drawn, requesting not to have additional labs drawn today.

## 2019-11-04 NOTE — ED PROVIDER NOTES
Subjective   This 88-year-old male patient was coming back from using the bathroom using his walker and he tripped and hit his head on the bedside stand.  He has a wound to his forehead however he denies it did not have any loss of consciousness and he was up and ambulatory when EMS arrived.  He remembers what happened.  Is accompanied by his family who basically states that he is in his normal mental status.  He does complain of some neck pain previous fall.            Review of Systems   Constitutional: Negative for activity change.   HENT: Negative.    Respiratory: Negative.    Cardiovascular: Negative.    Gastrointestinal: Negative.    Genitourinary: Negative.    Musculoskeletal: Positive for neck pain.   Neurological: Negative.        Past Medical History:   Diagnosis Date   • Anemia    • Atrial fibrillation by electrocardiogram (CMS/[facility])    • CAD in native artery    • Carotid bruit    • CHF (congestive heart failure) (CMS/[facility])    • Chronic kidney disease     stage III   • Diabetes mellitus (CMS/[facility])     diet controlled   • History of coronary artery bypass graft    • Hospital psychosis (CMS/[facility])    • Hyperlipidemia    • Hypertension    • Monoclonal (M) protein disease, multiple 'M' protein    • Multiple myeloma (CMS/[facility])    • Murmur    • Pulmonary hypertension (CMS/[facility])    • Sick sinus syndrome (CMS/[facility])     s/p pace maker   • Sleep apnea        No Known Allergies    Past Surgical History:   Procedure Laterality Date   • BACK SURGERY     • CARDIAC CATHETERIZATION  09/16/1998    Left Heart; candidate for re-do CABG   • COLONOSCOPY  12/10/2013    Diverticulosis repeat prn   • CORONARY ARTERY BYPASS GRAFT      X 8   • ENDOSCOPY N/A 6/20/2018    Normal exam   • HEMORROIDECTOMY     • PACEMAKER IMPLANTATION         Family History   Problem Relation Age of Onset   • Cancer Mother    • Heart disease Mother    • Colon cancer Mother    • Heart disease Father    • Coronary artery disease Father    • Dementia Sister    •  Colon polyps Neg Hx        Social History     Socioeconomic History   • Marital status:      Spouse name: Not on file   • Number of children: Not on file   • Years of education: Not on file   • Highest education level: Not on file   Tobacco Use   • Smoking status: Former Smoker     Types: Cigarettes     Last attempt to quit: 1986     Years since quittin.9   • Smokeless tobacco: Former User   Substance and Sexual Activity   • Alcohol use: No   • Drug use: No   • Sexual activity: Defer           Objective   Physical Exam   Constitutional: He appears well-developed and well-nourished.   HENT:   Right Ear: External ear normal.   Left Ear: External ear normal.   Patient has a linear laceration to his forehead which is 2.5 cm in length.   Eyes: EOM are normal. Pupils are equal, round, and reactive to light.   Neck:   Patient has mild tenderness to the cervical spine slight decreased range of motion.  No step-offs or deformities noted.   Cardiovascular: Normal rate, regular rhythm and normal heart sounds.   Pulmonary/Chest: Effort normal and breath sounds normal.   Abdominal: Soft. Bowel sounds are normal.   Musculoskeletal: Normal range of motion.   Neurological: He is alert.   Patient is oriented x2 and the family states that this seems like his normal orientation.  He is forgot who the president is.  He does know how old he is.   Skin:   Linear laceration to the forehead about 2.5 cm minimal gapping.   Psychiatric: He has a normal mood and affect.   Nursing note and vitals reviewed.      Laceration Repair  Date/Time: 11/3/2019 9:27 PM  Performed by: Romie Brothers DO  Authorized by: Romie Brothers DO     Consent:     Consent obtained:  Verbal    Consent given by:  Patient  Anesthesia (see MAR for exact dosages):     Anesthesia method:  None  Laceration details:     Location:  Face    Face location:  Forehead    Length (cm):  2.5    Depth (mm):  2  Repair type:     Repair type:  Simple  Exploration:      Hemostasis achieved with:  Direct pressure    Contaminated: no    Treatment:     Irrigation solution:  Sterile saline    Irrigation volume:  15    Visualized foreign bodies/material removed: no    Skin repair:     Repair method:  Tissue adhesive  Approximation:     Approximation:  Close    Vermilion border: well-aligned    Post-procedure details:     Patient tolerance of procedure:  Tolerated well, no immediate complications               ED Course        Patient was endorsed to me at shift change. Please see primary providers full note for H and P.     I had a long long talk with the family and patient. The patient has dementia and will often forget to use his walker or WC and fall secondary to this. He has been in NH previously but was released. He has seen PT as well but not currently. At this time he has no complaints, he has the spinus process fracture but no other acute findings. The famiily wished for the UA which is negative. At this time I no critera for admission but have asked them to speak to the family dcotor about possible further PT versus assisted living placement.       CT Cervical Spine Without Contrast   Final Result   Acute nondisplaced fracture through the spinous process of C3.       This report was finalized on 11/03/2019 22:03 by Dr. Joey Lobo MD.      CT Head Without Contrast   Final Result   No acute intracranial findings. Sequela of chronic microvascular   ischemia.       This report was finalized on 11/03/2019 21:58 by Dr. Joey Loob MD.        Labs Reviewed   URINALYSIS W/ MICROSCOPIC IF INDICATED (NO CULTURE) - Abnormal; Notable for the following components:       Result Value    Glucose,  mg/dL (1+) (*)     Protein, UA 30 mg/dL (1+) (*)     All other components within normal limits   URINALYSIS, MICROSCOPIC ONLY - Abnormal; Notable for the following components:    RBC, UA 6-12 (*)     WBC, UA 0-2 (*)     All other components within normal limits                  [facility]    Final diagnoses:   Laceration of forehead, initial encounter   Closed fracture of spinous process of cervical vertebra, initial encounter (CMS/[facility])              Carlos Alberto Lanier MD  11/03/19 8243

## 2019-11-04 NOTE — DISCHARGE INSTRUCTIONS
Family and [place],     Jesús, you have to start using your walker or wheel chair. If you continue to not do this you are going to fall more and break more bones. Today you only broke the spinous process (see the image below) and this will heal on its own. However, next time it can be your head or your hip.           A facial laceration is a cut on the face. This can happen because of an accident or injury that cuts or tears the skin or tissues on your face. These injuries can hurt and bleed. Some cuts may need to be closed with stitches (sutures), skin glue, or skin tape (adhesive) strips. Cuts usually heal quickly, but they can leave a scar. It can take 1-2 years for the scar to go away completely.  Follow these instructions at home:  Wound care  · Follow your doctor's instructions for wound care. These instructions will vary depending on how the wound was closed.  ? For stitches:  § Keep the wound clean and dry.  § If you were given a bandage (dressing), change it at least one time a day, or as told by your doctor. Also change the bandage if it gets wet or dirty.  § Wash the wound with soap and water two times a day, or as told by your doctor. Rinse off the soap with water. Use a clean towel to pat the wound dry.  § After cleaning, apply a thin layer of antibiotic ointment as told by your doctor. This helps prevent infection and keeps the bandage from sticking to the wound.  § You may shower as usual after the first 24 hours. Do not soak the wound until the stitches are taken out.  § Go back to have your stitches taken out as told by your doctor.  § Do not wear makeup until your doctor says it is okay.  ? For skin tape strips:  § Keep the wound clean and dry.  § Do not let the skin tape strips get wet.  § Bathe carefully to keep the wound and skin tape strips dry. If the wound gets wet, pat it dry with a clean towel right away.  § Skin tape strips fall off on their own over time. You may trim the strips as  the wound heals. Do not take off skin tape strips that are still stuck to the wound.  ? For skin glue:  § You may briefly wet your wound in the shower or bath.  § Do not soak or scrub the wound.  § Do not swim.  § Do not do anything that makes you sweat a lot until the skin glue has fallen off on its own.  § After you shower or take a bath, use a clean towel to gently pat the wound dry.  § Do not put liquid medicine, cream medicine, ointment, or makeup on your wound while the skin glue is in place. This may loosen the film before your wound is healed.  § If you have a bandage over your wound, be careful not to apply tape directly over the skin glue. This may pull off the skin glue before the wound is healed.  § Do not spend a long time in the sun or use a tanning lamp while the skin glue is in place.  § The skin glue usually stays in place for 5-10 days. Then, it naturally falls off the skin. Do not pick at the skin glue.  General instructions    · Take over-the-counter and prescription medicines only as told by your doctor.  · Check your wound area every day for signs of infection. Check for:  ? More redness, swelling, or pain.  ? More fluid or blood.  ? Warmth.  ? Pus or a bad smell.  · If you were prescribed an antibiotic, take or apply it as told by your doctor. Do not stop taking or applying the antibiotic even if your condition improves.  · After the cut has healed:  ? Know that it can take a year or two for redness or scarring to fade.  ? Apply sunscreen to the skin of your healed wound to minimize scarring. Ultraviolet (UV) rays can darken scar tissue.  Contact a doctor if:  · You have a fever.  · You have more redness, swelling, or pain around your wound.  · You have more fluid or blood coming from your wound.  · Your wound feels warm to the touch.  · You have pus or a bad smell coming from your wound.  Get help right away if:  · You have a red streak going away from your wound.  Summary  · A cut on the  face (facial laceration) may need to be closed with stitches (sutures), skin tape strips, or skin glue.  · Follow your doctor's instructions for wound care.  · Check your wound area every day for signs of infection such as redness, swelling, or drainage.  This information is not intended to replace advice given to you by your health care provider. Make sure you discuss any questions you have with your health care provider.  Document Released: 06/05/2009 Document Revised: 01/18/2018 Document Reviewed: 01/18/2018  [employer] Interactive Patient Education © 2019 [employer]      Laceration Care, Adult  A laceration is a cut that may go through all layers of the skin. The cut may also go into the tissue that is right under the skin. Some cuts heal on their own. Others need to be closed with stitches (sutures), staples, skin adhesive strips, or skin glue. Taking care of your injury lowers your risk of infection, helps your injury to heal better, and may prevent scarring.  Supplies needed:  · Soap.  · Water.  · Hand .  · Bandage (dressing).  · Antibiotic ointment.  · Clean towel.  How to take care of your cut  Wash your hands with soap and water before touching your wound or changing your bandage. If soap and water are not available, use hand .  If your doctor used stitches or staples:  · Keep the wound clean and dry.  · If you were given a bandage, change it at least once a day as told by your doctor. You should also change it if it gets wet or dirty.  · Keep the wound completely dry for the first 24 hours, or as told by your doctor. After that, you may take a shower or a bath. Do not get the wound soaked in water until after the stitches or staples have been removed.  · Clean the wound once a day, or as told by your doctor:  ? Wash the wound with soap and water.  ? Rinse the wound with water to remove all soap.  ? Pat the wound dry with a clean towel. Do not rub the wound.  · After you clean the wound,  put a thin layer of antibiotic ointment on it as told by your doctor. This ointment:  ? Helps to prevent infection.  ? Keeps the bandage from sticking to the wound.  · Have your stitches or staples removed as told by your doctor.  If your doctor used skin adhesive strips:  · Keep the wound clean and dry.  · If you were given a bandage, you should change it at least once a day as told by your doctor. You should also change it if it gets wet or dirty.  · Do not get the skin adhesive strips wet. You can take a shower or a bath, but keep the wound dry.  · If the wound gets wet, pat it dry with a clean towel. Do not rub the wound.  · Skin adhesive strips fall off on their own. You can trim the strips as the wound heals. Do not remove any strips that are still stuck to the wound. They will fall off after a while.  If your doctor used skin glue:  · Try to keep your wound dry, but you may briefly wet it in the shower or bath. Do not soak the wound in water, such as by swimming.  · After you take a shower or a bath, gently pat the wound dry with a clean towel. Do not rub the wound.  · Do not do any activities that will make you really sweaty until the skin glue has fallen off on its own.  · Do not apply liquid, cream, or ointment medicine to your wound while the skin glue is still on.  · If you were given a bandage, you should change it at least once a day or as told by your doctor. You should also change it if it gets dirty or wet.  · If a bandage is placed over the wound, do not let the tape touch the skin glue.  · Do not pick at the glue. The skin glue usually stays on for 5-10 days. Then, it falls off the skin.  General instructions    · Take over-the-counter and prescription medicines only as told by your doctor.  · If you were given antibiotic medicine or ointment, take or apply it as told by your doctor. Do not stop using it even if your condition improves.  · Do not scratch or pick at the wound.  · Check your wound  every day for signs of infection. Watch for:  ? Redness, swelling, or pain.  ? Fluid, blood, or pus.  · Raise (elevate) the injured area above the level of your heart while you are sitting or lying down.  · If directed, put ice on the affected area:  ? Put ice in a plastic bag.  ? Place a towel between your skin and the bag.  ? Leave the ice on for 20 minutes, 2-3 times a day.  · Prevent scarring by covering your wound with sunscreen of at least 30 SPF whenever you are outside after your wound has healed.  · Keep all follow-up visits as told by your doctor. This is important.  Get help if:  · You got a tetanus shot and you have any of these problems at the injection site:  ? Swelling.  ? Very bad pain.  ? Redness.  ? Bleeding.  · You have a fever.  · A wound that was closed breaks open.  · You notice a bad smell coming from your wound or your bandage.  · You notice something coming out of the wound, such as wood or glass.  · Medicine does not relieve your pain.  · You have more redness, swelling, or pain at the site of your wound.  · You have fluid, blood, or pus coming from your wound.  · You notice a change in the color of your skin near your wound.  · You need to change the bandage often because fluid, blood, or pus is coming from the wound.  · You start to have a new rash.  · You start to have numbness around the wound.  Get help right away if:  · You have very bad swelling around the wound.  · Your pain suddenly gets worse and is very bad.  · You notice painful lumps near the wound or anywhere on your body.  · You have a red streak going away from your wound.  · The wound is on your hand or foot, and:  ? You cannot move a finger or toe.  ? Your fingers or toes look pale or bluish.  Summary  · A laceration is a cut that may go through all layers of the skin. The cut may also go into the tissue right under the skin.  · Some cuts heal on their own. Others need to be closed with stitches, staples, skin adhesive  strips, or skin glue.  · Follow your doctor's instructions for caring for your cut. Proper care of a cut lowers the risk of infection, helps the cut heal better, and prevents scarring.  This information is not intended to replace advice given to you by your health care provider. Make sure you discuss any questions you have with your health care provider.  Document Released: 06/05/2009 Document Revised: 01/07/2019 Document Reviewed: 01/07/2019  [employer] Interactive Patient Education © 2019 [employer]

## 2020-01-01 ENCOUNTER — APPOINTMENT (OUTPATIENT)
Dept: CT IMAGING | Facility: HOSPITAL | Age: 85
End: 2020-01-01

## 2020-01-01 ENCOUNTER — LAB REQUISITION (OUTPATIENT)
Dept: LAB | Facility: HOSPITAL | Age: 85
End: 2020-01-01

## 2020-01-01 ENCOUNTER — CLINICAL SUPPORT (OUTPATIENT)
Dept: CARDIOLOGY | Facility: CLINIC | Age: 85
End: 2020-01-01

## 2020-01-01 ENCOUNTER — ANESTHESIA (OUTPATIENT)
Dept: PERIOP | Facility: HOSPITAL | Age: 85
End: 2020-01-01

## 2020-01-01 ENCOUNTER — HOSPITAL ENCOUNTER (EMERGENCY)
Facility: HOSPITAL | Age: 85
Discharge: HOME | End: 2020-01-10
Admitting: EMERGENCY MEDICINE

## 2020-01-01 ENCOUNTER — OFFICE VISIT (OUTPATIENT)
Dept: OTOLARYNGOLOGY | Facility: CLINIC | Age: 85
End: 2020-01-01

## 2020-01-01 ENCOUNTER — APPOINTMENT (OUTPATIENT)
Dept: NUCLEAR MEDICINE | Facility: HOSPITAL | Age: 85
End: 2020-01-01

## 2020-01-01 ENCOUNTER — ANESTHESIA EVENT (OUTPATIENT)
Dept: PERIOP | Facility: HOSPITAL | Age: 85
End: 2020-01-01

## 2020-01-01 ENCOUNTER — APPOINTMENT (OUTPATIENT)
Dept: GENERAL RADIOLOGY | Facility: HOSPITAL | Age: 85
End: 2020-01-01

## 2020-01-01 ENCOUNTER — HOSPITAL ENCOUNTER (INPATIENT)
Facility: HOSPITAL | Age: 85
LOS: 1 days | Discharge: NURSING, REHAB OR LONG-TERM CARE | End: 2020-02-08
Attending: INTERNAL MEDICINE | Admitting: INTERNAL MEDICINE

## 2020-01-01 VITALS
OXYGEN SATURATION: 100 % | SYSTOLIC BLOOD PRESSURE: 106 MMHG | DIASTOLIC BLOOD PRESSURE: 53 MMHG | HEART RATE: 82 BPM | BODY MASS INDEX: 19.4 KG/M2 | WEIGHT: 131 LBS | RESPIRATION RATE: 22 BRPM | HEIGHT: 69 IN | TEMPERATURE: 97.5 F

## 2020-01-01 VITALS
TEMPERATURE: 98 F | OXYGEN SATURATION: 92 % | HEIGHT: 68 IN | DIASTOLIC BLOOD PRESSURE: 66 MMHG | RESPIRATION RATE: 18 BRPM | WEIGHT: 145 LBS | BODY MASS INDEX: 21.98 KG/M2 | HEART RATE: 60 BPM | SYSTOLIC BLOOD PRESSURE: 116 MMHG

## 2020-01-01 VITALS
HEIGHT: 68 IN | SYSTOLIC BLOOD PRESSURE: 103 MMHG | DIASTOLIC BLOOD PRESSURE: 65 MMHG | TEMPERATURE: 98.2 F | HEART RATE: 117 BPM | WEIGHT: 140 LBS | BODY MASS INDEX: 21.22 KG/M2

## 2020-01-01 DIAGNOSIS — H60.22 MALIGNANT OTITIS EXTERNA OF LEFT EAR, UNSPECIFIED CHRONICITY: Primary | ICD-10-CM

## 2020-01-01 DIAGNOSIS — Z00.00 ROUTINE GENERAL MEDICAL EXAMINATION AT A HEALTH CARE FACILITY: ICD-10-CM

## 2020-01-01 DIAGNOSIS — H92.02 OTALGIA, LEFT: ICD-10-CM

## 2020-01-01 DIAGNOSIS — H60.312 ACUTE DIFFUSE OTITIS EXTERNA OF LEFT EAR: ICD-10-CM

## 2020-01-01 DIAGNOSIS — H70.92 MASTOIDITIS OF LEFT SIDE: Primary | ICD-10-CM

## 2020-01-01 DIAGNOSIS — I49.5 SSS (SICK SINUS SYNDROME) (HCC): ICD-10-CM

## 2020-01-01 DIAGNOSIS — Z95.0 PACEMAKER: Primary | ICD-10-CM

## 2020-01-01 DIAGNOSIS — R51.9 FACIAL PAIN, ACUTE: ICD-10-CM

## 2020-01-01 DIAGNOSIS — K11.20 PAROTITIS: Primary | ICD-10-CM

## 2020-01-01 LAB
ALBUMIN SERPL-MCNC: 2.9 G/DL (ref 3.5–5.2)
ALBUMIN SERPL-MCNC: 2.9 G/DL (ref 3.5–5.2)
ALBUMIN SERPL-MCNC: 3.4 G/DL (ref 3.5–5.2)
ALBUMIN SERPL-MCNC: 3.9 G/DL (ref 3.5–5.2)
ALBUMIN/GLOB SERPL: 1.1 G/DL
ALBUMIN/GLOB SERPL: 1.1 G/DL
ALBUMIN/GLOB SERPL: 1.3 G/DL
ALBUMIN/GLOB SERPL: 1.5 G/DL
ALP SERPL-CCNC: 40 U/L (ref 39–117)
ALP SERPL-CCNC: 43 U/L (ref 39–117)
ALP SERPL-CCNC: 48 U/L (ref 39–117)
ALP SERPL-CCNC: 52 U/L (ref 39–117)
ALT SERPL W P-5'-P-CCNC: 10 U/L (ref 1–41)
ALT SERPL W P-5'-P-CCNC: 7 U/L (ref 1–41)
ALT SERPL W P-5'-P-CCNC: 9 U/L (ref 1–41)
ALT SERPL W P-5'-P-CCNC: <5 U/L (ref 1–41)
ANION GAP SERPL CALCULATED.3IONS-SCNC: 12 MMOL/L (ref 5–15)
ANION GAP SERPL CALCULATED.3IONS-SCNC: 13 MMOL/L (ref 5–15)
ANION GAP SERPL CALCULATED.3IONS-SCNC: 15 MMOL/L (ref 5–15)
ANION GAP SERPL CALCULATED.3IONS-SCNC: 15 MMOL/L (ref 5–15)
APTT PPP: 38.2 SECONDS (ref 24.1–35)
APTT PPP: 40.5 SECONDS (ref 24.1–35)
AST SERPL-CCNC: 10 U/L (ref 1–40)
AST SERPL-CCNC: 14 U/L (ref 1–40)
AST SERPL-CCNC: 19 U/L (ref 1–40)
AST SERPL-CCNC: 19 U/L (ref 1–40)
BACTERIA SPEC AEROBE CULT: NORMAL
BACTERIA UR QL AUTO: ABNORMAL /HPF
BASOPHILS # BLD AUTO: 0.01 10*3/MM3 (ref 0–0.2)
BASOPHILS # BLD AUTO: 0.02 10*3/MM3 (ref 0–0.2)
BASOPHILS # BLD AUTO: 0.03 10*3/MM3 (ref 0–0.2)
BASOPHILS # BLD AUTO: 0.03 10*3/MM3 (ref 0–0.2)
BASOPHILS NFR BLD AUTO: 0.1 % (ref 0–1.5)
BASOPHILS NFR BLD AUTO: 0.4 % (ref 0–1.5)
BASOPHILS NFR BLD AUTO: 0.4 % (ref 0–1.5)
BASOPHILS NFR BLD AUTO: 0.6 % (ref 0–1.5)
BILIRUB SERPL-MCNC: 0.3 MG/DL (ref 0.2–1.2)
BILIRUB SERPL-MCNC: 0.4 MG/DL (ref 0.2–1.2)
BILIRUB SERPL-MCNC: 0.5 MG/DL (ref 0.2–1.2)
BILIRUB SERPL-MCNC: 0.5 MG/DL (ref 0.2–1.2)
BILIRUB UR QL STRIP: NEGATIVE
BUN BLD-MCNC: 32 MG/DL (ref 8–23)
BUN BLD-MCNC: 38 MG/DL (ref 8–23)
BUN BLD-MCNC: 46 MG/DL (ref 8–23)
BUN BLD-MCNC: 48 MG/DL (ref 8–23)
BUN BLD-MCNC: 52 MG/DL (ref 8–23)
BUN BLD-MCNC: 63 MG/DL (ref 8–23)
BUN/CREAT SERPL: 13.9 (ref 7–25)
BUN/CREAT SERPL: 22 (ref 7–25)
BUN/CREAT SERPL: 25.7 (ref 7–25)
BUN/CREAT SERPL: 26.3 (ref 7–25)
BUN/CREAT SERPL: 27.3 (ref 7–25)
BUN/CREAT SERPL: 28.7 (ref 7–25)
CALCIUM SPEC-SCNC: 7.6 MG/DL (ref 8.6–10.5)
CALCIUM SPEC-SCNC: 8 MG/DL (ref 8.6–10.5)
CALCIUM SPEC-SCNC: 8 MG/DL (ref 8.6–10.5)
CALCIUM SPEC-SCNC: 8.6 MG/DL (ref 8.6–10.5)
CALCIUM SPEC-SCNC: 8.8 MG/DL (ref 8.6–10.5)
CALCIUM SPEC-SCNC: 9.2 MG/DL (ref 8.6–10.5)
CHLORIDE SERPL-SCNC: 103 MMOL/L (ref 98–107)
CHLORIDE SERPL-SCNC: 106 MMOL/L (ref 98–107)
CHLORIDE SERPL-SCNC: 106 MMOL/L (ref 98–107)
CHLORIDE SERPL-SCNC: 108 MMOL/L (ref 98–107)
CHLORIDE SERPL-SCNC: 110 MMOL/L (ref 98–107)
CHLORIDE SERPL-SCNC: 98 MMOL/L (ref 98–107)
CLARITY UR: CLEAR
CO2 SERPL-SCNC: 20 MMOL/L (ref 22–29)
CO2 SERPL-SCNC: 20 MMOL/L (ref 22–29)
CO2 SERPL-SCNC: 22 MMOL/L (ref 22–29)
CO2 SERPL-SCNC: 23 MMOL/L (ref 22–29)
CO2 SERPL-SCNC: 23 MMOL/L (ref 22–29)
CO2 SERPL-SCNC: 24 MMOL/L (ref 22–29)
COLOR UR: YELLOW
CREAT BLD-MCNC: 1.73 MG/DL (ref 0.76–1.27)
CREAT BLD-MCNC: 1.76 MG/DL (ref 0.76–1.27)
CREAT BLD-MCNC: 1.79 MG/DL (ref 0.76–1.27)
CREAT BLD-MCNC: 1.81 MG/DL (ref 0.76–1.27)
CREAT BLD-MCNC: 2.31 MG/DL (ref 0.76–1.27)
CREAT BLD-MCNC: 2.4 MG/DL (ref 0.76–1.27)
CRP SERPL-MCNC: 13.57 MG/DL (ref 0–0.5)
CYTO UR: NORMAL
D-LACTATE SERPL-SCNC: 1.3 MMOL/L (ref 0.5–2)
DEPRECATED RDW RBC AUTO: 53.7 FL (ref 37–54)
DEPRECATED RDW RBC AUTO: 67.4 FL (ref 37–54)
DEPRECATED RDW RBC AUTO: 67.6 FL (ref 37–54)
DEPRECATED RDW RBC AUTO: 68.1 FL (ref 37–54)
DEPRECATED RDW RBC AUTO: 78.4 FL (ref 37–54)
EOSINOPHIL # BLD AUTO: 0.03 10*3/MM3 (ref 0–0.4)
EOSINOPHIL # BLD AUTO: 0.03 10*3/MM3 (ref 0–0.4)
EOSINOPHIL # BLD AUTO: 0.04 10*3/MM3 (ref 0–0.4)
EOSINOPHIL # BLD AUTO: 0.05 10*3/MM3 (ref 0–0.4)
EOSINOPHIL NFR BLD AUTO: 0.4 % (ref 0.3–6.2)
EOSINOPHIL NFR BLD AUTO: 0.6 % (ref 0.3–6.2)
EOSINOPHIL NFR BLD AUTO: 0.7 % (ref 0.3–6.2)
EOSINOPHIL NFR BLD AUTO: 0.8 % (ref 0.3–6.2)
ERYTHROCYTE [DISTWIDTH] IN BLOOD BY AUTOMATED COUNT: 15.7 % (ref 12.3–15.4)
ERYTHROCYTE [DISTWIDTH] IN BLOOD BY AUTOMATED COUNT: 18.9 % (ref 12.3–15.4)
ERYTHROCYTE [DISTWIDTH] IN BLOOD BY AUTOMATED COUNT: 19 % (ref 12.3–15.4)
ERYTHROCYTE [DISTWIDTH] IN BLOOD BY AUTOMATED COUNT: 19.2 % (ref 12.3–15.4)
ERYTHROCYTE [DISTWIDTH] IN BLOOD BY AUTOMATED COUNT: 20.9 % (ref 12.3–15.4)
ERYTHROCYTE [SEDIMENTATION RATE] IN BLOOD: 41 MM/HR (ref 0–15)
GFR SERPL CREATININE-BSD FRML MDRD: 26 ML/MIN/1.73
GFR SERPL CREATININE-BSD FRML MDRD: 27 ML/MIN/1.73
GFR SERPL CREATININE-BSD FRML MDRD: 36 ML/MIN/1.73
GFR SERPL CREATININE-BSD FRML MDRD: 36 ML/MIN/1.73
GFR SERPL CREATININE-BSD FRML MDRD: 37 ML/MIN/1.73
GFR SERPL CREATININE-BSD FRML MDRD: 37 ML/MIN/1.73
GLOBULIN UR ELPH-MCNC: 2 GM/DL
GLOBULIN UR ELPH-MCNC: 2.6 GM/DL
GLOBULIN UR ELPH-MCNC: 3.1 GM/DL
GLOBULIN UR ELPH-MCNC: 3.2 GM/DL
GLUCOSE BLD-MCNC: 101 MG/DL (ref 65–99)
GLUCOSE BLD-MCNC: 122 MG/DL (ref 65–99)
GLUCOSE BLD-MCNC: 136 MG/DL (ref 65–99)
GLUCOSE BLD-MCNC: 148 MG/DL (ref 65–99)
GLUCOSE BLD-MCNC: 87 MG/DL (ref 65–99)
GLUCOSE BLD-MCNC: 88 MG/DL (ref 65–99)
GLUCOSE BLDC GLUCOMTR-MCNC: 102 MG/DL (ref 70–130)
GLUCOSE BLDC GLUCOMTR-MCNC: 103 MG/DL (ref 70–130)
GLUCOSE BLDC GLUCOMTR-MCNC: 103 MG/DL (ref 70–130)
GLUCOSE BLDC GLUCOMTR-MCNC: 104 MG/DL (ref 70–130)
GLUCOSE BLDC GLUCOMTR-MCNC: 110 MG/DL (ref 70–130)
GLUCOSE BLDC GLUCOMTR-MCNC: 114 MG/DL (ref 70–130)
GLUCOSE BLDC GLUCOMTR-MCNC: 119 MG/DL (ref 70–130)
GLUCOSE BLDC GLUCOMTR-MCNC: 128 MG/DL (ref 70–130)
GLUCOSE BLDC GLUCOMTR-MCNC: 135 MG/DL (ref 70–130)
GLUCOSE BLDC GLUCOMTR-MCNC: 160 MG/DL (ref 70–130)
GLUCOSE BLDC GLUCOMTR-MCNC: 192 MG/DL (ref 70–130)
GLUCOSE BLDC GLUCOMTR-MCNC: 85 MG/DL (ref 70–130)
GLUCOSE BLDC GLUCOMTR-MCNC: 93 MG/DL (ref 70–130)
GLUCOSE BLDC GLUCOMTR-MCNC: 94 MG/DL (ref 70–130)
GLUCOSE UR STRIP-MCNC: NEGATIVE MG/DL
HBA1C MFR BLD: 6 % (ref 4.8–5.6)
HCT VFR BLD AUTO: 23.7 % (ref 37.5–51)
HCT VFR BLD AUTO: 25.1 % (ref 37.5–51)
HCT VFR BLD AUTO: 25.5 % (ref 37.5–51)
HCT VFR BLD AUTO: 27.1 % (ref 37.5–51)
HCT VFR BLD AUTO: 27.5 % (ref 37.5–51)
HGB BLD-MCNC: 7.4 G/DL (ref 13–17.7)
HGB BLD-MCNC: 7.4 G/DL (ref 13–17.7)
HGB BLD-MCNC: 7.8 G/DL (ref 13–17.7)
HGB BLD-MCNC: 8.4 G/DL (ref 13–17.7)
HGB BLD-MCNC: 9 G/DL (ref 13–17.7)
HGB UR QL STRIP.AUTO: NEGATIVE
HOLD SPECIMEN: NORMAL
HYALINE CASTS UR QL AUTO: ABNORMAL /LPF
IMM GRANULOCYTES # BLD AUTO: 0.03 10*3/MM3 (ref 0–0.05)
IMM GRANULOCYTES # BLD AUTO: 0.04 10*3/MM3 (ref 0–0.05)
IMM GRANULOCYTES # BLD AUTO: 0.06 10*3/MM3 (ref 0–0.05)
IMM GRANULOCYTES NFR BLD AUTO: 0.6 % (ref 0–0.5)
IMM GRANULOCYTES NFR BLD AUTO: 0.7 % (ref 0–0.5)
IMM GRANULOCYTES NFR BLD AUTO: 0.9 % (ref 0–0.5)
INR PPP: 1.14 (ref 0.91–1.09)
INR PPP: 1.21 (ref 0.91–1.09)
KETONES UR QL STRIP: NEGATIVE
LAB AP CASE REPORT: NORMAL
LEUKOCYTE ESTERASE UR QL STRIP.AUTO: NEGATIVE
LYMPHOCYTES # BLD AUTO: 0.37 10*3/MM3 (ref 0.7–3.1)
LYMPHOCYTES # BLD AUTO: 0.44 10*3/MM3 (ref 0.7–3.1)
LYMPHOCYTES # BLD AUTO: 0.57 10*3/MM3 (ref 0.7–3.1)
LYMPHOCYTES # BLD AUTO: 0.78 10*3/MM3 (ref 0.7–3.1)
LYMPHOCYTES NFR BLD AUTO: 16.1 % (ref 19.6–45.3)
LYMPHOCYTES NFR BLD AUTO: 5.8 % (ref 19.6–45.3)
LYMPHOCYTES NFR BLD AUTO: 6.8 % (ref 19.6–45.3)
LYMPHOCYTES NFR BLD AUTO: 8.2 % (ref 19.6–45.3)
MAGNESIUM SERPL-MCNC: 2.1 MG/DL (ref 1.6–2.4)
MCH RBC QN AUTO: 30.4 PG (ref 26.6–33)
MCH RBC QN AUTO: 30.5 PG (ref 26.6–33)
MCH RBC QN AUTO: 30.6 PG (ref 26.6–33)
MCH RBC QN AUTO: 31 PG (ref 26.6–33)
MCH RBC QN AUTO: 31 PG (ref 26.6–33)
MCHC RBC AUTO-ENTMCNC: 29 G/DL (ref 31.5–35.7)
MCHC RBC AUTO-ENTMCNC: 31 G/DL (ref 31.5–35.7)
MCHC RBC AUTO-ENTMCNC: 31.1 G/DL (ref 31.5–35.7)
MCHC RBC AUTO-ENTMCNC: 31.2 G/DL (ref 31.5–35.7)
MCHC RBC AUTO-ENTMCNC: 32.7 G/DL (ref 31.5–35.7)
MCV RBC AUTO: 105.4 FL (ref 79–97)
MCV RBC AUTO: 94.8 FL (ref 79–97)
MCV RBC AUTO: 98 FL (ref 79–97)
MCV RBC AUTO: 98.2 FL (ref 79–97)
MCV RBC AUTO: 99.2 FL (ref 79–97)
MONOCYTES # BLD AUTO: 0.35 10*3/MM3 (ref 0.1–0.9)
MONOCYTES # BLD AUTO: 0.51 10*3/MM3 (ref 0.1–0.9)
MONOCYTES # BLD AUTO: 0.53 10*3/MM3 (ref 0.1–0.9)
MONOCYTES # BLD AUTO: 0.69 10*3/MM3 (ref 0.1–0.9)
MONOCYTES NFR BLD AUTO: 6.7 % (ref 5–12)
MONOCYTES NFR BLD AUTO: 7.2 % (ref 5–12)
MONOCYTES NFR BLD AUTO: 9.8 % (ref 5–12)
MONOCYTES NFR BLD AUTO: 9.9 % (ref 5–12)
NEUTROPHILS # BLD AUTO: 3.62 10*3/MM3 (ref 1.7–7)
NEUTROPHILS # BLD AUTO: 4.44 10*3/MM3 (ref 1.7–7)
NEUTROPHILS # BLD AUTO: 5.56 10*3/MM3 (ref 1.7–7)
NEUTROPHILS # BLD AUTO: 6.52 10*3/MM3 (ref 1.7–7)
NEUTROPHILS NFR BLD AUTO: 74.7 % (ref 42.7–76)
NEUTROPHILS NFR BLD AUTO: 79.9 % (ref 42.7–76)
NEUTROPHILS NFR BLD AUTO: 81.7 % (ref 42.7–76)
NEUTROPHILS NFR BLD AUTO: 85.9 % (ref 42.7–76)
NITRITE UR QL STRIP: NEGATIVE
NRBC BLD AUTO-RTO: 0 /100 WBC (ref 0–0.2)
PATH REPORT.FINAL DX SPEC: NORMAL
PATH REPORT.GROSS SPEC: NORMAL
PH UR STRIP.AUTO: 6.5 [PH] (ref 5–8)
PHOSPHATE SERPL-MCNC: 3 MG/DL (ref 2.5–4.5)
PLATELET # BLD AUTO: 116 10*3/MM3 (ref 140–450)
PLATELET # BLD AUTO: 127 10*3/MM3 (ref 140–450)
PLATELET # BLD AUTO: 133 10*3/MM3 (ref 140–450)
PLATELET # BLD AUTO: 141 10*3/MM3 (ref 140–450)
PLATELET # BLD AUTO: 91 10*3/MM3 (ref 140–450)
PMV BLD AUTO: 10 FL (ref 6–12)
PMV BLD AUTO: 10.2 FL (ref 6–12)
PMV BLD AUTO: 9.6 FL (ref 6–12)
PMV BLD AUTO: 9.8 FL (ref 6–12)
PMV BLD AUTO: 9.9 FL (ref 6–12)
POTASSIUM BLD-SCNC: 3.9 MMOL/L (ref 3.5–5.2)
POTASSIUM BLD-SCNC: 4 MMOL/L (ref 3.5–5.2)
POTASSIUM BLD-SCNC: 4 MMOL/L (ref 3.5–5.2)
POTASSIUM BLD-SCNC: 4.2 MMOL/L (ref 3.5–5.2)
POTASSIUM BLD-SCNC: 4.2 MMOL/L (ref 3.5–5.2)
POTASSIUM BLD-SCNC: 4.4 MMOL/L (ref 3.5–5.2)
PROT SERPL-MCNC: 4.9 G/DL (ref 6–8.5)
PROT SERPL-MCNC: 5.5 G/DL (ref 6–8.5)
PROT SERPL-MCNC: 6.6 G/DL (ref 6–8.5)
PROT SERPL-MCNC: 7 G/DL (ref 6–8.5)
PROT UR QL STRIP: ABNORMAL
PROTHROMBIN TIME: 15 SECONDS (ref 11.9–14.6)
PROTHROMBIN TIME: 15.7 SECONDS (ref 11.9–14.6)
RBC # BLD AUTO: 2.39 10*6/MM3 (ref 4.14–5.8)
RBC # BLD AUTO: 2.42 10*6/MM3 (ref 4.14–5.8)
RBC # BLD AUTO: 2.56 10*6/MM3 (ref 4.14–5.8)
RBC # BLD AUTO: 2.76 10*6/MM3 (ref 4.14–5.8)
RBC # BLD AUTO: 2.9 10*6/MM3 (ref 4.14–5.8)
RBC # UR: ABNORMAL /HPF
REF LAB TEST METHOD: ABNORMAL
SODIUM BLD-SCNC: 135 MMOL/L (ref 136–145)
SODIUM BLD-SCNC: 140 MMOL/L (ref 136–145)
SODIUM BLD-SCNC: 141 MMOL/L (ref 136–145)
SODIUM BLD-SCNC: 141 MMOL/L (ref 136–145)
SODIUM BLD-SCNC: 143 MMOL/L (ref 136–145)
SODIUM BLD-SCNC: 144 MMOL/L (ref 136–145)
SP GR UR STRIP: 1.02 (ref 1–1.03)
SQUAMOUS #/AREA URNS HPF: ABNORMAL /HPF
UROBILINOGEN UR QL STRIP: ABNORMAL
WBC NRBC COR # BLD: 4.81 10*3/MM3 (ref 3.4–10.8)
WBC NRBC COR # BLD: 4.85 10*3/MM3 (ref 3.4–10.8)
WBC NRBC COR # BLD: 5.43 10*3/MM3 (ref 3.4–10.8)
WBC NRBC COR # BLD: 6.96 10*3/MM3 (ref 3.4–10.8)
WBC NRBC COR # BLD: 7.59 10*3/MM3 (ref 3.4–10.8)
WBC UR QL AUTO: ABNORMAL /HPF
WHOLE BLOOD HOLD SPECIMEN: NORMAL

## 2020-01-01 PROCEDURE — 25010000002 ONDANSETRON PER 1 MG: Performed by: NURSE PRACTITIONER

## 2020-01-01 PROCEDURE — A9561 TC99M OXIDRONATE: HCPCS | Performed by: FAMILY MEDICINE

## 2020-01-01 PROCEDURE — 70450 CT HEAD/BRAIN W/O DYE: CPT

## 2020-01-01 PROCEDURE — 99221 1ST HOSP IP/OBS SF/LOW 40: CPT | Performed by: OTOLARYNGOLOGY

## 2020-01-01 PROCEDURE — 63710000001 INSULIN LISPRO (HUMAN) PER 5 UNITS: Performed by: OTOLARYNGOLOGY

## 2020-01-01 PROCEDURE — 94799 UNLISTED PULMONARY SVC/PX: CPT

## 2020-01-01 PROCEDURE — 85027 COMPLETE CBC AUTOMATED: CPT | Performed by: NURSE PRACTITIONER

## 2020-01-01 PROCEDURE — 85651 RBC SED RATE NONAUTOMATED: CPT | Performed by: INTERNAL MEDICINE

## 2020-01-01 PROCEDURE — 72125 CT NECK SPINE W/O DYE: CPT

## 2020-01-01 PROCEDURE — G0378 HOSPITAL OBSERVATION PER HR: HCPCS

## 2020-01-01 PROCEDURE — 86140 C-REACTIVE PROTEIN: CPT | Performed by: INTERNAL MEDICINE

## 2020-01-01 PROCEDURE — 70480 CT ORBIT/EAR/FOSSA W/O DYE: CPT

## 2020-01-01 PROCEDURE — 80048 BASIC METABOLIC PNL TOTAL CA: CPT | Performed by: NURSE PRACTITIONER

## 2020-01-01 PROCEDURE — 80053 COMPREHEN METABOLIC PANEL: CPT | Performed by: INTERNAL MEDICINE

## 2020-01-01 PROCEDURE — 0 TECHNETIUM OXIDRONATE KIT: Performed by: FAMILY MEDICINE

## 2020-01-01 PROCEDURE — 69436 CREATE EARDRUM OPENING: CPT | Performed by: OTOLARYNGOLOGY

## 2020-01-01 PROCEDURE — 82962 GLUCOSE BLOOD TEST: CPT

## 2020-01-01 PROCEDURE — 36415 COLL VENOUS BLD VENIPUNCTURE: CPT

## 2020-01-01 PROCEDURE — 85730 THROMBOPLASTIN TIME PARTIAL: CPT | Performed by: PHYSICIAN ASSISTANT

## 2020-01-01 PROCEDURE — 93294 REM INTERROG EVL PM/LDLS PM: CPT | Performed by: INTERNAL MEDICINE

## 2020-01-01 PROCEDURE — 85610 PROTHROMBIN TIME: CPT | Performed by: NURSE PRACTITIONER

## 2020-01-01 PROCEDURE — 85730 THROMBOPLASTIN TIME PARTIAL: CPT | Performed by: NURSE PRACTITIONER

## 2020-01-01 PROCEDURE — 99213 OFFICE O/P EST LOW 20 MIN: CPT | Performed by: OTOLARYNGOLOGY

## 2020-01-01 PROCEDURE — 99024 POSTOP FOLLOW-UP VISIT: CPT | Performed by: OTOLARYNGOLOGY

## 2020-01-01 PROCEDURE — 85025 COMPLETE CBC W/AUTO DIFF WBC: CPT | Performed by: INTERNAL MEDICINE

## 2020-01-01 PROCEDURE — 96374 THER/PROPH/DIAG INJ IV PUSH: CPT

## 2020-01-01 PROCEDURE — 09B60ZX EXCISION OF LEFT MIDDLE EAR, OPEN APPROACH, DIAGNOSTIC: ICD-10-PCS | Performed by: OTOLARYNGOLOGY

## 2020-01-01 PROCEDURE — 99283 EMERGENCY DEPT VISIT LOW MDM: CPT

## 2020-01-01 PROCEDURE — 85025 COMPLETE CBC W/AUTO DIFF WBC: CPT | Performed by: PHYSICIAN ASSISTANT

## 2020-01-01 PROCEDURE — 83735 ASSAY OF MAGNESIUM: CPT | Performed by: INTERNAL MEDICINE

## 2020-01-01 PROCEDURE — 87040 BLOOD CULTURE FOR BACTERIA: CPT | Performed by: NURSE PRACTITIONER

## 2020-01-01 PROCEDURE — 25010000002 LEVOFLOXACIN PER 250 MG: Performed by: OTOLARYNGOLOGY

## 2020-01-01 PROCEDURE — 85025 COMPLETE CBC W/AUTO DIFF WBC: CPT | Performed by: NURSE PRACTITIONER

## 2020-01-01 PROCEDURE — 69105 BIOPSY OF EXTERNAL EAR CANAL: CPT | Performed by: OTOLARYNGOLOGY

## 2020-01-01 PROCEDURE — 88305 TISSUE EXAM BY PATHOLOGIST: CPT | Performed by: OTOLARYNGOLOGY

## 2020-01-01 PROCEDURE — 93296 REM INTERROG EVL PM/IDS: CPT | Performed by: INTERNAL MEDICINE

## 2020-01-01 PROCEDURE — 87040 BLOOD CULTURE FOR BACTERIA: CPT | Performed by: PHYSICIAN ASSISTANT

## 2020-01-01 PROCEDURE — 84100 ASSAY OF PHOSPHORUS: CPT | Performed by: INTERNAL MEDICINE

## 2020-01-01 PROCEDURE — 80053 COMPREHEN METABOLIC PANEL: CPT | Performed by: PHYSICIAN ASSISTANT

## 2020-01-01 PROCEDURE — 25010000002 PROPOFOL 10 MG/ML EMULSION: Performed by: NURSE ANESTHETIST, CERTIFIED REGISTERED

## 2020-01-01 PROCEDURE — 85025 COMPLETE CBC W/AUTO DIFF WBC: CPT

## 2020-01-01 PROCEDURE — 85610 PROTHROMBIN TIME: CPT | Performed by: PHYSICIAN ASSISTANT

## 2020-01-01 PROCEDURE — 71045 X-RAY EXAM CHEST 1 VIEW: CPT

## 2020-01-01 PROCEDURE — 80053 COMPREHEN METABOLIC PANEL: CPT | Performed by: NURSE PRACTITIONER

## 2020-01-01 PROCEDURE — 81001 URINALYSIS AUTO W/SCOPE: CPT | Performed by: NURSE PRACTITIONER

## 2020-01-01 PROCEDURE — 78315 BONE IMAGING 3 PHASE: CPT

## 2020-01-01 PROCEDURE — 09960ZZ DRAINAGE OF LEFT MIDDLE EAR, OPEN APPROACH: ICD-10-PCS | Performed by: OTOLARYNGOLOGY

## 2020-01-01 PROCEDURE — 80053 COMPREHEN METABOLIC PANEL: CPT

## 2020-01-01 PROCEDURE — 96375 TX/PRO/DX INJ NEW DRUG ADDON: CPT

## 2020-01-01 PROCEDURE — 99284 EMERGENCY DEPT VISIT MOD MDM: CPT

## 2020-01-01 PROCEDURE — 25010000002 LEVOFLOXACIN PER 250 MG: Performed by: PHYSICIAN ASSISTANT

## 2020-01-01 PROCEDURE — 36415 COLL VENOUS BLD VENIPUNCTURE: CPT | Performed by: INTERNAL MEDICINE

## 2020-01-01 PROCEDURE — 83605 ASSAY OF LACTIC ACID: CPT | Performed by: PHYSICIAN ASSISTANT

## 2020-01-01 PROCEDURE — 83036 HEMOGLOBIN GLYCOSYLATED A1C: CPT | Performed by: INTERNAL MEDICINE

## 2020-01-01 RX ORDER — CALCITRIOL 0.25 UG/1
0.25 CAPSULE, LIQUID FILLED ORAL DAILY
Status: DISCONTINUED | OUTPATIENT
Start: 2020-01-01 | End: 2020-01-01 | Stop reason: HOSPADM

## 2020-01-01 RX ORDER — METAXALONE 800 MG/1
400 TABLET ORAL 3 TIMES DAILY
Status: DISCONTINUED | OUTPATIENT
Start: 2020-01-01 | End: 2020-01-01

## 2020-01-01 RX ORDER — ACETAMINOPHEN 160 MG/5ML
650 SOLUTION ORAL EVERY 4 HOURS PRN
Status: DISCONTINUED | OUTPATIENT
Start: 2020-01-01 | End: 2020-01-01 | Stop reason: HOSPADM

## 2020-01-01 RX ORDER — QUETIAPINE FUMARATE 25 MG/1
12.5 TABLET, FILM COATED ORAL EVERY 6 HOURS PRN
Status: DISCONTINUED | OUTPATIENT
Start: 2020-01-01 | End: 2020-01-01 | Stop reason: HOSPADM

## 2020-01-01 RX ORDER — ONDANSETRON 2 MG/ML
4 INJECTION INTRAMUSCULAR; INTRAVENOUS AS NEEDED
Status: DISCONTINUED | OUTPATIENT
Start: 2020-01-01 | End: 2020-01-01 | Stop reason: HOSPADM

## 2020-01-01 RX ORDER — ACETAMINOPHEN 650 MG/1
650 SUPPOSITORY RECTAL EVERY 4 HOURS PRN
Status: DISCONTINUED | OUTPATIENT
Start: 2020-01-01 | End: 2020-01-01 | Stop reason: HOSPADM

## 2020-01-01 RX ORDER — ACETAMINOPHEN 325 MG/1
650 TABLET ORAL EVERY 4 HOURS PRN
Status: DISCONTINUED | OUTPATIENT
Start: 2020-01-01 | End: 2020-01-01 | Stop reason: HOSPADM

## 2020-01-01 RX ORDER — CEFDINIR 300 MG/1
300 CAPSULE ORAL 2 TIMES DAILY
Qty: 20 CAPSULE | Refills: 0 | Status: SHIPPED | OUTPATIENT
Start: 2020-01-01 | End: 2020-01-01

## 2020-01-01 RX ORDER — LEVOFLOXACIN 5 MG/ML
750 INJECTION, SOLUTION INTRAVENOUS ONCE
Status: COMPLETED | OUTPATIENT
Start: 2020-01-01 | End: 2020-01-01

## 2020-01-01 RX ORDER — FENTANYL CITRATE 50 UG/ML
25 INJECTION, SOLUTION INTRAMUSCULAR; INTRAVENOUS
Status: DISCONTINUED | OUTPATIENT
Start: 2020-01-01 | End: 2020-01-01 | Stop reason: HOSPADM

## 2020-01-01 RX ORDER — MORPHINE SULFATE 2 MG/ML
2 INJECTION, SOLUTION INTRAMUSCULAR; INTRAVENOUS
Status: DISCONTINUED | OUTPATIENT
Start: 2020-01-01 | End: 2020-01-01 | Stop reason: HOSPADM

## 2020-01-01 RX ORDER — HYDROCODONE BITARTRATE AND ACETAMINOPHEN 5; 325 MG/1; MG/1
1 TABLET ORAL EVERY 4 HOURS PRN
Status: DISCONTINUED | OUTPATIENT
Start: 2020-01-01 | End: 2020-01-01 | Stop reason: HOSPADM

## 2020-01-01 RX ORDER — ACETAMINOPHEN 325 MG/1
650 TABLET ORAL EVERY 4 HOURS PRN
COMMUNITY

## 2020-01-01 RX ORDER — ONDANSETRON 2 MG/ML
4 INJECTION INTRAMUSCULAR; INTRAVENOUS ONCE
Status: COMPLETED | OUTPATIENT
Start: 2020-01-01 | End: 2020-01-01

## 2020-01-01 RX ORDER — SODIUM CHLORIDE, SODIUM LACTATE, POTASSIUM CHLORIDE, CALCIUM CHLORIDE 600; 310; 30; 20 MG/100ML; MG/100ML; MG/100ML; MG/100ML
9 INJECTION, SOLUTION INTRAVENOUS CONTINUOUS
Status: DISCONTINUED | OUTPATIENT
Start: 2020-01-01 | End: 2020-01-01

## 2020-01-01 RX ORDER — HYDRALAZINE HYDROCHLORIDE 20 MG/ML
5 INJECTION INTRAMUSCULAR; INTRAVENOUS
Status: DISCONTINUED | OUTPATIENT
Start: 2020-01-01 | End: 2020-01-01 | Stop reason: HOSPADM

## 2020-01-01 RX ORDER — SERTRALINE HYDROCHLORIDE 100 MG/1
100 TABLET, FILM COATED ORAL DAILY
Status: DISCONTINUED | OUTPATIENT
Start: 2020-01-01 | End: 2020-01-01 | Stop reason: HOSPADM

## 2020-01-01 RX ORDER — SODIUM CHLORIDE 0.9 % (FLUSH) 0.9 %
10 SYRINGE (ML) INJECTION AS NEEDED
Status: DISCONTINUED | OUTPATIENT
Start: 2020-01-01 | End: 2020-01-01 | Stop reason: HOSPADM

## 2020-01-01 RX ORDER — SODIUM BICARBONATE 650 MG/1
650 TABLET ORAL 2 TIMES DAILY
Status: DISCONTINUED | OUTPATIENT
Start: 2020-01-01 | End: 2020-01-01 | Stop reason: HOSPADM

## 2020-01-01 RX ORDER — HYDROCODONE BITARTRATE AND ACETAMINOPHEN 5; 325 MG/1; MG/1
1 TABLET ORAL EVERY 6 HOURS PRN
Qty: 12 TABLET | Refills: 0 | Status: SHIPPED | OUTPATIENT
Start: 2020-01-01

## 2020-01-01 RX ORDER — OXYCODONE AND ACETAMINOPHEN 10; 325 MG/1; MG/1
1 TABLET ORAL ONCE AS NEEDED
Status: DISCONTINUED | OUTPATIENT
Start: 2020-01-01 | End: 2020-01-01 | Stop reason: HOSPADM

## 2020-01-01 RX ORDER — LEVOFLOXACIN 500 MG/1
500 TABLET, FILM COATED ORAL EVERY 24 HOURS
Start: 2020-01-01 | End: 2020-03-20

## 2020-01-01 RX ORDER — FENTANYL CITRATE 50 UG/ML
25 INJECTION, SOLUTION INTRAMUSCULAR; INTRAVENOUS AS NEEDED
Status: DISCONTINUED | OUTPATIENT
Start: 2020-01-01 | End: 2020-01-01 | Stop reason: HOSPADM

## 2020-01-01 RX ORDER — DEXTROSE MONOHYDRATE 25 G/50ML
12.5 INJECTION, SOLUTION INTRAVENOUS AS NEEDED
Status: DISCONTINUED | OUTPATIENT
Start: 2020-01-01 | End: 2020-01-01 | Stop reason: HOSPADM

## 2020-01-01 RX ORDER — LIDOCAINE HYDROCHLORIDE 10 MG/ML
0.5 INJECTION, SOLUTION EPIDURAL; INFILTRATION; INTRACAUDAL; PERINEURAL ONCE AS NEEDED
Status: DISCONTINUED | OUTPATIENT
Start: 2020-01-01 | End: 2020-01-01 | Stop reason: HOSPADM

## 2020-01-01 RX ORDER — SACCHAROMYCES BOULARDII 250 MG
250 CAPSULE ORAL 2 TIMES DAILY
Status: DISCONTINUED | OUTPATIENT
Start: 2020-01-01 | End: 2020-01-01 | Stop reason: HOSPADM

## 2020-01-01 RX ORDER — ACETAMINOPHEN AND CODEINE PHOSPHATE 300; 30 MG/1; MG/1
1-2 TABLET ORAL EVERY 6 HOURS PRN
Qty: 24 TABLET | Refills: 0 | Status: SHIPPED | OUTPATIENT
Start: 2020-01-01 | End: 2020-01-01

## 2020-01-01 RX ORDER — LEVOFLOXACIN 5 MG/ML
250 INJECTION, SOLUTION INTRAVENOUS EVERY 24 HOURS
Status: DISCONTINUED | OUTPATIENT
Start: 2020-01-01 | End: 2020-01-01

## 2020-01-01 RX ORDER — ONDANSETRON 2 MG/ML
4 INJECTION INTRAMUSCULAR; INTRAVENOUS EVERY 6 HOURS PRN
Status: DISCONTINUED | OUTPATIENT
Start: 2020-01-01 | End: 2020-01-01 | Stop reason: HOSPADM

## 2020-01-01 RX ORDER — ONDANSETRON 4 MG/1
4 TABLET, FILM COATED ORAL EVERY 6 HOURS PRN
Status: DISCONTINUED | OUTPATIENT
Start: 2020-01-01 | End: 2020-01-01 | Stop reason: HOSPADM

## 2020-01-01 RX ORDER — PROPOFOL 10 MG/ML
VIAL (ML) INTRAVENOUS AS NEEDED
Status: DISCONTINUED | OUTPATIENT
Start: 2020-01-01 | End: 2020-01-01 | Stop reason: SURG

## 2020-01-01 RX ORDER — IPRATROPIUM BROMIDE AND ALBUTEROL SULFATE 2.5; .5 MG/3ML; MG/3ML
3 SOLUTION RESPIRATORY (INHALATION) ONCE AS NEEDED
Status: DISCONTINUED | OUTPATIENT
Start: 2020-01-01 | End: 2020-01-01 | Stop reason: HOSPADM

## 2020-01-01 RX ORDER — MEGESTROL ACETATE 40 MG/ML
800 SUSPENSION ORAL DAILY
Status: DISCONTINUED | OUTPATIENT
Start: 2020-01-01 | End: 2020-01-01 | Stop reason: HOSPADM

## 2020-01-01 RX ORDER — DEXTROSE MONOHYDRATE 25 G/50ML
25 INJECTION, SOLUTION INTRAVENOUS
Status: DISCONTINUED | OUTPATIENT
Start: 2020-01-01 | End: 2020-01-01 | Stop reason: HOSPADM

## 2020-01-01 RX ORDER — QUETIAPINE FUMARATE 25 MG/1
25 TABLET, FILM COATED ORAL NIGHTLY
Start: 2020-01-01

## 2020-01-01 RX ORDER — CARVEDILOL 6.25 MG/1
6.25 TABLET ORAL 2 TIMES DAILY WITH MEALS
COMMUNITY

## 2020-01-01 RX ORDER — FLUMAZENIL 0.1 MG/ML
0.2 INJECTION INTRAVENOUS AS NEEDED
Status: DISCONTINUED | OUTPATIENT
Start: 2020-01-01 | End: 2020-01-01 | Stop reason: HOSPADM

## 2020-01-01 RX ORDER — CARVEDILOL 6.25 MG/1
6.25 TABLET ORAL 2 TIMES DAILY WITH MEALS
Status: DISCONTINUED | OUTPATIENT
Start: 2020-01-01 | End: 2020-01-01 | Stop reason: HOSPADM

## 2020-01-01 RX ORDER — PANTOPRAZOLE SODIUM 40 MG/1
40 TABLET, DELAYED RELEASE ORAL DAILY
COMMUNITY

## 2020-01-01 RX ORDER — HYDROCODONE BITARTRATE AND ACETAMINOPHEN 5; 325 MG/1; MG/1
1 TABLET ORAL EVERY 6 HOURS PRN
Status: ON HOLD | COMMUNITY
End: 2020-01-01 | Stop reason: SDUPTHER

## 2020-01-01 RX ORDER — LEVOFLOXACIN 5 MG/ML
750 INJECTION, SOLUTION INTRAVENOUS
Status: DISCONTINUED | OUTPATIENT
Start: 2020-01-01 | End: 2020-01-01

## 2020-01-01 RX ORDER — QUETIAPINE FUMARATE 25 MG/1
25 TABLET, FILM COATED ORAL NIGHTLY
Status: DISCONTINUED | OUTPATIENT
Start: 2020-01-01 | End: 2020-01-01 | Stop reason: HOSPADM

## 2020-01-01 RX ORDER — SODIUM BICARBONATE 650 MG/1
650 TABLET ORAL 2 TIMES DAILY
COMMUNITY

## 2020-01-01 RX ORDER — MULTIVIT,CALC,MINS/IRON/FOLIC 9MG-400MCG
1 TABLET ORAL DAILY
Status: DISCONTINUED | OUTPATIENT
Start: 2020-01-01 | End: 2020-01-01 | Stop reason: HOSPADM

## 2020-01-01 RX ORDER — NICOTINE POLACRILEX 4 MG
15 LOZENGE BUCCAL
Status: DISCONTINUED | OUTPATIENT
Start: 2020-01-01 | End: 2020-01-01 | Stop reason: HOSPADM

## 2020-01-01 RX ORDER — PANTOPRAZOLE SODIUM 40 MG/1
40 TABLET, DELAYED RELEASE ORAL
Status: DISCONTINUED | OUTPATIENT
Start: 2020-01-01 | End: 2020-01-01 | Stop reason: HOSPADM

## 2020-01-01 RX ORDER — NALOXONE HCL 0.4 MG/ML
0.04 VIAL (ML) INJECTION AS NEEDED
Status: DISCONTINUED | OUTPATIENT
Start: 2020-01-01 | End: 2020-01-01 | Stop reason: HOSPADM

## 2020-01-01 RX ORDER — SODIUM CHLORIDE 0.9 % (FLUSH) 0.9 %
10 SYRINGE (ML) INJECTION EVERY 12 HOURS SCHEDULED
Status: DISCONTINUED | OUTPATIENT
Start: 2020-01-01 | End: 2020-01-01 | Stop reason: HOSPADM

## 2020-01-01 RX ORDER — LABETALOL HYDROCHLORIDE 5 MG/ML
5 INJECTION, SOLUTION INTRAVENOUS
Status: DISCONTINUED | OUTPATIENT
Start: 2020-01-01 | End: 2020-01-01 | Stop reason: HOSPADM

## 2020-01-01 RX ORDER — LEVOFLOXACIN 500 MG/1
500 TABLET, FILM COATED ORAL EVERY 24 HOURS
Status: DISCONTINUED | OUTPATIENT
Start: 2020-01-01 | End: 2020-01-01 | Stop reason: HOSPADM

## 2020-01-01 RX ORDER — POTASSIUM CHLORIDE 750 MG/1
20 CAPSULE, EXTENDED RELEASE ORAL DAILY
Status: DISCONTINUED | OUTPATIENT
Start: 2020-01-01 | End: 2020-01-01

## 2020-01-01 RX ORDER — SODIUM CHLORIDE 9 MG/ML
50 INJECTION, SOLUTION INTRAVENOUS CONTINUOUS
Status: DISCONTINUED | OUTPATIENT
Start: 2020-01-01 | End: 2020-01-01

## 2020-01-01 RX ORDER — FUROSEMIDE 20 MG/1
20 TABLET ORAL DAILY
Status: DISCONTINUED | OUTPATIENT
Start: 2020-01-01 | End: 2020-01-01 | Stop reason: HOSPADM

## 2020-01-01 RX ORDER — LANOLIN ALCOHOL/MO/W.PET/CERES
10 CREAM (GRAM) TOPICAL NIGHTLY
Status: DISCONTINUED | OUTPATIENT
Start: 2020-01-01 | End: 2020-01-01 | Stop reason: HOSPADM

## 2020-01-01 RX ORDER — CIPROFLOXACIN AND DEXAMETHASONE 3; 1 MG/ML; MG/ML
4 SUSPENSION/ DROPS AURICULAR (OTIC) 2 TIMES DAILY
Status: DISCONTINUED | OUTPATIENT
Start: 2020-01-01 | End: 2020-01-01 | Stop reason: HOSPADM

## 2020-01-01 RX ADMIN — Medication 250 MG: at 23:10

## 2020-01-01 RX ADMIN — QUETIAPINE FUMARATE 12.5 MG: 25 TABLET, FILM COATED ORAL at 21:57

## 2020-01-01 RX ADMIN — QUETIAPINE FUMARATE 12.5 MG: 25 TABLET, FILM COATED ORAL at 12:45

## 2020-01-01 RX ADMIN — QUETIAPINE FUMARATE 25 MG: 25 TABLET, FILM COATED ORAL at 20:16

## 2020-01-01 RX ADMIN — SODIUM CHLORIDE 50 ML/HR: 9 INJECTION, SOLUTION INTRAVENOUS at 06:31

## 2020-01-01 RX ADMIN — Medication 9.75 MG: at 21:58

## 2020-01-01 RX ADMIN — SODIUM CHLORIDE 75 ML/HR: 9 INJECTION, SOLUTION INTRAVENOUS at 09:15

## 2020-01-01 RX ADMIN — HYDROCODONE BITARTRATE AND ACETAMINOPHEN 1 TABLET: 5; 325 TABLET ORAL at 04:27

## 2020-01-01 RX ADMIN — Medication 5000 UNITS: at 09:10

## 2020-01-01 RX ADMIN — SODIUM CHLORIDE, PRESERVATIVE FREE 10 ML: 5 INJECTION INTRAVENOUS at 20:17

## 2020-01-01 RX ADMIN — PROPOFOL 50 MG: 10 INJECTION, EMULSION INTRAVENOUS at 14:36

## 2020-01-01 RX ADMIN — INSULIN LISPRO 2 UNITS: 100 INJECTION, SOLUTION INTRAVENOUS; SUBCUTANEOUS at 20:16

## 2020-01-01 RX ADMIN — PANTOPRAZOLE SODIUM 40 MG: 40 TABLET, DELAYED RELEASE ORAL at 06:57

## 2020-01-01 RX ADMIN — Medication 250 MG: at 09:17

## 2020-01-01 RX ADMIN — TECHNETIUM TC 99M OXIDRONATE 1 DOSE: 3.15 INJECTION, POWDER, LYOPHILIZED, FOR SOLUTION INTRAVENOUS at 07:45

## 2020-01-01 RX ADMIN — SODIUM CHLORIDE, PRESERVATIVE FREE 10 ML: 5 INJECTION INTRAVENOUS at 09:10

## 2020-01-01 RX ADMIN — SODIUM CHLORIDE, PRESERVATIVE FREE 10 ML: 5 INJECTION INTRAVENOUS at 22:30

## 2020-01-01 RX ADMIN — Medication 1 TABLET: at 09:17

## 2020-01-01 RX ADMIN — CARVEDILOL 6.25 MG: 6.25 TABLET, FILM COATED ORAL at 21:59

## 2020-01-01 RX ADMIN — SODIUM CHLORIDE, PRESERVATIVE FREE 10 ML: 5 INJECTION INTRAVENOUS at 09:17

## 2020-01-01 RX ADMIN — SODIUM BICARBONATE 650 MG: 650 TABLET ORAL at 08:28

## 2020-01-01 RX ADMIN — Medication 1 TABLET: at 08:29

## 2020-01-01 RX ADMIN — SODIUM BICARBONATE 650 MG: 650 TABLET ORAL at 23:50

## 2020-01-01 RX ADMIN — SODIUM BICARBONATE 650 MG: 650 TABLET ORAL at 09:17

## 2020-01-01 RX ADMIN — CALCITRIOL 0.25 MCG: 0.25 CAPSULE ORAL at 08:28

## 2020-01-01 RX ADMIN — FUROSEMIDE 20 MG: 40 TABLET ORAL at 09:11

## 2020-01-01 RX ADMIN — MEGESTROL ACETATE 800 MG: 40 SUSPENSION ORAL at 09:17

## 2020-01-01 RX ADMIN — HYDROMORPHONE HYDROCHLORIDE 0.5 MG: 1 INJECTION, SOLUTION INTRAMUSCULAR; INTRAVENOUS; SUBCUTANEOUS at 12:18

## 2020-01-01 RX ADMIN — Medication 250 MG: at 21:58

## 2020-01-01 RX ADMIN — SODIUM CHLORIDE 75 ML/HR: 9 INJECTION, SOLUTION INTRAVENOUS at 22:30

## 2020-01-01 RX ADMIN — ONDANSETRON HYDROCHLORIDE 4 MG: 2 SOLUTION INTRAMUSCULAR; INTRAVENOUS at 12:18

## 2020-01-01 RX ADMIN — CIPROFLOXACIN AND DEXAMETHASONE 4 DROP: 3; 1 SUSPENSION/ DROPS AURICULAR (OTIC) at 08:46

## 2020-01-01 RX ADMIN — QUETIAPINE FUMARATE 12.5 MG: 25 TABLET, FILM COATED ORAL at 23:46

## 2020-01-01 RX ADMIN — SERTRALINE 100 MG: 100 TABLET, FILM COATED ORAL at 09:10

## 2020-01-01 RX ADMIN — SODIUM CHLORIDE 500 ML: 9 INJECTION, SOLUTION INTRAVENOUS at 12:18

## 2020-01-01 RX ADMIN — Medication 5000 UNITS: at 08:28

## 2020-01-01 RX ADMIN — SODIUM CHLORIDE, PRESERVATIVE FREE 10 ML: 5 INJECTION INTRAVENOUS at 21:59

## 2020-01-01 RX ADMIN — CIPROFLOXACIN AND DEXAMETHASONE 4 DROP: 3; 1 SUSPENSION/ DROPS AURICULAR (OTIC) at 21:17

## 2020-01-01 RX ADMIN — Medication 5000 UNITS: at 09:16

## 2020-01-01 RX ADMIN — CIPROFLOXACIN AND DEXAMETHASONE 4 DROP: 3; 1 SUSPENSION/ DROPS AURICULAR (OTIC) at 00:46

## 2020-01-01 RX ADMIN — CIPROFLOXACIN AND DEXAMETHASONE 4 DROP: 3; 1 SUSPENSION/ DROPS AURICULAR (OTIC) at 09:11

## 2020-01-01 RX ADMIN — LEVOFLOXACIN 500 MG: 500 TABLET, FILM COATED ORAL at 20:17

## 2020-01-01 RX ADMIN — PANTOPRAZOLE SODIUM 40 MG: 40 TABLET, DELAYED RELEASE ORAL at 06:29

## 2020-01-01 RX ADMIN — SODIUM BICARBONATE 650 MG: 650 TABLET ORAL at 09:10

## 2020-01-01 RX ADMIN — FUROSEMIDE 20 MG: 40 TABLET ORAL at 09:17

## 2020-01-01 RX ADMIN — Medication 9.75 MG: at 23:09

## 2020-01-01 RX ADMIN — SODIUM BICARBONATE 650 MG: 650 TABLET ORAL at 23:13

## 2020-01-01 RX ADMIN — Medication 1 TABLET: at 09:10

## 2020-01-01 RX ADMIN — Medication 250 MG: at 08:28

## 2020-01-01 RX ADMIN — MEGESTROL ACETATE 800 MG: 40 SUSPENSION ORAL at 08:29

## 2020-01-01 RX ADMIN — SERTRALINE 100 MG: 100 TABLET, FILM COATED ORAL at 08:27

## 2020-01-01 RX ADMIN — CALCITRIOL 0.25 MCG: 0.25 CAPSULE ORAL at 09:17

## 2020-01-01 RX ADMIN — SODIUM BICARBONATE 650 MG: 650 TABLET ORAL at 21:58

## 2020-01-01 RX ADMIN — Medication 250 MG: at 23:13

## 2020-01-01 RX ADMIN — Medication 250 MG: at 09:10

## 2020-01-01 RX ADMIN — LEVOFLOXACIN 750 MG: 5 INJECTION, SOLUTION INTRAVENOUS at 17:14

## 2020-01-01 RX ADMIN — Medication 250 MG: at 20:16

## 2020-01-01 RX ADMIN — SODIUM BICARBONATE 650 MG: 650 TABLET ORAL at 20:17

## 2020-01-01 RX ADMIN — HYDROCODONE BITARTRATE AND ACETAMINOPHEN 1 TABLET: 5; 325 TABLET ORAL at 12:00

## 2020-01-01 RX ADMIN — CARVEDILOL 6.25 MG: 6.25 TABLET, FILM COATED ORAL at 09:11

## 2020-01-01 RX ADMIN — CIPROFLOXACIN AND DEXAMETHASONE 4 DROP: 3; 1 SUSPENSION/ DROPS AURICULAR (OTIC) at 08:29

## 2020-01-01 RX ADMIN — CIPROFLOXACIN AND DEXAMETHASONE 4 DROP: 3; 1 SUSPENSION/ DROPS AURICULAR (OTIC) at 21:57

## 2020-01-01 RX ADMIN — LEVOFLOXACIN 750 MG: 5 INJECTION, SOLUTION INTRAVENOUS at 18:09

## 2020-01-01 RX ADMIN — SERTRALINE 100 MG: 100 TABLET, FILM COATED ORAL at 09:17

## 2020-01-01 RX ADMIN — Medication 9.75 MG: at 20:16

## 2020-01-01 RX ADMIN — CIPROFLOXACIN AND DEXAMETHASONE 4 DROP: 3; 1 SUSPENSION/ DROPS AURICULAR (OTIC) at 09:17

## 2020-01-01 RX ADMIN — MEGESTROL ACETATE 800 MG: 40 SUSPENSION ORAL at 09:10

## 2020-01-01 RX ADMIN — SODIUM CHLORIDE, POTASSIUM CHLORIDE, SODIUM LACTATE AND CALCIUM CHLORIDE 9 ML/HR: 600; 310; 30; 20 INJECTION, SOLUTION INTRAVENOUS at 14:22

## 2020-01-01 RX ADMIN — CALCITRIOL 0.25 MCG: 0.25 CAPSULE ORAL at 09:11

## 2020-01-01 RX ADMIN — SODIUM CHLORIDE, PRESERVATIVE FREE 10 ML: 5 INJECTION INTRAVENOUS at 08:29

## 2020-01-01 RX ADMIN — Medication 9.75 MG: at 23:13

## 2020-01-01 RX ADMIN — HYDROCODONE BITARTRATE AND ACETAMINOPHEN 1 TABLET: 5; 325 TABLET ORAL at 01:29

## 2020-01-01 RX ADMIN — CARVEDILOL 6.25 MG: 6.25 TABLET, FILM COATED ORAL at 18:14

## 2020-01-01 RX ADMIN — CIPROFLOXACIN AND DEXAMETHASONE 4 DROP: 3; 1 SUSPENSION/ DROPS AURICULAR (OTIC) at 20:17

## 2020-01-10 NOTE — DISCHARGE INSTRUCTIONS
Monitor oral intake and urine output.  Continue ear drops.  New medication as ordered. Follow up with ENT and PCP Monday - call for appointment. Return to ED if condition does not improve or worsens

## 2020-01-10 NOTE — ED PROVIDER NOTES
Subjective   88 yom here with family who state he is having left ear pain and neck pain.  Daughter states he has some dementia but it is worsening. Daughter states she was initially given amoxicillin for the ear and it did not help.  He was evaluated again and told he had a foreign body in the ear.  The ear was flushed. The ear canal then started to swell and he was given ear drops.  The daughter states that did not help and he saw ENT (RAMÍREZ COX) at [facility] Monday.  He was given cortisporin ear drops.  She states he is continuing to have ear pain and neck pain.  She states he has 'been hollering out' with the pain.  She states he has bone cancer and other issues such as renal failure.            Review of Systems   Constitutional: Negative for activity change, appetite change, fatigue and fever.   HENT: Negative for congestion, ear pain, facial swelling and sore throat.    Eyes: Negative for discharge and visual disturbance.   Respiratory: Negative for apnea, chest tightness, shortness of breath, wheezing and stridor.    Cardiovascular: Negative for chest pain and palpitations.   Gastrointestinal: Negative for abdominal distention, abdominal pain, diarrhea, nausea and vomiting.   Genitourinary: Negative for difficulty urinating and dysuria.   Musculoskeletal: Positive for neck pain. Negative for arthralgias and myalgias.   Skin: Negative for rash and wound.   Neurological: Negative for dizziness and seizures.   Psychiatric/Behavioral: Negative for agitation and confusion.       Past Medical History:   Diagnosis Date   • Anemia    • Atrial fibrillation by electrocardiogram (CMS/[facility])    • CAD in native artery    • Carotid bruit    • CHF (congestive heart failure) (CMS/[facility])    • Chronic kidney disease     stage III   • Diabetes mellitus (CMS/[facility])     diet controlled   • History of coronary artery bypass graft    • Hospital psychosis (CMS/[facility])    • Hyperlipidemia    • Hypertension    • Monoclonal (M) protein disease,  multiple 'M' protein    • Multiple myeloma (CMS/HCC)    • Murmur    • Pulmonary hypertension (CMS/HCC)    • Sick sinus syndrome (CMS/HCC)     s/p pace maker   • Sleep apnea        No Known Allergies    Past Surgical History:   Procedure Laterality Date   • BACK SURGERY     • CARDIAC CATHETERIZATION  1998    Left Heart; candidate for re-do CABG   • COLONOSCOPY  12/10/2013    Diverticulosis repeat prn   • CORONARY ARTERY BYPASS GRAFT      X 8   • ENDOSCOPY N/A 2018    Normal exam   • HEMORROIDECTOMY     • PACEMAKER IMPLANTATION         Family History   Problem Relation Age of Onset   • Cancer Mother    • Heart disease Mother    • Colon cancer Mother    • Heart disease Father    • Coronary artery disease Father    • Dementia Sister    • Colon polyps Neg Hx        Social History     Socioeconomic History   • Marital status:      Spouse name: Not on file   • Number of children: Not on file   • Years of education: Not on file   • Highest education level: Not on file   Tobacco Use   • Smoking status: Former Smoker     Types: Cigarettes     Last attempt to quit: 1986     Years since quittin.1   • Smokeless tobacco: Former User   Substance and Sexual Activity   • Alcohol use: No   • Drug use: No   • Sexual activity: Defer           Objective   Physical Exam   Constitutional: He appears well-developed.   HENT:   Head: Normocephalic.   Yellow drainage present in left ear canal.  No perforation present of the TM.   Eyes: Pupils are equal, round, and reactive to light. EOM are normal.   Neck: Normal range of motion. Neck supple.   Cardiovascular: Normal rate and regular rhythm.   No murmur heard.  Pulmonary/Chest: Effort normal and breath sounds normal.   Abdominal: Soft. Bowel sounds are normal.   Musculoskeletal: Normal range of motion.   Neurological: He is alert.   Confused.  Does not answer questions appropriately   Skin: Skin is warm and dry.   Psychiatric: He has a normal mood and affect.  "  Nursing note and vitals reviewed.      Procedures            Current Facility-Administered Medications:   •  [COMPLETED] Insert peripheral IV, , , Once **AND** sodium chloride 0.9 % flush 10 mL, 10 mL, Intravenous, PRN, Shoulders, Aguilar Smith, KENNY    Current Outpatient Medications:   •  acetaminophen-codeine (TYLENOL #3) 300-30 MG per tablet, Take 1-2 tablets by mouth Every 6 (Six) Hours As Needed for Moderate Pain  for up to 3 days., Disp: 24 tablet, Rfl: 0  •  b complex-vitamin c-folic acid (NEPHRO-MILENA) 0.8 MG tablet tablet, Take 1 tablet by mouth Daily., Disp: 30 tablet, Rfl:   •  calcitriol (ROCALTROL) 0.25 MCG capsule, Take 1 capsule by mouth Daily., Disp: , Rfl:   •  cefdinir (OMNICEF) 300 MG capsule, Take 1 capsule by mouth 2 (Two) Times a Day for 10 days., Disp: 20 capsule, Rfl: 0  •  coenzyme Q10 100 MG capsule, Take 100 mg by mouth Daily., Disp: , Rfl:   •  furosemide (LASIX) 20 MG tablet, Take 1 tablet by mouth Daily., Disp: , Rfl:   •  megestrol (MEGACE) 40 MG/ML suspension, Take 20 mL by mouth Daily., Disp: , Rfl:   •  melatonin 3 MG tablet, Take 2 tablets by mouth Every Night. (Patient taking differently: Take 15 mg by mouth Every Night.), Disp: , Rfl:   •  metaxalone (SKELAXIN) 400 MG tablet, Take 1 tablet by mouth 3 (Three) Times a Day., Disp: , Rfl:   •  potassium chloride (MICRO-K) 10 MEQ CR capsule, Take 2 capsules by mouth Daily., Disp: , Rfl:   •  saccharomyces boulardii (FLORASTOR) 250 MG capsule, Take 1 capsule by mouth 2 (Two) Times a Day., Disp: , Rfl:   •  sertraline (ZOLOFT) 50 MG tablet, Take 75 mg by mouth Daily., Disp: , Rfl:     Vital signs:  /53   Pulse 82   Temp 97.5 °F (36.4 °C) (Oral)   Resp 22   Ht 175.3 cm (69\")   Wt 59.4 kg (131 lb)   SpO2 100%   BMI 19.35 kg/m²        ED LAB RESULTS:   Lab Results (last 24 hours)     Procedure Component Value Units Date/Time    CBC & Differential [[id number]] Collected:  01/10/20 1139    Specimen:  Blood Updated:  01/10/20 " 1150    Narrative:       The following orders were created for panel order CBC & Differential.  Procedure                               Abnormality         Status                     ---------                               -----------         ------                     CBC Auto Differential[[id number]]        Abnormal            Final result                 Please view results for these tests on the individual orders.    Comprehensive Metabolic Panel [[id number]]  (Abnormal) Collected:  01/10/20 1139    Specimen:  Blood Updated:  01/10/20 1206     Glucose 101 mg/dL      BUN 32 mg/dL      Creatinine 2.31 mg/dL      Sodium 135 mmol/L      Potassium 3.9 mmol/L      Chloride 98 mmol/L      CO2 22.0 mmol/L      Calcium 9.2 mg/dL      Total Protein 7.0 g/dL      Albumin 3.90 g/dL      ALT (SGPT) <5 U/L      AST (SGOT) 10 U/L      Alkaline Phosphatase 52 U/L      Total Bilirubin 0.5 mg/dL      eGFR Non African Amer 27 mL/min/1.73      Globulin 3.1 gm/dL      A/G Ratio 1.3 g/dL      BUN/Creatinine Ratio 13.9     Anion Gap 15.0 mmol/L     Narrative:       GFR Normal >60  Chronic Kidney Disease <60  Kidney Failure <15      Protime-INR [[id number]]  (Abnormal) Collected:  01/10/20 1139    Specimen:  Blood Updated:  01/10/20 1157     Protime 15.0 Seconds      INR 1.14    aPTT [[id number]]  (Abnormal) Collected:  01/10/20 1139    Specimen:  Blood Updated:  01/10/20 1157     PTT 40.5 seconds     Blood Culture With MARJORIE - Blood, Arm, Left [[id number]] Collected:  01/10/20 1139    Specimen:  Blood from Arm, Left Updated:  01/10/20 1157    CBC Auto Differential [[id number]]  (Abnormal) Collected:  01/10/20 1139    Specimen:  Blood Updated:  01/10/20 1150     WBC 4.85 10*3/mm3      RBC 2.90 10*6/mm3      Hemoglobin 9.0 g/dL      Hematocrit 27.5 %      MCV 94.8 fL      MCH 31.0 pg      MCHC 32.7 g/dL      RDW 15.7 %      RDW-SD 53.7 fl      MPV 9.6 fL      Platelets 133 10*3/mm3      Neutrophil % 74.7 %      Lymphocyte % 16.1 %      Monocyte  % 7.2 %      Eosinophil % 0.8 %      Basophil % 0.6 %      Immature Grans % 0.6 %      Neutrophils, Absolute 3.62 10*3/mm3      Lymphocytes, Absolute 0.78 10*3/mm3      Monocytes, Absolute 0.35 10*3/mm3      Eosinophils, Absolute 0.04 10*3/mm3      Basophils, Absolute 0.03 10*3/mm3      Immature Grans, Absolute 0.03 10*3/mm3      nRBC 0.0 /100 WBC     Blood Culture With MARJORIE - Blood, Hand, Left [[id number]] Collected:  01/10/20 1142    Specimen:  Blood from Hand, Left Updated:  01/10/20 1157    Urinalysis With Culture If Indicated - Urine, Clean Catch [[id number]]  (Abnormal) Collected:  01/10/20 1229    Specimen:  Urine, Clean Catch Updated:  01/10/20 1240     Color, UA Yellow     Appearance, UA Clear     pH, UA 6.5     Specific Gravity, UA 1.021     Glucose, UA Negative     Ketones, UA Negative     Bilirubin, UA Negative     Blood, UA Negative     Protein, UA 30 mg/dL (1+)     Leuk Esterase, UA Negative     Nitrite, UA Negative     Urobilinogen, UA 0.2 E.U./dL    Urinalysis, Microscopic Only - Urine, Clean Catch [[id number]]  (Abnormal) Collected:  01/10/20 1229    Specimen:  Urine, Clean Catch Updated:  01/10/20 1240     RBC, UA 0-2 /HPF      WBC, UA None Seen /HPF      Bacteria, UA None Seen /HPF      Squamous Epithelial Cells, UA None Seen /HPF      Hyaline Casts, UA None Seen /LPF      Methodology Automated Microscopy             IMAGING RESULTS  XR Chest 1 View   ED Interpretation   See results below      Final Result   . Increased interstitial markings felt to be chronic in   nature. No evidence of acute lobar pneumonia or effusion.   This report was finalized on 01/10/2020 12:12 by Dr. Mack Sommers MD.      CT Head Without Contrast   ED Interpretation   See results below      Final Result       1. No acute intracranial findings.   2. Age related global cerebral volume loss and chronic microvascular   ischemic white matter change.   3. Left mastoid effusion.   This report was finalized on 01/10/2020 12:08  by Dr. Martin Esteban MD.      CT Cervical Spine Without Contrast   ED Interpretation   See results below      Final Result   1. No acute cervical spine abnormality, interval healing of spinous   process fractures at C3.   2. Multilevel degenerative changes as detailed above, stable.                   This report was finalized on 01/10/2020 12:14 by Dr. David Miller MD.                       ED Course  ED Course as of Randell 10 1510   Fri Randell 10, 2020   1330 I discussed the patient's history, assessment and testing results with Dr Velasco.  He will be dc'd home and tx for mastoiditis. I will give him a short course of a low dose pain medication. I discussed the d'c instructions and testing results with the patient's family.  They voice understanding of testing results and d'c instructions.    [KS]   1355 /65    [KS]   1357 NATALY [id number] reviewed and in order    [KS]      ED Course User Index  [KS] Shoulders, Aguilar Luis, APRJUDY                                               MDM  Number of Diagnoses or Management Options  Mastoiditis of left side: minor     Amount and/or Complexity of Data Reviewed  Clinical lab tests: ordered and reviewed  Tests in the radiology section of CPT®: ordered and reviewed  Decide to obtain previous medical records or to obtain history from someone other than the patient: yes  Independent visualization of images, tracings, or specimens: yes    Risk of Complications, Morbidity, and/or Mortality  Presenting problems: minimal  Diagnostic procedures: minimal  Management options: minimal    Patient Progress  Patient progress: stable      Final diagnoses:   Mastoiditis of left side            Iras, Aguilar Smith, KENNY  01/10/20 1511

## 2020-02-04 PROBLEM — IMO0002: Status: ACTIVE | Noted: 2020-01-01

## 2020-02-04 PROBLEM — I50.22 CHRONIC SYSTOLIC HEART FAILURE (HCC): Status: ACTIVE | Noted: 2020-01-01

## 2020-02-04 PROBLEM — G47.33 OBSTRUCTIVE SLEEP APNEA SYNDROME: Status: ACTIVE | Noted: 2020-01-01

## 2020-02-04 PROBLEM — F03.90 DEMENTIA (HCC): Chronic | Status: ACTIVE | Noted: 2020-01-01

## 2020-02-04 PROBLEM — H60.22 MALIGNANT OTITIS EXTERNA OF LEFT EAR: Status: ACTIVE | Noted: 2020-01-01

## 2020-02-04 NOTE — ED NOTES
Pt last took cipro ear drops 0900 this AM per [facility].  Ezio Obrien RN  02/04/20 [id number]

## 2020-02-04 NOTE — ED NOTES
Updated family that pt NPO until imaging results.  No other concerns at this time     Ezio Pineda RN  02/04/20 [id number]

## 2020-02-05 PROBLEM — H60.22 MALIGNANT OTITIS EXTERNA OF LEFT EAR: Status: ACTIVE | Noted: 2020-01-01

## 2020-02-05 PROBLEM — H60.509 ACUTE OTITIS EXTERNA: Status: ACTIVE | Noted: 2020-01-01

## 2020-02-05 PROBLEM — N18.30 STAGE 3 CHRONIC KIDNEY DISEASE (HCC): Status: ACTIVE | Noted: 2017-05-17

## 2020-02-05 NOTE — PROGRESS NOTES
Discharge Planning Assessment  [facility]     Patient Name: Jesús Long  MRN: [id number]  Today's Date: 2/5/2020    Admit Date: 2/4/2020    Discharge Needs Assessment     Row Name 02/05/20 1144       Living Environment    Lives With  facility resident    Current Living Arrangements  extended care facility    Able to Return to Prior Arrangements  yes       Resource/Environmental Concerns    Resource/Environmental Concerns  none       Transition Planning    Patient/Family Anticipates Transition to  long term care facility       Discharge Needs Assessment    Readmission Within the Last 30 Days  no previous admission in last 30 days    Outpatient/Agency/Support Group Needs  skilled nursing facility    Discharge Facility/Level of Care Needs  nursing facility, skilled    Discharge Coordination/Progress  Pt is from [facility], [facility] level, with a courtesy bed hold per [facility] [phone]. He will return there at d/c.         Discharge Plan    No documentation.       Destination      Coordination has not been started for this encounter.      Durable Medical Equipment      Coordination has not been started for this encounter.      Dialysis/Infusion      Coordination has not been started for this encounter.      Home Medical Care      Coordination has not been started for this encounter.      Therapy      Coordination has not been started for this encounter.      Community Resources      Coordination has not been started for this encounter.          Demographic Summary    No documentation.       Functional Status    No documentation.       Psychosocial    No documentation.       Abuse/Neglect    No documentation.       Legal    No documentation.       Substance Abuse    No documentation.       Patient Forms    No documentation.           PONCE Garcia

## 2020-02-05 NOTE — H&P
Daughters  dnr  No morphine      [facility]  HISTORY AND PHYSICAL    Date of Admission: 2/4/2020  Primary Care Physician: Yocasta Baumann APRN    Subjective     Chief Complaint: Left ear face pain    History of Present Illness  Patient is an 88-year-old white male past medical history of diabetes congestive heart failure with pacemaker A. fib  Who has a recent problem of otitis externa.  He apparently was hospitalized given antibiotics seen by ENT and improved.  He followed up on the 23rd with ENT over there and was stable he was to continue Ciprodex drops at the nursing home.  Apparently in the last few days he has had increasing pain swelling and drainage from his left ear.  He presented here for further management of this evaluation.  CT scan of the IACs were obtained which showed mucosal thickening involving the right maxillary bilateral frontal and some anterior ethmoid sinuses no air-fluid level in the paranasal sinuses fluid density in the left middle ear cavity thought to reflect effusion or infection also the mastoid region on the left has significant fluid density and as well as thickening of the left tympanic membrane and thickening of the soft tissues on the external auditory canal.  Patient obviously has significant swelling of his external ear as well as his parotid and on exam his tonsils on the left.  He has been having complaints of problems with swallowing.  His dementia limits greatly his ability to give any information.  Patient has an elevated CRP of 13.5 and elevated sed rate of 41 white counts normal creatinine is elevated but is chronic he is also anemic but that is chronic as well and slightly improved.  ENT has been contacted about the patient wishes for him to be placed on antibiotics and admitted for possible tympanostomy tube.  We have been asked to admit him due to his multiple medical problems.    Review of Systems   14 point  review of systems obtained and as per HPI however unreliable due to dementia.  Otherwise complete ROS reviewed and negative except as mentioned in the HPI.    Past Medical History:   Past Medical History:   Diagnosis Date   • Anemia    • Atrial fibrillation by electrocardiogram (CMS/[facility])    • CAD in native artery    • Carotid bruit    • CHF (congestive heart failure) (CMS/[facility])    • Chronic kidney disease     stage III   • Diabetes mellitus (CMS/[facility])     diet controlled   • History of coronary artery bypass graft    • Hospital psychosis (CMS/[facility])    • Hyperlipidemia    • Hypertension    • Monoclonal (M) protein disease, multiple 'M' protein    • Multiple myeloma (CMS/[facility])    • Murmur    • Pulmonary hypertension (CMS/[facility])    • Sick sinus syndrome (CMS/[facility])     s/p pace maker   • Sleep apnea      Past Surgical History:  Past Surgical History:   Procedure Laterality Date   • BACK SURGERY     • CARDIAC CATHETERIZATION  09/16/1998    Left Heart; candidate for re-do CABG   • COLONOSCOPY  12/10/2013    Diverticulosis repeat prn   • CORONARY ARTERY BYPASS GRAFT      X 8   • ENDOSCOPY N/A 6/20/2018    Normal exam   • HEMORROIDECTOMY     • PACEMAKER IMPLANTATION       Social History:  reports that he quit smoking about 33 years ago. His smoking use included cigarettes. He has quit using smokeless tobacco. He reports that he does not drink alcohol or use drugs.    Family History: family history includes Cancer in his mother; Colon cancer in his mother; Coronary artery disease in his father; Dementia in his sister; Heart disease in his father and mother.       Allergies:  No Known Allergies  Medications:  Prior to Admission medications    Medication Sig Start Date End Date Taking? Authorizing Provider   acetaminophen (TYLENOL) 325 MG tablet Take 650 mg by mouth Every 4 (Four) Hours As Needed for Mild Pain .   Yes Provider, MD Eagle   calcitriol (ROCALTROL) 0.25 MCG capsule Take 1 capsule by mouth Daily. 7/4/19  Yes Gia  "KENNY Da Silva   carvedilol (COREG) 6.25 MG tablet Take 6.25 mg by mouth 2 (Two) Times a Day With Meals. HOLD IF SBP LESS THAN 90 OR DBP LESS THAN 60   Yes Eagle Alvarado MD   Cholecalciferol (VITAMIN D3) 125 MCG (5000 UT) capsule capsule Take 5,000 Units by mouth Daily.   Yes Eagle Alvarado MD   CIPROFLOXACIN HCL OT Administer 4 drops into ear(s) as directed by provider 2 (Two) Times a Day. 0.3 %.  INSTILL 4 DROPS IN LEFT EAR BID X 14 DAYS.  STARTED 01/23/20   Yes Eagle Alvarado MD   furosemide (LASIX) 20 MG tablet Take 1 tablet by mouth Daily.  Patient taking differently: Take 20 mg by mouth Daily. HOLD IF SBP LESS THAN 90 OR DBP IS LESS THAN 60 7/4/19  Yes Nicky Nielsen APRN   HYDROcodone-acetaminophen (NORCO) 5-325 MG per tablet Take 1 tablet by mouth Every 6 (Six) Hours As Needed for Moderate Pain  or Severe Pain .   Yes Eagle Alvarado MD   megestrol (MEGACE) 40 MG/ML suspension Take 20 mL by mouth Daily.  Patient taking differently: Take 800 mg by mouth Daily. 10 ML DAILY 7/4/19  Yes Nicky Nielsen APRN   melatonin 3 MG tablet Take 2 tablets by mouth Every Night.  Patient taking differently: Take 5 mg by mouth Every Night. 7/3/19  Yes Nicky Nielsen APRN   pantoprazole (PROTONIX) 40 MG EC tablet Take 40 mg by mouth Daily.   Yes Eagle Alvarado MD   saccharomyces boulardii (FLORASTOR) 250 MG capsule Take 1 capsule by mouth 2 (Two) Times a Day.  Patient taking differently: Take 250 mg by mouth Daily. 7/3/19  Yes Nicky Nielsen APRN   sertraline (ZOLOFT) 50 MG tablet Take 100 mg by mouth Daily.   Yes Eagel Alvarado MD   sodium bicarbonate 650 MG tablet Take 650 mg by mouth 2 (Two) Times a Day.   Yes Eagle Alvarado MD     Objective     Vital Signs: /55 (BP Location: Right arm, Patient Position: Lying)   Pulse 58   Temp 98 °F (36.7 °C) (Oral)   Resp 18   Ht 172.7 cm (68\")   Wt 65.8 kg (145 lb)   SpO2 96%   " BMI 22.05 kg/m²   Physical Exam  Gen.: Elderly chronically ill-appearing white male in no acute distress  HEENT: Atraumatic, normocephalic.  Pupils equal, round, and reactive to light. Extraocular movements intact.  Sclerae anicteric.  Right TM negative left TM cloudy with significant amount of edema in the external auditory canal making it difficult to visualize.  Mucous membranes moist.  Neck is supple no lymphadenopathy on the left no JVD is noted.  No carotid bruits are auscultated.  Oropharynx is without erythema or exudate.  Left tonsil is enlarged order is pressed in due to swelling left parotid gland is significantly swollen and tender to palpation left external ear also swollen with some shotty nodes in the neck  Chest: Clear to auscultation and percussion.  CV: Regular rate and rhythm.  Normal S1-S2.  No gallops, murmurs. or rubs.  Abdomen: Soft, nontender, nondistended.  Active bowel sounds,  No hepatosplenomegaly.  No masses.  No hernias.  Extremities: No clubbing, edema, or cyanosis.  Capillary refill is normal.  Pulses are 2+ and symmetric at radial and dorsalis pedis.  Posterior tibial pulses are intact and 2+ palpable.  Neuro: Patient is awake, alert, and oriented ×2  Cranial nerves II through XII are grossly intact.  Motor is 5 out of 5 bilaterally.  DTRs are 2+ and symmetric bilaterally. Sensory exam is nonfocal  Skin: Warm, dry, and intact.  No evidence of breakdown.  Sensorium: Patient is slightly confused  Nursing notes and vital signs reviewed        Results Reviewed:  Lab Results (last 24 hours)     Procedure Component Value Units Date/Time    Sedimentation Rate [[id number]]  (Abnormal) Collected:  02/04/20 2151    Specimen:  Blood Updated:  02/04/20 2205     Sed Rate 41 mm/hr     Hemoglobin A1c [[id number]] Collected:  02/04/20 2151    Specimen:  Blood Updated:  02/04/20 2156    Phosphorus [[id number]]  (Normal) Collected:  02/04/20 1527    Specimen:  Blood Updated:  02/04/20 2136     Phosphorus  3.0 mg/dL     Magnesium [[id number]]  (Normal) Collected:  02/04/20 1527    Specimen:  Blood Updated:  02/04/20 2136     Magnesium 2.1 mg/dL     C-reactive Protein [[id number]]  (Abnormal) Collected:  02/04/20 1527    Specimen:  Blood Updated:  02/04/20 2136     C-Reactive Protein 13.57 mg/dL     Lactic Acid, Plasma [[id number]]  (Normal) Collected:  02/04/20 1709    Specimen:  Blood Updated:  02/04/20 1725     Lactate 1.3 mmol/L     Blood Culture - Blood, Arm, Left [[id number]] Collected:  02/04/20 1527    Specimen:  Blood from Arm, Left Updated:  02/04/20 1715    Blood Culture - Blood, Arm, Right [[id number]] Collected:  02/04/20 1651    Specimen:  Blood from Arm, Right Updated:  02/04/20 1710    Superior Draw [[id number]] Collected:  02/04/20 1527    Specimen:  Blood Updated:  02/04/20 1646    Narrative:       The following orders were created for panel order Superior Draw.  Procedure                               Abnormality         Status                     ---------                               -----------         ------                     Light Blue Top[[id number]]                                   Final result               Green Top (Gel)[[id number]]                                  Final result               Lavender Top[[id number]]                                     Final result               Red Top[[id number]]                                          Final result               Superior Blood Culture Simone...[[id number]]                      Final result               Gray Top - Ice[[id number]]                                   Final result                 Please view results for these tests on the individual orders.    Superior Blood Culture Bottle Set [[id number]] Collected:  02/04/20 1527    Specimen:  Blood from Arm, Left Updated:  02/04/20 1646     Extra Tube Hold for add-ons.     Comment: Auto resulted.       Comprehensive Metabolic Panel [[id number]]  (Abnormal) Collected:  02/04/20 1527    Specimen:  Blood  Updated:  02/04/20 1643     Glucose 122 mg/dL      BUN 52 mg/dL      Creatinine 1.81 mg/dL      Sodium 140 mmol/L      Potassium 4.4 mmol/L      Chloride 103 mmol/L      CO2 24.0 mmol/L      Calcium 8.8 mg/dL      Total Protein 6.6 g/dL      Albumin 3.40 g/dL      ALT (SGPT) 10 U/L      AST (SGOT) 19 U/L      Comment: Specimen hemolyzed.  Results may be affected.        Alkaline Phosphatase 48 U/L      Total Bilirubin 0.5 mg/dL      eGFR Non African Amer 36 mL/min/1.73      Globulin 3.2 gm/dL      A/G Ratio 1.1 g/dL      BUN/Creatinine Ratio 28.7     Anion Gap 13.0 mmol/L     Narrative:       GFR Normal >60  Chronic Kidney Disease <60  Kidney Failure <15      aPTT [[id number]]  (Abnormal) Collected:  02/04/20 1527    Specimen:  Blood Updated:  02/04/20 1632     PTT 38.2 seconds     Protime-INR [[id number]]  (Abnormal) Collected:  02/04/20 1527    Specimen:  Blood Updated:  02/04/20 1632     Protime 15.7 Seconds      INR 1.21    Light Blue Top [[id number]] Collected:  02/04/20 1527    Specimen:  Blood Updated:  02/04/20 1631     Extra Tube hold for add-on     Comment: Auto resulted       Green Top (Gel) [[id number]] Collected:  02/04/20 1527    Specimen:  Blood Updated:  02/04/20 1631     Extra Tube Hold for add-ons.     Comment: Auto resulted.       Lavender Top [[id number]] Collected:  02/04/20 1527    Specimen:  Blood Updated:  02/04/20 1631     Extra Tube hold for add-on     Comment: Auto resulted       Red Top [[id number]] Collected:  02/04/20 1527    Specimen:  Blood Updated:  02/04/20 1631     Extra Tube Hold for add-ons.     Comment: Auto resulted.       Gray Top - Ice [[id number]] Collected:  02/04/20 1527    Specimen:  Blood Updated:  02/04/20 1631     Extra Tube Hold for add-ons.     Comment: Auto resulted.       CBC & Differential [[id number]] Collected:  02/04/20 1527    Specimen:  Blood Updated:  02/04/20 1626    Narrative:       The following orders were created for panel order CBC & Differential.  Procedure                                Abnormality         Status                     ---------                               -----------         ------                     CBC Auto Differential[[id number]]        Abnormal            Final result                 Please view results for these tests on the individual orders.    CBC Auto Differential [[id number]]  (Abnormal) Collected:  02/04/20 1527    Specimen:  Blood Updated:  02/04/20 1626     WBC 6.96 10*3/mm3      RBC 2.76 10*6/mm3      Hemoglobin 8.4 g/dL      Hematocrit 27.1 %      MCV 98.2 fL      MCH 30.4 pg      MCHC 31.0 g/dL      RDW 19.2 %      RDW-SD 68.1 fl      MPV 9.8 fL      Platelets 141 10*3/mm3      Neutrophil % 79.9 %      Lymphocyte % 8.2 %      Monocyte % 9.9 %      Eosinophil % 0.7 %      Basophil % 0.4 %      Immature Grans % 0.9 %      Neutrophils, Absolute 5.56 10*3/mm3      Lymphocytes, Absolute 0.57 10*3/mm3      Monocytes, Absolute 0.69 10*3/mm3      Eosinophils, Absolute 0.05 10*3/mm3      Basophils, Absolute 0.03 10*3/mm3      Immature Grans, Absolute 0.06 10*3/mm3      nRBC 0.0 /100 WBC         Imaging Results (Last 24 Hours)     Procedure Component Value Units Date/Time    XR Chest 1 View [[id number]] Resulted:  02/04/20 2236     Updated:  02/04/20 2237    CT IAC Without Contrast [[id number]] Collected:  02/04/20 1744     Updated:  02/04/20 1815    Addenda:        ADDENDUM: Also on the current study, there appears to be some soft  tissue edema around the left parotid gland and extending into the  pterygoid musculature centrally. There is probably some inflammation of  the visualized portion of the masseter muscle. There is probable fluid  also in the left temporomandibular joint space. The inflammatory changes  in the extracranial soft tissues appear to be more prominent when  compared to a CT of the neck at [facility] on  01/12/2020. The fluid density within the mastoid region and middle ear  cavity on the left appears  relatively stable. The thickening of the  tympanic membrane and thickening of the soft tissues in the external  auditory canal also appear stable.  This report was finalized on 02/04/2020 18:12 by Dr. Pawel Benson MD.  Signed:  02/04/20 1812 by Pawel Benson MD    Narrative:       EXAMINATION:  CT IAC WO CONTRAST-  2/4/2020 5:24 PM CST     HISTORY: Otitis externa. Mastoiditis.      COMPARISON: No comparison study.     TECHNIQUE: Thin section spiral CT was performed of the temporal bones.  Sagittal and coronal images were reconstructed. DLP: 699 mGy-cm.     FINDINGS: There is mucosal thickening in the right maxillary sinus there  is mild mucosal thickening in the frontal sinus and a few of the  anterior ethmoid air cells. There is vascular calcification. The  internal auditory canals are symmetric. The cochlea, vestibule and  semicircular canals are symmetric. The ossicles appear to be intact  bilaterally. There is fluid in the left middle ear cavity. There is  fluid density throughout much of the mastoid air cells on the left.  There is no bone destruction identified. There is thickening of the left  tympanic membrane. There is soft tissue thickening of the external  auditory canal with no bone destruction identified. There is moderate  cerebral and cerebellar atrophy. There is associated ventricular  prominence.       Impression:       1. Minimal mucosal thickening involving the right maxillary, bilateral  frontal and some of the anterior ethmoid sinuses. No air-fluid level in  the paranasal sinuses.  2. Fluid density in the left middle ear cavity could reflect underlying  effusion or infection. There is also fluid density throughout most of  the mastoid region on the left with thickening of the left tympanic  membrane and also thickening of the soft tissues within the external  auditory canal. There is no visible bone destruction.  3. The inner ear structures have a normal appearance. The middle,  mastoid  and external ear structures on the right side appear normal.  4. Brain atrophy. Vascular calcification of the cavernous carotid  arteries bilaterally.     The full report of this exam was immediately signed and available to the  emergency room. The patient is currently in the emergency room.  This report was finalized on 02/04/2020 17:50 by Dr. Pawel Benson MD.        I have personally reviewed and interpreted the radiology studies and ECG obtained at time of admission.     Assessment / Plan     Assessment:   Active Hospital Problems    Diagnosis   • **Malignant otitis externa of left ear   • Chronic systolic heart failure (CMS/HCC)   • Dementia (CMS/HCC)   • Controlled type 2 diabetes mellitus diet controlled without complication, without long-term current use of insulin (CMS/HCC)   • Pulmonary hypertension (CMS/HCC)   • Multiple myeloma (CMS/HCC)   • Hypertension   • Chronic kidney disease   • PAF (paroxysmal atrial fibrillation) (CMS/HCC) No AC DUE TO GI  BLEED   1.  Malignant otitis externa left ear as well as proctitis plans to admit the patient consult ENT n.p.o. after midnight we will continue with Levaquin started in the ER and Ciprodex drops.  Norco for pain.  Culture blood follow sed rates and CRPs.  Surgical plans as per ENT.  2.  Chronic kidney disease will hydrate gently with IV fluids.  3.  Chronic anemia due to multiple myeloma follow labs transfuse as needed.  4.  Type 2 diabetes Accu-Chek sliding scale insulin check A1c.  5.  Dementia patient has a history of wandering in the hospital and confirmed increased confusion and sundowning will give him PRN Seroquel.  6.  Heart failure chart says systolic but it is diastolic his last EF was 50 a year ago.  She is currently asymptomatic monitor for signs or symptoms of decompensation.      Patient seen prior to midnight         Code Status: DNR/DNI     I discussed the patient's findings and my recommendations with the patient his 2 daughters and another  family member.  He designates his daughters as his surrogate healthcare decision makers.    Estimated length of stay to be determined.    Patient seen and examined by me on February 4, 2020 at 8:19 PM.    Rodrigo Stewart MD   02/05/20   2:20 AM

## 2020-02-05 NOTE — PLAN OF CARE
Patient denies pain/nausea. NPO diet. ENT consulted and scans ordered. NPO after MN for potential surgery tomorrow however, it may be Friday if at all w/ Dr. Braxton. Patient may eat following scan. Up w/ assist x1 to BSC. Patient is confused to everything except person. Will cont to monitor and reorient.     Problem: Patient Care Overview  Goal: Plan of Care Review  2/5/2020 1603 by Nicky Jacob, RN  Outcome: Ongoing (interventions implemented as appropriate)  Flowsheets (Taken 2/5/2020 1603)  Progress: no change  Plan of Care Reviewed With: patient; daughter

## 2020-02-05 NOTE — ED PROVIDER NOTES
Subjective   History of Present Illness  88-year-old male presents with a chief complaint of left ear pain.  Patient had previously been admitted for malignant otitis externa and mastoiditis over at [facility].  The patient is entirely demented and resides in a nursing home but had apparently been more agitated complaining of pain with his left ear.  He has had drainage.  He has been getting Ciprodex twice a day.  He had followed up with ENT after his discharge and had been taking oral ciprofloxacin.  Family had brought him here because they would like to be evaluated by a new ENT team.  Review of Systems   All other systems reviewed and are negative.      Past Medical History:   Diagnosis Date   • Anemia    • Atrial fibrillation by electrocardiogram (CMS/[facility])    • CAD in native artery    • Carotid bruit    • CHF (congestive heart failure) (CMS/[facility])    • Chronic kidney disease     stage III   • Diabetes mellitus (CMS/[facility])     diet controlled   • History of coronary artery bypass graft    • Hospital psychosis (CMS/[facility])    • Hyperlipidemia    • Hypertension    • Monoclonal (M) protein disease, multiple 'M' protein    • Multiple myeloma (CMS/[facility])    • Murmur    • Pulmonary hypertension (CMS/[facility])    • Sick sinus syndrome (CMS/[facility])     s/p pace maker   • Sleep apnea        No Known Allergies    Past Surgical History:   Procedure Laterality Date   • BACK SURGERY     • CARDIAC CATHETERIZATION  09/16/1998    Left Heart; candidate for re-do CABG   • COLONOSCOPY  12/10/2013    Diverticulosis repeat prn   • CORONARY ARTERY BYPASS GRAFT      X 8   • ENDOSCOPY N/A 6/20/2018    Normal exam   • HEMORROIDECTOMY     • PACEMAKER IMPLANTATION         Family History   Problem Relation Age of Onset   • Cancer Mother    • Heart disease Mother    • Colon cancer Mother    • Heart disease Father    • Coronary artery disease Father    • Dementia Sister    • Colon polyps Neg Hx        Social History     Socioeconomic History   • Marital status:       Spouse name: Not on file   • Number of children: Not on file   • Years of education: Not on file   • Highest education level: Not on file   Tobacco Use   • Smoking status: Former Smoker     Types: Cigarettes     Last attempt to quit: 1986     Years since quittin.2   • Smokeless tobacco: Former User   Substance and Sexual Activity   • Alcohol use: No   • Drug use: No   • Sexual activity: Defer           Objective   Physical Exam   Constitutional: He is oriented to person, place, and time. He appears well-developed and well-nourished.   HENT:   Head: Normocephalic and atraumatic.   Stenotic left ear canal, purulent drainage, pre auricular edema   Eyes: Pupils are equal, round, and reactive to light. EOM are normal.   Neck: Normal range of motion. Neck supple.   Cardiovascular: Normal rate and regular rhythm.   Pulmonary/Chest: Effort normal and breath sounds normal.   Abdominal: Soft. Bowel sounds are normal.   Musculoskeletal: Normal range of motion.   Neurological: He is alert and oriented to person, place, and time. No cranial nerve deficit. Coordination normal.   Skin: Skin is warm. Capillary refill takes less than 2 seconds.   Psychiatric: He has a normal mood and affect. His behavior is normal.   Nursing note and vitals reviewed.      Procedures           ED Course           Labs Reviewed   COMPREHENSIVE METABOLIC PANEL - Abnormal; Notable for the following components:       Result Value    Glucose 122 (*)     BUN 52 (*)     Creatinine 1.81 (*)     Albumin 3.40 (*)     eGFR Non African Amer 36 (*)     BUN/Creatinine Ratio 28.7 (*)     All other components within normal limits    Narrative:     GFR Normal >60  Chronic Kidney Disease <60  Kidney Failure <15     APTT - Abnormal; Notable for the following components:    PTT 38.2 (*)     All other components within normal limits   PROTIME-INR - Abnormal; Notable for the following components:    Protime 15.7 (*)     INR 1.21 (*)     All other  components within normal limits   CBC WITH AUTO DIFFERENTIAL - Abnormal; Notable for the following components:    RBC 2.76 (*)     Hemoglobin 8.4 (*)     Hematocrit 27.1 (*)     MCV 98.2 (*)     MCHC 31.0 (*)     RDW 19.2 (*)     RDW-SD 68.1 (*)     Neutrophil % 79.9 (*)     Lymphocyte % 8.2 (*)     Immature Grans % 0.9 (*)     Lymphocytes, Absolute 0.57 (*)     Immature Grans, Absolute 0.06 (*)     All other components within normal limits   LACTIC ACID, PLASMA - Normal   BLOOD CULTURE   BLOOD CULTURE   RAINBOW DRAW    Narrative:     The following orders were created for panel order [place] Draw.  Procedure                               Abnormality         Status                     ---------                               -----------         ------                     Light Blue Top[[id number]]                                   Final result               Green Top (Gel)[[id number]]                                  Final result               Lavender Top[[id number]]                                     Final result               Red Top[[id number]]                                          Final result               [place] Blood Culture Simone...[[id number]]                      Final result               Gray Top - Ice[[id number]]                                   Final result                 Please view results for these tests on the individual orders.   GRAY TOP - ICE   LIGHT BLUE TOP   GREEN TOP   LAVENDER TOP   RED TOP   RAINBOW BLOOD CULTURE BOTTLES - 1 SET   CBC AND DIFFERENTIAL    Narrative:     The following orders were created for panel order CBC & Differential.  Procedure                               Abnormality         Status                     ---------                               -----------         ------                     CBC Auto Differential[[id number]]        Abnormal            Final result                 Please view results for these tests on the individual orders.     CT IAC Without Contrast   Final  Result   Addendum 1 of 1   ADDENDUM: Also on the current study, there appears to be some soft   tissue edema around the left parotid gland and extending into the   pterygoid musculature centrally. There is probably some inflammation of   the visualized portion of the masseter muscle. There is probable fluid   also in the left temporomandibular joint space. The inflammatory changes   in the extracranial soft tissues appear to be more prominent when   compared to a CT of the neck at [facility] on   01/12/2020. The fluid density within the mastoid region and middle ear   cavity on the left appears relatively stable. The thickening of the   tympanic membrane and thickening of the soft tissues in the external   auditory canal also appear stable.   This report was finalized on 02/04/2020 18:12 by Dr. Pawel Benson MD.      Final                   [place] Coma Scale Score: 14                          MDM  Number of Diagnoses or Management Options  Malignant otitis externa of left ear, unspecified chronicity: new and requires workup  Diagnosis management comments: Failed outpatient therapy, consulted Dr. Aceves, to admit to hospitalist for IV antibiotics and he will consult for possible tympanostomy tube placement tomorrow        Amount and/or Complexity of Data Reviewed  Clinical lab tests: reviewed and ordered  Tests in the radiology section of CPT®: ordered and reviewed  Tests in the medicine section of CPT®: ordered and reviewed  Decide to obtain previous medical records or to obtain history from someone other than the patient: yes    Risk of Complications, Morbidity, and/or Mortality  Presenting problems: moderate  Diagnostic procedures: moderate  Management options: moderate    Patient Progress  Patient progress: stable      Final diagnoses:   Malignant otitis externa of left ear, unspecified chronicity            Pedro Justice PA-C  02/04/20 2011

## 2020-02-05 NOTE — PROGRESS NOTES
[facility]  INPATIENT PROGRESS NOTE    Length of Stay: 0  Date of Admission: 2/4/2020  Primary Care Physician: Yocasta Baumann APRN    Subjective   Chief Complaint: Left face and ear pain  HPI   Recent otitis externa.  Hospitalized at [facility] and seen by ENT.  Received antibiotics and improved.  Patient was to continue Ciprodex drops at nursing home.  Last few days had increased swelling and drainage from left ear.  CT scan showed mucosal thickening involving the right maxillary bilateral frontal and anterior ethmoid sinuses.  No air-fluid level.  Dense fluid in the left middle ear cavity thought to reflect effusion or infection.  Is noted to have swelling the external ear, parotid region.  Pain for when touching.  Patient is confused and does not answer questions but does indicate pain.  CRP and sed rate elevated.  ENT, has seen and planned for MRI but patient has pacemaker and unsure if compatible.  Waiting nuclear med bone scan.  ENT to determine when and if surgery be done if needed.  Daughter told ENT that patient was on blood thinner but nothing is listed in home medications.  We are trying to clarify.    Review of Systems   Unable to determine due to baseline confusion.  All pertinent negatives and positives are as above. All other systems have been reviewed and are negative unless otherwise stated.     Objective    Temp:  [98 °F (36.7 °C)-98.5 °F (36.9 °C)] 98 °F (36.7 °C)  Heart Rate:  [58-65] 60  Resp:  [16-18] 18  BP: (102-131)/(55-60) 102/60  Physical Exam   Constitutional:   No acute distress.  Lying in bed.  No family in room.   HENT:   Head: Normocephalic and atraumatic.   Left tonsil enlarged.  Swelling parotid gland left.  Left external ear swollen.  Left facial swelling in front of left ear.   Eyes: Pupils are equal, round, and reactive to light. EOM are normal.   Neck: Normal range of motion. Neck supple.   Cardiovascular: Normal rate,  regular rhythm, normal heart sounds and intact distal pulses. Exam reveals no gallop and no friction rub.   No murmur heard.  Ventricular paced 60-62 on telemetry.   Pulmonary/Chest: He has no wheezes. He has no rales.   No oxygen in use.   Abdominal: Soft. Bowel sounds are normal. He exhibits no distension. There is no tenderness.   Genitourinary:   Genitourinary Comments: Voiding   Musculoskeletal: Normal range of motion. He exhibits no edema or deformity.   Neurological:   Disoriented.  Confused, rambles.   Skin: Skin is warm and dry. No erythema.   Psychiatric:   Impaired judgment.  Disoriented.  Does not answer questions appropriately.     Results Review:  I have reviewed the labs, radiology results, and diagnostic studies.    Laboratory Data:   Results from last 7 days   Lab Units 02/05/20  0637 02/04/20  1527   WBC 10*3/mm3 5.43 6.96   HEMOGLOBIN g/dL 7.4* 8.4*   HEMATOCRIT % 23.7* 27.1*   PLATELETS 10*3/mm3 116* 141        Results from last 7 days   Lab Units 02/05/20  0637 02/04/20  1527   SODIUM mmol/L 141 140   POTASSIUM mmol/L 4.2 4.4   CHLORIDE mmol/L 106 103   CO2 mmol/L 23.0 24.0   BUN mg/dL 48* 52*   CREATININE mg/dL 1.76* 1.81*   CALCIUM mg/dL 8.0* 8.8   BILIRUBIN mg/dL 0.4 0.5   ALK PHOS U/L 40 48   ALT (SGPT) U/L 7 10   AST (SGOT) U/L 14 19   GLUCOSE mg/dL 87 122*     Blood Culture   Date Value Ref Range Status   02/04/2020 No growth at less than 24 hours  Preliminary   02/04/2020 No growth at less than 24 hours  Preliminary     Imaging Results (All)     Procedure Component Value Units Date/Time    XR Chest 1 View [[id number]] Collected:  02/05/20 0711     Updated:  02/05/20 0715    Narrative:       Cc: Cough     CXR: A frontal view the chest obtained.     COMPARISON: 01/10/2020     FINDINGS: Patient rotated to the right portable exam. There is  cardiomegaly, stable. Dual lead left subclavian cardiac pacer device.  Intact median sternotomy wires.     There are chronic interstitial lung changes.  There are new asymmetrical  opacities in the left infrahilar region. No significant pleural  effusion. No appreciable pneumothorax. Arthritic changes of the  shoulders and spine. Thoracic aortic calcification. Calcified left hilar  lymph nodes.       Impression:       1. New left infrahilar opacities are suspicious for pneumonia.  2. Stable cardiomegaly. Prior mediastinal surgery. Left subclavian  cardiac pacer device.  3. Chronic interstitial lung change.  This report was finalized on 02/05/2020 07:12 by Dr. Nicky Silvestre MD.    CT IAC Without Contrast [[id number]] Collected:  02/04/20 1744     Updated:  02/04/20 1815    Addenda:        ADDENDUM: Also on the current study, there appears to be some soft  tissue edema around the left parotid gland and extending into the  pterygoid musculature centrally. There is probably some inflammation of  the visualized portion of the masseter muscle. There is probable fluid  also in the left temporomandibular joint space. The inflammatory changes  in the extracranial soft tissues appear to be more prominent when  compared to a CT of the neck at [facility] on  01/12/2020. The fluid density within the mastoid region and middle ear  cavity on the left appears relatively stable. The thickening of the  tympanic membrane and thickening of the soft tissues in the external  auditory canal also appear stable.  This report was finalized on 02/04/2020 18:12 by Dr. Pawel Benson MD.  Signed:  02/04/20 1812 by Pawel Benson MD    Narrative:       EXAMINATION:  CT IAC WO CONTRAST-  2/4/2020 5:24 PM CST     HISTORY: Otitis externa. Mastoiditis.      COMPARISON: No comparison study.     TECHNIQUE: Thin section spiral CT was performed of the temporal bones.  Sagittal and coronal images were reconstructed. DLP: 699 mGy-cm.     FINDINGS: There is mucosal thickening in the right maxillary sinus there  is mild mucosal thickening in the frontal sinus and a few of  the  anterior ethmoid air cells. There is vascular calcification. The  internal auditory canals are symmetric. The cochlea, vestibule and  semicircular canals are symmetric. The ossicles appear to be intact  bilaterally. There is fluid in the left middle ear cavity. There is  fluid density throughout much of the mastoid air cells on the left.  There is no bone destruction identified. There is thickening of the left  tympanic membrane. There is soft tissue thickening of the external  auditory canal with no bone destruction identified. There is moderate  cerebral and cerebellar atrophy. There is associated ventricular  prominence.       Impression:       1. Minimal mucosal thickening involving the right maxillary, bilateral  frontal and some of the anterior ethmoid sinuses. No air-fluid level in  the paranasal sinuses.  2. Fluid density in the left middle ear cavity could reflect underlying  effusion or infection. There is also fluid density throughout most of  the mastoid region on the left with thickening of the left tympanic  membrane and also thickening of the soft tissues within the external  auditory canal. There is no visible bone destruction.  3. The inner ear structures have a normal appearance. The middle,  mastoid and external ear structures on the right side appear normal.  4. Brain atrophy. Vascular calcification of the cavernous carotid  arteries bilaterally.     The full report of this exam was immediately signed and available to the  emergency room. The patient is currently in the emergency room.  This report was finalized on 02/04/2020 17:50 by Dr. Pawel Benson MD.        Intake/Output    Intake/Output Summary (Last 24 hours) at 2/5/2020 1646  Last data filed at 2/5/2020 1635  Gross per 24 hour   Intake 461 ml   Output 600 ml   Net -139 ml       Scheduled Meds    calcitriol 0.25 mcg Oral Daily   carvedilol 6.25 mg Oral BID With Meals   ciprofloxacin-dexamethasone 4 drop Left Ear BID   furosemide 20  mg Oral Daily   insulin lispro 2-7 Units Subcutaneous 4x Daily With Meals & Nightly   [START ON 2/6/2020] levoFLOXacin 750 mg Intravenous Q48H   megestrol 800 mg Oral Daily   melatonin 9.75 mg Oral Nightly   multivitamin w/minerals 1 tablet Oral Daily   pantoprazole 40 mg Oral Q AM   saccharomyces boulardii 250 mg Oral BID   sertraline 100 mg Oral Daily   sodium bicarbonate 650 mg Oral BID   sodium chloride 10 mL Intravenous Q12H   vitamin D3 5,000 Units Oral Daily       I have reviewed the patient current medications.     Assessment/Plan     Active Hospital Problems    Diagnosis   • **Acute otitis externa   • Chronic systolic heart failure (CMS/HCC)   • Dementia (CMS/HCC)   • Moderate malnutrition (CMS/HCC)   • Light chain myeloma (CMS/HCC)   • Controlled type 2 diabetes mellitus diet controlled without complication, without long-term current use of insulin (CMS/HCC)   • Pulmonary hypertension (CMS/HCC)   • Hypertension   • Stage 3 chronic kidney disease (CMS/HCC)   • PAF (paroxysmal atrial fibrillation) (CMS/HCC) No AC DUE TO GI  BLEED     Plan:  1.  Current otitis externa left ear.  Recent hospitalized at [facility].  Followed up with ENT and was to continue Ciprodex drops at SNF.  Noted increased swelling and pain with drainage left ear over the last few days.  CT scan shows fluid density left middle ear cavity thought to reflect effusion or infection also mastoid region on the left has significant fluid density as well as thickening of the left tympanic membrane and thickening of the soft tissues of the external auditory canal.    2.  Ciprodex drops left ear.  Levaquin IV every 48 hours  3.  ENT consult.  Plans for nuclear med scan.  Unable to do MRI due to pacemaker and unknown compatibility.  Unable to perform NM scan today as most order contrast.  Surgical intervention depending results of NM scan.  4.  ENT concerned that patient was on some form of blood thinner, have reviewed medication list and do  not see any antiplatelet, anticoagulation medications.  GM Saunders his nurse reviewed medications from SNF and does not see any medications in the form of blood thinner or anticoagulations.  5.  Blood cultures no growth less than 24 hours.  CRP 13.57.  6.  CKD stage III.  IV fluids at 75 mL/h.  Will decrease to 50 mL/h with history of CHF.  No exacerbation.  BMP tomorrow.  7.  Glucoses 93, 85, 87, 122.  Sliding scale insulin coverage.  Hemoglobin A1c 6.0  8.  Medications reviewed and resumed if appropriate.    Yaneli Gold and Cata Dunne are his surrogate decision makers  The above documentation resulted from a face-to-face encounter by me Carolin COX, [facility].      Discharge Planning: I expect patient to be discharged to SNF in 2 days.    KENNY Brown   02/05/20   4:46 PM

## 2020-02-05 NOTE — CONSULTS
Ladarius Braxton Jr, MD     OTOLARYNGOLOGY, HEAD AND NECK SURGERY CONSULTATION NOTE   Referring Provider: Neo Hopson DO  Reason for Consultation: Otitis externa  Location: 388/1  Accompanied by: Daughter  Chief complaint:   Chief Complaint   Patient presents with   • Ear Problem         HISTORY OF PRESENT ILLNESS:   Jesús Long is a  88 y.o. male with a complex history, obtained from chart and daughter. Patient had begun with ear pain near [facility]. He was resident in nursing home, suffering from dementia and debility. He apparently falls a lot.  He was admitted to [facility], with a diagnosis of Otitis externa. He was discharged and followed by ENT as outpatient. He was discharged to NH. He apparently has had continued L sided ear pain, facial pain. Pain radiated down to L OP. He has moreno difficulty swallowing at times as well. The pain has vacillated in intensity. History from daughter is somewhat incomplete. Patient is unable to give much history, but asks for help with his pain. He is affable, but un-informative. He has L facial pain.  Patient seen. Chart reviewed  Review of Systems  Review of Systems  ROS master CMN: Unobtainable because:   Mental staus dementia    History  Past Medical History:   Diagnosis Date   • Anemia    • Atrial fibrillation by electrocardiogram (CMS/[facility])    • CAD in native artery    • Carotid bruit    • CHF (congestive heart failure) (CMS/[facility])    • Chronic kidney disease     stage III   • Coronary artery disease 5/17/2017   • Diabetes mellitus (CMS/HCC)     diet controlled   • History of coronary artery bypass graft    • Hospital psychosis (CMS/[facility])    • Hyperlipidemia    • Hypertension    • Monoclonal (M) protein disease, multiple 'M' protein    • Multiple myeloma (CMS/[facility])    • Murmur    • Myelodysplastic or myeloproliferative disease 2/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   • Obstructive sleep apnea syndrome 2/4/2020   • Pacemaker 5/17/2017   • Pulmonary hypertension (CMS/[facility])    • Sick sinus syndrome  (CMS/[facility])     s/p pace maker   • Sleep apnea      Past Surgical History:   Procedure Laterality Date   • BACK SURGERY     • CARDIAC CATHETERIZATION  1998    Left Heart; candidate for re-do CABG   • COLONOSCOPY  12/10/2013    Diverticulosis repeat prn   • CORONARY ARTERY BYPASS GRAFT      X 8   • ENDOSCOPY N/A 2018    Normal exam   • HEMORROIDECTOMY     • PACEMAKER IMPLANTATION       Family History   Problem Relation Age of Onset   • Cancer Mother    • Heart disease Mother    • Colon cancer Mother    • Heart disease Father    • Coronary artery disease Father    • Dementia Sister    • Colon polyps Neg Hx      Social History     Tobacco Use   • Smoking status: Former Smoker     Types: Cigarettes     Last attempt to quit: 1986     Years since quittin.2   • Smokeless tobacco: Former User   Substance Use Topics   • Alcohol use: No   • Drug use: No     Past Medical History:    reviewed intake note    reviewed in chart  Past Surgical History:    reviewd intake note    reviewed in chart  Social History:    reviewed intake note    reviewed in chart  Family History:    reviewed intake note    reviewed in chart    reviewed intake note    reviewed in chart      Current Medicines: Reviewed    Current Facility-Administered Medications:   •  acetaminophen (TYLENOL) tablet 650 mg, 650 mg, Oral, Q4H PRN **OR** acetaminophen (TYLENOL) 160 MG/5ML solution 650 mg, 650 mg, Oral, Q4H PRN **OR** acetaminophen (TYLENOL) suppository 650 mg, 650 mg, Rectal, Q4H PRN, Rodrigo Stewart MD  •  calcitriol (ROCALTROL) capsule 0.25 mcg, 0.25 mcg, Oral, Daily, Rodrigo Stewart MD, 0.25 mcg at 20 09  •  carvedilol (COREG) tablet 6.25 mg, 6.25 mg, Oral, BID With Meals, Rodrigo Stewart MD, 6.25 mg at 20 1814  •  ciprofloxacin-dexamethasone (CIPRODEX) 0.3-0.1 % otic suspension 4 drop, 4 drop, Left Ear, BID, Rodrigo Stewart MD, 4 drop at 20 09  •  dextrose (D50W) 25 g/ 50mL Intravenous Solution 25 g,  25 g, Intravenous, Q15 Min PRN, Rodrigo Stewart MD  •  dextrose (GLUTOSE) oral gel 15 g, 15 g, Oral, Q15 Min PRN, Rodrigo Stewart MD  •  furosemide (LASIX) tablet 20 mg, 20 mg, Oral, Daily, Rodrigo Stewart MD, 20 mg at 02/05/20 0911  •  glucagon (human recombinant) (GLUCAGEN DIAGNOSTIC) injection 1 mg, 1 mg, Subcutaneous, Q15 Min PRN, Rodrigo Stewart MD  •  HYDROcodone-acetaminophen (NORCO) 5-325 MG per tablet 1 tablet, 1 tablet, Oral, Q4H PRN, Rodrigo Stewart MD  •  insulin lispro (humaLOG) injection 2-7 Units, 2-7 Units, Subcutaneous, 4x Daily With Meals & Nightly, Rodrigo Stewart MD  •  [START ON 2/6/2020] levoFLOXacin (LEVAQUIN) 750 mg/150 mL D5W (premix) (LEVAQUIN) 750 mg, 750 mg, Intravenous, Q48H, Rodrigo Stewart MD  •  megestrol (MEGACE) 40 MG/ML suspension 800 mg, 800 mg, Oral, Daily, Rodrigo Stewart MD, 800 mg at 02/05/20 0910  •  melatonin tablet 9.75 mg, 9.75 mg, Oral, Nightly, Rodrigo Stewart MD, 9.75 mg at 02/04/20 2309  •  multivitamin w/minerals tablet 1 tablet, 1 tablet, Oral, Daily, Rodrigo Stewart MD, 1 tablet at 02/05/20 0910  •  ondansetron (ZOFRAN) tablet 4 mg, 4 mg, Oral, Q6H PRN **OR** ondansetron (ZOFRAN) injection 4 mg, 4 mg, Intravenous, Q6H PRN, Rodrigo Stewart MD  •  pantoprazole (PROTONIX) EC tablet 40 mg, 40 mg, Oral, Q AM, Rodrigo Stewart MD  •  QUEtiapine (SEROquel) tablet 12.5 mg, 12.5 mg, Oral, Q6H PRN, Rodrigo Stewart MD  •  saccharomyces boulardii (FLORASTOR) capsule 250 mg, 250 mg, Oral, BID, Rodrigo Stewart MD, 250 mg at 02/05/20 0910  •  sertraline (ZOLOFT) tablet 100 mg, 100 mg, Oral, Daily, Rodrigo Stewart MD, 100 mg at 02/05/20 0910  •  sodium bicarbonate tablet 650 mg, 650 mg, Oral, BID, Rodrigo Stewart MD, 650 mg at 02/05/20 0910  •  sodium chloride 0.9 % flush 10 mL, 10 mL, Intravenous, Q12H, Rodrigo Stewart MD, 10 mL at 02/05/20 0910  •  sodium chloride 0.9 % flush 10 mL, 10 mL, Intravenous, PRN, Rodrigo Stewart  MD  •  sodium chloride 0.9 % infusion, 50 mL/hr, Intravenous, Continuous, Carolin Gongora APRN, Last Rate: 50 mL/hr at 02/05/20 1815, 50 mL/hr at 02/05/20 1815  •  vitamin D3 capsule 5,000 Units, 5,000 Units, Oral, Daily, Rodrigo Stewart MD, 5,000 Units at 02/05/20 0910    Allergies:  Patient has no known allergies.      Vital Signs   Temp:  [98 °F (36.7 °C)-98.5 °F (36.9 °C)] 98 °F (36.7 °C)  Heart Rate:  [58-62] 60  Resp:  [18] 18  BP: (102-131)/(55-60) 102/60  EXAMINATION:   CONSTITUTIONAL:    well developed  well nourished  unkempt  elderly male, lying in bed    BODY HABITUS:    Normal body habitus    COMMUNICATION:    able to communicate normally, hoarse vocal quality    HEAD:       temporal wasting    FACE:    mild L swelling over parotid  tenderness present-  L pretragal area    SALIVARY GLANDS:    left parotid-  tender over gland with mild swelling  submandibular gland with no tenderness, no swelling, no masses   bilateral  sublingual gland with no tenderness, no swelling, no masses  Bilateral    EYE:    ocular motility normal, eyelids normal, orbits normal, no proptosis, conjunctiva clear, sclera non-icteric, pupils equal, round, reactive to light and accomodation    HEARING:      response to conversational voice decreased  Bilateral    EARS:    Otoscopic exam      Bilateral  Right  normal pinna with no lesions, Canals normal size and shape, Tympanic membranes normal, Ossicular chain intact, Middle ear clear   EXTERNAL AUDITORY CANALS:     abnormal:        LEFT: thick skin, debris, wet   TYMPANIC MEMBRANES:     Abnormal tympanic membrane:        LEFT: wet, thick, bluish   MIDDLE EAR:     abnormal:       LEFT: not visualized   MASTOID:     Non-surgical, Non-tender    PINNA:     normal, non-tender, skin intact without lesions      NOSE EXTERNAL:    APPEARANCE: normal, straight, with good projection, no tenderness, no lesions, no tenderness, good nasal support, patent nares    NOSE INTERNAL:    Anterior  rhinoscopy  intact mucosa, normal inferior turbinates, septum midline without perforation, secretions clear and normal amount, nares patent, normal nasal airway without obstruction, no lesions    ORAL CAVITY:    Normal lips with no lesions, dentition normal for age, FOM intact without lesions and normal salivary flow, Mucosa intact without lesions, Hard and soft palate normal without lesions    OROPHARYNX:    Direct examination  oropharyngeal mucosa normal, tonsil(s) with normal appearance      NECK:    normal appearance, no masses, no lesions, larynx normal mobility, trachea midline    LYMPH NODES :    no adenopathy    THYROID:    no overt thyromegaly, no tenderness, nodules or mass present on palpation, position midline    CHEST/RESPIRATORY:    respiratory effort normal, no rales, rubs or wheezing, no stridor, normal appearance to chest    CARDIOVASCULAR:      irregularly irregular    NEURO/PSYCHIATRIC :      orientation- not oriented well     mood- upbeat     affect- intact         I have reviewed the patients old records in the chart.  I reviewed the patient's new clinical results.  I have personally reviewed the patient's ct scan of the sinuses without contrast images.  I have personally reviewed the patient's ct scan of the sinuses without contrast report.      Assessment    ASSESSMENT:     Acute otitis externa    PAF (paroxysmal atrial fibrillation) (CMS/HCC) No AC DUE TO GI  BLEED    Stage 3 chronic kidney disease (CMS/HCC)    Hypertension    Pulmonary hypertension (CMS/HCC)    Controlled type 2 diabetes mellitus diet controlled without complication, without long-term current use of insulin (CMS/HCC)    Light chain myeloma (CMS/HCC)    Moderate malnutrition (CMS/HCC)    Chronic systolic heart failure (CMS/HCC)    Dementia (CMS/HCC)    Malignant otitis externa of left ear         Plan    PLAN:   Patient has severe pain c/w ALBARO.  I will get MR and plan L tube in ear tomorrow.  Discussed with  daughter.    Following patient as in-patient. Further recommendations will be made based on serial examinations.    Medications/Orders for this encounter: No new medications ordered.  Orders-  MRI and case request         Problem List Items Addressed This Visit        Nervous and Auditory    * (Principal) Acute otitis externa    Relevant Orders    Case Request (Completed)    Malignant otitis externa of left ear - Primary    Relevant Orders    Case Request (Completed)        Medical and surgical options were discussed including continued medical management vs myringotomy tube insertion with or without adenoidectomy. Risks, benefits and alternatives were discussed and questions were answered. Myringotomy tube insertion was felt to be indicated due to the patient's history of possible malignant otitis externa. After considering the options, it was decided that myringotomy tube insertion with adenoidectomy were the best options. Lengthy discussion with family carried out to discuss increased anesthesia risk. I have carefully explained goals of tube placement L ear. I will try to get cultures, irrigate ear clean and assess the middle ear. He may need mastoidectomy if tube does not help.     -----SURGERY SCHEDULING:-----  Schedule: Left Tympanostomy with tube insertion     ---INFORMED CONSENT DISCUSSION:---  MYRINGOTOMY TUBE INSERTION: The risks and benefits of myringotomy tube insertion were explained including but not limited to pain, aural fullness, bleeding, infection, risks of the anesthesia, persistent tympanic membrane perforation, chronic otorrhea, early and late extrusion, and the possibility for the need of reinsertion after extrusion. Alternatives were discussed. Understanding of the risks was demonstrated.     ---PREOPERATIVE WORKUP:---  Per anesthesia     Ladarius Braxton Jr, MD  02/05/20  1:02 PM

## 2020-02-06 PROBLEM — H60.312 ACUTE DIFFUSE OTITIS EXTERNA OF LEFT EAR: Status: ACTIVE | Noted: 2020-01-01

## 2020-02-06 PROBLEM — H74.42 GRANULATION POLYP OF MIDDLE EAR, LEFT: Status: ACTIVE | Noted: 2020-01-01

## 2020-02-06 NOTE — OP NOTE
Ladarius Braxton Jr, MD     OPERATIVE NOTE     Jesús Long  2/6/2020    Pre-op Diagnosis:   Malignant otitis externa of left ear, unspecified chronicity [H60.22]  Acute diffuse otitis externa of left ear [H60.312]    Post-op Diagnosis:     Post-Op Diagnosis Codes:     * Malignant otitis externa of left ear, unspecified chronicity [H60.22]     * Acute diffuse otitis externa of left ear [H60.312]    Surgeon(s):   Surgeon(s):  Ladarius Braxton Jr., MD    Procedure/CPT® Codes:  Left myringotomy  Removal/NBiopsy Middle ear polyp    Anesthesia:   General mask    Staff:   Circulator: Randy Brown RN  Scrub Person: Melissa Simmons  Assistant: Marilyn Fisher    Estimated Blood Loss:   minimal    Specimens:   none      Drains:   none    FINDINGS:  EAR CANAL:     LEFT: macerated with skin desquamation and old drops   TYMPANIC MEMBRANE:      LEFT: dull, thick, retracted, no perforation  OSSICULAR CHAIN:      LEFT: Only lateral aspect malleus visualized, TM too thick to see through  MIDDLE EAR:      LEFT: Granulation noted anteriorly with profuse bleeding, unable to visualize any further    Complications: none    Reason for the Operation: Jesús Long is a 88 y.o. male who has had a history of chronic/ recurrent ear disease.  The risks and benefits of myringotomy tube insertion were explained including but not limited to pain, aural fullness, bleeding, infection, risks of the anesthesia, persistent tympanic membrane perforation, chronic otorrhea, early and late extrusion, and the possibility for the need of reinsertion after extrusion. Alternatives were discussed.  Questions were asked appropriately answered.      Procedure Description:  The patient was taken back to the operating room, placed supine on the operating table and placed under anesthesia by the anesthesia staff. Once this was done a time out was performed to confirm the patient and the proper procedure.    Myringotomy: Left with biopsy  middle ear granulation  The operating microscope was brought into the field and used throughout this procedure.  The head was turned and positioned. The ear canal was cleaned.  The Tympanic membrane was visualized, with the findings noted above.  The incision was made in the tympanic membrane, anteriorly.  There was brisk bleeding from the tympanic membrane. This was controlled with Irrigation saline and suction.  The Middle ear was entered and mucoid effusion was aspirated.  The middle ear was then examined anteriorly. There was a large amount of granulation. The granulation continued to bleed. A small piece was obtained for biopsy and sent to Pathology. Afrin was applied. The bleeding decreased.  Because of bleeding and granulation, I elected to not place a tube, since there was no free middle ear space. There was concern that Granulation or bleeding would obstruct the tube. The bleeding made difficult proper tube placement and recurred with any manipulation of the ear drum.  Afrin and Gelfoam were placed in the ear to continue Hemostasis.  The procedure was terminated.    At the end of the procedure, the operative site was found to be hemostatic.  The operative site was inspected for foreign bodies and retained instruments.  Sponge and instrument count was correct.    Disposition: The patient was transported to the PACU in Stable condition.   The pateint will be returned to Regular room.    Postoperative discussion held with: Spouse  Procedure and findings reviewed.  DVT ASSESSMENT CARRIED OUT: Patient is in the immediate post-operative period and is not a candidate for anticoagulation therapy  Patient is at increased risk for bleeding if anticoagulant therapy is used    The patient will be discharged home post-operatively.    Ladarius Braxton Jr, MD  2/6/2020  3:35 PM

## 2020-02-06 NOTE — ANESTHESIA PREPROCEDURE EVALUATION
Anesthesia Evaluation     Patient summary reviewed   no history of anesthetic complications:  NPO Solid Status: > 8 hours             Airway   Mallampati: II  TM distance: >3 FB  Neck ROM: full  Dental      Pulmonary    (+) home oxygen (2LNC), sleep apnea (noncompliant),     ROS comment: pulm HTN  Cardiovascular     (+) hypertension, CAD, CABG (1998), CHF Diastolic >=55%,       Neuro/Psych  (+) dementia,     (-) CVA  GI/Hepatic/Renal/Endo    (+)   renal disease CRI, diabetes mellitus,     Musculoskeletal     Abdominal    Substance History      OB/GYN          Other      history of cancer (multiple myeloma)      Other Comment: Anemia                    Anesthesia Plan    ASA 3     MAC   (Will suspend DNR perioperatively.  D/w pt daughter)    Anesthetic plan, all risks, benefits, and alternatives have been provided, discussed and informed consent has been obtained with: patient and child.

## 2020-02-06 NOTE — PLAN OF CARE
Problem: Patient Care Overview  Goal: Plan of Care Review  Outcome: Ongoing (interventions implemented as appropriate)  Flowsheets (Taken 2/6/2020 8440)  Progress: no change  Plan of Care Reviewed With: patient  Outcome Summary: Pt went for debridement of left ear canal. Pt is currently resting well in his room. Family at bedside. VSS, cont to monitor.

## 2020-02-06 NOTE — NURSING NOTE
Information brought in from daughter about patient's implanted pacemaker:     Device- [employer]   Model-Adapta ADDR-01  (on the back of the device information at home- Wire x 3GLC Med  1)  Placed by Dr. Lopez 7/2/2013

## 2020-02-07 PROBLEM — R41.0 DELIRIUM: Status: ACTIVE | Noted: 2020-01-01

## 2020-02-07 NOTE — PROGRESS NOTES
"    [facility]  INPATIENT PROGRESS NOTE    Length of Stay: 0  Date of Admission: 2/4/2020  Primary Care Physician: Yocasta Baumann APRN    Subjective   Chief Complaint: Follow-up  HPI   Patient resting in bed.  He is quite anxious and upset today.  He tells me \"I am ashamed for the way I spoke to her\".  He cannot tell me who it is that he spoke with.  He keeps telling me that he is to have surgery tomorrow.  He speaks frequently of dying and that he has made peace with God.  He denies any chest pain or shortness of breath.  He denies pain anywhere presently.    Review of Systems   All pertinent negatives and positives are as above. All other systems have been reviewed and are negative unless otherwise stated.     Objective    Temp:  [97.5 °F (36.4 °C)-98.1 °F (36.7 °C)] 97.7 °F (36.5 °C)  Heart Rate:  [42-62] 62  Resp:  [14-60] 18  BP: ()/(46-60) 132/56  Physical Exam  Constitutional: He is oriented to person, place, and time. He appears well-developed and well-nourished. No distress.   HENT:   Head: Normocephalic and atraumatic.   Cotton ball in the left ear   Neck: Normal range of motion. Neck supple. No JVD present. No tracheal deviation present.   Cardiovascular: Normal rate, regular rhythm and intact distal pulses. Exam reveals no gallop and no friction rub.    60-62  Pulmonary/Chest: Effort normal and breath sounds normal. He has no wheezes. He has no rales.   Abdominal: Soft. Bowel sounds are normal. He exhibits no distension. There is no tenderness. There is no guarding.   Musculoskeletal: Normal range of motion. He exhibits edema (Trace RLE edema and left ear). He exhibits no tenderness.   Neurological: He is alert and oriented to person only.  Conversation is illogical at times.  Skin: Skin is warm and dry. No rash noted. No erythema.   Psychiatric: He has a normal mood and affect. His behavior is normal. Judgment and thought content normal. "   Vitals reviewed.    Results Review:  I have reviewed the labs, radiology results, and diagnostic studies.    Laboratory Data:   Results from last 7 days   Lab Units 02/07/20  0648 02/05/20  0637 02/04/20  1527   WBC 10*3/mm3 4.81 5.43 6.96   HEMOGLOBIN g/dL 7.8* 7.4* 8.4*   HEMATOCRIT % 25.1* 23.7* 27.1*   PLATELETS 10*3/mm3 127* 116* 141     Results from last 7 days   Lab Units 02/07/20  0648 02/06/20  0447 02/05/20  0637 02/04/20  1527   SODIUM mmol/L 143 141 141 140   POTASSIUM mmol/L 4.0 4.2 4.2 4.4   CHLORIDE mmol/L 110* 106 106 103   CO2 mmol/L 20.0* 20.0* 23.0 24.0   BUN mg/dL 38* 46* 48* 52*   CREATININE mg/dL 1.73* 1.79* 1.76* 1.81*   CALCIUM mg/dL 7.6* 8.0* 8.0* 8.8   BILIRUBIN mg/dL  --   --  0.4 0.5   ALK PHOS U/L  --   --  40 48   ALT (SGPT) U/L  --   --  7 10   AST (SGOT) U/L  --   --  14 19   GLUCOSE mg/dL 88 148* 87 122*     Radiology Data:   Imaging Results (Last 24 Hours)     Procedure Component Value Units Date/Time    NM Bone Scan 3 Phase [[id number]] Collected:  02/06/20 1423     Updated:  02/06/20 1437    Narrative:       Nuclear medicine 3 phase bone scan     Indication: Osteomyelitis of the left temporal bone, malignant otitis  externa     Comparison: CT IAC 02/04/2020     Radiopharmaceutical: 22.3 mCi technetium-99m HDP     Findings:     Dynamic left lateral images obtained during blood pool demonstrate  increased vascular flow to the left temporal bone region.     On delayed 3 hour images, there is no evidence of increased radiotracer  uptake within the region of the left temporal bone.      Increased radiotracer uptake in the upper cervical spine on delayed  images is likely related to degenerative change.       Impression:       Impression:     Increased radiotracer flow to the left temporal bone on blood pool  images with washout on delayed images. Findings are most consistent with  soft tissue infection without osteomyelitis.  This report was finalized on 02/06/2020 14:34 by Dr. Salazar  MD Carlito.        I have reviewed the patient current medications.     Assessment/Plan     Active Hospital Problems    Diagnosis   • **Acute otitis externa   • Delirium   • Granulation polyp of middle ear, left   • Malignant otitis externa of left ear     Osteomyelitis of L temporal bone     • Chronic systolic heart failure (CMS/HCC)   • Dementia (CMS/[facility])   • Acute diffuse otitis externa of left ear     Added automatically from request for surgery [id number]     • Moderate malnutrition (CMS/[facility])   • Light chain myeloma (CMS/[facility])   • Controlled type 2 diabetes mellitus diet controlled without complication, without long-term current use of insulin (CMS/[facility])   • Pulmonary hypertension (CMS/HCC)   • Hypertension   • Stage 3 chronic kidney disease (CMS/HCC)   • PAF (paroxysmal atrial fibrillation) (CMS/[facility]) No AC DUE TO GI  BLEED     Plan:  1.  Feel that the patient is currently exhibiting an acute delirium superimposed on existing dementia.  His daughter told me yesterday that he had not slept for at least 2 or 3 days prior to surgery.  He has been given a Norco and a Seroquel recently, was exhibiting this behavior before these medications were administered.  We will continue melatonin nightly and schedule Seroquel tonight in an attempt to regulate the patient's sleep-wake cycle  2.  Record review indicates the patient was admitted to an outlying facility from 1/12 through 1/17.  A CT of the neck showed a left mastoid effusion and fluid in the internal and external auditory canals.  He was evaluated by ENT during that hospitalization.  It was felt most likely the patient had malignant otitis externa leading to reactive mastoid inflammation.  He was treated with IV cefepime while inpatient and discharged back to nursing facility on p.o. ciprofloxacin and Ciprodex drops.  He was evaluated by ENT in follow-up on 1/23/2020.  He was found to have left tympanic membrane perforation and a 14-day course of Ciprodex was added  once again.  3.  ENT note and recommendations reviewed.  Patient was taken to the operating room 2/6/2020.  It appears there was a large amount of granulation to the middle ear, which continued to bleed.  A small piece was obtained for biopsy and sent to pathology.  Due to bleeding and granulation, decision was made to not place a tube.  A mucoid effusion was aspirated.   4.  Dr. Braxton has recommended continuing Iv antibiotics until Monday or Tuesday.  Will consult ID for further recommendations as ENT recommends possible long-term antibiotic use.  Patient is currently on IV Levaquin, which is absorbed just as well systemically by mouth, so if this is felt to be adequate from infectious disease standpoint, he may be able to be discharged soon.  Will await any further recommendations.  Continue Ciprodex eardrops.  5.  Creatinine 1.7, renal function felt to be essentially at baseline.  Continue to monitor off of IV fluids.  6.  Patient services following for discharge back to skilled nursing facility  7.  Lab holiday in a.m.    Discharge Planning: I expect the patient to be discharged to SNF in ? days.    Crystal Oshea, KENNY   02/07/20   1:00 PM

## 2020-02-07 NOTE — PROGRESS NOTES
Ladarius Braxton Jr, MD     OTOLARYNGOLOGY, HEAD AND NECK SURGERY PROGRESS NOTE   Referring Provider: Neo Hopson DO  Reason for Consultation: L Malignant otitis externa  Location: 388/1  Accompanied by: No one  Chief complaint:   Chief Complaint   Patient presents with   • Ear Problem         Interval History:   Since last examined, Jesús Long has had L ME exam under anesthesia. This was complicated by bleeding from ME mucosa and EAC. Procedure stopped and EAC packed.  Patient sleeping this AM. He awakens easily. He is mildly disoriented but very calm. He denies any pain in L ear but notes decreased hearing.  Patient seen. Chart reviewed.      Review of Systems:   ROS master CMN: No change from prior inquiry    Vital Signs  Temp:  [97.5 °F (36.4 °C)-98.1 °F (36.7 °C)] 97.9 °F (36.6 °C)  Heart Rate:  [42-62] 61  Resp:  [14-60] 18  BP: ()/(46-60) 117/60      EXAMINATION:  CONSTITUTIONAL:    well developed  well nourished  elderly male, lying in bed, awakens easily     BODY HABITUS:    Normal body habitus     COMMUNICATION:    able to communicate normally, hoarse vocal quality     HEAD:       temporal wasting     FACE:    Non-tender with no swelling     SALIVARY GLANDS:    left parotid-  tender over gland with mild swelling  submandibular gland with no tenderness, no swelling, no masses   bilateral  sublingual gland with no tenderness, no swelling, no masses  Bilateral     EYE:    ocular motility normal, eyelids normal, orbits normal, no proptosis, conjunctiva clear, sclera non-icteric, pupils equal, round, reactive to light and accomodation     HEARING:      response to conversational voice decreased  Bilateral     EARS:    Otoscopic exam      Bilateral  Right  normal pinna with no lesions, Canals normal size and shape, Tympanic membranes normal, Ossicular chain intact, Middle ear clear  AS- packed with no bleeding     NOSE EXTERNAL:    APPEARANCE: normal, straight, with good projection, no  tenderness, no lesions, no tenderness, good nasal support, patent nares     NOSE INTERNAL:    Anterior rhinoscopy  intact mucosa, normal inferior turbinates, septum midline without perforation, secretions clear and normal amount, nares patent, normal nasal airway without obstruction, no lesions     ORAL CAVITY:    Normal lips with no lesions, dentition normal for age, FOM intact without lesions and normal salivary flow, Mucosa intact without lesions, Hard and soft palate normal without lesions     OROPHARYNX:    Direct examination  oropharyngeal mucosa normal, tonsil(s) with normal appearance       NECK:    normal appearance, no masses, no lesions, larynx normal mobility, trachea midline     LYMPH NODES :    no adenopathy     THYROID:    no overt thyromegaly, no tenderness, nodules or mass present on palpation, position midline     CHEST/RESPIRATORY:    respiratory effort normal, no rales, rubs or wheezing, no stridor, normal appearance to chest     CARDIOVASCULAR:      irregularly irregular     NEURO/PSYCHIATRIC :      orientation- not oriented well     mood- upbeat     affect- intact       Results Review:    Lab Results (last 24 hours)     Procedure Component Value Units Date/Time    POC Glucose Once [[id number]]  (Normal) Collected:  02/07/20 0820    Specimen:  Blood Updated:  02/07/20 0839     Glucose 94 mg/dL      Comment: : [id number] Aubrey MaribelnMeter ID: [id number]       Tissue Pathology Exam [[id number]] Collected:  02/06/20 1453    Specimen:  Tissue from Ear, Left Updated:  02/07/20 0737    Basic Metabolic Panel [[id number]]  (Abnormal) Collected:  02/07/20 0648    Specimen:  Blood Updated:  02/07/20 0719     Glucose 88 mg/dL      BUN 38 mg/dL      Creatinine 1.73 mg/dL      Sodium 143 mmol/L      Potassium 4.0 mmol/L      Chloride 110 mmol/L      CO2 20.0 mmol/L      Calcium 7.6 mg/dL      eGFR Non African Amer 37 mL/min/1.73      BUN/Creatinine Ratio 22.0     Anion Gap 13.0 mmol/L     Narrative:        GFR Normal >60  Chronic Kidney Disease <60  Kidney Failure <15      CBC (No Diff) [[id number]]  (Abnormal) Collected:  02/07/20 0648    Specimen:  Blood Updated:  02/07/20 0707     WBC 4.81 10*3/mm3      RBC 2.56 10*6/mm3      Hemoglobin 7.8 g/dL      Hematocrit 25.1 %      MCV 98.0 fL      MCH 30.5 pg      MCHC 31.1 g/dL      RDW 18.9 %      RDW-SD 67.4 fl      MPV 9.9 fL      Platelets 127 10*3/mm3     POC Glucose Once [[id number]]  (Normal) Collected:  02/06/20 2127    Specimen:  Blood Updated:  02/06/20 2138     Glucose 104 mg/dL      Comment: : [id number] Damon SaundersMeter ID: [id number]       POC Glucose Once [[id number]]  (Normal) Collected:  02/06/20 1816    Specimen:  Blood Updated:  02/06/20 1827     Glucose 102 mg/dL      Comment: : [id number] Tejinder ClaytonMeter ID: [id number]       Blood Culture - Blood, Arm, Left [[id number]] Collected:  02/04/20 1527    Specimen:  Blood from Arm, Left Updated:  02/06/20 1731     Blood Culture No growth at 2 days    Blood Culture - Blood, Arm, Right [[id number]] Collected:  02/04/20 1651    Specimen:  Blood from Arm, Right Updated:  02/06/20 1715     Blood Culture No growth at 2 days    POC Glucose Once [[id number]]  (Normal) Collected:  02/06/20 1519    Specimen:  Blood Updated:  02/06/20 1535     Glucose 110 mg/dL      Comment: : [id number] Nayeavinash Dill  Brigid ID: [id number]       POC Glucose Once [[id number]]  (Normal) Collected:  02/06/20 1211    Specimen:  Blood Updated:  02/06/20 1223     Glucose 119 mg/dL      Comment: : [id number] Tejinder ClaytonMeter ID: [id number]           Imaging Results (Last 24 Hours)     Procedure Component Value Units Date/Time    NM Bone Scan 3 Phase [[id number]] Collected:  02/06/20 1423     Updated:  02/06/20 1437    Narrative:       Nuclear medicine 3 phase bone scan     Indication: Osteomyelitis of the left temporal bone, malignant otitis  externa     Comparison: CT IAC 02/04/2020     Radiopharmaceutical: 22.3 mCi  technetium-99m HDP     Findings:     Dynamic left lateral images obtained during blood pool demonstrate  increased vascular flow to the left temporal bone region.     On delayed 3 hour images, there is no evidence of increased radiotracer  uptake within the region of the left temporal bone.      Increased radiotracer uptake in the upper cervical spine on delayed  images is likely related to degenerative change.       Impression:       Impression:     Increased radiotracer flow to the left temporal bone on blood pool  images with washout on delayed images. Findings are most consistent with  soft tissue infection without osteomyelitis.  This report was finalized on 02/06/2020 14:34 by Dr. Martin Esteban MD.          Medications, Allergies and Past History Reviewed      Assessment    ASSESSMENT:     Acute otitis externa    PAF (paroxysmal atrial fibrillation) (CMS/[facility]) No AC DUE TO GI  BLEED    Stage 3 chronic kidney disease (CMS/[facility])    Hypertension    Pulmonary hypertension (CMS/[facility])    Controlled type 2 diabetes mellitus diet controlled without complication, without long-term current use of insulin (CMS/[facility])    Light chain myeloma (CMS/HCC)    Moderate malnutrition (CMS/HCC)    Chronic systolic heart failure (CMS/HCC)    Dementia (CMS/[facility])    Malignant otitis externa of left ear    Acute diffuse otitis externa of left ear    Granulation polyp of middle ear, left         Plan    PLAN:    Patient still has issues with ear, L side.  He has no pain. No family present to discuss plan.  I have reviewed the current imaging and after exam under microscope, I feel he has a chronic process. I do not think this is ALBARO, buut am concerned abot the longevity of process and pain.  I will discuss with primary team to determine therapy.  Currently, I recommend IV antibiotics until Monday or Tues. If no pain, then I will send home on antibiotics for 6 weeks po.  I can follow tympanic membrane in office. I will clean out ear in a few  days.  Consider autoimmune labs. Perhaps ID consult to advise on po antibiotics for long term use.    Following patient as in-patient. Further recommendations will be made based on serial examinations.    Medications/Orders for this encounter: No new medications ordered.  No New orders written.      Ladarius Braxton Jr, MD  02/07/20  8:52 AM

## 2020-02-07 NOTE — PLAN OF CARE
Oriented this morning. Now with confusion again. Norco given for pain, seroquel given for agitation. Safety maintained. Repositioned frequently.   Problem: Patient Care Overview  Goal: Plan of Care Review  Outcome: Ongoing (interventions implemented as appropriate)

## 2020-02-07 NOTE — PROGRESS NOTES
[facility] Medicine Services  INPATIENT PROGRESS NOTE    Length of Stay: 0  Date of Admission: 2/4/2020  Primary Care Physician: Yocasta Baumann APRN    Subjective   Chief Complaint: Follow-up  HPI   Patient seen following surgery.  His family is at bedside.  He denies any complaints today.  He denies any chest pain or shortness of breath.  His daughter tells me that he has not slept in at least 3 days.    Review of Systems   All pertinent negatives and positives are as above. All other systems have been reviewed and are negative unless otherwise stated.     Objective    Temp:  [97.5 °F (36.4 °C)-98.1 °F (36.7 °C)] 97.5 °F (36.4 °C)  Heart Rate:  [42-64] 60  Resp:  [14-60] 16  BP: ()/(46-65) 137/46  Physical Exam   Constitutional: He is oriented to person, place, and time. He appears well-developed and well-nourished. No distress.   HENT:   Head: Normocephalic and atraumatic.   Cotton ball in the left ear   Neck: Normal range of motion. Neck supple. No JVD present. No tracheal deviation present.   Cardiovascular: Normal rate, regular rhythm and intact distal pulses. Exam reveals no gallop and no friction rub.    60-80 with PVC's and couplets   Pulmonary/Chest: Effort normal and breath sounds normal. He has no wheezes. He has no rales.   Abdominal: Soft. Bowel sounds are normal. He exhibits no distension. There is no tenderness. There is no guarding.   Musculoskeletal: Normal range of motion. He exhibits edema (Trace RLE edema). He exhibits no tenderness.   Neurological: He is alert and oriented to person, place, and time. No cranial nerve deficit.   Skin: Skin is warm and dry. No rash noted. No erythema.   Psychiatric: He has a normal mood and affect. His behavior is normal. Judgment and thought content normal.   Vitals reviewed.    Results Review:  I have reviewed the labs, radiology results, and diagnostic studies.    Laboratory Data:   Results from last 7 days    Lab Units 02/05/20  0637 02/04/20  1527   WBC 10*3/mm3 5.43 6.96   HEMOGLOBIN g/dL 7.4* 8.4*   HEMATOCRIT % 23.7* 27.1*   PLATELETS 10*3/mm3 116* 141     Results from last 7 days   Lab Units 02/06/20  0447 02/05/20  0637 02/04/20  1527   SODIUM mmol/L 141 141 140   POTASSIUM mmol/L 4.2 4.2 4.4   CHLORIDE mmol/L 106 106 103   CO2 mmol/L 20.0* 23.0 24.0   BUN mg/dL 46* 48* 52*   CREATININE mg/dL 1.79* 1.76* 1.81*   CALCIUM mg/dL 8.0* 8.0* 8.8   BILIRUBIN mg/dL  --  0.4 0.5   ALK PHOS U/L  --  40 48   ALT (SGPT) U/L  --  7 10   AST (SGOT) U/L  --  14 19   GLUCOSE mg/dL 148* 87 122*     Radiology Data:   Imaging Results (Last 24 Hours)     Procedure Component Value Units Date/Time    NM Bone Scan 3 Phase [[id number]] Collected:  02/06/20 1423     Updated:  02/06/20 1437    Narrative:       Nuclear medicine 3 phase bone scan     Indication: Osteomyelitis of the left temporal bone, malignant otitis  externa     Comparison: CT IAC 02/04/2020     Radiopharmaceutical: 22.3 mCi technetium-99m HDP     Findings:     Dynamic left lateral images obtained during blood pool demonstrate  increased vascular flow to the left temporal bone region.     On delayed 3 hour images, there is no evidence of increased radiotracer  uptake within the region of the left temporal bone.      Increased radiotracer uptake in the upper cervical spine on delayed  images is likely related to degenerative change.       Impression:       Impression:     Increased radiotracer flow to the left temporal bone on blood pool  images with washout on delayed images. Findings are most consistent with  soft tissue infection without osteomyelitis.  This report was finalized on 02/06/2020 14:34 by Dr. Martin Esteban MD.        I have reviewed the patient current medications.     Assessment/Plan     Active Hospital Problems    Diagnosis   • **Acute otitis externa   • Granulation polyp of middle ear, left   • Malignant otitis externa of left ear     Osteomyelitis of L  temporal bone     • Chronic systolic heart failure (CMS/HCC)   • Dementia (CMS/[facility])   • Acute diffuse otitis externa of left ear     Added automatically from request for surgery [id number]     • Moderate malnutrition (CMS/HCC)   • Light chain myeloma (CMS/[facility])   • Controlled type 2 diabetes mellitus diet controlled without complication, without long-term current use of insulin (CMS/HCC)   • Pulmonary hypertension (CMS/[facility])   • Hypertension   • Stage 3 chronic kidney disease (CMS/[facility])   • PAF (paroxysmal atrial fibrillation) (CMS/[facility]) No AC DUE TO GI  BLEED     Plan:  1.  Bone scan today showed findings most consistent with soft tissue infection without osteomyelitis.  2.  Appreciate ENT assistance.  Patient was taken to the operating room today.  It appears there was a large amount of granulation to the middle ear, which continued to bleed.  A small piece was obtained for biopsy and sent to pathology.  Due to bleeding and granulation, decision was made to not place a tube.  A mucoid effusion was aspirated.  Will await any further plans from ENT regarding definitive management.  3.  Continue Levaquin and ciprodex eardrops  4.  Continue melatonin nightly for sleep.  Would be hesitant to give him anything stronger than this at least tonight as he seems to be resting well after anesthesia.  5.  Renal function appears to be at baseline, will discontinue IV fluids and monitor closely.  6.  Will need to monitor anemia closely.  7.  Labs in AM  8.   following for discharge back to skilled nursing facility.    Discharge Planning: I expect the patient to be discharged to SNF in ? days.    Crystal Oshea, KENNY   02/06/20   6:02 PM

## 2020-02-07 NOTE — PLAN OF CARE
Problem: Patient Care Overview  Goal: Plan of Care Review  Outcome: Ongoing (interventions implemented as appropriate)  Flowsheets  Taken 2/7/2020 0636  Progress: improving  Outcome Summary: Pt w/ mild to moderate nonverbal pain indicators, PO pain x1 w/ effective results. Pt disoriented, restless, fidgety. Incontinent at times, uses urinal when prompted. O2 2L, cont pulse ox in place. Bed check on. L ear w/ cottonball or gauze, no drainage at this time. No redness, no swelling. Cont to monitor.  Taken 2/6/2020 2010  Plan of Care Reviewed With: patient

## 2020-02-07 NOTE — ANESTHESIA POSTPROCEDURE EVALUATION
Patient: Jesús Long    Procedure Summary     Date:  02/06/20 Room / Location:   [facility] OR  /  [facility]    Anesthesia Start:  1433 Anesthesia Stop:  1513    Procedure:  left myringotomy and debredment of ear canal (Left Ear) Diagnosis:       Malignant otitis externa of left ear, unspecified chronicity      Acute diffuse otitis externa of left ear      (Malignant otitis externa of left ear, unspecified chronicity [H60.22])      (Acute diffuse otitis externa of left ear [H60.312])    Surgeon:  Ladarius Braxton Jr., MD Provider:  Bonilla Baker CRNA    Anesthesia Type:  MAC ASA Status:  3          Anesthesia Type: MAC    Vitals  Vitals Value Taken Time   /59 2/6/2020  4:15 PM   Temp 97.8 °F (36.6 °C) 2/6/2020  4:15 PM   Pulse 60 2/6/2020  4:18 PM   Resp 14 2/6/2020  4:15 PM   SpO2 98 % 2/6/2020  4:18 PM   Vitals shown include unvalidated device data.        Post Anesthesia Care and Evaluation    Patient location during evaluation: PACU  Patient participation: complete - patient participated  Level of consciousness: awake and alert  Pain management: adequate  Airway patency: patent  Anesthetic complications: No anesthetic complications  PONV Status: none  Cardiovascular status: acceptable and stable  Respiratory status: acceptable and unassisted  Hydration status: acceptable

## 2020-02-07 NOTE — PROGRESS NOTES
Continued Stay Note  [facility]     Patient Name: Jesús Long  MRN: [id number]  Today's Date: 2/7/2020    Admit Date: 2/4/2020    Discharge Plan     Row Name 02/07/20 1621       Plan    Plan  [facility]    Plan Comments  Pt continues to have a bed at [facility]. Will follow.         Discharge Codes    No documentation.             PONCE Garcia

## 2020-02-08 NOTE — PROGRESS NOTES
Continued Stay Note   [place]     Patient Name: Jesús Long  MRN: [id number]  Today's Date: 2/8/2020    Admit Date: 2/4/2020    Discharge Plan     Row Name 02/08/20 1119       Plan    Plan Comments  SW has confirmed that [facility] can take PT back today. They need a DC summary faxed as soon as possible, this facility usually has a 2pm cutoff. [facility] [phone] phone [phone] fax.     Final Discharge Disposition Code  03 - skilled nursing facility (SNF)        Discharge Codes    No documentation.             BUDDY Myers

## 2020-02-08 NOTE — PROGRESS NOTES
Infectious Diseases Progress Note    Patient:  Jesús Long  YOB: 1931  MRN: [id number]   Admit date: 2/4/2020   Admitting Physician: Noe Hopson DO  Primary Care Physician: Yocasta Baumann APRN    Chief Complaint/Interval History: He seems to be doing okay.  He looks comfortable.  While talking with him I palpated extensively around the left ear.  I also pulled on his auricle significantly.  He did not wince in any discomfort.  He did not seem uncomfortable at all on exam today.  Discussed at length with Dr. Braxton.  He feels findings pointing against malignant external otitis.  He plans to see him this week in follow-up to reassess his ear after his microscopic irrigation/debridement/myringotomy.  When he did the myringotomy there was some mucopurulent drainage.  I suspect much of his improvement has come from the myringotomy and the debridement.  Both of us felt comfortable that it was reasonable to treat as otitis media/external otitis without malignant external otitis and follow him clinically.  Levofloxacin seemed to be reasonable choice and he seems to be tolerating the antibiotic treatment.  Discussed with hospitalist.    Intake/Output Summary (Last 24 hours) at 2/8/2020 0821  Last data filed at 2/8/2020 0400  Gross per 24 hour   Intake --   Output 420 ml   Net -420 ml     Allergies: No Known Allergies  Current Scheduled Medications:     calcitriol 0.25 mcg Oral Daily   carvedilol 6.25 mg Oral BID With Meals   ciprofloxacin-dexamethasone 4 drop Left Ear BID   furosemide 20 mg Oral Daily   insulin lispro 2-7 Units Subcutaneous 4x Daily With Meals & Nightly   levoFLOXacin 500 mg Oral Q24H   megestrol 800 mg Oral Daily   melatonin 9.75 mg Oral Nightly   multivitamin w/minerals 1 tablet Oral Daily   pantoprazole 40 mg Oral Q AM   QUEtiapine 25 mg Oral Nightly   saccharomyces boulardii 250 mg Oral BID   sertraline 100 mg Oral Daily   sodium bicarbonate 650 mg Oral BID   sodium  "chloride 10 mL Intravenous Q12H   vitamin D3 5,000 Units Oral Daily     Current PRN Medications:  •  acetaminophen **OR** acetaminophen **OR** acetaminophen  •  dextrose  •  dextrose  •  glucagon (human recombinant)  •  HYDROcodone-acetaminophen  •  ipratropium  •  ondansetron **OR** ondansetron  •  QUEtiapine  •  sodium chloride    Review of Systems see HPI    Vital Signs:  /51 (BP Location: Left arm, Patient Position: Lying)   Pulse 59   Temp 97.5 °F (36.4 °C) (Oral)   Resp 18   Ht 172.7 cm (68\")   Wt 65.8 kg (145 lb)   SpO2 98%   BMI 22.05 kg/m²     Physical Exam  Vital signs - reviewed.  Line/IV site - No erythema, warmth, induration, or tenderness.  Left ear without tenderness today in the periauricular area.  There was no tenderness with traction on his auricle.  No cervical adenopathy  He looks comfortable  Abdomen soft and nondistended.    Lab Results:  CBC: Results from last 7 days   Lab Units 02/07/20  0648 02/05/20  0637 02/04/20  1527   WBC 10*3/mm3 4.81 5.43 6.96   HEMOGLOBIN g/dL 7.8* 7.4* 8.4*   HEMATOCRIT % 25.1* 23.7* 27.1*   PLATELETS 10*3/mm3 127* 116* 141     BMP:  Results from last 7 days   Lab Units 02/07/20  0648 02/06/20  0447 02/05/20  0637 02/04/20  1527   SODIUM mmol/L 143 141 141 140   POTASSIUM mmol/L 4.0 4.2 4.2 4.4   CHLORIDE mmol/L 110* 106 106 103   CO2 mmol/L 20.0* 20.0* 23.0 24.0   BUN mg/dL 38* 46* 48* 52*   CREATININE mg/dL 1.73* 1.79* 1.76* 1.81*   GLUCOSE mg/dL 88 148* 87 122*   CALCIUM mg/dL 7.6* 8.0* 8.0* 8.8   ALT (SGPT) U/L  --   --  7 10     Culture Results:   Blood Culture   Date Value Ref Range Status   02/04/2020 No growth at 3 days  Preliminary   02/04/2020 No growth at 3 days  Preliminary     Radiology: None  Additional Studies Reviewed: None    Impression:   Findings seem most consistent with external otitis/otitis media.  Malignant external otitis seems less likely based on imaging findings, operative findings per Dr. Braxton, and current exam/clinical " course.    Recommendations:   Okay with me for release to [facility] when ready for release per others  Would suggest levofloxacin 500 mg orally daily for 5 to 6 weeks  He is going to follow-up with ENT next week -Dr. Braxtno comfortable seeing him in follow-up and calling me if any questions or concerns regarding infection  Discussed with hospital service  We will sign off for now  Happy to reassess if new problems or no ongoing improvement  He can otherwise follow-up with infectious diseases as needed. Would be happy to reevaluate if no ongoing improvement    Ladarius Thompson MD

## 2020-02-08 NOTE — CONSULTS
INFECTIOUS DISEASES CONSULT NOTE    Patient:  Jesús Long 88 y.o. male  ROOM # 388/1  YOB: 1931  MRN: [id number]  [facility]:  [id number]  Admit date: 2/4/2020   Admitting Physician: Neo Hopson DO  Primary Care Physician: Yocasta Baumann APRN  REFERRING PROVIDER: Rodrigo Stewart MD    Reason for Consultation: Otitis externa/media.  Recommendations for oral antibiotic treatment for long-term use.    History of Present Illness/Chief Complaint: 88-year-old man.  Per chart review and per nursing he has a history of dementia.  They indicate he lives at [facility].  As best I can tell talking to him he has had about a month of ear pain.  He also describes some drainage from the ear. He does not describe fever.  Work-up for malignancies per chart review it appears work-up for malignant external otitis was negative based on ENT exam, and CT scan.  I get the impression he has shown some improvement with antimicrobial treatment.  Was asked to make recommendations for longer term oral antibiotic treatment use.  Blood cultures are negative.  No wound or drainage cultures to guide treatment.  Per chart review likely plan for discharge back to his nursing home.  With ENT follow-up for ear cleaning in a few days.  6 weeks of oral antibiotic treatment recommended per review of medicine and ENT notes.    Current Scheduled Medications:     calcitriol 0.25 mcg Oral Daily   carvedilol 6.25 mg Oral BID With Meals   ciprofloxacin-dexamethasone 4 drop Left Ear BID   furosemide 20 mg Oral Daily   insulin lispro 2-7 Units Subcutaneous 4x Daily With Meals & Nightly   levoFLOXacin 750 mg Intravenous Q48H   megestrol 800 mg Oral Daily   melatonin 9.75 mg Oral Nightly   multivitamin w/minerals 1 tablet Oral Daily   pantoprazole 40 mg Oral Q AM   QUEtiapine 25 mg Oral Nightly   saccharomyces boulardii 250 mg Oral BID   sertraline 100 mg Oral Daily   sodium bicarbonate 650 mg Oral BID   sodium chloride  "10 mL Intravenous Q12H   vitamin D3 5,000 Units Oral Daily     Current PRN Medications:  •  acetaminophen **OR** acetaminophen **OR** acetaminophen  •  dextrose  •  dextrose  •  glucagon (human recombinant)  •  HYDROcodone-acetaminophen  •  ipratropium  •  ondansetron **OR** ondansetron  •  QUEtiapine  •  sodium chloride    Allergies:  No Known Allergies     Past Medical History: Dementia.  Diabetes.  Hypertension.  Multiple myeloma.  Chronic kidney disease.  Coronary artery disease.  Atrial fibrillation.    Past Surgical History: Pacemaker implantation.  Hemorrhoidectomy.  Coronary artery bypass grafting.  Back surgery.    Social History: Prior history of tobacco use.  No alcohol or illicit drug use.    Family History: Cancer in mother.  Coronary artery disease in father.  Dementia in sister.    Exposure History: No close contacts have been ill.    Review of Systems looks comfortable.  Notable to get significant review of systems from the patient due to his dementia.    Vital Signs:  /45 (BP Location: Left arm, Patient Position: Lying)   Pulse 63   Temp 97.7 °F (36.5 °C) (Oral)   Resp 18   Ht 172.7 cm (68\")   Wt 65.8 kg (145 lb)   SpO2 93%   BMI 22.05 kg/m²  Temp (24hrs), Av.8 °F (36.6 °C), Min:97.6 °F (36.4 °C), Max:97.9 °F (36.6 °C)    Physical Exam  Vital signs - reviewed.  Line/IV site - No erythema or tenderness.    Some mild tenderness with tugging on the left ear.  C[place]ball in place left ear.  No cervical adenopathy  Neck supple  Cranial nerves II through XII intact  Motor 5 out of 5 in the upper lower extremity  Heart without murmur  Lungs clear without crackles  Abdomen soft nontender    Lab Results:  CBC: Results from last 7 days   Lab Units 20  0648 20  0637 20  1527   WBC 10*3/mm3 4.81 5.43 6.96   HEMOGLOBIN g/dL 7.8* 7.4* 8.4*   HEMATOCRIT % 25.1* 23.7* 27.1*   PLATELETS 10*3/mm3 127* 116* 141     CMP: Results from last 7 days   Lab Units 20  0648 " 02/06/20  0447 02/05/20  0637 02/04/20  1527   SODIUM mmol/L 143 141 141 140   POTASSIUM mmol/L 4.0 4.2 4.2 4.4   CHLORIDE mmol/L 110* 106 106 103   CO2 mmol/L 20.0* 20.0* 23.0 24.0   BUN mg/dL 38* 46* 48* 52*   CREATININE mg/dL 1.73* 1.79* 1.76* 1.81*   CALCIUM mg/dL 7.6* 8.0* 8.0* 8.8   BILIRUBIN mg/dL  --   --  0.4 0.5   ALK PHOS U/L  --   --  40 48   ALT (SGPT) U/L  --   --  7 10   AST (SGOT) U/L  --   --  14 19   GLUCOSE mg/dL 88 148* 87 122*     Blood cultures February 4, 2020-no growth    Radiology: Nuclear medicine bone scan February 6, 2020:  IMPRESSION:  Impression:     Increased radiotracer flow to the left temporal bone on blood pool  images with washout on delayed images. Findings are most consistent with  soft tissue infection without osteomyelitis.  This report was finalized on 02/06/2020 14:34 by Dr. Martin Esteban MD.    CT internal auditory canal without contrast February 4, 2020:  IMPRESSION:  1. Minimal mucosal thickening involving the right maxillary, bilateral  frontal and some of the anterior ethmoid sinuses. No air-fluid level in  the paranasal sinuses.  2. Fluid density in the left middle ear cavity could reflect underlying  effusion or infection. There is also fluid density throughout most of  the mastoid region on the left with thickening of the left tympanic  membrane and also thickening of the soft tissues within the external  auditory canal. There is no visible bone destruction.  3. The inner ear structures have a normal appearance. The middle,  mastoid and external ear structures on the right side appear normal.  4. Brain atrophy. Vascular calcification of the cavernous carotid  arteries bilaterally.     The full report of this exam was immediately signed and available to the  emergency room. The patient is currently in the emergency room.  This report was finalized on 02/04/2020 17:50 by Dr. Pawel Benson MD.    ADDENDUM: Also on the current study, there appears to be some  soft  tissue edema around the left parotid gland and extending into the  pterygoid musculature centrally. There is probably some inflammation of  the visualized portion of the masseter muscle. There is probable fluid  also in the left temporomandibular joint space. The inflammatory changes  in the extracranial soft tissues appear to be more prominent when  compared to a CT of the neck at [facility] on  01/12/2020. The fluid density within the mastoid region and middle ear  cavity on the left appears relatively stable. The thickening of the  tympanic membrane and thickening of the soft tissues in the external  auditory canal also appear stable.  This report was finalized on 02/04/2020 18:12 by Dr. Pawel Benson MD.    Impression:   External otitis/otitis media.  Seems to be showing some improvement.  No cultures to guide treatment.  Levofloxacin reasonable empiric antibiotic treatment to cover most likely pathogens.    Recommendations:    Would suggest levofloxacin 500 mg orally daily for 6 weeks  He is going to follow-up with ENT next week  Okay with me for discharge if ready for release per others  Would be happy to reassess if no ongoing improvement  He can otherwise follow-up with infectious diseases PRN    Ladarius Thompson MD  02/07/20  6:43 PM

## 2020-02-08 NOTE — DISCHARGE SUMMARY
[facility] Medicine Services  DISCHARGE SUMMARY       Date of Admission: 2/4/2020  Date of Discharge:  2/8/2020  Primary Care Physician: Yocasta Baumann APRN    Presenting Problem/History of Present Illness:  Left ear pain     Final Discharge Diagnoses:  Active Hospital Problems    Diagnosis   • **Acute otitis externa   • Delirium   • Granulation polyp of middle ear, left   • Malignant otitis externa of left ear     Osteomyelitis of L temporal bone     • Chronic systolic heart failure (CMS/HCC)   • Dementia (CMS/Formerly [facility])   • Acute diffuse otitis externa of left ear     Added automatically from request for surgery [id number]     • Moderate malnutrition (CMS/HCC)   • Light chain myeloma (CMS/Formerly [facility])   • Controlled type 2 diabetes mellitus diet controlled without complication, without long-term current use of insulin (CMS/Formerly [facility])   • Pulmonary hypertension (CMS/HCC)   • Hypertension   • Stage 3 chronic kidney disease (CMS/HCC)   • PAF (paroxysmal atrial fibrillation) (CMS/Formerly [facility]) No AC DUE TO GI  BLEED     Consults:   1.  Dr. Ladarius Braxton- otolaryngology  2.  Dr. Ladarius Cox- infectious disease    Procedures Performed:   1.  2/6/2020-left myringotomy and removal/biopsy of middle ear polyp    Pertinent Test Results:   Lab Results (all)     Procedure Component Value Units Date/Time    POC Glucose Once [[id number]]  (Normal) Collected:  02/08/20 0812    Specimen:  Blood Updated:  02/08/20 0832     Glucose 103 mg/dL      Comment: : [id number] Aubrey GwendolynMeter ID: [id number]       POC Glucose Once [[id number]]  (Abnormal) Collected:  02/07/20 2008    Specimen:  Blood Updated:  02/07/20 2019     Glucose 192 mg/dL      Comment: : [id number] Felix PutnameeMeter ID: [id number]       POC Glucose Once [[id number]]  (Normal) Collected:  02/07/20 1746    Specimen:  Blood Updated:  02/07/20 1807     Glucose 128 mg/dL      Comment: : [id number] Aubrey VericanendolynMeter ID: [id number]       Blood  Culture - Blood, Arm, Left [[id number]] Collected:  02/04/20 1527    Specimen:  Blood from Arm, Left Updated:  02/07/20 1731     Blood Culture No growth at 3 days    Blood Culture - Blood, Arm, Right [[id number]] Collected:  02/04/20 1651    Specimen:  Blood from Arm, Right Updated:  02/07/20 1715     Blood Culture No growth at 3 days    POC Glucose Once [[id number]]  (Normal) Collected:  02/07/20 1145    Specimen:  Blood Updated:  02/07/20 1205     Glucose 103 mg/dL      Comment: : [id number] Aubrey Clear2PayendolynMeter ID: [id number]       POC Glucose Once [[id number]]  (Normal) Collected:  02/07/20 0820    Specimen:  Blood Updated:  02/07/20 0839     Glucose 94 mg/dL      Comment: : [id number] Aubrey Clear2PayendolynMeter ID: [id number]       Tissue Pathology Exam [[id number]] Collected:  02/06/20 1453    Specimen:  Tissue from Ear, Left Updated:  02/07/20 0737    Basic Metabolic Panel [[id number]]  (Abnormal) Collected:  02/07/20 0648    Specimen:  Blood Updated:  02/07/20 0719     Glucose 88 mg/dL      BUN 38 mg/dL      Creatinine 1.73 mg/dL      Sodium 143 mmol/L      Potassium 4.0 mmol/L      Chloride 110 mmol/L      CO2 20.0 mmol/L      Calcium 7.6 mg/dL      eGFR Non African Amer 37 mL/min/1.73      BUN/Creatinine Ratio 22.0    CBC (No Diff) [[id number]]  (Abnormal) Collected:  02/07/20 0648    Specimen:  Blood Updated:  02/07/20 0707     WBC 4.81 10*3/mm3      RBC 2.56 10*6/mm3      Hemoglobin 7.8 g/dL      Hematocrit 25.1 %      MCV 98.0 fL      MCH 30.5 pg      MCHC 31.1 g/dL      RDW 18.9 %      RDW-SD 67.4 fl      MPV 9.9 fL      Platelets 127 10*3/mm3     POC Glucose Once [[id number]]  (Normal) Collected:  02/06/20 2127    Specimen:  Blood Updated:  02/06/20 2138     Glucose 104 mg/dL      Comment: : [id number] Damon BajwaniferMeter ID: [id number]       POC Glucose Once [[id number]]  (Normal) Collected:  02/06/20 1816    Specimen:  Blood Updated:  02/06/20 1827     Glucose 102 mg/dL      Comment: :  [id number] Tejinder DiaenMeter ID: [id number]       POC Glucose Once [[id number]]  (Normal) Collected:  02/06/20 1519    Specimen:  Blood Updated:  02/06/20 1535     Glucose 110 mg/dL      Comment: : [id number] Naye Rainey ID: [id number]       POC Glucose Once [[id number]]  (Normal) Collected:  02/06/20 1211    Specimen:  Blood Updated:  02/06/20 1223     Glucose 119 mg/dL      Comment: : [id number] Tejinder DiaenMeter ID: [id number]       Basic Metabolic Panel [[id number]]  (Abnormal) Collected:  02/06/20 0447    Specimen:  Blood Updated:  02/06/20 0512     Glucose 148 mg/dL      BUN 46 mg/dL      Creatinine 1.79 mg/dL      Sodium 141 mmol/L      Potassium 4.2 mmol/L      Chloride 106 mmol/L      CO2 20.0 mmol/L      Calcium 8.0 mg/dL      eGFR Non African Amer 36 mL/min/1.73      BUN/Creatinine Ratio 25.7     Anion Gap 15.0 mmol/L     POC Glucose Once [[id number]]  (Abnormal) Collected:  02/05/20 2318    Specimen:  Blood Updated:  02/05/20 2331     Glucose 160 mg/dL      Comment: : [id number] Justice Fields ID: [id number]       POC Glucose Once [[id number]]  (Abnormal) Collected:  02/05/20 1649    Specimen:  Blood Updated:  02/05/20 1659     Glucose 135 mg/dL      Comment: : [id number] Cecil HobbsferMeter ID: [id number]       POC Glucose Once [[id number]]  (Normal) Collected:  02/05/20 1059    Specimen:  Blood Updated:  02/05/20 1114     Glucose 93 mg/dL      Comment: : [id number] Cecil BajwaniferMeter ID: [id number]       POC Glucose Once [[id number]]  (Normal) Collected:  02/05/20 0816    Specimen:  Blood Updated:  02/05/20 0827     Glucose 85 mg/dL      Comment: : [id number] Cecil BajwaniferMeter ID: [id number]       Comprehensive Metabolic Panel [[id number]]  (Abnormal) Collected:  02/05/20 0637    Specimen:  Blood Updated:  02/05/20 0733     Glucose 87 mg/dL      BUN 48 mg/dL      Creatinine 1.76 mg/dL      Sodium 141 mmol/L      Potassium 4.2 mmol/L      Chloride 106 mmol/L      CO2 23.0  mmol/L      Calcium 8.0 mg/dL      Total Protein 5.5 g/dL      Albumin 2.90 g/dL      ALT (SGPT) 7 U/L      AST (SGOT) 14 U/L      Alkaline Phosphatase 40 U/L      Total Bilirubin 0.4 mg/dL      eGFR Non African Amer 37 mL/min/1.73      Globulin 2.6 gm/dL      A/G Ratio 1.1 g/dL      BUN/Creatinine Ratio 27.3     Anion Gap 12.0 mmol/L     Narrative:       CBC Auto Differential [[id number]]  (Abnormal) Collected:  02/05/20 0637    Specimen:  Blood Updated:  02/05/20 0715     WBC 5.43 10*3/mm3      RBC 2.39 10*6/mm3      Hemoglobin 7.4 g/dL      Hematocrit 23.7 %      MCV 99.2 fL      MCH 31.0 pg      MCHC 31.2 g/dL      RDW 19.0 %      RDW-SD 67.6 fl      MPV 10.2 fL      Platelets 116 10*3/mm3      Neutrophil % 81.7 %      Lymphocyte % 6.8 %      Monocyte % 9.8 %      Eosinophil % 0.6 %      Basophil % 0.4 %      Immature Grans % 0.7 %      Neutrophils, Absolute 4.44 10*3/mm3      Lymphocytes, Absolute 0.37 10*3/mm3      Monocytes, Absolute 0.53 10*3/mm3      Eosinophils, Absolute 0.03 10*3/mm3      Basophils, Absolute 0.02 10*3/mm3      Immature Grans, Absolute 0.04 10*3/mm3      nRBC 0.0 /100 WBC     Hemoglobin A1c [[id number]]  (Abnormal) Collected:  02/04/20 2151    Specimen:  Blood Updated:  02/05/20 0426     Hemoglobin A1C 6.00 %     Narrative:       Sedimentation Rate [[id number]]  (Abnormal) Collected:  02/04/20 2151    Specimen:  Blood Updated:  02/04/20 2205     Sed Rate 41 mm/hr     Phosphorus [[id number]]  (Normal) Collected:  02/04/20 1527    Specimen:  Blood Updated:  02/04/20 2136     Phosphorus 3.0 mg/dL     Magnesium [[id number]]  (Normal) Collected:  02/04/20 1527    Specimen:  Blood Updated:  02/04/20 2136     Magnesium 2.1 mg/dL     C-reactive Protein [[id number]]  (Abnormal) Collected:  02/04/20 1527    Specimen:  Blood Updated:  02/04/20 2136     C-Reactive Protein 13.57 mg/dL     Lactic Acid, Plasma [[id number]]  (Normal) Collected:  02/04/20 1709    Specimen:  Blood Updated:  02/04/20 1720      Lactate 1.3 mmol/L     Comprehensive Metabolic Panel [[id number]]  (Abnormal) Collected:  02/04/20 1527    Specimen:  Blood Updated:  02/04/20 1643     Glucose 122 mg/dL      BUN 52 mg/dL      Creatinine 1.81 mg/dL      Sodium 140 mmol/L      Potassium 4.4 mmol/L      Chloride 103 mmol/L      CO2 24.0 mmol/L      Calcium 8.8 mg/dL      Total Protein 6.6 g/dL      Albumin 3.40 g/dL      ALT (SGPT) 10 U/L      AST (SGOT) 19 U/L      Comment: Specimen hemolyzed.  Results may be affected.        Alkaline Phosphatase 48 U/L      Total Bilirubin 0.5 mg/dL      eGFR Non African Amer 36 mL/min/1.73      Globulin 3.2 gm/dL      A/G Ratio 1.1 g/dL      BUN/Creatinine Ratio 28.7     Anion Gap 13.0 mmol/L     aPTT [[id number]]  (Abnormal) Collected:  02/04/20 1527    Specimen:  Blood Updated:  02/04/20 1632     PTT 38.2 seconds     Protime-INR [[id number]]  (Abnormal) Collected:  02/04/20 1527    Specimen:  Blood Updated:  02/04/20 1632     Protime 15.7 Seconds      INR 1.21    CBC Auto Differential [[id number]]  (Abnormal) Collected:  02/04/20 1527    Specimen:  Blood Updated:  02/04/20 1626     WBC 6.96 10*3/mm3      RBC 2.76 10*6/mm3      Hemoglobin 8.4 g/dL      Hematocrit 27.1 %      MCV 98.2 fL      MCH 30.4 pg      MCHC 31.0 g/dL      RDW 19.2 %      RDW-SD 68.1 fl      MPV 9.8 fL      Platelets 141 10*3/mm3      Neutrophil % 79.9 %      Lymphocyte % 8.2 %      Monocyte % 9.9 %      Eosinophil % 0.7 %      Basophil % 0.4 %      Immature Grans % 0.9 %      Neutrophils, Absolute 5.56 10*3/mm3      Lymphocytes, Absolute 0.57 10*3/mm3      Monocytes, Absolute 0.69 10*3/mm3      Eosinophils, Absolute 0.05 10*3/mm3      Basophils, Absolute 0.03 10*3/mm3      Immature Grans, Absolute 0.06 10*3/mm3      nRBC 0.0 /100 WBC         Imaging Results (All)     Procedure Component Value Units Date/Time    NM Bone Scan 3 Phase [[id number]] Collected:  02/06/20 1423     Updated:  02/06/20 1437    Narrative:       Nuclear medicine 3 phase bone  scan     Indication: Osteomyelitis of the left temporal bone, malignant otitis  externa     Comparison: CT IAC 02/04/2020     Radiopharmaceutical: 22.3 mCi technetium-99m HDP     Findings:     Dynamic left lateral images obtained during blood pool demonstrate  increased vascular flow to the left temporal bone region.     On delayed 3 hour images, there is no evidence of increased radiotracer  uptake within the region of the left temporal bone.      Increased radiotracer uptake in the upper cervical spine on delayed  images is likely related to degenerative change.       Impression:       Impression:     Increased radiotracer flow to the left temporal bone on blood pool  images with washout on delayed images. Findings are most consistent with  soft tissue infection without osteomyelitis.  This report was finalized on 02/06/2020 14:34 by Dr. Martin Esteban MD.    XR Chest 1 View [[id number]] Collected:  02/05/20 0711     Updated:  02/05/20 0715    Narrative:       Cc: Cough     CXR: A frontal view the chest obtained.     COMPARISON: 01/10/2020     FINDINGS: Patient rotated to the right portable exam. There is  cardiomegaly, stable. Dual lead left subclavian cardiac pacer device.  Intact median sternotomy wires.     There are chronic interstitial lung changes. There are new asymmetrical  opacities in the left infrahilar region. No significant pleural  effusion. No appreciable pneumothorax. Arthritic changes of the  shoulders and spine. Thoracic aortic calcification. Calcified left hilar  lymph nodes.       Impression:       1. New left infrahilar opacities are suspicious for pneumonia.  2. Stable cardiomegaly. Prior mediastinal surgery. Left subclavian  cardiac pacer device.  3. Chronic interstitial lung change.  This report was finalized on 02/05/2020 07:12 by Dr. Nicky Silvestre MD.    CT IAC Without Contrast [[id number]] Collected:  02/04/20 1744     Updated:  02/04/20 1815    Addenda:        ADDENDUM: Also on the  current study, there appears to be some soft  tissue edema around the left parotid gland and extending into the  pterygoid musculature centrally. There is probably some inflammation of  the visualized portion of the masseter muscle. There is probable fluid  also in the left temporomandibular joint space. The inflammatory changes  in the extracranial soft tissues appear to be more prominent when  compared to a CT of the neck at [facility] on  01/12/2020. The fluid density within the mastoid region and middle ear  cavity on the left appears relatively stable. The thickening of the  tympanic membrane and thickening of the soft tissues in the external  auditory canal also appear stable.  This report was finalized on 02/04/2020 18:12 by Dr. Pawel Benson MD.  Signed:  02/04/20 1812 by Pawel Benson MD    Narrative:       EXAMINATION:  CT IAC WO CONTRAST-  2/4/2020 5:24 PM CST     HISTORY: Otitis externa. Mastoiditis.      COMPARISON: No comparison study.     TECHNIQUE: Thin section spiral CT was performed of the temporal bones.  Sagittal and coronal images were reconstructed. DLP: 699 mGy-cm.     FINDINGS: There is mucosal thickening in the right maxillary sinus there  is mild mucosal thickening in the frontal sinus and a few of the  anterior ethmoid air cells. There is vascular calcification. The  internal auditory canals are symmetric. The cochlea, vestibule and  semicircular canals are symmetric. The ossicles appear to be intact  bilaterally. There is fluid in the left middle ear cavity. There is  fluid density throughout much of the mastoid air cells on the left.  There is no bone destruction identified. There is thickening of the left  tympanic membrane. There is soft tissue thickening of the external  auditory canal with no bone destruction identified. There is moderate  cerebral and cerebellar atrophy. There is associated ventricular  prominence.       Impression:       1. Minimal mucosal  thickening involving the right maxillary, bilateral  frontal and some of the anterior ethmoid sinuses. No air-fluid level in  the paranasal sinuses.  2. Fluid density in the left middle ear cavity could reflect underlying  effusion or infection. There is also fluid density throughout most of  the mastoid region on the left with thickening of the left tympanic  membrane and also thickening of the soft tissues within the external  auditory canal. There is no visible bone destruction.  3. The inner ear structures have a normal appearance. The middle,  mastoid and external ear structures on the right side appear normal.  4. Brain atrophy. Vascular calcification of the cavernous carotid  arteries bilaterally.     The full report of this exam was immediately signed and available to the  emergency room. The patient is currently in the emergency room.  This report was finalized on 02/04/2020 17:50 by Dr. Pawel Benson MD.        History of Present Illness on Day of Discharge: Patient resting in bed.  He is completely alert and oriented today, much better in comparison to yesterday.  He denies any pain.    Hospital Course:  Mr. Long is a pleasant 88-year-old  male who currently resides at [facility].  He has a medical history significant for diabetes mellitus, chronic kidney disease, sick sinus syndrome status post pacemaker, paroxysmal atrial fibrillation, hypertension, and multiple myeloma.  He presented to the [facility] emergency department on 2/4/2020 with complaints of left ear and facial pain.  Review of records indicates the patient was admitted to an outlying facility from 1/12 through 1/17.  A CT of the neck showed a left mastoid effusion and fluid in the internal and external auditory canals.  He was evaluated by ENT during that hospitalization and it was felt most likely the patient had malignant otitis externa leading to reactive mastoid inflammation.  He was treated  with IV cefepime while inpatient and transitioned to oral ciprofloxacin at time of discharge.  He was also given Ciprodex drops.  He was evaluated by ENT in follow-up on 1/23.  He was found to have left tympanic membrane perforation and a 14-day course of Ciprodex was added once again.  Upon arrival to our emergency department,  CT of the internal auditory canal was performed which showed minimal mucosal thickening involving the right maxillary, bilateral frontal, and anterior ethmoid sinuses.  There was again fluid density in the left middle ear felt to reflect underlying effusion or infection.  There was also fluid density throughout most of the mastoid region on the left.  Inflammatory markers were elevated, but otherwise laboratory work-up was essentially unrevealing.  Chronic kidney disease was felt to be at baseline.  The patient was admitted to the hospitalist service for further evaluation and management.    The patient was placed on IV Levaquin and Ciprodex drops were continued.  ENT was consulted.  A nuclear medication bone scan showed findings most consistent with soft tissue infection without osteomyelitis.  The patient was taken to the operating room on 2/6/2020.  There appeared to be a large amount of granulation to the middle ear and there was quite a bit of bleeding, which improved with Afrin.  A small piece was obtained for biopsy and sent to pathology.  Due to bleeding and granulation, the decision was made to not place a tube at this time.  A mucoid effusion was also aspirated.    An infectious disease consult was sought for recommendations regarding length of antimicrobial therapy.  It is felt that Levaquin was an acceptable choice for ongoing antimicrobial therapy, and he has been converted to oral Levaquin at 500 mg.  He will need to continue this for at least 4 weeks, and up to 6 weeks if needed.  He will continue to be evaluated on an outpatient basis by ENT, and will be seen in the office  "early next week.    The patient did exhibit postoperative delirium.  He was given a dose of Seroquel last night, and is completely alert and oriented today.  With his underlying dementia and predisposition to difficulty with sleep, we will continue Seroquel at time of discharge on a nightly basis.    Overall, the patient is hemodynamically stable and appropriate for discharge back to skilled nursing facility today.  He will require close outpatient follow-up.    Condition on Discharge: Medically stable    Physical Exam on Discharge:  /51 (BP Location: Left arm, Patient Position: Lying)   Pulse 59   Temp 97.5 °F (36.4 °C) (Oral)   Resp 18   Ht 172.7 cm (68\")   Wt 65.8 kg (145 lb)   SpO2 98%   BMI 22.05 kg/m²   Physical Exam  Constitutional: He is oriented to person, place, and time. He appears well-developed and well-nourished. No distress.   HENT:   Head: Normocephalic and atraumatic.   Cotton ball in the left ear   Neck: Normal range of motion. Neck supple. No JVD present. No tracheal deviation present.   Cardiovascular: Normal rate, regular rhythm and intact distal pulses. Exam reveals no gallop and no friction rub.    60  Pulmonary/Chest: Effort normal and breath sounds normal. He has no wheezes. He has no rales.   Abdominal: Soft. Bowel sounds are normal. He exhibits no distension. There is no tenderness. There is no guarding.   Musculoskeletal: Normal range of motion. He exhibits edema (Left pre-auricular). He exhibits no tenderness.   Neurological: He is alert and oriented to person, place, time, and situation.  Skin: Skin is warm and dry. No rash noted. No erythema.   Psychiatric: He has a normal mood and affect. His behavior is normal. Judgment and thought content normal.   Vitals reviewed.    Discharge Disposition:  Skilled Nursing Facility (DC - External)    Discharge Medications:     Discharge Medications      New Medications      Instructions Start Date   levoFLOXacin 500 MG " tablet  Commonly known as:  LEVAQUIN   500 mg, Oral, Every 24 Hours      QUEtiapine 25 MG tablet  Commonly known as:  SEROquel   25 mg, Oral, Nightly         Changes to Medications      Instructions Start Date   furosemide 20 MG tablet  Commonly known as:  LASIX  What changed:  additional instructions   20 mg, Oral, Daily      megestrol 40 MG/ML suspension  Commonly known as:  MEGACE  What changed:  additional instructions   800 mg, Oral, Daily      melatonin 3 MG tablet  What changed:  how much to take   6 mg, Oral, Nightly      saccharomyces boulardii 250 MG capsule  Commonly known as:  FLORASTOR  What changed:  when to take this   250 mg, Oral, 2 Times Daily         Continue These Medications      Instructions Start Date   acetaminophen 325 MG tablet  Commonly known as:  TYLENOL   650 mg, Oral, Every 4 Hours PRN      calcitriol 0.25 MCG capsule  Commonly known as:  ROCALTROL   0.25 mcg, Oral, Daily      carvedilol 6.25 MG tablet  Commonly known as:  COREG   6.25 mg, Oral, 2 Times Daily With Meals, HOLD IF SBP LESS THAN 90 OR DBP LESS THAN 60      CIPROFLOXACIN HCL OT   4 drops, Otic, 2 Times Daily, 0.3 %.  INSTILL 4 DROPS IN LEFT EAR BID X 14 DAYS.  STARTED 01/23/20      HYDROcodone-acetaminophen 5-325 MG per tablet  Commonly known as:  NORCO   1 tablet, Oral, Every 6 Hours PRN      pantoprazole 40 MG EC tablet  Commonly known as:  PROTONIX   40 mg, Oral, Daily      sertraline 100 MG tablet  Commonly known as:  ZOLOFT   100 mg, Oral, Daily      sodium bicarbonate 650 MG tablet   650 mg, Oral, 2 Times Daily      vitamin D3 125 MCG (5000 UT) capsule capsule   5,000 Units, Oral, Daily           Discharge Diet:   Diet Instructions     Diet: Soft Texture, Consistent Carbohydrate; Nectar / Syrup Thick Liquids; Chopped      Discharge Diet:   Soft Texture  Consistent Carbohydrate       Fluid Consistency:  Nectar / Syrup Thick Liquids    Soft Options:  Chopped        Activity at Discharge:   Activity Instructions      Activity as Tolerated          Discharge Care Plan/Instructions:   1.  Return for any acute or worsening symptoms    Follow-up Appointments:   1.  Early next week with ENT  2.  Facility physician as soon as possible for post hospitalization assessment.  Future Appointments   Date Time Provider Department Center   3/2/2020  8:45 AM [facility]   3/3/2020  9:00 AM Mauri Lopez MD [facility]   9/10/2020 10:00 AM [facility]     Test Results Pending at Discharge: Surgical pathology pending, will need to follow-up with ENT.    Crystal Oshea, KENNY  02/08/20  11:58 AM    Time: 40 minutes

## 2020-02-08 NOTE — PLAN OF CARE
Problem: Skin Injury Risk (Adult)  Goal: Identify Related Risk Factors and Signs and Symptoms  Outcome: Ongoing (interventions implemented as appropriate)  Flowsheets (Taken 2/8/2020 0300)  Related Risk Factors (Skin Injury Risk): advanced age;cognitive impairment;edema;nutritional deficiencies     Problem: Patient Care Overview  Goal: Plan of Care Review  Outcome: Ongoing (interventions implemented as appropriate)  Flowsheets (Taken 2/8/2020 0300)  Progress: improving  Outcome Summary: Pt very disoriented and restless at beginning of shift, new dose of seroquel nightly w/ effective results. Pt has been resting well so far this shift. ID following w/ transition to oral abx. Will cont to monitor.

## 2020-02-17 PROBLEM — K11.20 SIALADENITIS: Status: ACTIVE | Noted: 2020-01-01

## 2020-02-17 NOTE — PROGRESS NOTES
Ladarius Braxton Jr, MD     ENT FOLLOW UP NOTE     Chief Complaint   Patient presents with   • Ear Problem     left ear pain        HISTORY OF PRESENT ILLNESS:  Accompanied by:  Wife, Daughter  Jesús Long is a  88 y.o. male who is here for follow up. He is here as hospital follow up.  He has been discharged on antibiotics for L mastoiditis.  He still complains L ear pain.  He is on po antibiotics.  Daughter says face was swollen this AM. He is better today.  No drainage from ear.  Daughter says he is starting to swell in ankles.    Review of Systems  Reviewed per patient intake note and confirmed by me    Past History:  Past medical and surgical history, family history and social history reviewed and updated when appropriate.  Current medications and allergies reviewed and updated when appropriate.  Allergies:  Patient has no known allergies.        Vital Signs:   Temp:  [98.2 °F (36.8 °C)] 98.2 °F (36.8 °C)  Heart Rate:  [117] 117  BP: (103)/(65) 103/65  EXAMINATION:  CONSTITUTIONAL:    well developed  well nourished  Sitting up in Wheelchair     BODY HABITUS:    Normal body habitus     COMMUNICATION:    able to communicate normally, hoarse vocal quality     HEAD:       temporal wasting     FACE:    R face non-tender, no swelling  L parotid at angle of mandible full and very tender, non-fluctuant, 2.5 cm     SALIVARY GLANDS:    left parotid-  tender over gland with mild swelling over angle of mandible, very tender  submandibular gland with no tenderness, no swelling, no masses   bilateral  sublingual gland with no tenderness, no swelling, no masses  Bilateral     EYE:    ocular motility normal, eyelids normal, orbits normal, no proptosis, conjunctiva clear, sclera non-icteric, pupils equal, round, reactive to light and accomodation     HEARING:      response to conversational voice decreased  Bilateral     EARS:    Otoscopic exam      Bilateral  Right  normal pinna with no lesions, Canals normal size and shape,  "Tympanic membranes normal, Ossicular chain intact, Middle ear clear  AS- EAC open, tympanic membrane looks normal, ME not well seen, no blood present     NOSE EXTERNAL:    APPEARANCE: normal, straight, with good projection, no tenderness, no lesions, no tenderness, Nasal valve collapse bilateral moderate  Nasal cannula- O2 present     NOSE INTERNAL:    Anterior rhinoscopy  Nasal mucosa- dry, peal, atrophic  Turbinates- Dry, peal and strophic  Septum- dry, peal, midline     ORAL CAVITY:    Normal lips with no lesions, dentition normal for age, FOM intact without lesions and normal salivary flow, Mucosa intact without lesions, Hard and soft palate normal without lesions     OROPHARYNX:    Direct examination  oropharyngeal mucosa normal, tonsil(s) with normal appearance       NECK:    normal appearance, no masses, no lesions, larynx normal mobility, trachea midline     LYMPH NODES :    no adenopathy     THYROID:    no overt thyromegaly, no tenderness, nodules or mass present on palpation, position midline     CHEST/RESPIRATORY:    respiratory effort normal, no rales, rubs or wheezing, no stridor, normal appearance to chest     CARDIOVASCULAR:      irregularly irregular     NEURO/PSYCHIATRIC :      orientation- not oriented well     mood- upbeat     affect- intact      RESULTS REVIEW:    No reports available for review   PATH:   Description    Specimen #1 is received in a formalin filled container, labeled with the patient's name, date of birth, and \"left ear canal skin\".  The specimen consists of a scaly flake of red-tan tissue measuring 0.2 by less than 0.1 by less than 0.1 cm.  The skin surface is red-brown and appears dark.  The specimen is inked blue, wrapped in lens paper, and submitted in block 1A.   Microscopic Description    Histologic sections show a collapsed cystlike structure with reactive squamous epithelium and patchy acute inflammation composed predominantly of neutrophils.               ASSESSMENT:   " Diagnosis Plan   1. Parotitis      L sided inferior angle  of mandible  Very tender   2. Facial pain, acute      From L parotitis   3. Otalgia, left      from L parotitis and radiation           PLAN:  Medical and surgical options were discussed including observation, continued medical management, medication modification and surgical management. Risks, benefits and alternatives were discussed and questions were answered. After considering the options, the patient decided to proceed with observation.  Patient has tender L parotid at angle of mandible. I feel this is causing L ear pain. tympanic membrane looks normal today.  Discussed options with wife and daughter. If they wish to consider surgery, I will get CT of mastoid and L parotid. If they do not wish surgery, I will hold on the CT scan. They will consider options and let me know.  In the meantime, I will start salivary gland care to see if this helps.  He is increased surgical risk for any procedure.  Stop ear drops  Salivary gland care- heat  Pain control  Follow up as needed or if family wishes to consider surgical option.  MY CHART:  Patient is Encouraged to enroll in My Chart  Encouraged to review data and findings in My Chart          Spouse, Daughter understand(s) and agree(s) with the treatment plan as described.    Return if symptoms worsen or fail to improve, for Recheck Parotid, L ear.     Ladarius Braxton Jr, MD  02/17/20  2:12 PM

## 2020-02-17 NOTE — PATIENT INSTRUCTIONS
CARE OF THE SALIVARY GLANDS     At times the salivary (spit) glands will swell or get infected, causing obstruction to the flow of saliva. This swelling can occur in front of or underneath the ear, underneath the jaw in the upper neck area, or in the floor of the mouth.     There are several things to do in order to get relief. Basically you will be trying to clean out the old secretions, to prevent build-up and pressure. This will lower the risk of infection as well.     -Drink plenty of water- this thins the secretions by preventing dehydration.                                     -Massage the affected gland- this will force the saliva out and hopefully clear any blockage or a stone.                                                                                                                  -Suck on lemon wedges- Lemon wedges will cause a tight feeling in the cheeks, but will force the gland to squeeze down on itself, clearing the gland. Use real lemon or lime wedges. Avoid candies, as the sugar will thicken the secretions.     -Warm soaks to the affected side, frequently. Use washcloths warmed in the microwave. Do not sleep with a heating pad.                                         -Pain relievers- prescribed as needed. Advil/Ibuprofen/Aleve can be very helpful, used as directed on the bottle.                                                                        -Antibiotics- used if infection is present.    Stop ear drops  Pain control for L ear and face pain    Heat to L face    Thank you for enrolling in [facility]. Please follow the instructions below to securely access your online medical record. [facility] [facility] allows you to send messages to your doctor, view your test results, renew your prescriptions, schedule appointments, and more.    How Do I Sign Up?  1. In your Internet browser, go to [employer]  2. Click on the Sign Up Now link in the New User? box.   3. Enter your [facility] Activation Code  exactly as it appears below. You will not need to use this code after you have completed the sign-up process. If you do not sign up before the expiration date, you must request a new code.  [facility] Activation Code: Activation code not generated  Current [facility] Status: Account disabled    4. Enter the last four digits of your Social Security Number and your Date of Birth as indicated and click Next. You will be taken to the next sign-up page.  5. Create a [employer] [facility] username. Think of one that is secure and easy to remember.  6. Create a [employer] [facility] password. You can change your password at any time.  7. Choose a security question, enter your answer, and click Next. This can be used to access [facility] if you forget your password.   8. Select your communication preference. Enter a valid e-mail address if you would like to receive e-mail notifications when new information is available in [facility].  9. Click Sign In. You can now view your medical record.     Additional Information  If you have questions, you can e-mail [email or web address], or call [phone] to talk to our [employer] staff. Remember, [employer] [facility] is NOT to be used for urgent needs. For medical emergencies, dial 911.

## 2020-02-17 NOTE — PROGRESS NOTES
Estefany Mcdonald LPN   Patient Intake Note    Review of Systems  Review of Systems   Constitutional: Negative for chills, fatigue and fever.   HENT:        See hPII   Respiratory: Negative for cough, choking and shortness of breath.    Gastrointestinal: Negative for diarrhea, nausea and vomiting.   Neurological: Negative for dizziness, light-headedness and headaches.   Psychiatric/Behavioral: Positive for sleep disturbance.       Tobacco Use: Screening and Cessation Intervention  Social History    Tobacco Use      Smoking status: Former Smoker        Types: Cigarettes        Quit date: 1986        Years since quittin.2      Smokeless tobacco: Former User        Estefany Mcdonald LPN  2020  1:37 PM

## 2020-03-02 NOTE — PROGRESS NOTES
Single Chamber Ventricular Pacemaker Evaluation Report  [facility]    March 2, 2020    Primary Cardiologist: Jessica  : [employer] Model: Adapta ADDR01  Implant date: 7/2/2013     Reason for evaluation: routine  Indication for pacemaker: sick sinus syndrome    Measurements  Ventricular sensing - R wave: >=80% Paced   Ventricular threshold: 0.375 V @ 0.4 ms  Ventricular lead impedance: 493 ohms      Diagnostic Data  Paced: 96.9 %  Other: No new episodes.  Battery status: satisfactory   4 years       Final Parameters  Mode: VVIR     Lower rate: 60 bpm    Ventricular - Amplitude: 2 V Pulse width: 0.4 ms Sensitivity: 2.8 mV   Changes made: None  Conclusions: normal pacemaker function, stable pacing and sensing thresholds and adequate battery reserve    Follow up: 6 months

## 2020-03-06 NOTE — PROGRESS NOTES
I have reviewed the notes, assessments, and/or procedures performed by Shyann Gallardo , I concur with her  documentation  of Jesús Long.

## (undated) DEVICE — GLV SURG BIOGEL M LTX PF 7 1/2

## (undated) DEVICE — CUFF,BP,DISP,1 TUBE,ADULT,HP: Brand: MEDLINE

## (undated) DEVICE — TUBING, SUCTION, 1/4" X 12', STRAIGHT: Brand: MEDLINE

## (undated) DEVICE — BLD MYRNGTMY BEAVR LANCE/DWN/CUT NRW 45D

## (undated) DEVICE — PK TURNOVER RM ADV

## (undated) DEVICE — CONMED SCOPE SAVER BITE BLOCK, 20X27 MM: Brand: SCOPE SAVER

## (undated) DEVICE — TBG SMPL FLTR LINE NASL 02/C02 A/ BX/100

## (undated) DEVICE — TOWEL,OR,DSP,ST,BLUE,STD,4/PK,20PK/CS: Brand: MEDLINE

## (undated) DEVICE — ENDOGATOR AUXILIARY WATER JET CONNECTOR: Brand: ENDOGATOR

## (undated) DEVICE — STERILE COTTON BALLS LARGE 5/P: Brand: MEDLINE

## (undated) DEVICE — Device: Brand: DEFENDO AIR/WATER/SUCTION AND BIOPSY VALVE

## (undated) DEVICE — SENSR O2 OXIMAX FNGR A/ 18IN NONSTR

## (undated) DEVICE — SURGICAL SUCTION CONNECTING TUBE WITH MALE CONNECTOR AND SUCTION CLAMP, 2 FT. LONG (.6 M), 5 MM I.D.: Brand: CONMED

## (undated) DEVICE — THE CHANNEL CLEANING BRUSH IS A NYLON FLEXI BRUSH ATTACHED TO A FLEXIBLE PLASTIC SHEATH DESIGNED TO SAFELY REMOVE DEBRIS FROM FLEXIBLE ENDOSCOPES.